# Patient Record
Sex: MALE | Race: WHITE | NOT HISPANIC OR LATINO | Employment: OTHER | URBAN - METROPOLITAN AREA
[De-identification: names, ages, dates, MRNs, and addresses within clinical notes are randomized per-mention and may not be internally consistent; named-entity substitution may affect disease eponyms.]

---

## 2023-04-27 LAB
ANION GAP IN SER/PLAS: 11 MMOL/L (ref 10–20)
CALCIUM (MG/DL) IN SER/PLAS: 9.4 MG/DL (ref 8.6–10.3)
CARBON DIOXIDE, TOTAL (MMOL/L) IN SER/PLAS: 28 MMOL/L (ref 21–32)
CHLORIDE (MMOL/L) IN SER/PLAS: 104 MMOL/L (ref 98–107)
CHOLESTEROL (MG/DL) IN SER/PLAS: 123 MG/DL (ref 0–199)
CHOLESTEROL IN HDL (MG/DL) IN SER/PLAS: 41.1 MG/DL
CHOLESTEROL/HDL RATIO: 3
CREATININE (MG/DL) IN SER/PLAS: 0.91 MG/DL (ref 0.5–1.3)
ERYTHROCYTE DISTRIBUTION WIDTH (RATIO) BY AUTOMATED COUNT: 12.3 % (ref 11.5–14.5)
ERYTHROCYTE MEAN CORPUSCULAR HEMOGLOBIN CONCENTRATION (G/DL) BY AUTOMATED: 32.7 G/DL (ref 32–36)
ERYTHROCYTE MEAN CORPUSCULAR VOLUME (FL) BY AUTOMATED COUNT: 95 FL (ref 80–100)
ERYTHROCYTES (10*6/UL) IN BLOOD BY AUTOMATED COUNT: 5.23 X10E12/L (ref 4.5–5.9)
GFR MALE: >90 ML/MIN/1.73M2
GLUCOSE (MG/DL) IN SER/PLAS: 105 MG/DL (ref 74–99)
HEMATOCRIT (%) IN BLOOD BY AUTOMATED COUNT: 49.9 % (ref 41–52)
HEMOGLOBIN (G/DL) IN BLOOD: 16.3 G/DL (ref 13.5–17.5)
LDL: 69 MG/DL (ref 0–99)
LEUKOCYTES (10*3/UL) IN BLOOD BY AUTOMATED COUNT: 8.4 X10E9/L (ref 4.4–11.3)
PLATELETS (10*3/UL) IN BLOOD AUTOMATED COUNT: 261 X10E9/L (ref 150–450)
POTASSIUM (MMOL/L) IN SER/PLAS: 4.8 MMOL/L (ref 3.5–5.3)
SODIUM (MMOL/L) IN SER/PLAS: 138 MMOL/L (ref 136–145)
TRIGLYCERIDE (MG/DL) IN SER/PLAS: 64 MG/DL (ref 0–149)
UREA NITROGEN (MG/DL) IN SER/PLAS: 16 MG/DL (ref 6–23)
VLDL: 13 MG/DL (ref 0–40)

## 2023-11-17 ENCOUNTER — APPOINTMENT (OUTPATIENT)
Dept: RADIOLOGY | Facility: HOSPITAL | Age: 63
End: 2023-11-17
Payer: MEDICARE

## 2023-11-17 ENCOUNTER — HOSPITAL ENCOUNTER (EMERGENCY)
Facility: HOSPITAL | Age: 63
Discharge: HOME | End: 2023-11-17
Attending: EMERGENCY MEDICINE
Payer: MEDICARE

## 2023-11-17 VITALS
BODY MASS INDEX: 29.48 KG/M2 | DIASTOLIC BLOOD PRESSURE: 71 MMHG | WEIGHT: 187.83 LBS | TEMPERATURE: 97.2 F | HEIGHT: 67 IN | SYSTOLIC BLOOD PRESSURE: 108 MMHG | HEART RATE: 67 BPM | RESPIRATION RATE: 18 BRPM | OXYGEN SATURATION: 96 %

## 2023-11-17 DIAGNOSIS — R42 VERTIGO: Primary | ICD-10-CM

## 2023-11-17 LAB
ANION GAP SERPL CALC-SCNC: 10 MMOL/L (ref 10–20)
BASOPHILS # BLD AUTO: 0.07 X10*3/UL (ref 0–0.1)
BASOPHILS NFR BLD AUTO: 0.7 %
BUN SERPL-MCNC: 11 MG/DL (ref 6–23)
CALCIUM SERPL-MCNC: 9 MG/DL (ref 8.6–10.3)
CHLORIDE SERPL-SCNC: 99 MMOL/L (ref 98–107)
CO2 SERPL-SCNC: 31 MMOL/L (ref 21–32)
CREAT SERPL-MCNC: 1.03 MG/DL (ref 0.5–1.3)
EOSINOPHIL # BLD AUTO: 0.94 X10*3/UL (ref 0–0.7)
EOSINOPHIL NFR BLD AUTO: 8.9 %
ERYTHROCYTE [DISTWIDTH] IN BLOOD BY AUTOMATED COUNT: 12.2 % (ref 11.5–14.5)
GFR SERPL CREATININE-BSD FRML MDRD: 81 ML/MIN/1.73M*2
GLUCOSE SERPL-MCNC: 144 MG/DL (ref 74–99)
HCT VFR BLD AUTO: 49.5 % (ref 41–52)
HGB BLD-MCNC: 15.7 G/DL (ref 13.5–17.5)
IMM GRANULOCYTES # BLD AUTO: 0.03 X10*3/UL (ref 0–0.7)
IMM GRANULOCYTES NFR BLD AUTO: 0.3 % (ref 0–0.9)
LYMPHOCYTES # BLD AUTO: 3.01 X10*3/UL (ref 1.2–4.8)
LYMPHOCYTES NFR BLD AUTO: 28.5 %
MCH RBC QN AUTO: 30.7 PG (ref 26–34)
MCHC RBC AUTO-ENTMCNC: 31.7 G/DL (ref 32–36)
MCV RBC AUTO: 97 FL (ref 80–100)
MONOCYTES # BLD AUTO: 0.59 X10*3/UL (ref 0.1–1)
MONOCYTES NFR BLD AUTO: 5.6 %
NEUTROPHILS # BLD AUTO: 5.92 X10*3/UL (ref 1.2–7.7)
NEUTROPHILS NFR BLD AUTO: 56 %
NRBC BLD-RTO: 0 /100 WBCS (ref 0–0)
PLATELET # BLD AUTO: 296 X10*3/UL (ref 150–450)
POTASSIUM SERPL-SCNC: 4.5 MMOL/L (ref 3.5–5.3)
RBC # BLD AUTO: 5.11 X10*6/UL (ref 4.5–5.9)
SODIUM SERPL-SCNC: 135 MMOL/L (ref 136–145)
WBC # BLD AUTO: 10.6 X10*3/UL (ref 4.4–11.3)

## 2023-11-17 PROCEDURE — 85025 COMPLETE CBC W/AUTO DIFF WBC: CPT | Performed by: EMERGENCY MEDICINE

## 2023-11-17 PROCEDURE — 2500000004 HC RX 250 GENERAL PHARMACY W/ HCPCS (ALT 636 FOR OP/ED): Performed by: EMERGENCY MEDICINE

## 2023-11-17 PROCEDURE — 70450 CT HEAD/BRAIN W/O DYE: CPT

## 2023-11-17 PROCEDURE — 99285 EMERGENCY DEPT VISIT HI MDM: CPT | Mod: 25 | Performed by: EMERGENCY MEDICINE

## 2023-11-17 PROCEDURE — 96361 HYDRATE IV INFUSION ADD-ON: CPT

## 2023-11-17 PROCEDURE — 70450 CT HEAD/BRAIN W/O DYE: CPT | Performed by: SURGERY

## 2023-11-17 PROCEDURE — 99284 EMERGENCY DEPT VISIT MOD MDM: CPT | Mod: 25

## 2023-11-17 PROCEDURE — 2500000001 HC RX 250 WO HCPCS SELF ADMINISTERED DRUGS (ALT 637 FOR MEDICARE OP): Performed by: EMERGENCY MEDICINE

## 2023-11-17 PROCEDURE — 36415 COLL VENOUS BLD VENIPUNCTURE: CPT | Performed by: EMERGENCY MEDICINE

## 2023-11-17 PROCEDURE — 96374 THER/PROPH/DIAG INJ IV PUSH: CPT

## 2023-11-17 PROCEDURE — 82435 ASSAY OF BLOOD CHLORIDE: CPT | Performed by: EMERGENCY MEDICINE

## 2023-11-17 RX ORDER — ONDANSETRON HYDROCHLORIDE 2 MG/ML
4 INJECTION, SOLUTION INTRAVENOUS ONCE
Status: COMPLETED | OUTPATIENT
Start: 2023-11-17 | End: 2023-11-17

## 2023-11-17 RX ORDER — MECLIZINE HYDROCHLORIDE 25 MG/1
25 TABLET ORAL ONCE
Status: COMPLETED | OUTPATIENT
Start: 2023-11-17 | End: 2023-11-17

## 2023-11-17 RX ORDER — ONDANSETRON 8 MG/1
8 TABLET, ORALLY DISINTEGRATING ORAL EVERY 8 HOURS PRN
Qty: 20 TABLET | Refills: 0 | Status: SHIPPED | OUTPATIENT
Start: 2023-11-17

## 2023-11-17 RX ORDER — MECLIZINE HYDROCHLORIDE 25 MG/1
25 TABLET ORAL 3 TIMES DAILY PRN
Qty: 20 TABLET | Refills: 0 | Status: SHIPPED | OUTPATIENT
Start: 2023-11-17

## 2023-11-17 RX ADMIN — MECLIZINE HYDROCHLORIDE 25 MG: 25 TABLET ORAL at 03:49

## 2023-11-17 RX ADMIN — ONDANSETRON 4 MG: 2 INJECTION INTRAMUSCULAR; INTRAVENOUS at 03:47

## 2023-11-17 RX ADMIN — SODIUM CHLORIDE 500 ML: 9 INJECTION, SOLUTION INTRAVENOUS at 03:39

## 2023-11-17 ASSESSMENT — LIFESTYLE VARIABLES
EVER FELT BAD OR GUILTY ABOUT YOUR DRINKING: NO
HAVE YOU EVER FELT YOU SHOULD CUT DOWN ON YOUR DRINKING: NO
HAVE PEOPLE ANNOYED YOU BY CRITICIZING YOUR DRINKING: NO
EVER HAD A DRINK FIRST THING IN THE MORNING TO STEADY YOUR NERVES TO GET RID OF A HANGOVER: NO
REASON UNABLE TO ASSESS: NO

## 2023-11-17 ASSESSMENT — PAIN - FUNCTIONAL ASSESSMENT: PAIN_FUNCTIONAL_ASSESSMENT: 0-10

## 2023-11-17 ASSESSMENT — PAIN SCALES - GENERAL: PAINLEVEL_OUTOF10: 0 - NO PAIN

## 2023-11-17 NOTE — ED PROVIDER NOTES
HPI   Chief Complaint   Patient presents with   • Dizziness   • n/v       Patient presents with dizziness.  He states that started 1 hour when he woke up.  He describes as a room spinning sensation.  He has felt nauseated as well.  He states that when he stands up or sits up it gets worse.  Denies any head trauma.  He has no chest pain.  Does have shortness of breath he has short of breath all the time.  He took no medications for this at home.  No history of vertigo.  Has been eating and drinking normally.  EMS was called.  He was 87% on room air so they placed him on oxygen.  He states he has been trying to get oxygen at home because of his COPD.  His normal pulse ox is around 88%.                            No data recorded                Patient History   Past Medical History:   Diagnosis Date   • Chronic systolic (congestive) heart failure (CMS/HCC) 09/22/2020    Chronic systolic heart failure   • Old myocardial infarction     Old non-ST elevation myocardial infarction (NSTEMI)   • Old myocardial infarction 09/22/2020    History of ST elevation myocardial infarction (STEMI)   • Old myocardial infarction     History of ST elevation myocardial infarction (STEMI)   • Personal history of other diseases of the circulatory system     History of heart disease   • Personal history of other endocrine, nutritional and metabolic disease     History of hyperlipidemia     Past Surgical History:   Procedure Laterality Date   • MANDIBLE SURGERY  06/01/2016    Jaw Surgery   • OTHER SURGICAL HISTORY  06/29/2020    Coronary artery stent placement   • TONSILLECTOMY  06/01/2016    Tonsillectomy With Adenoidectomy   • VASECTOMY  06/01/2016    Surgery Vas Deferens Vasectomy     No family history on file.  Social History     Tobacco Use   • Smoking status: Not on file   • Smokeless tobacco: Not on file   Substance Use Topics   • Alcohol use: Not on file   • Drug use: Not on file       Physical Exam   ED Triage Vitals   Temp Pulse  Resp BP   -- -- -- --      SpO2 Temp src Heart Rate Source Patient Position   -- -- -- --      BP Location FiO2 (%)     -- --       Physical Exam  Vitals and nursing note reviewed.   Constitutional:       Appearance: Normal appearance.   HENT:      Head: Normocephalic and atraumatic.      Mouth/Throat:      Mouth: Mucous membranes are moist.   Eyes:      Extraocular Movements: Extraocular movements intact.      Pupils: Pupils are equal, round, and reactive to light.      Comments: Bilateral nystagmus with 3 beats on each side.  This did not exacerbate his symptoms.   Cardiovascular:      Rate and Rhythm: Normal rate and regular rhythm.      Heart sounds: No murmur heard.  Pulmonary:      Effort: Pulmonary effort is normal. No respiratory distress.      Breath sounds: Normal breath sounds.   Abdominal:      General: There is no distension.      Palpations: Abdomen is soft.      Tenderness: There is no abdominal tenderness.   Musculoskeletal:         General: No tenderness or deformity. Normal range of motion.      Right lower leg: No edema.      Left lower leg: No edema.   Skin:     General: Skin is warm and dry.      Findings: No lesion or rash.   Neurological:      General: No focal deficit present.      Mental Status: He is alert and oriented to person, place, and time.      Sensory: No sensory deficit.      Motor: No weakness.   Psychiatric:         Behavior: Behavior normal.       Labs Reviewed   CBC WITH AUTO DIFFERENTIAL   BASIC METABOLIC PANEL     CT head wo IV contrast    (Results Pending)     ED Course & MDM   Diagnoses as of 11/17/23 0527   Vertigo       Medical Decision Making  Differentials include vertigo, dehydration, intracranial abnormality. Imaging studies, if performed, were independently reviewed and interpreted by myself and confirmed by radiologist. EKG(s), if performed, were interpreted by myself.  Patient was given 500 cc of IV fluids.  EKG was sinus rhythm with a rate of 69.  No acute  ischemia noted.  QTc 428, VA interval 167.  Laboratory studies were obtained and only showed a sodium of 135 and glucose of 144.  Otherwise unremarkable.  CAT scan of the head is unremarkable as well.  He felt improvement with meclizine, IV fluids and Zofran.  Feel this is likely vertigo.  I have very low suspicion for a posterior circulation issue of the brain.  This does seem peripheral in nature.  I feel that he can be discharged home with meclizine and Zofran.  Patient will be discharged to follow-up with his PCP.        Procedure  Procedures     Ignacio Garcia MD  11/17/23 0579

## 2024-03-16 RX ORDER — METFORMIN HYDROCHLORIDE 500 MG/1
500 TABLET, EXTENDED RELEASE ORAL 2 TIMES DAILY
COMMUNITY
Start: 2020-06-25

## 2024-03-16 RX ORDER — ALBUTEROL SULFATE 90 UG/1
2 AEROSOL, METERED RESPIRATORY (INHALATION) EVERY 6 HOURS PRN
COMMUNITY
Start: 2020-06-24

## 2024-03-16 RX ORDER — ASPIRIN 81 MG/1
81 TABLET ORAL DAILY
COMMUNITY
Start: 2020-06-25

## 2024-03-16 RX ORDER — TIOTROPIUM BROMIDE 18 UG/1
1 CAPSULE ORAL; RESPIRATORY (INHALATION)
COMMUNITY
Start: 2020-08-11

## 2024-03-16 RX ORDER — CARVEDILOL 6.25 MG/1
6.25 TABLET ORAL
COMMUNITY
Start: 2020-06-25 | End: 2024-03-20 | Stop reason: SDUPTHER

## 2024-03-16 RX ORDER — LISINOPRIL 5 MG/1
5 TABLET ORAL DAILY
COMMUNITY
Start: 2015-06-01 | End: 2024-03-20 | Stop reason: SDUPTHER

## 2024-03-16 RX ORDER — NITROGLYCERIN 0.4 MG/1
0.4 TABLET SUBLINGUAL EVERY 5 MIN PRN
COMMUNITY
Start: 2020-06-25 | End: 2024-03-20 | Stop reason: WASHOUT

## 2024-03-16 RX ORDER — ATORVASTATIN CALCIUM 80 MG/1
80 TABLET, FILM COATED ORAL NIGHTLY
COMMUNITY
Start: 2020-06-25 | End: 2024-03-20 | Stop reason: SDUPTHER

## 2024-03-16 RX ORDER — FLUTICASONE PROPIONATE AND SALMETEROL 100; 50 UG/1; UG/1
1 POWDER RESPIRATORY (INHALATION) EVERY 12 HOURS
COMMUNITY
Start: 2020-08-11

## 2024-03-18 PROBLEM — I25.10 ASHD (ARTERIOSCLEROTIC HEART DISEASE): Status: ACTIVE | Noted: 2024-03-18

## 2024-03-18 PROBLEM — E78.5 HYPERLIPIDEMIA: Status: ACTIVE | Noted: 2024-03-18

## 2024-03-18 PROBLEM — F17.200 TOBACCO DEPENDENCY: Status: ACTIVE | Noted: 2024-03-18

## 2024-03-18 PROBLEM — Z95.5 PRESENCE OF STENT IN LAD CORONARY ARTERY: Status: ACTIVE | Noted: 2024-03-18

## 2024-03-20 ENCOUNTER — OFFICE VISIT (OUTPATIENT)
Dept: CARDIOLOGY | Facility: CLINIC | Age: 64
End: 2024-03-20
Payer: MEDICARE

## 2024-03-20 VITALS
WEIGHT: 190 LBS | SYSTOLIC BLOOD PRESSURE: 102 MMHG | HEIGHT: 67 IN | BODY MASS INDEX: 29.82 KG/M2 | DIASTOLIC BLOOD PRESSURE: 60 MMHG | HEART RATE: 70 BPM

## 2024-03-20 DIAGNOSIS — E78.5 HYPERLIPIDEMIA, UNSPECIFIED HYPERLIPIDEMIA TYPE: ICD-10-CM

## 2024-03-20 DIAGNOSIS — I25.10 ASHD (ARTERIOSCLEROTIC HEART DISEASE): ICD-10-CM

## 2024-03-20 DIAGNOSIS — Z95.5 PRESENCE OF STENT IN LAD CORONARY ARTERY: Primary | ICD-10-CM

## 2024-03-20 DIAGNOSIS — F17.200 TOBACCO DEPENDENCY: ICD-10-CM

## 2024-03-20 PROCEDURE — 93000 ELECTROCARDIOGRAM COMPLETE: CPT | Performed by: INTERNAL MEDICINE

## 2024-03-20 PROCEDURE — 1160F RVW MEDS BY RX/DR IN RCRD: CPT | Performed by: INTERNAL MEDICINE

## 2024-03-20 PROCEDURE — 99214 OFFICE O/P EST MOD 30 MIN: CPT | Performed by: INTERNAL MEDICINE

## 2024-03-20 PROCEDURE — 1159F MED LIST DOCD IN RCRD: CPT | Performed by: INTERNAL MEDICINE

## 2024-03-20 RX ORDER — ATORVASTATIN CALCIUM 80 MG/1
80 TABLET, FILM COATED ORAL NIGHTLY
Qty: 90 TABLET | Refills: 3 | Status: SHIPPED | OUTPATIENT
Start: 2024-03-20

## 2024-03-20 RX ORDER — CARVEDILOL 6.25 MG/1
6.25 TABLET ORAL
Qty: 180 TABLET | Refills: 3 | Status: SHIPPED | OUTPATIENT
Start: 2024-03-20

## 2024-03-20 RX ORDER — LISINOPRIL 5 MG/1
5 TABLET ORAL DAILY
Qty: 90 TABLET | Refills: 3 | Status: SHIPPED | OUTPATIENT
Start: 2024-03-20

## 2024-03-20 ASSESSMENT — ENCOUNTER SYMPTOMS
OCCASIONAL FEELINGS OF UNSTEADINESS: 0
LOSS OF SENSATION IN FEET: 0
DEPRESSION: 0

## 2024-03-20 NOTE — PROGRESS NOTES
"Chief Complaint:   Follow-up (annual)     History Of Present Illness:    Darron Resendez is a 63 y.o. male here for annual follow-up.  He has a previous history of PCI to LAD in 2011 and hyperlipidemia, LV dysfunction. He got lost to follow-up and stopped taking his medications and presented with STEMI in July 2020 and had urgent PCI to LAD on June 22, 2020. Post MI ejection fraction was 35% but more recently improved to 65% in July 2020.     He is here for follow-up. He is doing well and does not have palpitations, ankle swelling, lightheadedness or loss of consciousness.  He has chronic shortness of breath with exertion.  He is not interested in quitting smoking.            EKG showed sinus rhythm with old anteroseptal MI. His last lipid profile from April 2023 was favorable.     Last Recorded Vitals:  Vitals:    03/20/24 1111   BP: 102/60   BP Location: Left arm   Pulse: 70   Weight: 86.2 kg (190 lb)   Height: 1.702 m (5' 7\")       Past Medical History:  He has a past medical history of Chronic systolic (congestive) heart failure (CMS/HCC) (09/22/2020), Old myocardial infarction, Old myocardial infarction (09/22/2020), Old myocardial infarction, Personal history of other diseases of the circulatory system, and Personal history of other endocrine, nutritional and metabolic disease.    Past Surgical History:  He has a past surgical history that includes Tonsillectomy (06/01/2016); Vasectomy (06/01/2016); Mandible surgery (06/01/2016); and Other surgical history (06/29/2020).      Social History:  He reports that he has been smoking cigarettes. He has been smoking an average of 1.5 packs per day. He has never used smokeless tobacco. He reports that he does not currently use alcohol. He reports that he does not use drugs.    Family History:  No family history on file.     Allergies:  Penicillin g    Outpatient Medications:  Current Outpatient Medications   Medication Instructions    albuterol 90 mcg/actuation inhaler 2 " puffs, inhalation, Every 6 hours PRN    aspirin 81 mg, oral, Daily    atorvastatin (LIPITOR) 80 mg, oral, Nightly    carvedilol (COREG) 6.25 mg, oral, 2 times daily with meals    fluticasone propion-salmeteroL (Advair Diskus) 100-50 mcg/dose diskus inhaler 1 puff, inhalation, Every 12 hours    lisinopril 5 mg, oral, Daily    meclizine (ANTIVERT) 25 mg, oral, 3 times daily PRN    metFORMIN XR (GLUCOPHAGE-XR) 500 mg, oral, 2 times daily    nitroglycerin (NITROSTAT) 0.4 mg, sublingual, Every 5 min PRN    ondansetron ODT (ZOFRAN-ODT) 8 mg, oral, Every 8 hours PRN    tiotropium (Spiriva) 18 mcg inhalation capsule 1 capsule, inhalation, Daily RT       Physical Exam:  Physical Exam  Vitals reviewed.   Constitutional:       Appearance: Normal appearance.   Neck:      Vascular: No carotid bruit or JVD.   Cardiovascular:      Rate and Rhythm: Normal rate and regular rhythm.      Heart sounds: Normal heart sounds, S1 normal and S2 normal. No murmur heard.  Pulmonary:      Effort: Pulmonary effort is normal.      Breath sounds: Examination of the left-lower field reveals wheezing. Wheezing present.   Abdominal:      General: Abdomen is flat. Bowel sounds are normal.      Palpations: Abdomen is soft.   Musculoskeletal:      Right lower leg: No edema.      Left lower leg: No edema.   Skin:     General: Skin is warm.   Neurological:      Mental Status: He is alert. Mental status is at baseline.   Psychiatric:         Mood and Affect: Mood normal.         Behavior: Behavior normal.          Last Labs:  CBC -  Lab Results   Component Value Date    WBC 10.6 11/17/2023    HGB 15.7 11/17/2023    HCT 49.5 11/17/2023    MCV 97 11/17/2023     11/17/2023       CMP -  Lab Results   Component Value Date    CALCIUM 9.0 11/17/2023    PROT 6.3 (L) 07/08/2020    ALBUMIN 3.4 07/08/2020    AST 11 07/08/2020    ALT 15 07/08/2020    ALKPHOS 42 07/08/2020    BILITOT 0.4 07/08/2020       LIPID PANEL -   Lab Results   Component Value Date     "CHOL 123 04/27/2023    TRIG 64 04/27/2023    HDL 41.1 04/27/2023    CHHDL 3.0 04/27/2023    LDLF 69 04/27/2023    VLDL 13 04/27/2023       RENAL FUNCTION PANEL -   Lab Results   Component Value Date    GLUCOSE 144 (H) 11/17/2023     (L) 11/17/2023    K 4.5 11/17/2023    CL 99 11/17/2023    CO2 31 11/17/2023    ANIONGAP 10 11/17/2023    BUN 11 11/17/2023    CREATININE 1.03 11/17/2023    GFRMALE >90 04/27/2023    CALCIUM 9.0 11/17/2023    ALBUMIN 3.4 07/08/2020        Lab Results   Component Value Date    BNP 20 08/07/2020    HGBA1C 6.5 06/23/2020       Last Cardiology Tests:  ECG:  ECG 12 Lead       Echo:  No results found for this or any previous visit from the past 1095 days.      Ejection Fractions:  No results found for: \"EF\"    Cath:  No results found for this or any previous visit from the past 1095 days.      Stress Test:  No results found for this or any previous visit from the past 1095 days.      Cardiac Imaging:  No results found for this or any previous visit from the past 1095 days.          Assessment/Plan   In summary Mr. Resendez is a 63-year-old gentleman with coronary artery disease, prior history of STEMI.     1-CAD-clinically doing well and is on appropriate medical therapy.   2-hyperlipidemia-lipid profile favorable.    3-cardiomyopathy/hypertension-LV function has improved post MI continue current medications blood pressure well-controlled.    4-tobacco use-not interested in quitting.    Corrine Zelaya MD  "

## 2024-04-02 ENCOUNTER — TELEPHONE (OUTPATIENT)
Dept: CARDIOLOGY | Facility: CLINIC | Age: 64
End: 2024-04-02
Payer: MEDICARE

## 2024-04-02 NOTE — TELEPHONE ENCOUNTER
----- Message from Britney Pham sent at 3/28/2024  4:01 PM EDT -----  Regarding: med refill  Atorvastatin, lisinopril, and carvedilol. Uses [facility] in [place]

## 2024-07-01 ENCOUNTER — APPOINTMENT (OUTPATIENT)
Dept: CARDIOLOGY | Facility: HOSPITAL | Age: 64
End: 2024-07-01
Payer: MEDICARE

## 2024-07-01 ENCOUNTER — APPOINTMENT (OUTPATIENT)
Dept: RADIOLOGY | Facility: HOSPITAL | Age: 64
End: 2024-07-01
Payer: MEDICARE

## 2024-07-01 ENCOUNTER — HOSPITAL ENCOUNTER (INPATIENT)
Facility: HOSPITAL | Age: 64
End: 2024-07-01
Attending: EMERGENCY MEDICINE | Admitting: INTERNAL MEDICINE
Payer: MEDICARE

## 2024-07-01 DIAGNOSIS — J96.21 ACUTE ON CHRONIC RESPIRATORY FAILURE WITH HYPOXIA (MULTI): Primary | ICD-10-CM

## 2024-07-01 DIAGNOSIS — E86.0 DEHYDRATION: ICD-10-CM

## 2024-07-01 DIAGNOSIS — A41.9 SEPTIC SHOCK (MULTI): ICD-10-CM

## 2024-07-01 DIAGNOSIS — J44.1 COPD EXACERBATION (MULTI): ICD-10-CM

## 2024-07-01 DIAGNOSIS — R65.21 SEPTIC SHOCK (MULTI): ICD-10-CM

## 2024-07-01 DIAGNOSIS — R13.12 OROPHARYNGEAL DYSPHAGIA: ICD-10-CM

## 2024-07-01 PROBLEM — N17.9 AKI (ACUTE KIDNEY INJURY) (CMS-HCC): Status: ACTIVE | Noted: 2024-07-01

## 2024-07-01 PROBLEM — I21.9 MYOCARDIAL INFARCTION (MULTI): Status: RESOLVED | Noted: 2024-07-01 | Resolved: 2024-07-01

## 2024-07-01 PROBLEM — I10 HTN (HYPERTENSION): Status: ACTIVE | Noted: 2024-07-01

## 2024-07-01 PROBLEM — F17.210 CIGARETTE NICOTINE DEPENDENCE: Status: ACTIVE | Noted: 2024-03-18

## 2024-07-01 PROBLEM — E87.1 HYPONATREMIA: Status: ACTIVE | Noted: 2024-07-01

## 2024-07-01 PROBLEM — R06.82 TACHYPNEA: Status: ACTIVE | Noted: 2024-07-01

## 2024-07-01 PROBLEM — R73.9 HYPERGLYCEMIA: Status: ACTIVE | Noted: 2024-07-01

## 2024-07-01 PROBLEM — Z95.5 PRESENCE OF STENT IN ANTERIOR DESCENDING BRANCH OF LEFT CORONARY ARTERY: Status: RESOLVED | Noted: 2024-07-01 | Resolved: 2024-07-01

## 2024-07-01 LAB
ANION GAP SERPL CALC-SCNC: 14 MMOL/L (ref 10–20)
ATRIAL RATE: 76 BPM
BASE EXCESS BLDV CALC-SCNC: 3.1 MMOL/L (ref -2–3)
BASOPHILS # BLD AUTO: 0.08 X10*3/UL (ref 0–0.1)
BASOPHILS NFR BLD AUTO: 0.6 %
BNP SERPL-MCNC: 122 PG/ML (ref 0–99)
BODY TEMPERATURE: 37 DEGREES CELSIUS
BUN SERPL-MCNC: 64 MG/DL (ref 6–23)
CALCIUM SERPL-MCNC: 8.9 MG/DL (ref 8.6–10.3)
CARDIAC TROPONIN I PNL SERPL HS: 14 NG/L (ref 0–20)
CARDIAC TROPONIN I PNL SERPL HS: 15 NG/L (ref 0–20)
CHLORIDE SERPL-SCNC: 92 MMOL/L (ref 98–107)
CO2 SERPL-SCNC: 29 MMOL/L (ref 21–32)
CREAT SERPL-MCNC: 2.12 MG/DL (ref 0.5–1.3)
EGFRCR SERPLBLD CKD-EPI 2021: 34 ML/MIN/1.73M*2
EOSINOPHIL # BLD AUTO: 0.09 X10*3/UL (ref 0–0.7)
EOSINOPHIL NFR BLD AUTO: 0.7 %
ERYTHROCYTE [DISTWIDTH] IN BLOOD BY AUTOMATED COUNT: 12.6 % (ref 11.5–14.5)
GLUCOSE SERPL-MCNC: 220 MG/DL (ref 74–99)
HCO3 BLDV-SCNC: 33 MMOL/L (ref 22–26)
HCT VFR BLD AUTO: 43.9 % (ref 41–52)
HGB BLD-MCNC: 14.3 G/DL (ref 13.5–17.5)
IMM GRANULOCYTES # BLD AUTO: 0.09 X10*3/UL (ref 0–0.7)
IMM GRANULOCYTES NFR BLD AUTO: 0.7 % (ref 0–0.9)
INHALED O2 CONCENTRATION: 100 %
LACTATE SERPL-SCNC: 1.4 MMOL/L (ref 0.4–2)
LYMPHOCYTES # BLD AUTO: 0.77 X10*3/UL (ref 1.2–4.8)
LYMPHOCYTES NFR BLD AUTO: 6 %
MCH RBC QN AUTO: 31.5 PG (ref 26–34)
MCHC RBC AUTO-ENTMCNC: 32.6 G/DL (ref 32–36)
MCV RBC AUTO: 97 FL (ref 80–100)
MONOCYTES # BLD AUTO: 0.9 X10*3/UL (ref 0.1–1)
MONOCYTES NFR BLD AUTO: 7 %
NEUTROPHILS # BLD AUTO: 11.01 X10*3/UL (ref 1.2–7.7)
NEUTROPHILS NFR BLD AUTO: 85 %
NRBC BLD-RTO: 0 /100 WBCS (ref 0–0)
OXYHGB MFR BLDV: 23.1 % (ref 45–75)
P AXIS: 75 DEGREES
PCO2 BLDV: 77 MM HG (ref 41–51)
PH BLDV: 7.24 PH (ref 7.33–7.43)
PLATELET # BLD AUTO: 260 X10*3/UL (ref 150–450)
PO2 BLDV: 21 MM HG (ref 35–45)
POTASSIUM SERPL-SCNC: 4.6 MMOL/L (ref 3.5–5.3)
PR INTERVAL: 144 MS
Q ONSET: 252 MS
QRS COUNT: 12 BEATS
QRS DURATION: 89 MS
QT INTERVAL: 333 MS
QTC CALCULATION(BAZETT): 375 MS
QTC FREDERICIA: 360 MS
R AXIS: 77 DEGREES
RBC # BLD AUTO: 4.54 X10*6/UL (ref 4.5–5.9)
SAO2 % BLDV: 23 % (ref 45–75)
SARS-COV-2 RNA RESP QL NAA+PROBE: NOT DETECTED
SODIUM SERPL-SCNC: 130 MMOL/L (ref 136–145)
T AXIS: 44 DEGREES
T OFFSET: 419 MS
VENTRICULAR RATE: 76 BPM
WBC # BLD AUTO: 12.9 X10*3/UL (ref 4.4–11.3)

## 2024-07-01 PROCEDURE — 94660 CPAP INITIATION&MGMT: CPT

## 2024-07-01 PROCEDURE — 71045 X-RAY EXAM CHEST 1 VIEW: CPT | Mod: FOREIGN READ | Performed by: RADIOLOGY

## 2024-07-01 PROCEDURE — 85025 COMPLETE CBC W/AUTO DIFF WBC: CPT | Performed by: EMERGENCY MEDICINE

## 2024-07-01 PROCEDURE — 2500000005 HC RX 250 GENERAL PHARMACY W/O HCPCS

## 2024-07-01 PROCEDURE — 84484 ASSAY OF TROPONIN QUANT: CPT | Performed by: EMERGENCY MEDICINE

## 2024-07-01 PROCEDURE — 2500000004 HC RX 250 GENERAL PHARMACY W/ HCPCS (ALT 636 FOR OP/ED): Performed by: STUDENT IN AN ORGANIZED HEALTH CARE EDUCATION/TRAINING PROGRAM

## 2024-07-01 PROCEDURE — 87635 SARS-COV-2 COVID-19 AMP PRB: CPT | Performed by: EMERGENCY MEDICINE

## 2024-07-01 PROCEDURE — 2500000005 HC RX 250 GENERAL PHARMACY W/O HCPCS: Performed by: EMERGENCY MEDICINE

## 2024-07-01 PROCEDURE — 94640 AIRWAY INHALATION TREATMENT: CPT | Mod: 59

## 2024-07-01 PROCEDURE — 2500000004 HC RX 250 GENERAL PHARMACY W/ HCPCS (ALT 636 FOR OP/ED): Mod: JZ

## 2024-07-01 PROCEDURE — 96375 TX/PRO/DX INJ NEW DRUG ADDON: CPT

## 2024-07-01 PROCEDURE — 87040 BLOOD CULTURE FOR BACTERIA: CPT | Mod: PORLAB | Performed by: EMERGENCY MEDICINE

## 2024-07-01 PROCEDURE — 2500000001 HC RX 250 WO HCPCS SELF ADMINISTERED DRUGS (ALT 637 FOR MEDICARE OP): Performed by: STUDENT IN AN ORGANIZED HEALTH CARE EDUCATION/TRAINING PROGRAM

## 2024-07-01 PROCEDURE — 5A09357 ASSISTANCE WITH RESPIRATORY VENTILATION, LESS THAN 24 CONSECUTIVE HOURS, CONTINUOUS POSITIVE AIRWAY PRESSURE: ICD-10-PCS | Performed by: INTERNAL MEDICINE

## 2024-07-01 PROCEDURE — 82805 BLOOD GASES W/O2 SATURATION: CPT | Performed by: EMERGENCY MEDICINE

## 2024-07-01 PROCEDURE — 9420000001 HC RT PATIENT EDUCATION 5 MIN

## 2024-07-01 PROCEDURE — 80048 BASIC METABOLIC PNL TOTAL CA: CPT | Performed by: EMERGENCY MEDICINE

## 2024-07-01 PROCEDURE — 36415 COLL VENOUS BLD VENIPUNCTURE: CPT | Performed by: EMERGENCY MEDICINE

## 2024-07-01 PROCEDURE — 96361 HYDRATE IV INFUSION ADD-ON: CPT

## 2024-07-01 PROCEDURE — 99291 CRITICAL CARE FIRST HOUR: CPT | Mod: 25 | Performed by: EMERGENCY MEDICINE

## 2024-07-01 PROCEDURE — 94640 AIRWAY INHALATION TREATMENT: CPT

## 2024-07-01 PROCEDURE — 2500000002 HC RX 250 W HCPCS SELF ADMINISTERED DRUGS (ALT 637 FOR MEDICARE OP, ALT 636 FOR OP/ED): Performed by: EMERGENCY MEDICINE

## 2024-07-01 PROCEDURE — 99406 BEHAV CHNG SMOKING 3-10 MIN: CPT | Performed by: STUDENT IN AN ORGANIZED HEALTH CARE EDUCATION/TRAINING PROGRAM

## 2024-07-01 PROCEDURE — 99291 CRITICAL CARE FIRST HOUR: CPT | Performed by: STUDENT IN AN ORGANIZED HEALTH CARE EDUCATION/TRAINING PROGRAM

## 2024-07-01 PROCEDURE — 2500000002 HC RX 250 W HCPCS SELF ADMINISTERED DRUGS (ALT 637 FOR MEDICARE OP, ALT 636 FOR OP/ED): Performed by: STUDENT IN AN ORGANIZED HEALTH CARE EDUCATION/TRAINING PROGRAM

## 2024-07-01 PROCEDURE — 83880 ASSAY OF NATRIURETIC PEPTIDE: CPT | Performed by: EMERGENCY MEDICINE

## 2024-07-01 PROCEDURE — 93005 ELECTROCARDIOGRAM TRACING: CPT

## 2024-07-01 PROCEDURE — 94664 DEMO&/EVAL PT USE INHALER: CPT

## 2024-07-01 PROCEDURE — 71045 X-RAY EXAM CHEST 1 VIEW: CPT

## 2024-07-01 PROCEDURE — 2500000005 HC RX 250 GENERAL PHARMACY W/O HCPCS: Performed by: STUDENT IN AN ORGANIZED HEALTH CARE EDUCATION/TRAINING PROGRAM

## 2024-07-01 PROCEDURE — 83605 ASSAY OF LACTIC ACID: CPT | Performed by: EMERGENCY MEDICINE

## 2024-07-01 PROCEDURE — 96365 THER/PROPH/DIAG IV INF INIT: CPT

## 2024-07-01 PROCEDURE — 2020000001 HC ICU ROOM DAILY

## 2024-07-01 PROCEDURE — 99291 CRITICAL CARE FIRST HOUR: CPT | Performed by: INTERNAL MEDICINE

## 2024-07-01 PROCEDURE — 2500000004 HC RX 250 GENERAL PHARMACY W/ HCPCS (ALT 636 FOR OP/ED): Performed by: EMERGENCY MEDICINE

## 2024-07-01 RX ORDER — ACETAMINOPHEN 650 MG/1
650 SUPPOSITORY RECTAL EVERY 4 HOURS PRN
Status: DISCONTINUED | OUTPATIENT
Start: 2024-07-01 | End: 2024-07-11 | Stop reason: HOSPADM

## 2024-07-01 RX ORDER — LANOLIN ALCOHOL/MO/W.PET/CERES
1000 CREAM (GRAM) TOPICAL DAILY
COMMUNITY

## 2024-07-01 RX ORDER — NOREPINEPHRINE BITARTRATE/D5W 8 MG/250ML
0-.2 PLASTIC BAG, INJECTION (ML) INTRAVENOUS CONTINUOUS
Status: DISCONTINUED | OUTPATIENT
Start: 2024-07-01 | End: 2024-07-02

## 2024-07-01 RX ORDER — PANTOPRAZOLE SODIUM 40 MG/10ML
40 INJECTION, POWDER, LYOPHILIZED, FOR SOLUTION INTRAVENOUS
Status: DISCONTINUED | OUTPATIENT
Start: 2024-07-02 | End: 2024-07-11 | Stop reason: HOSPADM

## 2024-07-01 RX ORDER — ONDANSETRON HYDROCHLORIDE 2 MG/ML
4 INJECTION, SOLUTION INTRAVENOUS EVERY 8 HOURS PRN
Status: DISCONTINUED | OUTPATIENT
Start: 2024-07-01 | End: 2024-07-11 | Stop reason: HOSPADM

## 2024-07-01 RX ORDER — BISACODYL 10 MG/1
10 SUPPOSITORY RECTAL DAILY PRN
Status: DISCONTINUED | OUTPATIENT
Start: 2024-07-01 | End: 2024-07-11 | Stop reason: HOSPADM

## 2024-07-01 RX ORDER — IPRATROPIUM BROMIDE AND ALBUTEROL SULFATE 2.5; .5 MG/3ML; MG/3ML
3 SOLUTION RESPIRATORY (INHALATION)
Status: DISCONTINUED | OUTPATIENT
Start: 2024-07-01 | End: 2024-07-01

## 2024-07-01 RX ORDER — BISACODYL 5 MG
10 TABLET, DELAYED RELEASE (ENTERIC COATED) ORAL DAILY PRN
Status: DISCONTINUED | OUTPATIENT
Start: 2024-07-01 | End: 2024-07-11 | Stop reason: HOSPADM

## 2024-07-01 RX ORDER — IPRATROPIUM BROMIDE AND ALBUTEROL SULFATE 2.5; .5 MG/3ML; MG/3ML
3 SOLUTION RESPIRATORY (INHALATION)
Status: DISCONTINUED | OUTPATIENT
Start: 2024-07-01 | End: 2024-07-11 | Stop reason: HOSPADM

## 2024-07-01 RX ORDER — ALBUTEROL SULFATE 0.83 MG/ML
2.5 SOLUTION RESPIRATORY (INHALATION) ONCE
Status: COMPLETED | OUTPATIENT
Start: 2024-07-01 | End: 2024-07-01

## 2024-07-01 RX ORDER — ASPIRIN 81 MG/1
81 TABLET ORAL DAILY
Status: DISCONTINUED | OUTPATIENT
Start: 2024-07-01 | End: 2024-07-11 | Stop reason: HOSPADM

## 2024-07-01 RX ORDER — CEFEPIME 1 G/50ML
2 INJECTION, SOLUTION INTRAVENOUS EVERY 12 HOURS
Status: DISCONTINUED | OUTPATIENT
Start: 2024-07-01 | End: 2024-07-09

## 2024-07-01 RX ORDER — ATORVASTATIN CALCIUM 40 MG/1
80 TABLET, FILM COATED ORAL NIGHTLY
Status: DISCONTINUED | OUTPATIENT
Start: 2024-07-01 | End: 2024-07-11 | Stop reason: HOSPADM

## 2024-07-01 RX ORDER — GLUCOSAM/CHONDRO/HERB 149/HYAL 750-100 MG
1 TABLET ORAL DAILY
COMMUNITY

## 2024-07-01 RX ORDER — ACETAMINOPHEN 325 MG/1
650 TABLET ORAL EVERY 4 HOURS PRN
Status: DISCONTINUED | OUTPATIENT
Start: 2024-07-01 | End: 2024-07-11 | Stop reason: HOSPADM

## 2024-07-01 RX ORDER — ONDANSETRON 4 MG/1
4 TABLET, FILM COATED ORAL EVERY 8 HOURS PRN
Status: DISCONTINUED | OUTPATIENT
Start: 2024-07-01 | End: 2024-07-11 | Stop reason: HOSPADM

## 2024-07-01 RX ORDER — PANTOPRAZOLE SODIUM 40 MG/1
40 TABLET, DELAYED RELEASE ORAL
Status: DISCONTINUED | OUTPATIENT
Start: 2024-07-02 | End: 2024-07-11 | Stop reason: HOSPADM

## 2024-07-01 RX ORDER — ACETAMINOPHEN 160 MG/5ML
650 SOLUTION ORAL EVERY 4 HOURS PRN
Status: DISCONTINUED | OUTPATIENT
Start: 2024-07-01 | End: 2024-07-11 | Stop reason: HOSPADM

## 2024-07-01 RX ORDER — TALC
3 POWDER (GRAM) TOPICAL NIGHTLY PRN
Status: DISCONTINUED | OUTPATIENT
Start: 2024-07-01 | End: 2024-07-11 | Stop reason: HOSPADM

## 2024-07-01 RX ORDER — IPRATROPIUM BROMIDE AND ALBUTEROL SULFATE 2.5; .5 MG/3ML; MG/3ML
3 SOLUTION RESPIRATORY (INHALATION) EVERY 2 HOUR PRN
Status: DISCONTINUED | OUTPATIENT
Start: 2024-07-01 | End: 2024-07-11 | Stop reason: HOSPADM

## 2024-07-01 RX ORDER — ENOXAPARIN SODIUM 100 MG/ML
40 INJECTION SUBCUTANEOUS EVERY 24 HOURS
Status: DISCONTINUED | OUTPATIENT
Start: 2024-07-01 | End: 2024-07-11 | Stop reason: HOSPADM

## 2024-07-01 RX ORDER — GUAIFENESIN 600 MG/1
600 TABLET, EXTENDED RELEASE ORAL EVERY 12 HOURS PRN
Status: DISCONTINUED | OUTPATIENT
Start: 2024-07-01 | End: 2024-07-11 | Stop reason: HOSPADM

## 2024-07-01 RX ORDER — POLYETHYLENE GLYCOL 3350 17 G/17G
17 POWDER, FOR SOLUTION ORAL DAILY
Status: DISCONTINUED | OUTPATIENT
Start: 2024-07-01 | End: 2024-07-11 | Stop reason: HOSPADM

## 2024-07-01 RX ORDER — CEFTRIAXONE 1 G/50ML
1 INJECTION, SOLUTION INTRAVENOUS ONCE
Status: COMPLETED | OUTPATIENT
Start: 2024-07-01 | End: 2024-07-01

## 2024-07-01 RX ORDER — CEFEPIME 1 G/50ML
1 INJECTION, SOLUTION INTRAVENOUS EVERY 8 HOURS
Status: DISCONTINUED | OUTPATIENT
Start: 2024-07-01 | End: 2024-07-01 | Stop reason: DRUGHIGH

## 2024-07-01 RX ORDER — SODIUM CHLORIDE, SODIUM LACTATE, POTASSIUM CHLORIDE, CALCIUM CHLORIDE 600; 310; 30; 20 MG/100ML; MG/100ML; MG/100ML; MG/100ML
100 INJECTION, SOLUTION INTRAVENOUS CONTINUOUS
Status: ACTIVE | OUTPATIENT
Start: 2024-07-01 | End: 2024-07-03

## 2024-07-01 SDOH — HEALTH STABILITY: PHYSICAL HEALTH: ON AVERAGE, HOW MANY DAYS PER WEEK DO YOU ENGAGE IN MODERATE TO STRENUOUS EXERCISE (LIKE A BRISK WALK)?: 0 DAYS

## 2024-07-01 SDOH — SOCIAL STABILITY: SOCIAL INSECURITY: DO YOU FEEL ANYONE HAS EXPLOITED OR TAKEN ADVANTAGE OF YOU FINANCIALLY OR OF YOUR PERSONAL PROPERTY?: NO

## 2024-07-01 SDOH — ECONOMIC STABILITY: HOUSING INSECURITY
IN THE LAST 12 MONTHS, WAS THERE A TIME WHEN YOU DID NOT HAVE A STEADY PLACE TO SLEEP OR SLEPT IN A SHELTER (INCLUDING NOW)?: NO

## 2024-07-01 SDOH — HEALTH STABILITY: MENTAL HEALTH: HOW OFTEN DO YOU HAVE A DRINK CONTAINING ALCOHOL?: NEVER

## 2024-07-01 SDOH — ECONOMIC STABILITY: TRANSPORTATION INSECURITY
IN THE PAST 12 MONTHS, HAS THE LACK OF TRANSPORTATION KEPT YOU FROM MEDICAL APPOINTMENTS OR FROM GETTING MEDICATIONS?: NO

## 2024-07-01 SDOH — SOCIAL STABILITY: SOCIAL INSECURITY: ABUSE: ADULT

## 2024-07-01 SDOH — HEALTH STABILITY: MENTAL HEALTH: HOW OFTEN DO YOU HAVE 6 OR MORE DRINKS ON ONE OCCASION?: NEVER

## 2024-07-01 SDOH — HEALTH STABILITY: PHYSICAL HEALTH: ON AVERAGE, HOW MANY MINUTES DO YOU ENGAGE IN EXERCISE AT THIS LEVEL?: 0 MIN

## 2024-07-01 SDOH — ECONOMIC STABILITY: HOUSING INSECURITY: IN THE LAST 12 MONTHS, HOW MANY PLACES HAVE YOU LIVED?: 1

## 2024-07-01 SDOH — SOCIAL STABILITY: SOCIAL INSECURITY: DO YOU FEEL UNSAFE GOING BACK TO THE PLACE WHERE YOU ARE LIVING?: NO

## 2024-07-01 SDOH — ECONOMIC STABILITY: INCOME INSECURITY: IN THE LAST 12 MONTHS, WAS THERE A TIME WHEN YOU WERE NOT ABLE TO PAY THE MORTGAGE OR RENT ON TIME?: NO

## 2024-07-01 SDOH — SOCIAL STABILITY: SOCIAL INSECURITY: ARE THERE ANY APPARENT SIGNS OF INJURIES/BEHAVIORS THAT COULD BE RELATED TO ABUSE/NEGLECT?: NO

## 2024-07-01 SDOH — ECONOMIC STABILITY: INCOME INSECURITY: HOW HARD IS IT FOR YOU TO PAY FOR THE VERY BASICS LIKE FOOD, HOUSING, MEDICAL CARE, AND HEATING?: NOT HARD AT ALL

## 2024-07-01 SDOH — SOCIAL STABILITY: SOCIAL INSECURITY: HAS ANYONE EVER THREATENED TO HURT YOUR FAMILY OR YOUR PETS?: NO

## 2024-07-01 SDOH — SOCIAL STABILITY: SOCIAL INSECURITY: HAVE YOU HAD ANY THOUGHTS OF HARMING ANYONE ELSE?: NO

## 2024-07-01 SDOH — HEALTH STABILITY: MENTAL HEALTH: HOW MANY STANDARD DRINKS CONTAINING ALCOHOL DO YOU HAVE ON A TYPICAL DAY?: PATIENT DOES NOT DRINK

## 2024-07-01 SDOH — SOCIAL STABILITY: SOCIAL INSECURITY: HAVE YOU HAD THOUGHTS OF HARMING ANYONE ELSE?: NO

## 2024-07-01 SDOH — SOCIAL STABILITY: SOCIAL INSECURITY: ARE YOU OR HAVE YOU BEEN THREATENED OR ABUSED PHYSICALLY, EMOTIONALLY, OR SEXUALLY BY ANYONE?: NO

## 2024-07-01 SDOH — SOCIAL STABILITY: SOCIAL INSECURITY: DOES ANYONE TRY TO KEEP YOU FROM HAVING/CONTACTING OTHER FRIENDS OR DOING THINGS OUTSIDE YOUR HOME?: NO

## 2024-07-01 SDOH — SOCIAL STABILITY: SOCIAL INSECURITY: WERE YOU ABLE TO COMPLETE ALL THE BEHAVIORAL HEALTH SCREENINGS?: YES

## 2024-07-01 SDOH — ECONOMIC STABILITY: TRANSPORTATION INSECURITY
IN THE PAST 12 MONTHS, HAS LACK OF TRANSPORTATION KEPT YOU FROM MEETINGS, WORK, OR FROM GETTING THINGS NEEDED FOR DAILY LIVING?: NO

## 2024-07-01 ASSESSMENT — ACTIVITIES OF DAILY LIVING (ADL)
HEARING - LEFT EAR: DIFFICULTY WITH NOISE
GROOMING: INDEPENDENT
JUDGMENT_ADEQUATE_SAFELY_COMPLETE_DAILY_ACTIVITIES: YES
DRESSING YOURSELF: INDEPENDENT
TOILETING: INDEPENDENT
PATIENT'S MEMORY ADEQUATE TO SAFELY COMPLETE DAILY ACTIVITIES?: YES
FEEDING YOURSELF: INDEPENDENT
WALKS IN HOME: INDEPENDENT
HEARING - RIGHT EAR: FUNCTIONAL
ADEQUATE_TO_COMPLETE_ADL: YES
BATHING: INDEPENDENT

## 2024-07-01 ASSESSMENT — COGNITIVE AND FUNCTIONAL STATUS - GENERAL
DAILY ACTIVITIY SCORE: 24
DAILY ACTIVITIY SCORE: 24
MOBILITY SCORE: 24
MOBILITY SCORE: 23
DAILY ACTIVITIY SCORE: 24
PATIENT BASELINE BEDBOUND: NO
MOBILITY SCORE: 23
CLIMB 3 TO 5 STEPS WITH RAILING: A LITTLE
CLIMB 3 TO 5 STEPS WITH RAILING: A LITTLE

## 2024-07-01 ASSESSMENT — ENCOUNTER SYMPTOMS
CONFUSION: 0
FLANK PAIN: 0
FEVER: 1
DIARRHEA: 0
ARTHRALGIAS: 0
DYSURIA: 0
MYALGIAS: 0
ACTIVITY CHANGE: 0
WOUND: 0
SINUS PAIN: 0
DIFFICULTY URINATING: 0
SORE THROAT: 0
CHILLS: 0
VOMITING: 0
HEMATURIA: 0
RHINORRHEA: 0
HEADACHES: 0
ABDOMINAL PAIN: 0
AGITATION: 0
DIAPHORESIS: 1
NAUSEA: 0
FREQUENCY: 0
ABDOMINAL DISTENTION: 0
CHEST TIGHTNESS: 0
JOINT SWELLING: 0
STRIDOR: 0
NUMBNESS: 0
WEAKNESS: 0
DECREASED CONCENTRATION: 0
LIGHT-HEADEDNESS: 0
FATIGUE: 1
PALPITATIONS: 0
BACK PAIN: 0
COLOR CHANGE: 0
NERVOUS/ANXIOUS: 0
APPETITE CHANGE: 0
APNEA: 0
DIZZINESS: 0
CONSTIPATION: 0
COUGH: 1
SHORTNESS OF BREATH: 1
WHEEZING: 1

## 2024-07-01 ASSESSMENT — PAIN - FUNCTIONAL ASSESSMENT
PAIN_FUNCTIONAL_ASSESSMENT: 0-10

## 2024-07-01 ASSESSMENT — LIFESTYLE VARIABLES
HAVE YOU EVER FELT YOU SHOULD CUT DOWN ON YOUR DRINKING: NO
AUDIT-C TOTAL SCORE: 0
EVER HAD A DRINK FIRST THING IN THE MORNING TO STEADY YOUR NERVES TO GET RID OF A HANGOVER: NO
HOW MANY STANDARD DRINKS CONTAINING ALCOHOL DO YOU HAVE ON A TYPICAL DAY: PATIENT DOES NOT DRINK
HOW OFTEN DO YOU HAVE 6 OR MORE DRINKS ON ONE OCCASION: NEVER
HOW OFTEN DO YOU HAVE A DRINK CONTAINING ALCOHOL: NEVER
TOTAL SCORE: 0
AUDIT-C TOTAL SCORE: 0
HAVE PEOPLE ANNOYED YOU BY CRITICIZING YOUR DRINKING: NO
SKIP TO QUESTIONS 9-10: 1
EVER FELT BAD OR GUILTY ABOUT YOUR DRINKING: NO
SKIP TO QUESTIONS 9-10: 1
AUDIT-C TOTAL SCORE: 0

## 2024-07-01 ASSESSMENT — PAIN SCALES - GENERAL
PAINLEVEL_OUTOF10: 0 - NO PAIN
PAINLEVEL_OUTOF10: 0 - NO PAIN
PAINLEVEL_OUTOF10: 6
PAINLEVEL_OUTOF10: 0 - NO PAIN
PAINLEVEL_OUTOF10: 0 - NO PAIN

## 2024-07-01 ASSESSMENT — PAIN DESCRIPTION - DESCRIPTORS: DESCRIPTORS: ACHING

## 2024-07-01 ASSESSMENT — PATIENT HEALTH QUESTIONNAIRE - PHQ9
1. LITTLE INTEREST OR PLEASURE IN DOING THINGS: NOT AT ALL
SUM OF ALL RESPONSES TO PHQ9 QUESTIONS 1 & 2: 0
2. FEELING DOWN, DEPRESSED OR HOPELESS: NOT AT ALL

## 2024-07-01 ASSESSMENT — COLUMBIA-SUICIDE SEVERITY RATING SCALE - C-SSRS
2. HAVE YOU ACTUALLY HAD ANY THOUGHTS OF KILLING YOURSELF?: NO
1. IN THE PAST MONTH, HAVE YOU WISHED YOU WERE DEAD OR WISHED YOU COULD GO TO SLEEP AND NOT WAKE UP?: NO
6. HAVE YOU EVER DONE ANYTHING, STARTED TO DO ANYTHING, OR PREPARED TO DO ANYTHING TO END YOUR LIFE?: NO

## 2024-07-01 ASSESSMENT — PAIN DESCRIPTION - LOCATION: LOCATION: HEAD

## 2024-07-01 NOTE — CONSULTS
"Critical Care Medicine Consult      Reason For Consult  Hypotension and shortness of breath    History Of Present Illness  Darron Resendez is a 63 y.o. male that is seen in the emergency department with chief complaints of change in his chronic productive cough with feverish feeling (did not take his temperature) chills and increasing dyspnea.  Emergency department staff physician began treating the patient and after 4-1/2 L of crystalloid administration his blood pressure remained low.  Patient is asking to be taken off of his BiPAP, which she was and he was comfortable and answered during my questions fluently in complete sentences.  He has no chest pain.  He had no hemoptysis.  In general he is limited in his ability to ambulate any distance due to chronic back pain.  He believes he has \"borderline\" COPD.  He does not have home oxygen.    Past Medical History:   Diagnosis Date    Chronic systolic (congestive) heart failure (Multi) 09/22/2020    Chronic systolic heart failure    Old myocardial infarction     Old non-ST elevation myocardial infarction (NSTEMI)    Old myocardial infarction 09/22/2020    History of ST elevation myocardial infarction (STEMI)    Old myocardial infarction     History of ST elevation myocardial infarction (STEMI)    Personal history of other diseases of the circulatory system     History of heart disease    Personal history of other endocrine, nutritional and metabolic disease     History of hyperlipidemia     Past Surgical History:   Procedure Laterality Date    MANDIBLE SURGERY  06/01/2016    Jaw Surgery    OTHER SURGICAL HISTORY  06/29/2020    Coronary artery stent placement    TONSILLECTOMY  06/01/2016    Tonsillectomy With Adenoidectomy    VASECTOMY  06/01/2016    Surgery Vas Deferens Vasectomy     (Not in a hospital admission)    Allergies:  Penicillin g  Social History     Tobacco Use    Smoking status: Every Day     Current packs/day: 1.50     Types: Cigarettes    Smokeless " tobacco: Never   Substance Use Topics    Alcohol use: Not Currently    Drug use: Never     Home medications are reviewed and include aspirin, statin, carvedilol, lisinopril, meclizine, and Martha.  Respiratory medications include Advair 100/50, Spiriva, and albuterol.  PRN Medications:   Family history is reviewed and is not contributory to the present illness    Review of Systems:  Review of Systems     Objective   Vitals:  Most Recent:  Vitals:    07/01/24 1352   BP: (!) 81/29   Pulse: 69   Resp:    Temp:    SpO2: 94%       24hr Min/Max:  Temp  Min: 36.2 °C (97.2 °F)  Max: 36.2 °C (97.2 °F)  Pulse  Min: 66  Max: 81  BP  Min: 51/37  Max: 91/53  Resp  Min: 20  Max: 22  SpO2  Min: 90 %  Max: 98 %          Intake/Output Summary (Last 24 hours) at 7/1/2024 1402  Last data filed at 7/1/2024 1309  Gross per 24 hour   Intake 4750 ml   Output --   Net 4750 ml           Physical exam:    Physical Exam  Vitals reviewed.   Constitutional:       General: He is not in acute distress.     Appearance: He is not toxic-appearing.   HENT:      Head: Normocephalic and atraumatic.      Nose: No congestion.      Mouth/Throat:      Pharynx: Oropharyngeal exudate (Whitish exudate on the tongue) present.   Eyes:      General: No scleral icterus.  Cardiovascular:      Rate and Rhythm: Normal rate and regular rhythm.      Heart sounds: No murmur heard.  Pulmonary:      Comments: He has patchy crackles throughout much of the right hemithorax; left side clear, diminished  Musculoskeletal:      Cervical back: Neck supple. No rigidity.   Lymphadenopathy:      Cervical: No cervical adenopathy.   Neurological:      Mental Status: He is alert.          Lab/Radiology/Diagnostic Review:  Results for orders placed or performed during the hospital encounter of 07/01/24 (from the past 24 hour(s))   B-Type Natriuretic Peptide   Result Value Ref Range     (H) 0 - 99 pg/mL   Basic Metabolic Panel   Result Value Ref Range    Glucose 220 (H) 74 - 99  mg/dL    Sodium 130 (L) 136 - 145 mmol/L    Potassium 4.6 3.5 - 5.3 mmol/L    Chloride 92 (L) 98 - 107 mmol/L    Bicarbonate 29 21 - 32 mmol/L    Anion Gap 14 10 - 20 mmol/L    Urea Nitrogen 64 (H) 6 - 23 mg/dL    Creatinine 2.12 (H) 0.50 - 1.30 mg/dL    eGFR 34 (L) >60 mL/min/1.73m*2    Calcium 8.9 8.6 - 10.3 mg/dL   CBC and Auto Differential   Result Value Ref Range    WBC 12.9 (H) 4.4 - 11.3 x10*3/uL    nRBC 0.0 0.0 - 0.0 /100 WBCs    RBC 4.54 4.50 - 5.90 x10*6/uL    Hemoglobin 14.3 13.5 - 17.5 g/dL    Hematocrit 43.9 41.0 - 52.0 %    MCV 97 80 - 100 fL    MCH 31.5 26.0 - 34.0 pg    MCHC 32.6 32.0 - 36.0 g/dL    RDW 12.6 11.5 - 14.5 %    Platelets 260 150 - 450 x10*3/uL    Neutrophils % 85.0 40.0 - 80.0 %    Immature Granulocytes %, Automated 0.7 0.0 - 0.9 %    Lymphocytes % 6.0 13.0 - 44.0 %    Monocytes % 7.0 2.0 - 10.0 %    Eosinophils % 0.7 0.0 - 6.0 %    Basophils % 0.6 0.0 - 2.0 %    Neutrophils Absolute 11.01 (H) 1.20 - 7.70 x10*3/uL    Immature Granulocytes Absolute, Automated 0.09 0.00 - 0.70 x10*3/uL    Lymphocytes Absolute 0.77 (L) 1.20 - 4.80 x10*3/uL    Monocytes Absolute 0.90 0.10 - 1.00 x10*3/uL    Eosinophils Absolute 0.09 0.00 - 0.70 x10*3/uL    Basophils Absolute 0.08 0.00 - 0.10 x10*3/uL   BLOOD GAS VENOUS   Result Value Ref Range    POCT pH, Venous 7.24 (LL) 7.33 - 7.43 pH    POCT pCO2, Venous 77 (HH) 41 - 51 mm Hg    POCT pO2, Venous 21 (L) 35 - 45 mm Hg    POCT SO2, Venous 23 (L) 45 - 75 %    POCT Oxy Hemoglobin, Venous 23.1 (L) 45.0 - 75.0 %    POCT Base Excess, Venous 3.1 (H) -2.0 - 3.0 mmol/L    POCT HCO3 Calculated, Venous 33.0 (H) 22.0 - 26.0 mmol/L    Patient Temperature 37.0 degrees Celsius    FiO2 100 %   Troponin I, High Sensitivity, Initial   Result Value Ref Range    Troponin I, High Sensitivity 15 0 - 20 ng/L   ECG 12 Lead   Result Value Ref Range    Ventricular Rate 76 BPM    Atrial Rate 76 BPM    MT Interval 144 ms    QRS Duration 89 ms    QT Interval 333 ms    QTC  Calculation(Bazett) 375 ms    P Axis 75 degrees    R Axis 77 degrees    T Axis 44 degrees    QRS Count 12 beats    Q Onset 252 ms    T Offset 419 ms    QTC Fredericia 360 ms   Sars-CoV-2 PCR   Result Value Ref Range    Coronavirus 2019, PCR Not Detected Not Detected   Troponin, High Sensitivity, 1 Hour   Result Value Ref Range    Troponin I, High Sensitivity 14 0 - 20 ng/L   Lactate   Result Value Ref Range    Lactate 1.4 0.4 - 2.0 mmol/L     XR chest 1 view    Result Date: 7/1/2024  STUDY: Chest Radiograph;  7/1/2024 10:13 AM. INDICATION: Shortness of breath. COMPARISON: Chest radiograph and CTA chest 8/7/2020. ACCESSION NUMBER(S): [id number] ORDERING CLINICIAN: ROSALVA CHIRINOS TECHNIQUE:  Frontal chest was obtained at 10:12 hours. FINDINGS: CARDIOMEDIASTINAL SILHOUETTE: Cardiomediastinal silhouette is normal in size and configuration but there is widening when compared with the prior.  LUNGS: There are increased perihilar and pulmonary markings more so on the right than the left.  No pleural effusion or pneumothorax  ABDOMEN: No remarkable upper abdominal findings.  BONES: No acute osseous changes.    1.Increased perihilar and pulmonary markings more so on the right than the left. Findings could be on the basis of pulmonary vascular congestion or pneumonia. 2.Widening of the mediastinum when compared with the prior. Signed by Erna Lu DO    ECG 12 Lead    Result Date: 7/1/2024  Sinus rhythm    I revealed multiple thoracic imaging studies from 2020 including a CT angio done on August 7, 2020.  Patient also had a plain film done earlier today which was a single frontal view.  This film was also compared to a single frontal view done on August 7, 2020.  Today's film is quite lordotic.  There are patchy areas of airspace disease in the right lung that were not present on the x-ray from 2020.  CT angio from 2020 shows the lungs to be riddled with emphysema, probably to a moderately severe  degree.      Assessment/Plan   Septic shock, due to pneumonia involving more than 1 lobe of the right lung.  Patient with very poor p.o. intake over the past 4 to 5 days and is likely still volume depleted.  Outpatient cardiology notes reviewed and most recently his ejection fraction has been preserved despite his history of low EF and 2 stents.  Therefore we can be more liberal with his fluid resuscitation.  Old records from 2020 note that he may have had a Pseudomonas infection so it may be wise to change his ceftriaxone to ceftazidime or cefepime.  Continue supplemental oxygen.  Probably can come off of BiPAP now.  Will require ICU admission.  May require vasopressors.  Blood pressure in the cardiologist's office not long ago was 102 systolic.  He did take his ACE inhibitor this morning.  He has associated acute kidney injury and has not yet produced urine.  Baseline creatinine November 2023 was normal, 1.03.  BUN was 11 at that time.  His BUN to creatinine ratio is about 30 now.  He is immunocompromised on the basis of his diabetes.  He is at high risk for pneumonia on the basis of his tobacco use and COPD/emphysema.  Blood cultures have been sent.  Would send Legionella and streptococcal antigens.    Thank you very much for asking me to see this gentleman in consultation.    I spent 55 minutes of cumulative critical care time with the patient.  Greater than 50% of that time was spent in the direct collaboration and or coordination of care of the patient.     Dragon dictation software was used to dictate this note and thus there may be minor errors in translation/transcription including garbled speech or misspellings. Please contact for clarification if needed. Inpatient consult to Intensivist  Consult performed by: Andrea Zayas MD  Consult ordered by: MD Andrea Wolfe MD

## 2024-07-01 NOTE — PROGRESS NOTES
Darron Resendez is a 63 y.o. male admitted for No Principal Problem: There is no principal problem currently on the Problem List. Please update the Problem List and refresh.. Pharmacy reviewed the patient's [id number] medications and allergies for accuracy.    The list below reflects the PTA list prior to pharmacy medication history. A summary a changes to the PTA medication list has been listed below. Please review each medication in order reconciliation for additional clarification and justification.    Source of information:  T2P SO    Medications added:  Fish oil 1000mg every day   B12 1000mcg every day     Medications modified:    Medications to be removed:  Advair 100/50mcg  Meclizine 25mg  Metformin ER 500mg  Ondansetron 8mg  Spiriva 18mcg    Medications of concern:      Prior to Admission Medications   Prescriptions Last Dose Informant Patient Reported? Taking?   albuterol 90 mcg/actuation inhaler   Yes No   Sig: Inhale 2 puffs every 6 hours if needed for shortness of breath.   aspirin 81 mg EC tablet   Yes No   Sig: Take 1 tablet (81 mg) by mouth once daily.   atorvastatin (Lipitor) 80 mg tablet   No No   Sig: Take 1 tablet (80 mg) by mouth once daily at bedtime.   carvedilol (Coreg) 6.25 mg tablet   No No   Sig: Take 1 tablet (6.25 mg) by mouth 2 times a day with meals.   fluticasone propion-salmeteroL (Advair Diskus) 100-50 mcg/dose diskus inhaler   Yes No   Sig: Inhale 1 puff every 12 hours.   lisinopril 5 mg tablet   No No   Sig: Take 1 tablet (5 mg) by mouth once daily.   meclizine (Antivert) 25 mg tablet   No No   Sig: Take 1 tablet (25 mg) by mouth 3 times a day as needed for dizziness.   Patient not taking: Reported on 3/20/2024   metFORMIN  mg 24 hr tablet   Yes No   Sig: Take 1 tablet (500 mg) by mouth 2 times a day.   ondansetron ODT (Zofran-ODT) 8 mg disintegrating tablet   No No   Sig: Take 1 tablet (8 mg) by mouth every 8 hours if needed for nausea or vomiting.   Patient not  taking: Reported on 3/20/2024   tiotropium (Spiriva) 18 mcg inhalation capsule   Yes No   Sig: Place 1 capsule (18 mcg) into inhaler and inhale once daily.      Facility-Administered Medications: None       Whitney Montalvo

## 2024-07-01 NOTE — PROGRESS NOTES
Medication Adjustment    The following medication(s) was/were adjusted for Darron Resendez per protocol/policy due to altered renal function.    Medication(s) adjusted:   Pharmacy - Antimicrobial dosing adjustment    Per System Dosing Policy    Dose adjustment from Cephalosporins: Cefepime: 1 g every 8 hours to Cephalosporins: Cefepime: 2 g every 12 hours for Estimated Creatinine Clearance: 36.3 mL/min (A) (by C-G formula based on SCr of 2.12 mg/dL (H)).            Cheikh Chin, RPh

## 2024-07-01 NOTE — H&P
"[facility] - GENERAL MEDICINE HISTORY AND PHYSICAL    History Obtained From: Pt and family    History Of Present Illness:  Darron Resendez is a 63 y.o. male with PMHx s/f \"borderline\" COPD, CAD with stent hx STEMI, HTN, chronic tobacco abuse presenting with shortness of breath.  Patient states for the past 5 days he has been increasingly short of breath.  He admits to a productive cough with white/yellow sputum production, he has been experiencing subjective fevers and lightheadedness.  He has not had any cigarettes in the past 3 days.  He is felt very winded and fatigued.  He denies sick contacts or environmental exposures.  He denies chest pain, nausea, vomiting, syncope, leg swelling, abdominal pain, or other symptoms.  He states he has been hospitalized with severe pneumonia in the past.  Chart review reveals he was admitted to this hospital back in 2020 for COPD exacerbation.    ED Course (Summary):   Vitals on presentation: 97.2 F, 81 bpm, 22 rr, 91/53 --> 76/54, 90% on Bipap  Labs: BMP glu 220, Na 130, Cl 92, BUN 64, Cr 2.12  Lactate 1.4    Trop 15 --> 14  CBC WBC 12.9, Hg 14.3, Plt 260  COVID negative  VBG pH 7.24, pCO2 77, pO2 21, HCO3 33.0  Imaging: CXR - Increased perihilar and pulmonary markings more so on the right   than the left. Findings could be on the basis of pulmonary vascular   congestion or pneumonia.   EKG - Sinus at 76 bpm.  Interventions: Duoneb X1, Rocephin and azithro given, Solumedrol 125 mg, NS 6.5 L bolus given. Pt is still hypotensive and will be admitted to the ICU. Critical care consulted.     ED Course (From Provider):  Diagnoses as of 07/01/24 1550   Acute on chronic respiratory failure with hypoxia (Multi)   Dehydration   Septic shock (Multi)     Relevant Results  Results for orders placed or performed during the hospital encounter of 07/01/24 (from the past 24 hour(s))   B-Type Natriuretic Peptide   Result Value Ref Range     (H) 0 - 99 pg/mL   Basic " Metabolic Panel   Result Value Ref Range    Glucose 220 (H) 74 - 99 mg/dL    Sodium 130 (L) 136 - 145 mmol/L    Potassium 4.6 3.5 - 5.3 mmol/L    Chloride 92 (L) 98 - 107 mmol/L    Bicarbonate 29 21 - 32 mmol/L    Anion Gap 14 10 - 20 mmol/L    Urea Nitrogen 64 (H) 6 - 23 mg/dL    Creatinine 2.12 (H) 0.50 - 1.30 mg/dL    eGFR 34 (L) >60 mL/min/1.73m*2    Calcium 8.9 8.6 - 10.3 mg/dL   CBC and Auto Differential   Result Value Ref Range    WBC 12.9 (H) 4.4 - 11.3 x10*3/uL    nRBC 0.0 0.0 - 0.0 /100 WBCs    RBC 4.54 4.50 - 5.90 x10*6/uL    Hemoglobin 14.3 13.5 - 17.5 g/dL    Hematocrit 43.9 41.0 - 52.0 %    MCV 97 80 - 100 fL    MCH 31.5 26.0 - 34.0 pg    MCHC 32.6 32.0 - 36.0 g/dL    RDW 12.6 11.5 - 14.5 %    Platelets 260 150 - 450 x10*3/uL    Neutrophils % 85.0 40.0 - 80.0 %    Immature Granulocytes %, Automated 0.7 0.0 - 0.9 %    Lymphocytes % 6.0 13.0 - 44.0 %    Monocytes % 7.0 2.0 - 10.0 %    Eosinophils % 0.7 0.0 - 6.0 %    Basophils % 0.6 0.0 - 2.0 %    Neutrophils Absolute 11.01 (H) 1.20 - 7.70 x10*3/uL    Immature Granulocytes Absolute, Automated 0.09 0.00 - 0.70 x10*3/uL    Lymphocytes Absolute 0.77 (L) 1.20 - 4.80 x10*3/uL    Monocytes Absolute 0.90 0.10 - 1.00 x10*3/uL    Eosinophils Absolute 0.09 0.00 - 0.70 x10*3/uL    Basophils Absolute 0.08 0.00 - 0.10 x10*3/uL   BLOOD GAS VENOUS   Result Value Ref Range    POCT pH, Venous 7.24 (LL) 7.33 - 7.43 pH    POCT pCO2, Venous 77 (HH) 41 - 51 mm Hg    POCT pO2, Venous 21 (L) 35 - 45 mm Hg    POCT SO2, Venous 23 (L) 45 - 75 %    POCT Oxy Hemoglobin, Venous 23.1 (L) 45.0 - 75.0 %    POCT Base Excess, Venous 3.1 (H) -2.0 - 3.0 mmol/L    POCT HCO3 Calculated, Venous 33.0 (H) 22.0 - 26.0 mmol/L    Patient Temperature 37.0 degrees Celsius    FiO2 100 %   Troponin I, High Sensitivity, Initial   Result Value Ref Range    Troponin I, High Sensitivity 15 0 - 20 ng/L   ECG 12 Lead   Result Value Ref Range    Ventricular Rate 76 BPM    Atrial Rate 76 BPM    NH Interval  144 ms    QRS Duration 89 ms    QT Interval 333 ms    QTC Calculation(Bazett) 375 ms    P Axis 75 degrees    R Axis 77 degrees    T Axis 44 degrees    QRS Count 12 beats    Q Onset 252 ms    T Offset 419 ms    QTC Fredericia 360 ms   Sars-CoV-2 PCR   Result Value Ref Range    Coronavirus 2019, PCR Not Detected Not Detected   Troponin, High Sensitivity, 1 Hour   Result Value Ref Range    Troponin I, High Sensitivity 14 0 - 20 ng/L   Lactate   Result Value Ref Range    Lactate 1.4 0.4 - 2.0 mmol/L      XR chest 1 view    Result Date: 7/1/2024  STUDY: Chest Radiograph;  7/1/2024 10:13 AM. INDICATION: Shortness of breath. COMPARISON: Chest radiograph and CTA chest 8/7/2020. ACCESSION NUMBER(S): [id number] ORDERING CLINICIAN: ROSALVA CHIRINOS TECHNIQUE:  Frontal chest was obtained at 10:12 hours. FINDINGS: CARDIOMEDIASTINAL SILHOUETTE: Cardiomediastinal silhouette is normal in size and configuration but there is widening when compared with the prior.  LUNGS: There are increased perihilar and pulmonary markings more so on the right than the left.  No pleural effusion or pneumothorax  ABDOMEN: No remarkable upper abdominal findings.  BONES: No acute osseous changes.    1.Increased perihilar and pulmonary markings more so on the right than the left. Findings could be on the basis of pulmonary vascular congestion or pneumonia. 2.Widening of the mediastinum when compared with the prior. Signed by Erna Lu DO    ECG 12 Lead    Result Date: 7/1/2024  Sinus rhythm    Scheduled medications:  aspirin, 81 mg, oral, Daily  atorvastatin, 80 mg, oral, Nightly  cefepime, 2 g, intravenous, q12h  enoxaparin, 40 mg, subcutaneous, q24h  oxygen, , inhalation, Continuous - Inhalation  [START ON 7/2/2024] pantoprazole, 40 mg, oral, Daily before breakfast   Or  [START ON 7/2/2024] pantoprazole, 40 mg, intravenous, Daily before breakfast  polyethylene glycol, 17 g, oral, Daily      Continuous medications:     PRN medications:  PRN  medications: bisacodyl, bisacodyl, guaiFENesin, melatonin, ondansetron **OR** ondansetron     Past Medical History  He has a past medical history of Chronic systolic (congestive) heart failure (Multi) (09/22/2020), Myocardial infarction (Multi) (07/01/2024), Old myocardial infarction, Old myocardial infarction (09/22/2020), Old myocardial infarction, Personal history of other diseases of the circulatory system, Personal history of other endocrine, nutritional and metabolic disease, and Presence of stent in anterior descending branch of left coronary artery (07/01/2024).    Surgical History  He has a past surgical history that includes Tonsillectomy (06/01/2016); Vasectomy (06/01/2016); Mandible surgery (06/01/2016); and Other surgical history (06/29/2020).     Social History  He reports that he has been smoking cigarettes. He has never used smokeless tobacco. He reports that he does not currently use alcohol. He reports that he does not use drugs.    Family History  No family history on file.    Allergies  Penicillin g    Code Status  Full Code     Review of Systems   Constitutional:  Positive for diaphoresis, fatigue and fever. Negative for activity change, appetite change and chills.   HENT:  Negative for congestion, ear pain, rhinorrhea, sinus pain and sore throat.    Respiratory:  Positive for cough, shortness of breath and wheezing. Negative for apnea, chest tightness and stridor.    Cardiovascular:  Negative for chest pain, palpitations and leg swelling.   Gastrointestinal:  Negative for abdominal distention, abdominal pain, constipation, diarrhea, nausea and vomiting.   Genitourinary:  Negative for difficulty urinating, dysuria, flank pain, frequency, hematuria and urgency.   Musculoskeletal:  Negative for arthralgias, back pain, gait problem, joint swelling and myalgias.   Skin:  Negative for color change, pallor, rash and wound.   Neurological:  Negative for dizziness, syncope, weakness, light-headedness,  numbness and headaches.   Psychiatric/Behavioral:  Negative for agitation, behavioral problems, confusion and decreased concentration. The patient is not nervous/anxious.    All other systems reviewed and are negative.      Last Recorded Vitals  BP 73/56   Pulse 72   Temp 36.2 °C (97.2 °F)   Resp 20   Wt 85.6 kg (188 lb 11.4 oz)   SpO2 98%      Physical Exam:  Vital signs and nursing notes reviewed.   Constitutional: Pleasant and cooperative. Laying in bed in moderate acute distress. Conversant.   Skin: Warm and dry; no obvious lesions, rashes, pallor, or jaundice. Good turgor.   Eyes: EOMI. Anicteric sclera.   ENT: Mucous membranes moist; no obvious injury or deformity appreciated.   Head and Neck: Normocephalic, atraumatic. ROM preserved. Trachea midline. No appreciable JVD.   Respiratory: Nonlabored on NRB. Mild end-exp wheezing bilaterally. Chest rise is equal.  Cardiovascular: RRR. No gross murmur, gallop, or rub. Extremities are warm and well-perfused with good capillary refill (< 3 seconds). No chest wall tenderness.   GI: Abdomen soft, nontender, nondistended. No obvious organomegaly appreciated. Bowel sounds are present and normoactive.  : No CVA tenderness.   MSK: No gross abnormalities appreciated. No limitations to AROM/PROM appreciated.   Extremities: No cyanosis, edema, or clubbing evident. Neurovascularly intact.   Neuro: A&Ox3. CN 2-12 grossly intact. Able to respond to questions appropriately and clearly. No acute focal neurologic deficits appreciated.  Psych: Appropriate mood and behavior.    Assessment/Plan   Principal Problem:    Acute on chronic respiratory failure with hypoxia (Multi)  Active Problems:    Cigarette nicotine dependence    Presence of stent in LAD coronary artery    Hyperlipidemia    HTN (hypertension)    Septic shock (Multi)    Tachypnea    Hyperglycemia    LARRY (acute kidney injury) (CMS-HCC)    Hyponatremia    Plan:  Admit to ICU  Critical care consulted.    R sided  pneumonia/COPD exacerbation, septic shock:  Rocephin and azithro given in ER. Will change to cefepime given he has a possible hx of pseudomonas infection in 2020.  6.5 L NS given in ER. Still somewhat hypotensive. I did discuss CVC placement with him, but did not obtain formal consent.  Solumedrol BID and duonebs  Was on Bipap originally in ER, now on NRB and much more calm.    LARRY:  Cr 2.12 on arrival. No hx CKD.  Generous fluid resuscitation given. Presumed prerenal etiology as pt has not been eating or drinking much.  Will start LR at 125 ml/hr given he is still somewhat hypotensive and hasn't made urine yet.  Hold home anti-hypertensives for now given LARRY and hypotension    CAD:  Home Lipitor and ASA.  Will need his Coreg resumed once stabilized.    Tobacco abuse  5 minutes of tobacco cessation counseling given during this encounter.    Diet: Regular  DVT Prophylaxis: Lovenox SQ  Code Status: Full code per discussion with pt.    60 minutes critical care spent on this encounter.     DO Mateusz Trotter dictation software was used to dictate this note and thus there may be minor errors in translation/transcription including garbled speech or misspellings. Please contact for clarification if needed.

## 2024-07-01 NOTE — ED PROVIDER NOTES
"HPI   Chief Complaint   Patient presents with   • Shortness of Breath       Patient presents with shortness of breath.  He states it has been happening over the past 3 days.  He states he is having a hard time catching his breath.  He has had a cough productive of sputum.  He states he did have a fever 4 days ago but has not had one since.  He does have history of \"borderline COPD.\"  He is not on any home oxygen.  He denies any leg swelling.  He denies any chest pain associated with this.  He does follow with cardiology for history of coronary artery disease.  He states that he did quit smoking.  EMS was called and noted his respiratory distress and gave him a DuoNeb treatment and route.                          [place] Coma Scale Score: 15                  Patient History   Past Medical History:   Diagnosis Date   • Chronic systolic (congestive) heart failure (Multi) 09/22/2020    Chronic systolic heart failure   • Old myocardial infarction     Old non-ST elevation myocardial infarction (NSTEMI)   • Old myocardial infarction 09/22/2020    History of ST elevation myocardial infarction (STEMI)   • Old myocardial infarction     History of ST elevation myocardial infarction (STEMI)   • Personal history of other diseases of the circulatory system     History of heart disease   • Personal history of other endocrine, nutritional and metabolic disease     History of hyperlipidemia     Past Surgical History:   Procedure Laterality Date   • MANDIBLE SURGERY  06/01/2016    Jaw Surgery   • OTHER SURGICAL HISTORY  06/29/2020    Coronary artery stent placement   • TONSILLECTOMY  06/01/2016    Tonsillectomy With Adenoidectomy   • VASECTOMY  06/01/2016    Surgery Vas Deferens Vasectomy     No family history on file.  Social History     Tobacco Use   • Smoking status: Every Day     Current packs/day: 1.50     Types: Cigarettes   • Smokeless tobacco: Never   Substance Use Topics   • Alcohol use: Not Currently   • Drug use: Never "       Physical Exam   ED Triage Vitals [07/01/24 0901]   Temperature Heart Rate Respirations BP   (!) 33.7 °C (92.7 °F) 81 (!) 22 91/53      Pulse Ox Temp src Heart Rate Source Patient Position   (!) 90 % -- -- --      BP Location FiO2 (%)     -- 50 %       Physical Exam  Vitals and nursing note reviewed.   Constitutional:       Appearance: Normal appearance.      Comments: Talking in 3-5 word sentences   HENT:      Head: Normocephalic and atraumatic.      Mouth/Throat:      Mouth: Mucous membranes are moist.   Eyes:      Extraocular Movements: Extraocular movements intact.      Pupils: Pupils are equal, round, and reactive to light.   Cardiovascular:      Rate and Rhythm: Normal rate and regular rhythm.      Heart sounds: No murmur heard.  Pulmonary:      Effort: Respiratory distress (Mild) present.      Breath sounds: Wheezing (Diffuse) present.   Chest:      Chest wall: No tenderness or crepitus.   Abdominal:      General: There is no distension.      Palpations: Abdomen is soft.      Tenderness: There is no abdominal tenderness.   Musculoskeletal:         General: No tenderness or deformity. Normal range of motion.      Cervical back: Neck supple.      Right lower leg: No edema.      Left lower leg: No edema.   Skin:     General: Skin is warm and dry.      Findings: No lesion or rash.   Neurological:      General: No focal deficit present.      Mental Status: He is alert and oriented to person, place, and time.      Sensory: No sensory deficit.      Motor: No weakness.   Psychiatric:         Behavior: Behavior normal.       Labs Reviewed   TROPONIN SERIES- (INITIAL, 1 HR)    Narrative:     The following orders were created for panel order Troponin I Series, High Sensitivity (0, 1 HR).  Procedure                               Abnormality         Status                     ---------                               -----------         ------                     Troponin I, High Sensiti...[[id number]]                       In process                 Troponin, High Sensitivi...[[id number]]                                                   Please view results for these tests on the individual orders.   B-TYPE NATRIURETIC PEPTIDE   BASIC METABOLIC PANEL   CBC WITH AUTO DIFFERENTIAL   BLOOD GAS VENOUS   SERIAL TROPONIN-INITIAL   SERIAL TROPONIN, 1 HOUR   SARS-COV-2 PCR     XR chest 1 view    (Results Pending)     ED Course & MDM   Diagnoses as of 07/01/24 1420   Acute on chronic respiratory failure with hypoxia (Multi)   Dehydration   Septic shock (Multi)       Medical Decision Making  Differentials include COPD exacerbation, pneumonia, ACS, COVID.  Imaging studies, if performed, were independently reviewed and interpreted by myself and confirmed by radiologist. EKG(s), if performed, were interpreted by myself.The patient had an EKG that was sinus rhythm at a rate of 76.  No acute ST changes.  QTc 375, .  Patient was placed on BiPAP upon his arrival due to shortness of breath.  Initial pH was 7.24 with a pCO2 of 77. His CBC showed a WBC count of 12.9.  Creatinine is 2.12 which is above his baseline.  Sodium is 130.  Troponin is 15 with a repeat of 40.  Lactate normal.  COVID-negative.  Chest x-ray does show evidence of right-sided pneumonia.  Chest x-ray returned at 1135.  Antibiotics were ordered at 1140.  He continued to be hypotensive despite 30 cc/kg of IV fluids.  I continued this.  There is likely an element of dehydration along with septic shock.  I did give him Solu-Medrol upon his arrival as well.  I discussed with Dr. Olivarez with the intensive care unit.  He evaluated the patient and felt that there could be an element of dehydration along with the pneumonia.  We will continue IV fluids.  He may need pressors in the near future.  We will admit the patient to the intensive care unit overnight for close monitoring.        Procedure  Critical Care    Performed by: Ignacio Garcia MD  Authorized by: Ignacio Garcia MD     Critical care provider statement:     Critical care time (minutes):  45    Critical care time was exclusive of:  Separately billable procedures and treating other patients    Critical care was necessary to treat or prevent imminent or life-threatening deterioration of the following conditions:  Respiratory failure    Critical care was time spent personally by me on the following activities:  Ordering and performing treatments and interventions, ordering and review of laboratory studies, ordering and review of radiographic studies, discussions with consultants, evaluation of patient's response to treatment, re-evaluation of patient's condition and examination of patient    Care discussed with: admitting provider         Ignacio Garcia MD  07/01/24 1481

## 2024-07-02 ENCOUNTER — APPOINTMENT (OUTPATIENT)
Dept: RADIOLOGY | Facility: HOSPITAL | Age: 64
End: 2024-07-02
Payer: MEDICARE

## 2024-07-02 LAB
ANION GAP SERPL CALC-SCNC: 9 MMOL/L (ref 10–20)
APPEARANCE UR: CLEAR
BILIRUB UR STRIP.AUTO-MCNC: NEGATIVE MG/DL
BUN SERPL-MCNC: 68 MG/DL (ref 6–23)
CALCIUM SERPL-MCNC: 7.2 MG/DL (ref 8.6–10.3)
CHLORIDE SERPL-SCNC: 102 MMOL/L (ref 98–107)
CO2 SERPL-SCNC: 22 MMOL/L (ref 21–32)
COLOR UR: ABNORMAL
CREAT SERPL-MCNC: 2.13 MG/DL (ref 0.5–1.3)
CREAT UR-MCNC: 108.4 MG/DL (ref 20–370)
CREAT UR-MCNC: 109.7 MG/DL (ref 20–370)
EGFRCR SERPLBLD CKD-EPI 2021: 34 ML/MIN/1.73M*2
ERYTHROCYTE [DISTWIDTH] IN BLOOD BY AUTOMATED COUNT: 12.7 % (ref 11.5–14.5)
GLUCOSE BLD MANUAL STRIP-MCNC: 214 MG/DL (ref 74–99)
GLUCOSE BLD MANUAL STRIP-MCNC: 242 MG/DL (ref 74–99)
GLUCOSE BLD MANUAL STRIP-MCNC: 272 MG/DL (ref 74–99)
GLUCOSE SERPL-MCNC: 237 MG/DL (ref 74–99)
GLUCOSE UR STRIP.AUTO-MCNC: NORMAL MG/DL
HCT VFR BLD AUTO: 35.8 % (ref 41–52)
HGB BLD-MCNC: 11.3 G/DL (ref 13.5–17.5)
HYALINE CASTS #/AREA URNS AUTO: ABNORMAL /LPF
KETONES UR STRIP.AUTO-MCNC: NEGATIVE MG/DL
LEGIONELLA AG UR QL: NEGATIVE
LEUKOCYTE ESTERASE UR QL STRIP.AUTO: NEGATIVE
MAGNESIUM SERPL-MCNC: 1.92 MG/DL (ref 1.6–2.4)
MCH RBC QN AUTO: 31.2 PG (ref 26–34)
MCHC RBC AUTO-ENTMCNC: 31.6 G/DL (ref 32–36)
MCV RBC AUTO: 99 FL (ref 80–100)
MICROALBUMIN UR-MCNC: 9.1 MG/L
MICROALBUMIN/CREAT UR: 8.4 UG/MG CREAT
MUCOUS THREADS #/AREA URNS AUTO: ABNORMAL /LPF
NITRITE UR QL STRIP.AUTO: NEGATIVE
NRBC BLD-RTO: 0 /100 WBCS (ref 0–0)
OSMOLALITY UR: 407 MOSM/KG (ref 200–1200)
PH UR STRIP.AUTO: 5.5 [PH]
PLATELET # BLD AUTO: 225 X10*3/UL (ref 150–450)
POTASSIUM SERPL-SCNC: 4.4 MMOL/L (ref 3.5–5.3)
PROT UR STRIP.AUTO-MCNC: ABNORMAL MG/DL
RBC # BLD AUTO: 3.62 X10*6/UL (ref 4.5–5.9)
RBC # UR STRIP.AUTO: ABNORMAL /UL
RBC #/AREA URNS AUTO: ABNORMAL /HPF
S PNEUM AG UR QL: NEGATIVE
SODIUM SERPL-SCNC: 129 MMOL/L (ref 136–145)
SODIUM UR-SCNC: <10 MMOL/L
SODIUM/CREAT UR-RTO: NORMAL
SP GR UR STRIP.AUTO: 1.01
UROBILINOGEN UR STRIP.AUTO-MCNC: NORMAL MG/DL
WBC # BLD AUTO: 12.8 X10*3/UL (ref 4.4–11.3)
WBC #/AREA URNS AUTO: ABNORMAL /HPF

## 2024-07-02 PROCEDURE — 2500000004 HC RX 250 GENERAL PHARMACY W/ HCPCS (ALT 636 FOR OP/ED)

## 2024-07-02 PROCEDURE — 2500000005 HC RX 250 GENERAL PHARMACY W/O HCPCS: Performed by: STUDENT IN AN ORGANIZED HEALTH CARE EDUCATION/TRAINING PROGRAM

## 2024-07-02 PROCEDURE — 2500000004 HC RX 250 GENERAL PHARMACY W/ HCPCS (ALT 636 FOR OP/ED): Performed by: STUDENT IN AN ORGANIZED HEALTH CARE EDUCATION/TRAINING PROGRAM

## 2024-07-02 PROCEDURE — 82570 ASSAY OF URINE CREATININE: CPT | Performed by: REGISTERED NURSE

## 2024-07-02 PROCEDURE — 87899 AGENT NOS ASSAY W/OPTIC: CPT | Mod: PORLAB

## 2024-07-02 PROCEDURE — 85027 COMPLETE CBC AUTOMATED: CPT | Performed by: STUDENT IN AN ORGANIZED HEALTH CARE EDUCATION/TRAINING PROGRAM

## 2024-07-02 PROCEDURE — 83935 ASSAY OF URINE OSMOLALITY: CPT | Mod: PORLAB | Performed by: REGISTERED NURSE

## 2024-07-02 PROCEDURE — 99233 SBSQ HOSP IP/OBS HIGH 50: CPT | Performed by: INTERNAL MEDICINE

## 2024-07-02 PROCEDURE — 2500000004 HC RX 250 GENERAL PHARMACY W/ HCPCS (ALT 636 FOR OP/ED): Mod: JZ

## 2024-07-02 PROCEDURE — 80048 BASIC METABOLIC PNL TOTAL CA: CPT | Performed by: STUDENT IN AN ORGANIZED HEALTH CARE EDUCATION/TRAINING PROGRAM

## 2024-07-02 PROCEDURE — 2500000002 HC RX 250 W HCPCS SELF ADMINISTERED DRUGS (ALT 637 FOR MEDICARE OP, ALT 636 FOR OP/ED): Performed by: STUDENT IN AN ORGANIZED HEALTH CARE EDUCATION/TRAINING PROGRAM

## 2024-07-02 PROCEDURE — 82043 UR ALBUMIN QUANTITATIVE: CPT | Performed by: REGISTERED NURSE

## 2024-07-02 PROCEDURE — 2500000004 HC RX 250 GENERAL PHARMACY W/ HCPCS (ALT 636 FOR OP/ED): Performed by: INTERNAL MEDICINE

## 2024-07-02 PROCEDURE — 2500000002 HC RX 250 W HCPCS SELF ADMINISTERED DRUGS (ALT 637 FOR MEDICARE OP, ALT 636 FOR OP/ED): Performed by: NURSE PRACTITIONER

## 2024-07-02 PROCEDURE — 2500000001 HC RX 250 WO HCPCS SELF ADMINISTERED DRUGS (ALT 637 FOR MEDICARE OP): Performed by: STUDENT IN AN ORGANIZED HEALTH CARE EDUCATION/TRAINING PROGRAM

## 2024-07-02 PROCEDURE — 82947 ASSAY GLUCOSE BLOOD QUANT: CPT

## 2024-07-02 PROCEDURE — 82540 ASSAY OF CREATINE: CPT | Performed by: REGISTERED NURSE

## 2024-07-02 PROCEDURE — 83735 ASSAY OF MAGNESIUM: CPT

## 2024-07-02 PROCEDURE — 87449 NOS EACH ORGANISM AG IA: CPT | Mod: PORLAB

## 2024-07-02 PROCEDURE — 2500000005 HC RX 250 GENERAL PHARMACY W/O HCPCS: Performed by: NURSE PRACTITIONER

## 2024-07-02 PROCEDURE — 2500000005 HC RX 250 GENERAL PHARMACY W/O HCPCS

## 2024-07-02 PROCEDURE — 76770 US EXAM ABDO BACK WALL COMP: CPT

## 2024-07-02 PROCEDURE — 2060000001 HC INTERMEDIATE ICU ROOM DAILY

## 2024-07-02 PROCEDURE — 36415 COLL VENOUS BLD VENIPUNCTURE: CPT | Performed by: STUDENT IN AN ORGANIZED HEALTH CARE EDUCATION/TRAINING PROGRAM

## 2024-07-02 PROCEDURE — 94640 AIRWAY INHALATION TREATMENT: CPT

## 2024-07-02 PROCEDURE — 94660 CPAP INITIATION&MGMT: CPT

## 2024-07-02 PROCEDURE — 2500000002 HC RX 250 W HCPCS SELF ADMINISTERED DRUGS (ALT 637 FOR MEDICARE OP, ALT 636 FOR OP/ED): Performed by: INTERNAL MEDICINE

## 2024-07-02 PROCEDURE — C9113 INJ PANTOPRAZOLE SODIUM, VIA: HCPCS | Performed by: STUDENT IN AN ORGANIZED HEALTH CARE EDUCATION/TRAINING PROGRAM

## 2024-07-02 PROCEDURE — 81001 URINALYSIS AUTO W/SCOPE: CPT

## 2024-07-02 PROCEDURE — 76770 US EXAM ABDO BACK WALL COMP: CPT | Performed by: RADIOLOGY

## 2024-07-02 PROCEDURE — 2500000004 HC RX 250 GENERAL PHARMACY W/ HCPCS (ALT 636 FOR OP/ED): Performed by: NURSE PRACTITIONER

## 2024-07-02 PROCEDURE — 99291 CRITICAL CARE FIRST HOUR: CPT | Performed by: INTERNAL MEDICINE

## 2024-07-02 RX ORDER — INSULIN LISPRO 100 [IU]/ML
3 INJECTION, SOLUTION INTRAVENOUS; SUBCUTANEOUS
Status: DISCONTINUED | OUTPATIENT
Start: 2024-07-02 | End: 2024-07-11 | Stop reason: HOSPADM

## 2024-07-02 RX ORDER — DEXTROSE 50 % IN WATER (D50W) INTRAVENOUS SYRINGE
12.5
Status: DISCONTINUED | OUTPATIENT
Start: 2024-07-02 | End: 2024-07-11 | Stop reason: HOSPADM

## 2024-07-02 RX ORDER — DEXTROSE 50 % IN WATER (D50W) INTRAVENOUS SYRINGE
25
Status: DISCONTINUED | OUTPATIENT
Start: 2024-07-02 | End: 2024-07-11 | Stop reason: HOSPADM

## 2024-07-02 ASSESSMENT — ENCOUNTER SYMPTOMS
ENDOCRINE NEGATIVE: 1
GASTROINTESTINAL NEGATIVE: 1
WHEEZING: 1
EYES NEGATIVE: 1
CARDIOVASCULAR NEGATIVE: 1
NEUROLOGICAL NEGATIVE: 1
MUSCULOSKELETAL NEGATIVE: 1
FATIGUE: 1
ALLERGIC/IMMUNOLOGIC NEGATIVE: 1
HEMATOLOGIC/LYMPHATIC NEGATIVE: 1
PSYCHIATRIC NEGATIVE: 1
SHORTNESS OF BREATH: 1

## 2024-07-02 ASSESSMENT — PAIN SCALES - GENERAL
PAINLEVEL_OUTOF10: 0 - NO PAIN

## 2024-07-02 ASSESSMENT — PAIN - FUNCTIONAL ASSESSMENT
PAIN_FUNCTIONAL_ASSESSMENT: 0-10

## 2024-07-02 ASSESSMENT — COGNITIVE AND FUNCTIONAL STATUS - GENERAL
DAILY ACTIVITIY SCORE: 24
MOBILITY SCORE: 23
CLIMB 3 TO 5 STEPS WITH RAILING: A LITTLE

## 2024-07-02 NOTE — PROGRESS NOTES
"T[facility] attempted to meet with patient for discharge planning assessment. Patient is having an imaging study done at bedside. T[facility] following, will meet with patient later today as time permits.     1530:    07/02/24 1548   Discharge Planning   Living Arrangements Spouse/significant other   Support Systems Spouse/significant other   Assistance Needed none   Type of Residence Private residence   Patient expects to be discharged to: Home   Does the patient need discharge transport arranged? No     Discharge planning assessment completed with patient at bedside. His significant other [place] was present for the assessment as well. Patient stated he is fairly independent at home. He has a cane but does not use it. Patient relayed that sometimes [place] needs to assist him up out of furniture that sits low to the floor, but otherwise does not need assistance. Patient does not follow with primary care, states \"If I have to see my cardiologist I come in. Otherwise, I don't like doctors too much\". He is currently on supplemental oxygen, which is new for him. Patient is planning to return home at discharge. PT/OT evals are pending. TCC following.   "

## 2024-07-02 NOTE — PROGRESS NOTES
Critical Care Daily Progress        Subjective   Patient is a 63 y.o. male admitted on 7/1/2024  8:59 AM with hypotension.     Interval History: Feels a bit better, used some NIPPV overnight now on cannula.  Cough is less productive.  Able to produce some urine..     Scheduled Medications:   aspirin, 81 mg, oral, Daily  atorvastatin, 80 mg, oral, Nightly  cefepime, 2 g, intravenous, q12h  enoxaparin, 40 mg, subcutaneous, q24h  ipratropium-albuteroL, 3 mL, nebulization, TID  methylPREDNISolone sodium succinate (PF), 40 mg, intravenous, q12h  oxygen, , inhalation, Continuous - Inhalation  pantoprazole, 40 mg, oral, Daily before breakfast   Or  pantoprazole, 40 mg, intravenous, Daily before breakfast  polyethylene glycol, 17 g, oral, Daily         Continuous Medications:   lactated Ringer's, 125 mL/hr, Last Rate: 125 mL/hr (07/02/24 0111)  norepinephrine, 0-0.2 mcg/kg/min, Last Rate: Stopped (07/02/24 0215)         PRN Medications:   PRN medications: acetaminophen **OR** acetaminophen **OR** acetaminophen, bisacodyl, bisacodyl, guaiFENesin, ipratropium-albuteroL, melatonin, ondansetron **OR** ondansetron, oxygen    Objective   Vitals:  Most Recent:  Vitals:    07/02/24 0726   BP:    Pulse:    Resp:    Temp: 36.3 °C (97.4 °F)   SpO2:        24hr Min/Max:  Temp  Min: 36.2 °C (97.2 °F)  Max: 37 °C (98.6 °F)  Pulse  Min: 60  Max: 124  BP  Min: 51/37  Max: 125/100  Resp  Min: 14  Max: 25  SpO2  Min: 88 %  Max: 100 %        I/O:    Intake/Output Summary (Last 24 hours) at 7/2/2024 0801  Last data filed at 7/2/2024 0600  Gross per 24 hour   Intake 86802.29 ml   Output 475 ml   Net 18516.29 ml       Physical Exam:   Physical Exam  Vitals reviewed.   Constitutional:       General: He is not in acute distress.     Appearance: He is obese. He is not toxic-appearing.   HENT:      Head: Normocephalic and atraumatic.      Nose: No congestion.      Mouth/Throat:      Pharynx: No oropharyngeal exudate.   Eyes:      General: No scleral  icterus.  Cardiovascular:      Rate and Rhythm: Normal rate and regular rhythm.      Heart sounds: No murmur heard.  Pulmonary:      Effort: No respiratory distress.      Breath sounds: Wheezing (Diffuse, bilateral high-pitched expiratory) present.   Chest:      Chest wall: No tenderness.   Abdominal:      Palpations: Abdomen is soft. There is no mass.      Tenderness: There is no guarding or rebound.   Musculoskeletal:         General: Normal range of motion.      Cervical back: Normal range of motion and neck supple. No rigidity or tenderness.   Lymphadenopathy:      Cervical: No cervical adenopathy.   Skin:     Findings: No rash.   Neurological:      General: No focal deficit present.      Mental Status: He is alert. Mental status is at baseline.          Lab/Radiology/Diagnostic Review:  Results for orders placed or performed during the hospital encounter of 07/01/24 (from the past 24 hour(s))   B-Type Natriuretic Peptide   Result Value Ref Range     (H) 0 - 99 pg/mL   Basic Metabolic Panel   Result Value Ref Range    Glucose 220 (H) 74 - 99 mg/dL    Sodium 130 (L) 136 - 145 mmol/L    Potassium 4.6 3.5 - 5.3 mmol/L    Chloride 92 (L) 98 - 107 mmol/L    Bicarbonate 29 21 - 32 mmol/L    Anion Gap 14 10 - 20 mmol/L    Urea Nitrogen 64 (H) 6 - 23 mg/dL    Creatinine 2.12 (H) 0.50 - 1.30 mg/dL    eGFR 34 (L) >60 mL/min/1.73m*2    Calcium 8.9 8.6 - 10.3 mg/dL   CBC and Auto Differential   Result Value Ref Range    WBC 12.9 (H) 4.4 - 11.3 x10*3/uL    nRBC 0.0 0.0 - 0.0 /100 WBCs    RBC 4.54 4.50 - 5.90 x10*6/uL    Hemoglobin 14.3 13.5 - 17.5 g/dL    Hematocrit 43.9 41.0 - 52.0 %    MCV 97 80 - 100 fL    MCH 31.5 26.0 - 34.0 pg    MCHC 32.6 32.0 - 36.0 g/dL    RDW 12.6 11.5 - 14.5 %    Platelets 260 150 - 450 x10*3/uL    Neutrophils % 85.0 40.0 - 80.0 %    Immature Granulocytes %, Automated 0.7 0.0 - 0.9 %    Lymphocytes % 6.0 13.0 - 44.0 %    Monocytes % 7.0 2.0 - 10.0 %    Eosinophils % 0.7 0.0 - 6.0 %     Basophils % 0.6 0.0 - 2.0 %    Neutrophils Absolute 11.01 (H) 1.20 - 7.70 x10*3/uL    Immature Granulocytes Absolute, Automated 0.09 0.00 - 0.70 x10*3/uL    Lymphocytes Absolute 0.77 (L) 1.20 - 4.80 x10*3/uL    Monocytes Absolute 0.90 0.10 - 1.00 x10*3/uL    Eosinophils Absolute 0.09 0.00 - 0.70 x10*3/uL    Basophils Absolute 0.08 0.00 - 0.10 x10*3/uL   BLOOD GAS VENOUS   Result Value Ref Range    POCT pH, Venous 7.24 (LL) 7.33 - 7.43 pH    POCT pCO2, Venous 77 (HH) 41 - 51 mm Hg    POCT pO2, Venous 21 (L) 35 - 45 mm Hg    POCT SO2, Venous 23 (L) 45 - 75 %    POCT Oxy Hemoglobin, Venous 23.1 (L) 45.0 - 75.0 %    POCT Base Excess, Venous 3.1 (H) -2.0 - 3.0 mmol/L    POCT HCO3 Calculated, Venous 33.0 (H) 22.0 - 26.0 mmol/L    Patient Temperature 37.0 degrees Celsius    FiO2 100 %   Troponin I, High Sensitivity, Initial   Result Value Ref Range    Troponin I, High Sensitivity 15 0 - 20 ng/L   ECG 12 Lead   Result Value Ref Range    Ventricular Rate 76 BPM    Atrial Rate 76 BPM    MT Interval 144 ms    QRS Duration 89 ms    QT Interval 333 ms    QTC Calculation(Bazett) 375 ms    P Axis 75 degrees    R Axis 77 degrees    T Axis 44 degrees    QRS Count 12 beats    Q Onset 252 ms    T Offset 419 ms    QTC Fredericia 360 ms   Sars-CoV-2 PCR   Result Value Ref Range    Coronavirus 2019, PCR Not Detected Not Detected   Troponin, High Sensitivity, 1 Hour   Result Value Ref Range    Troponin I, High Sensitivity 14 0 - 20 ng/L   Lactate   Result Value Ref Range    Lactate 1.4 0.4 - 2.0 mmol/L   Blood Culture    Specimen: Peripheral Venipuncture; Blood culture   Result Value Ref Range    Blood Culture Loaded on Instrument - Culture in progress    Blood Culture    Specimen: Peripheral Venipuncture; Blood culture   Result Value Ref Range    Blood Culture Loaded on Instrument - Culture in progress    CBC   Result Value Ref Range    WBC 12.8 (H) 4.4 - 11.3 x10*3/uL    nRBC 0.0 0.0 - 0.0 /100 WBCs    RBC 3.62 (L) 4.50 - 5.90 x10*6/uL     Hemoglobin 11.3 (L) 13.5 - 17.5 g/dL    Hematocrit 35.8 (L) 41.0 - 52.0 %    MCV 99 80 - 100 fL    MCH 31.2 26.0 - 34.0 pg    MCHC 31.6 (L) 32.0 - 36.0 g/dL    RDW 12.7 11.5 - 14.5 %    Platelets 225 150 - 450 x10*3/uL   Basic metabolic panel   Result Value Ref Range    Glucose 237 (H) 74 - 99 mg/dL    Sodium 129 (L) 136 - 145 mmol/L    Potassium 4.4 3.5 - 5.3 mmol/L    Chloride 102 98 - 107 mmol/L    Bicarbonate 22 21 - 32 mmol/L    Anion Gap 9 (L) 10 - 20 mmol/L    Urea Nitrogen 68 (H) 6 - 23 mg/dL    Creatinine 2.13 (H) 0.50 - 1.30 mg/dL    eGFR 34 (L) >60 mL/min/1.73m*2    Calcium 7.2 (L) 8.6 - 10.3 mg/dL   Magnesium   Result Value Ref Range    Magnesium 1.92 1.60 - 2.40 mg/dL   Urinalysis with Reflex Microscopic   Result Value Ref Range    Color, Urine Light-Yellow Light-Yellow, Yellow, Dark-Yellow    Appearance, Urine Clear Clear    Specific Gravity, Urine 1.014 1.005 - 1.035    pH, Urine 5.5 5.0, 5.5, 6.0, 6.5, 7.0, 7.5, 8.0    Protein, Urine 30 (1+) (A) NEGATIVE, 10 (TRACE), 20 (TRACE) mg/dL    Glucose, Urine Normal Normal mg/dL    Blood, Urine 0.03 (TRACE) (A) NEGATIVE    Ketones, Urine NEGATIVE NEGATIVE mg/dL    Bilirubin, Urine NEGATIVE NEGATIVE    Urobilinogen, Urine Normal Normal mg/dL    Nitrite, Urine NEGATIVE NEGATIVE    Leukocyte Esterase, Urine NEGATIVE NEGATIVE   Microscopic Only, Urine   Result Value Ref Range    WBC, Urine 1-5 1-5, NONE /HPF    RBC, Urine NONE NONE, 1-2, 3-5 /HPF    Mucus, Urine FEW Reference range not established. /LPF    Hyaline Casts, Urine 1+ (A) NONE /LPF     XR chest 1 view    Result Date: 7/1/2024  STUDY: Chest Radiograph;  7/1/2024 10:13 AM. INDICATION: Shortness of breath. COMPARISON: Chest radiograph and CTA chest 8/7/2020. ACCESSION NUMBER(S): [id number] ORDERING CLINICIAN: ROSALVA CHIRINOS TECHNIQUE:  Frontal chest was obtained at 10:12 hours. FINDINGS: CARDIOMEDIASTINAL SILHOUETTE: Cardiomediastinal silhouette is normal in size and configuration but there is  widening when compared with the prior.  LUNGS: There are increased perihilar and pulmonary markings more so on the right than the left.  No pleural effusion or pneumothorax  ABDOMEN: No remarkable upper abdominal findings.  BONES: No acute osseous changes.    1.Increased perihilar and pulmonary markings more so on the right than the left. Findings could be on the basis of pulmonary vascular congestion or pneumonia. 2.Widening of the mediastinum when compared with the prior. Signed by Erna Lu DO    ECG 12 Lead    Result Date: 7/1/2024  Sinus rhythm      Assessment/Plan   Principal Problem:    Acute on chronic respiratory failure with hypoxia (Multi)  Active Problems:    Cigarette nicotine dependence    Presence of stent in LAD coronary artery    Hyperlipidemia  History of HTN (hypertension)    Septic shock (Multi)    Tachypnea    Hyperglycemia    LARRY (acute kidney injury) (CMS-HCC)    Hyponatremia    My impressions and assessment are unchanged compared to yesterday.  The patient is clinically stable to slightly improved.  Was placed on norepinephrine for about 3 hours now off.  Creatinine stable.  Will check renal ultrasound and ask nephrology to see.  Continue to hold ACE inhibitors and carvedilol.  Blood glucose control.  Drop in hemoglobin was dilutional.    I spent 38 minutes of cumulative critical care time with the patient.  Greater than 50% of that time was spent in the direct collaboration and or coordination of care of the patient.     Dragon dictation software was used to dictate this note and thus there may be minor errors in translation/transcription including garbled speech or misspellings. Please contact for clarification if needed.     Andrea Zayas MD

## 2024-07-02 NOTE — CONSULTS
Reason For Consult    elevated creatinine       History Of Present Illness  Darron Resendez is a 63 y.o. male presenting with history of COPD not on oxygen, CAD with stents, history of STEMI, hypertension, chronic tobacco abuse.  Presents with shortness of breath for the last 5 days along with a productive cough.  He expressed that he had intermittent fevers and lightheadedness, sputum is yellowish.  Denies sick contact or environmental exposure.  In the ER patient's vitals: blood pressure 91/53, as low as 51/33 pulse ox  88% placed on BiPAP now spo2 90%  Fluid bolus administered, patient placed on levo and admitted to the ICU for pressor support and BiPAP    Nephrology consulted for acute kidney injury, currently 2.13, patient has a baseline of 0.8-1  Patient has hyponatremia as well, sodium 129, previously 130  No CT with contrast  Patient's blood pressure has been low since admission as low as 51/37, levo was started for a short period of time, discontinued this morning at o937  Patient is on lisinopril at home and was last administer yesterday, medication currently on hold.       Past Medical History  He has a past medical history of Chronic systolic (congestive) heart failure (Multi) (09/22/2020), Myocardial infarction (Multi) (07/01/2024), Old myocardial infarction, Old myocardial infarction (09/22/2020), Old myocardial infarction, Personal history of other diseases of the circulatory system, Personal history of other endocrine, nutritional and metabolic disease, and Presence of stent in anterior descending branch of left coronary artery (07/01/2024).    Surgical History  He has a past surgical history that includes Tonsillectomy (06/01/2016); Vasectomy (06/01/2016); Mandible surgery (06/01/2016); and Other surgical history (06/29/2020).     Social History  He reports that he has been smoking cigarettes. He has never used smokeless tobacco. He reports that he does not currently use alcohol. He reports that he  "does not use drugs.    Family History  No family hx of renal failure per pt knowledge       Allergies  Penicillin g    Review of Systems  .Review of Systems   Constitutional:  Positive for fatigue.   HENT: Negative.     Eyes: Negative.    Respiratory:  Positive for shortness of breath and wheezing.    Cardiovascular: Negative.    Gastrointestinal: Negative.    Endocrine: Negative.    Genitourinary: Negative.    Musculoskeletal: Negative.    Allergic/Immunologic: Negative.    Neurological: Negative.    Hematological: Negative.    Psychiatric/Behavioral: Negative.           Physical Exam  .Physical Exam  Constitutional:       Appearance: He is obese.   HENT:      Mouth/Throat:      Mouth: Mucous membranes are dry.   Eyes:      Pupils: Pupils are equal, round, and reactive to light.   Cardiovascular:      Rate and Rhythm: Tachycardia present.   Pulmonary:      Breath sounds: Wheezing present.   Abdominal:      General: Bowel sounds are normal. There is distension.   Skin:     Capillary Refill: Capillary refill takes less than 2 seconds.   Neurological:      Mental Status: He is alert and oriented to person, place, and time.   Psychiatric:         Mood and Affect: Mood normal.                I&O 24HR    Intake/Output Summary (Last 24 hours) at 7/2/2024 1312  Last data filed at 7/2/2024 1100  Gross per 24 hour   Intake 6572.29 ml   Output 775 ml   Net 5797.29 ml       Vitals 24HR  Heart Rate:  []   Temp:  [36.3 °C (97.3 °F)-37 °C (98.6 °F)]   Resp:  [14-25]   BP: ()/()   Height:  [170.2 cm (5' 7.01\")]   Weight:  [85.6 kg (188 lb 11.4 oz)-94.2 kg (207 lb 10.8 oz)]   SpO2:  [88 %-100 %]       Relevant Results  No hydronephrosis bilaterally.      Right renal lower pole 6.5 cm partially exophytic cyst.      No focal urinary bladder abnormality identified.    Increased perihilar and pulmonary markings more so on the right  than the left. Findings could be on the basis of pulmonary vascular  congestion or " pneumonia.  2.Widening of the mediastinum when compared with the prior.     Assessment/Plan   Acute kidney injury likely developing ATN: hypoperfusion injury in the setting of hypotension, + ACE use (N17.9)  Hyponatremia e87.1  Pneumonia/COPD exacerbation (J18.9)  Acute on chronic respiratory failure with hypoxia(J96.21)  Leukocytosis  Nicotine dependence  Septic shock A41.9, R65.21      Recommendations  Urine lytes: consistent with prerenal but likely will develop ATN  Continue with free water restriction 1500/ 24 hour  Oliguric now currently non oliguric, needs strict I and O documentation  Sodium will improve as acute kidney injury resolves  Maintain systolic blood pressure greater than 100 to help with renal perfusion  Continue to hold lisinopril  Continue with IV fluids  Your recs for pneumonia  Dose to current GFR  Active smoker prior to presentation to hospital, needs tobacco cessation.  Patient counseled   Wean oxygen as tolerated  Rest of management per ICU team            .Thank you for the consult and the opportunity to participate inn the care of this patient. Please do not hesitate to contact us with any questions or concern  .This note is not final until Authenticated by responsible provider.   OTONIEL Hooker, CNP  [facility]  [phone]   OTONIEL Styles-CNP    Attending Supervising Physician's Attestation Statement  I independently performed a history and physical examination on the patient and discussed the management with the RICHARD on the date of service.  The plan was co formulated and reflects my assessment. Any variance is noted below.     Electronically signed by Mateus Donald MD    High likelihood of patient developing ATN.  Creatinine has started to plateau between July 1 and July 2.  Some tubular function is still intact patient is sodium avid.  Making more urine no Johns catheter at this time.  Seems to be reasonably well volume expanded cut down the rate of IV  fluids and discontinue in 10 hours.

## 2024-07-02 NOTE — CONSULTS
"Nutrition Initial Assessment:   Nutrition Assessment    Reason for Assessment: Provider consult order    Medical history per chart: Darron Resendez is a 63 y.o. male with PMHx s/f \"borderline\" COPD, CAD with stent hx STEMI, HTN, chronic tobacco abuse presenting with shortness of breath. Currently admitted for acute on chronic respiratory failure with hypoxia.     7/2: Pt seen in ICU this AM. Notes usual intake of 1-2 meals/day, says this is normal for him. Does not endorse any unintentional weight loss (usual noted to be around 195# per pt, current = 207#). Encouraged pt to continue eating and to be sure to get in regular meals, offered a supplement to regulate PO intake during admission. Pt was agreeable to ensure plus strawberry once daily. No other nutrition-related concerns at this time. Will continue to follow and monitor per protocol.     Nutrition History:          Current Diet: Adult diet Regular; 1500 mL fluid  Supplement(s): No  Average meal Intake during admission: %   Average supplement intake during admission: none ordered at this time     Nutrition Related Findings:   Oral Symptoms: none Teeth: Dentures upper, Dentures lower   GI symptoms: no GI issues at this time.   BM:    Food allergies: NKFA. is allergic to penicillin g.  Meds/Labs reviewed.  aspirin, 81 mg, oral, Daily  atorvastatin, 80 mg, oral, Nightly  cefepime, 2 g, intravenous, q12h  enoxaparin, 40 mg, subcutaneous, q24h  insulin lispro, 3 Units, subcutaneous, TID  ipratropium-albuteroL, 3 mL, nebulization, TID  methylPREDNISolone sodium succinate (PF), 40 mg, intravenous, q8h  oxygen, , inhalation, Continuous - Inhalation  pantoprazole, 40 mg, oral, Daily before breakfast   Or  pantoprazole, 40 mg, intravenous, Daily before breakfast  polyethylene glycol, 17 g, oral, Daily       lactated Ringer's, 125 mL/hr, Last Rate: 125 mL/hr (07/02/24 0923)         Nutrition Significant Labs:  Results from last 7 days   Lab Units 07/02/24  8412 " "07/01/24  0904   GLUCOSE mg/dL 237* 220*   SODIUM mmol/L 129* 130*   POTASSIUM mmol/L 4.4 4.6   CHLORIDE mmol/L 102 92*   CO2 mmol/L 22 29   BUN mg/dL 68* 64*   CREATININE mg/dL 2.13* 2.12*   EGFR mL/min/1.73m*2 34* 34*   CALCIUM mg/dL 7.2* 8.9   MAGNESIUM mg/dL 1.92  --      Lab Results   Component Value Date    HGBA1C 6.5 06/23/2020     Results from last 7 days   Lab Units 07/02/24  1031   POCT GLUCOSE mg/dL 214*       Anthropometrics:  Height: 170.2 cm (5' 7.01\")   Weight: 94.2 kg (207 lb 10.8 oz)   BMI (Calculated): 32.52  IBW/kg (Dietitian Calculated): 67.3 kg  Percent of IBW: 140 %          Weight History:   Wt Readings from Last 10 Encounters:   07/02/24 94.2 kg (207 lb 10.8 oz)   03/20/24 86.2 kg (190 lb)   11/17/23 85.2 kg (187 lb 13.3 oz)   03/15/23 84.8 kg (187 lb)   07/15/22 85.3 kg (188 lb)   03/08/22 87 kg (191 lb 12.8 oz)   03/23/21 87.2 kg (192 lb 4 oz)   09/22/20 80.4 kg (177 lb 3.2 oz)   07/08/20 85.3 kg (188 lb 0.8 oz)   06/29/20 88 kg (194 lb)        Weight Change %:  Weight History / % Weight Change: Noted ~9% gain x4 months, potentially related to fluid repletion this admission (pt was hypotensive and received 6.5L NS in ED). Pt's weight is otherwise stable based on available weight history.  Significant Weight Loss: No     Significant Weight Gain: Fluid related    Nutrition Focused Physical Exam Findings:  defer: No visible losses present.       Estimated Needs:   Total Energy Estimated Needs (kCal):  (0884-1446)  Method for Estimating Needs: 25-30kcal/kg IBW  Total Protein Estimated Needs (g):  (68-81)  Method for Estimating Needs: 1-1.2g/kg IBW   Fluid Needs:  Method for Estimating Needs: per physician        Nutrition Diagnosis   Nutrition Diagnosis:  Malnutrition Diagnosis  Patient has Malnutrition Diagnosis: No    Nutrition Diagnosis  Patient has Nutrition Diagnosis: Yes  Diagnosis Status (1): New  Nutrition Diagnosis 1: Predicted inadequate energy intake  Related to (1): pt reported low " appetite  As Evidenced by (1): reported usual intake of 1-2 meals per day       Nutrition Interventions/Recommendations   Nutrition Interventions and Recommendations:        Nutrition Prescription:  Individualized Nutrition Prescription Provided for : Continue regular diet, fluid restriction. Add ensure plus HP strawberry once daily to regulate PO intake        Nutrition Interventions:   Food and/or Nutrient Delivery Interventions  Interventions: Meals and snacks, Medical food supplement  Meals and Snacks: Fluid-modified diet, General healthful diet  Goal: >75% intake meals  Medical Food Supplement: Commercial beverage  Goal: Ensure plus HP strawberry once daily    Coordination of Nutrition Care by a Nutrition Professional  Collaboration and Referral of Nutrition Care: Collaboration by nutrition professional with other providers  Goal: IDT rounds    Nutrition Education:   Education Documentation  No documentation found.      Nutrition Counseling  Counseling Theoretical Approach: Other (Comment)  Goal: Discussed importance of regular PO intake, supplement options with pt       Nutrition Monitoring and Evaluation   Monitoring/Evaluation:   Food/Nutrient Related History Monitoring  Monitoring and Evaluation Plan: Energy intake  Energy Intake: Estimated energy intake  Criteria: >75% intake estimated needs    Body Composition/Growth/Weight History  Monitoring and Evaluation Plan: Weight  Weight: Measured weight  Criteria: maintain stable weight    Biochemical Data, Medical Tests and Procedures  Monitoring and Evaluation Plan: Electrolyte/renal panel, Glucose/endocrine profile  Electrolyte and Renal Panel: BUN, Creatinine, Magnesium, Potassium, Sodium, Calcium, serum  Criteria: WNL  Glucose/Endocrine Profile: Glucose, casual  Criteria: WNL    Nutrition Focused Physical Findings  Monitoring and Evaluation Plan: Skin  Skin: Other (Comment)  Criteria: Maintain skin integrity            Time Spent/Follow-up Reminder:   Follow  Up  Time Spent (min): 40 minutes  Last Date of Nutrition Visit: 07/02/24  Nutrition Follow-Up Needed?: Dietitian to reassess per policy  Follow up Comment: 7/8-7/10

## 2024-07-02 NOTE — PROGRESS NOTES
Social work consult placed for positive medical risk screen. SW reviewed pt's chart and communicated with [facility]. No SW needs foreseen at this time. SW signing off; available upon request.    Cheikh Eid, MSW, LSW ([id number])   Care Transitions

## 2024-07-02 NOTE — PROGRESS NOTES
"Darron Resendez is a 63 y.o. male on day 1 of admission presenting with Acute on chronic respiratory failure with hypoxia (Multi).      Subjective   Darron Resendez is a 63 y.o. male with PMHx s/f \"borderline\" COPD, CAD with stent hx STEMI, HTN, chronic tobacco abuse presenting with shortness of breath.  Patient states for the past 5 days he has been increasingly short of breath.  He admits to a productive cough with white/yellow sputum production, he has been experiencing subjective fevers and lightheadedness.  He has not had any cigarettes in the past 3 days.  He is felt very winded and fatigued.  He denies sick contacts or environmental exposures.  He denies chest pain, nausea, vomiting, syncope, leg swelling, abdominal pain, or other symptoms.  He states he has been hospitalized with severe pneumonia in the past.  Chart review reveals he was admitted to this hospital back in 2020 for COPD exacerbation.     ED Course (Summary):   Vitals on presentation: 97.2 F, 81 bpm, 22 rr, 91/53 --> 76/54, 90% on Bipap  Labs: BMP glu 220, Na 130, Cl 92, BUN 64, Cr 2.12  Lactate 1.4    Trop 15 --> 14  CBC WBC 12.9, Hg 14.3, Plt 260  COVID negative  VBG pH 7.24, pCO2 77, pO2 21, HCO3 33.0  Imaging: CXR - Increased perihilar and pulmonary markings more so on the right   than the left. Findings could be on the basis of pulmonary vascular   congestion or pneumonia.   EKG - Sinus at 76 bpm.  Interventions: Duoneb X1, Rocephin and azithro given, Solumedrol 125 mg, NS 6.5 L bolus given. Pt is still hypotensive and will be admitted to the ICU. Critical care consulted.     7/2: History and chart reviewed.  Patient seen and examined.  No acute events overnight.  Patient feels a little bit better.  He denies any shortness of breath, chest pain, fevers or chills.  Critical care recommendations appreciated.  Nephrology recommendations appreciated.  Patient is off pressors.  Follow cultures.  Wean down oxygen as tolerated.  Patient will " require at least another 24 to 48 hours of inpatient service.       Objective     Last Recorded Vitals  BP (!) 100/33   Pulse 85   Temp 36.8 °C (98.2 °F)   Resp 20   Wt 94.2 kg (207 lb 10.8 oz)   SpO2 92%   Intake/Output last 3 Shifts:    Intake/Output Summary (Last 24 hours) at 7/2/2024 1628  Last data filed at 7/2/2024 1500  Gross per 24 hour   Intake 6572.29 ml   Output 1175 ml   Net 5397.29 ml       Admission Weight  Weight: 85.6 kg (188 lb 11.4 oz) (07/01/24 0901)    Daily Weight  07/02/24 : 94.2 kg (207 lb 10.8 oz)    Image Results  US renal complete  Narrative: Interpreted By:  Ken Kruse,   STUDY:  US RENAL COMPLETE;  7/2/2024 12:08 pm      INDICATION:  Signs/Symptoms:elevated creatinine.      COMPARISON:  None.      ACCESSION NUMBER(S):  [id number]      ORDERING CLINICIAN:  DINORAH BREEN      TECHNIQUE:  Real-time sonographic evaluation of the kidneys and urinary bladder  was performed.      FINDINGS:  RIGHT KIDNEY:  Right kidney measures  11.6 cm.  No shadowing calculus or right  hydronephrosis is visualized.  There is a right renal lower pole  round hypoechoic partially exophytic cyst measuring 5.9 x 6.1 x 6.5  cm.      LEFT KIDNEY:  Left kidney measures  11.2 cm.  No shadowing calculus or left  hydronephrosis is visualized.  No discrete renal lesion is visualized.      BLADDER:  Urinary bladder is partially distended.      No intraluminal mass, wall thickening or shadowing calculus is  visualized.      Impression: No hydronephrosis bilaterally.      Right renal lower pole 6.5 cm partially exophytic cyst.      No focal urinary bladder abnormality identified.      MACRO:  None.      Signed by: Ken Kruse 7/2/2024 1:05 PM  Dictation workstation:   [id number]      Physical Exam  CONSTITUTIONAL - alert, in no acute distress, not ill-appearing  CHEST -decreased air entry bilaterally, diffuse wheeze, no crackles  CARDIAC - regular rate and regular rhythm, no murmur  ABDOMEN - no  organomegaly, soft, nontender  EXTREMITIES - no edema, no deformities  NEUROLOGICAL - alert, oriented x3 and no acute focal signs  PSYCHIATRIC - alert, pleasant and cordial, age-appropriate    Relevant Results               Assessment/Plan            #Presumed streptococcal pneumoniae pneumonia with septic shock  #Acute hypoxic respiratory failure secondary to pneumonia and COPD exacerbation  #COPD exacerbation secondary to pneumonia  Critical care recommendations appreciated.  Continue cefepime  Continue IV methylprednisone  Patient is off pressors  Follow cultures  Encourage out of bed  Wean down oxygen as tolerated    #Acute kidney injury   Baseline creatinine is between 0.9-1.0.  On admission creatinine was 2.13  Nephrology recommendations appreciated  Renal ultrasound ordered and showed no hydronephrosis  Monitor renal function closely    #History of coronary artery disease  #Tobacco abuse         Keisha Streeter MD

## 2024-07-03 LAB
GLUCOSE BLD MANUAL STRIP-MCNC: 240 MG/DL (ref 74–99)
GLUCOSE BLD MANUAL STRIP-MCNC: 242 MG/DL (ref 74–99)
GLUCOSE BLD MANUAL STRIP-MCNC: 248 MG/DL (ref 74–99)
GLUCOSE BLD MANUAL STRIP-MCNC: 315 MG/DL (ref 74–99)

## 2024-07-03 PROCEDURE — 97116 GAIT TRAINING THERAPY: CPT | Mod: GP | Performed by: PHYSICAL THERAPIST

## 2024-07-03 PROCEDURE — 2500000001 HC RX 250 WO HCPCS SELF ADMINISTERED DRUGS (ALT 637 FOR MEDICARE OP): Performed by: NURSE PRACTITIONER

## 2024-07-03 PROCEDURE — 2500000005 HC RX 250 GENERAL PHARMACY W/O HCPCS: Performed by: NURSE PRACTITIONER

## 2024-07-03 PROCEDURE — 94640 AIRWAY INHALATION TREATMENT: CPT

## 2024-07-03 PROCEDURE — 2500000004 HC RX 250 GENERAL PHARMACY W/ HCPCS (ALT 636 FOR OP/ED): Performed by: NURSE PRACTITIONER

## 2024-07-03 PROCEDURE — 97161 PT EVAL LOW COMPLEX 20 MIN: CPT | Mod: GP | Performed by: PHYSICAL THERAPIST

## 2024-07-03 PROCEDURE — 97165 OT EVAL LOW COMPLEX 30 MIN: CPT | Mod: GO

## 2024-07-03 PROCEDURE — 94660 CPAP INITIATION&MGMT: CPT

## 2024-07-03 PROCEDURE — 82947 ASSAY GLUCOSE BLOOD QUANT: CPT

## 2024-07-03 PROCEDURE — 2060000001 HC INTERMEDIATE ICU ROOM DAILY

## 2024-07-03 PROCEDURE — 2500000002 HC RX 250 W HCPCS SELF ADMINISTERED DRUGS (ALT 637 FOR MEDICARE OP, ALT 636 FOR OP/ED): Performed by: NURSE PRACTITIONER

## 2024-07-03 PROCEDURE — 99233 SBSQ HOSP IP/OBS HIGH 50: CPT | Performed by: INTERNAL MEDICINE

## 2024-07-03 ASSESSMENT — COGNITIVE AND FUNCTIONAL STATUS - GENERAL
EATING MEALS: A LITTLE
DRESSING REGULAR LOWER BODY CLOTHING: TOTAL
STANDING UP FROM CHAIR USING ARMS: A LOT
DRESSING REGULAR UPPER BODY CLOTHING: A LITTLE
DAILY ACTIVITIY SCORE: 15
DRESSING REGULAR UPPER BODY CLOTHING: A LITTLE
MOVING TO AND FROM BED TO CHAIR: A LOT
PERSONAL GROOMING: A LITTLE
EATING MEALS: A LITTLE
DRESSING REGULAR LOWER BODY CLOTHING: TOTAL
HELP NEEDED FOR BATHING: A LOT
CLIMB 3 TO 5 STEPS WITH RAILING: A LITTLE
PERSONAL GROOMING: A LITTLE
DAILY ACTIVITIY SCORE: 15
MOBILITY SCORE: 23
MOBILITY SCORE: 16
WALKING IN HOSPITAL ROOM: A LOT
HELP NEEDED FOR BATHING: A LOT
TOILETING: A LITTLE
CLIMB 3 TO 5 STEPS WITH RAILING: A LOT
TOILETING: A LITTLE

## 2024-07-03 ASSESSMENT — PAIN - FUNCTIONAL ASSESSMENT
PAIN_FUNCTIONAL_ASSESSMENT: 0-10

## 2024-07-03 ASSESSMENT — PAIN SCALES - GENERAL
PAINLEVEL_OUTOF10: 0 - NO PAIN

## 2024-07-03 ASSESSMENT — ACTIVITIES OF DAILY LIVING (ADL)
ADL_ASSISTANCE: INDEPENDENT
BATHING_ASSISTANCE: MODERATE

## 2024-07-03 NOTE — PROGRESS NOTES
"Darron Resendez is a 64 y.o. male on day 2 of admission presenting with Acute on chronic respiratory failure with hypoxia (Multi).      Subjective   Darron Resendez is a 63 y.o. male with PMHx s/f \"borderline\" COPD, CAD with stent hx STEMI, HTN, chronic tobacco abuse presenting with shortness of breath.  Patient states for the past 5 days he has been increasingly short of breath.  He admits to a productive cough with white/yellow sputum production, he has been experiencing subjective fevers and lightheadedness.  He has not had any cigarettes in the past 3 days.  He is felt very winded and fatigued.  He denies sick contacts or environmental exposures.  He denies chest pain, nausea, vomiting, syncope, leg swelling, abdominal pain, or other symptoms.  He states he has been hospitalized with severe pneumonia in the past.  Chart review reveals he was admitted to this hospital back in 2020 for COPD exacerbation.     ED Course (Summary):   Vitals on presentation: 97.2 F, 81 bpm, 22 rr, 91/53 --> 76/54, 90% on Bipap  Labs: BMP glu 220, Na 130, Cl 92, BUN 64, Cr 2.12  Lactate 1.4    Trop 15 --> 14  CBC WBC 12.9, Hg 14.3, Plt 260  COVID negative  VBG pH 7.24, pCO2 77, pO2 21, HCO3 33.0  Imaging: CXR - Increased perihilar and pulmonary markings more so on the right   than the left. Findings could be on the basis of pulmonary vascular   congestion or pneumonia.   EKG - Sinus at 76 bpm.  Interventions: Duoneb X1, Rocephin and azithro given, Solumedrol 125 mg, NS 6.5 L bolus given. Pt is still hypotensive and will be admitted to the ICU. Critical care consulted.     7/2: History and chart reviewed.  Patient seen and examined.  No acute events overnight.  Patient feels a little bit better.  He denies any shortness of breath, chest pain, fevers or chills.  Critical care recommendations appreciated.  Nephrology recommendations appreciated.  Patient is off pressors.  Follow cultures.  Wean down oxygen as tolerated.  Patient will " require at least another 24 to 48 hours of inpatient service.  7/3: No acute events overnight.  Patient is transferred to the stepdown unit.  He is still short of breath.  And has a dry cough with minimal expectoration.  He denies any chest pain, fevers or chills.  He still requiring at least 5 L of oxygen.  Continue aggressive pulmonary hygiene.  Continue nebulizers.  Patient will require at least another 24 to 48 hours of inpatient service.       Objective     Last Recorded Vitals  /68 (BP Location: Right arm, Patient Position: Lying)   Pulse 84   Temp 37.2 °C (99 °F) (Temporal)   Resp 17   Wt 97.6 kg (215 lb 2.7 oz)   SpO2 93%   Intake/Output last 3 Shifts:    Intake/Output Summary (Last 24 hours) at 7/3/2024 1639  Last data filed at 7/3/2024 1530  Gross per 24 hour   Intake 2700 ml   Output 1320 ml   Net 1380 ml       Admission Weight  Weight: 85.6 kg (188 lb 11.4 oz) (07/01/24 0901)    Daily Weight  07/03/24 : 97.6 kg (215 lb 2.7 oz)    Image Results  US renal complete  Narrative: Interpreted By:  Ken Kruse,   STUDY:  US RENAL COMPLETE;  7/2/2024 12:08 pm      INDICATION:  Signs/Symptoms:elevated creatinine.      COMPARISON:  None.      ACCESSION NUMBER(S):  [id number]      ORDERING CLINICIAN:  DINORAH BREEN      TECHNIQUE:  Real-time sonographic evaluation of the kidneys and urinary bladder  was performed.      FINDINGS:  RIGHT KIDNEY:  Right kidney measures  11.6 cm.  No shadowing calculus or right  hydronephrosis is visualized.  There is a right renal lower pole  round hypoechoic partially exophytic cyst measuring 5.9 x 6.1 x 6.5  cm.      LEFT KIDNEY:  Left kidney measures  11.2 cm.  No shadowing calculus or left  hydronephrosis is visualized.  No discrete renal lesion is visualized.      BLADDER:  Urinary bladder is partially distended.      No intraluminal mass, wall thickening or shadowing calculus is  visualized.      Impression: No hydronephrosis bilaterally.      Right renal  lower pole 6.5 cm partially exophytic cyst.      No focal urinary bladder abnormality identified.      MACRO:  None.      Signed by: Ken Kruse 7/2/2024 1:05 PM  Dictation workstation:   [id number]      Physical Exam  CONSTITUTIONAL - alert, in no acute distress, not ill-appearing  CHEST -shallow breaths, decreased air entry bilaterally, diffuse wheeze  CARDIAC - regular rate and regular rhythm, no murmur  ABDOMEN - no organomegaly, soft, nontender  EXTREMITIES - no edema, no deformities  NEUROLOGICAL - alert, oriented x3 and no acute focal signs  PSYCHIATRIC - alert, pleasant and cordial, age-appropriate    Relevant Results               Assessment/Plan            #Presumed streptococcal pneumoniae pneumonia with septic shock  #Acute hypoxic respiratory failure secondary to pneumonia and COPD exacerbation  #COPD exacerbation secondary to pneumonia  Critical care recommendations appreciated.  Continue cefepime  Continue IV methylprednisone  Patient is off pressors  Follow cultures  Encourage out of bed  Wean down oxygen as tolerated  7/3: Continue nebulizers and IV steroids.  Continue antibiotics.  Continue aggressive pulmonary hygiene.    #Acute kidney injury   Baseline creatinine is between 0.9-1.0.  On admission creatinine was 2.13  Nephrology recommendations appreciated  Renal ultrasound ordered and showed no hydronephrosis  Monitor renal function closely  7/3: Will check renal function in the morning    #History of coronary artery disease  #Tobacco abuse         Keisha Streeter MD

## 2024-07-03 NOTE — CONSULTS
Reason For Consult    elevated creatinine       Interval History    Patient is doing   beter   Nephrology consulted for acute kidney injury, currently 2.13, patient has a baseline of 0.8-1  Patient has hyponatremia as well, sodium 129, previously 130  No CT with contrast  No labs on July 3    Patient is on lisinopril at home and was last administer yesterday, medication currently on hold.  Bp better      ROS  Denies chest pain,    Nausea, vomiting , diarrhea, fever or chills.     No change in Past Medical History      I&O 24HR    Intake/Output Summary (Last 24 hours) at 7/3/2024 1540  Last data filed at 7/3/2024 1530  Gross per 24 hour   Intake 2700 ml   Output 1320 ml   Net 1380 ml       Vitals 24HR  Heart Rate:  [82-88]   Temp:  [36.5 °C (97.7 °F)-36.9 °C (98.5 °F)]   Resp:  [18-25]   BP: (100-140)/(33-77)   Weight:  [97.6 kg (215 lb 2.7 oz)]   SpO2:  [87 %-96 %]       Relevant Results  No hydronephrosis bilaterally.      Right renal lower pole 6.5 cm partially exophytic cyst.      No focal urinary bladder abnormality identified.    Increased perihilar and pulmonary markings more so on the right  than the left. Findings could be on the basis of pulmonary vascular  congestion or pneumonia.  2.Widening of the mediastinum when compared with the prior.     Assessment/Plan   Acute kidney injury likely developing ATN: hypoperfusion injury in the setting of hypotension, + ACE use (N17.9)  Hyponatremia e87.1  Pneumonia/COPD exacerbation (J18.9)  Acute on chronic respiratory failure with hypoxia(J96.21)  Leukocytosis  Nicotine dependence  Septic shock A41.9, R65.21      Recommendations  Urine lytes: consistent with prerenal but likely will develop ATN    High likelihood of patient developing ATN.  Nonoliguric    Some tubular function is still intact patient is sodium avid.  Making more urine no Johns catheter at this time.  Seems to be reasonably well volume expanded cut down the rate of IV fluids and discontinue in 10  hours.    No blood work on July 3 will obtain lab work for July 4 continue with free water restriction 1500/ 24 hour      Sodium will improve as acute kidney injury resolves  Maintain systolic blood pressure greater than 100 to help with renal perfusion  Continue to hold lisinopril     Your recs for pneumonia  Dose to current GFR    Wean oxygen as tolerated

## 2024-07-03 NOTE — CARE PLAN
The patient's goals for the shift include      The clinical goals for the shift include SPO2 >88%      Problem: Nutrition  Goal: Oral intake greater 75%  Outcome: Progressing  Goal: Consume prescribed supplement  Outcome: Progressing  Goal: BG  mg/dL  Outcome: Progressing  Goal: Lab values WNL  Outcome: Progressing  Goal: Electrolytes WNL  Outcome: Progressing  Goal: Maintain stable weight  Outcome: Progressing     Problem: Respiratory  Goal: Clear secretions with interventions this shift  Outcome: Progressing  Goal: Minimize anxiety/maximize coping throughout shift  Outcome: Progressing  Goal: Minimal/no exertional discomfort or dyspnea this shift  Outcome: Progressing  Goal: No signs of respiratory distress (eg. Use of accessory muscles. Peds grunting)  Outcome: Progressing  Goal: Patent airway maintained this shift  Outcome: Progressing  Goal: Tolerate pulmonary toileting this shift  Outcome: Progressing  Goal: Verbalize decreased shortness of breath this shift  Outcome: Progressing  Goal: Wean oxygen to maintain O2 saturation per order/standard this shift  Outcome: Progressing  Goal: Increase self care and/or family involvement in next 24 hours  Outcome: Progressing     Problem: Pain - Adult  Goal: Verbalizes/displays adequate comfort level or baseline comfort level  Outcome: Progressing     Problem: Safety - Adult  Goal: Free from fall injury  Outcome: Progressing     Problem: Discharge Planning  Goal: Discharge to home or other facility with appropriate resources  Outcome: Progressing     Problem: Chronic Conditions and Co-morbidities  Goal: Patient's chronic conditions and co-morbidity symptoms are monitored and maintained or improved  Outcome: Progressing     Problem: Infection prevention/bleeding  Goal: Infection s/sx managed  Outcome: Progressing  Goal: No further progression of infection  Outcome: Progressing  Goal: No signs of bleeding  Outcome: Progressing  Goal: Normal coagulation  studies  Outcome: Progressing     Problem: Perfusion/Cardiac  Goal: Adequate perfusion to organs/extremities  Outcome: Progressing  Goal: Hemodynamically stable  Outcome: Progressing  Goal: No cardiac arrhythmias  Outcome: Progressing     Problem: Respiratory/Oxygenation  Goal: No signs of respiratory compromise  Outcome: Progressing  Goal: Tolerates activity without increased O2 demands  Outcome: Progressing     Problem: Neuro/Coping  Goal: Minimal anxiety; utilize coping mechanisms  Outcome: Progressing  Goal: No signs of neurological compromise  Outcome: Progressing  Goal: Increase self care/family involvement  Outcome: Progressing     Problem: Fluid/Electrolyte/Nutrition  Goal: Fluid balance within 1 liter of normovolemia  Outcome: Progressing  Goal: No signs of renal failure  Outcome: Progressing  Goal: Normal electrolyte levels  Outcome: Progressing  Goal: Normal glucose levels  Outcome: Progressing  Goal: Tolerates nutritional intake  Outcome: Progressing

## 2024-07-03 NOTE — PROGRESS NOTES
SW consult placed stating pt interested in POA and Living Will paperwork. SW met with pt, accompanied by ZAIN SANTAMARIA, introduced self and role, provided requested paperwork and educated pt re: utilization and completion, including needing a notary or two witnesses for signatures. SW assisted pt with completing documentation, pt named significant other India Adame as HPOA. SW placed copy of paperwork in pt's chart, returned originals to pt, pt was appreciative and did not identify any other SW needs at this time. No other needs identified at this time, SW signing off,  pt and care team aware of SW availability while inpt.     Cheikh Eid, MSW, LSW ([id number])   Care Transitions

## 2024-07-03 NOTE — PROGRESS NOTES
Physical Therapy    Physical Therapy Evaluation    Patient Name: Darron Resendez  MRN: [id number]  Today's Date: 7/3/2024   Time Calculation  Start Time: 1018  Stop Time: 1046  Time Calculation (min): 28 min  2008/2008-A    Assessment/Plan   PT Assessment  PT Assessment Results: Decreased strength, Decreased endurance, Decreased mobility, Impaired judgement, Decreased safety awareness (respiratory restrictions)  Rehab Prognosis: Fair  Barriers to Discharge: new need for O2, poor safety awareness  Evaluation/Treatment Tolerance: Treatment limited secondary to medical complications (Comment)  Barriers to Participation: Ability to acquire knowledge, Attitude of self, Coping skills  Assessment Comment: pt demos POOR cardiopulmonary endurance with new need for O2, recommend OUTPATIENT CARDIOPULMONARY REHAB as pt describes chronic TODD and increased dep on S.O. Anticipate mobility may be assisted at home by S.O. pt actually LESS safe using walker d/t poor attention to safety and reduced ability to learn.  End of Session Patient Position: Bed, 3 rail up, Alarm on  IP OR SWING BED PT PLAN  Inpatient or Swing Bed: Inpatient  PT Plan  Treatment/Interventions: Transfer training, Gait training, Stair training, Neuromuscular re-education, Endurance training, Therapeutic exercise, Therapeutic activity  PT Plan: Ongoing PT  PT Frequency: 4 times per week  PT Discharge Recommendations: Low intensity level of continued care (PT HOME SAFETY ASSESSMENT)  PT Recommended Transfer Status: Assist x1  PT - OK to Discharge: Yes    Subjective     Current Problem:  1. Acute on chronic respiratory failure with hypoxia (Multi)        2. Dehydration        3. Septic shock (Multi)          General Visit Information:  General  Reason for Referral: Patient states for the past 5 days he has been increasingly short of breath.  He admits to a productive cough with white/yellow sputum production, he has been experiencing subjective fevers and  "lightheadedness.  Referred By: PASCUAL YATES. PT FOR IMPAIRED MOBILITY  Past Medical History Relevant to Rehab: Chronic CHF, MI, Old myocardial infarction, Old myocardial infarction (09/22/2020), Old myocardial infarction- stent in anterior descending branch of left coronary artery (07/01/2024).  Family/Caregiver Present: No  Caregiver Feedback: pt expecting arrival of girlfriend soon  Prior to Session Communication: Bedside nurse  Patient Position Received: Up in chair, Alarm on  General Comment: pt alert and motivated to participate in PT but self limits activity, frequently blames TODD on \"smoking\", very distracted by lines (repeatedly wrapping them around his hands despite repeated reassurance that PT would manage lines)    Home Living:  Home Living  Type of Home: House  Lives With: Significant other (\"girlfriend\")  Home Adaptive Equipment: Cane  Home Layout: One level  Home Access: Stairs to enter with rails  Entrance Stairs-Number of Steps: 3-4    Prior Level of Function:  Prior Function Per Pt/Caregiver Report  Ambulatory Assistance: Needs assistance  Transfers: Moderate  Gait: Minimal (has cane but states \"likes to hold onto girlfriend\")  Prior Function Comments: describes dependency on S.O. for assisted transfers from low surfaces    Precautions:  Precautions  Medical Precautions: Fall precautions, Oxygen therapy device and L/min  Precautions Comment: no Home O2 baseline, unable to wean n/c 5L to 4L  7/3 d/t hypoxia. WATCH PULSE OX.    Vital Signs:  Vital Signs  SpO2: (!) 88 % (with any activity, needed VC /demo for PLBreathing to rebound to 90%--otherwise pt tends to BREATH SHALLOW and BREATH HOLD)  Objective     Pain:  Pain Assessment  Pain Assessment: 0-10  0-10 (Numeric) Pain Score: 0 - No pain    Cognition:  Cognition  Attention: Exceptions to WFL (easily distracted by lines and walker, walker had to be removed to improve attention to TASK and self A)  Problem Solving: Exceptions to WFL (defers to " caregiver assistance before making effor for SELF A)    General Assessments:  General Observation  General Observation: sit<>stand practice from chair x2, gait training around bed with seated rest on other side of bed. then pt asked to walk to bathrom toilet for BM: gait with walker into bathroom,needed much safety training in bathroom gait training back to bed WITHOUT walker. pt opted to dangle EOB-alarm ON and repeated warning that pt must have STAFF  assist to walk, clarified that pt may NOT walk with walker unassisted.   Activity Tolerance  Endurance: Tolerates less than 10 min exercise with changes in vital signs  Activity Tolerance Comments: HYPOXIA on 5L O2     Dynamic Sitting Balance  Dynamic Sitting-Comments: good  Dynamic Standing Balance  Dynamic Standing-Comments: fair    Functional Assessments:     Bed Mobility  Bed Mobility: No  Transfer 1  Transfer From 1: Chair with arms to  Technique 1: Sit to stand  Transfer Device 1: Walker  Transfer Level of Assistance 1: Maximum verbal cues, Maximum tactile cues, Moderate assistance  Trials/Comments 1: *trial use walker practice x2 bouts: pt verbalized understanding of HAND PLACEMENT repeated instructions VC/TC but UNABLE TO RETURN DEMO at chair OR toilet  Transfers 2  Transfer to 2: Toilet  Technique 2: Sit to stand, Stand to sit  Transfer Device 2: Walker (grab bar)  Transfer Level of Assistance 2: Maximum verbal cues, Maximum tactile cues, Moderate assistance  Trials/Comments 2: pt could NOT return demo use grab bar, PT needed to physically place pt's hand onto grab bar  Ambulation/Gait Training 1  Device 1: Rolling walker  Assistance 1: Maximum verbal cues, Maximum tactile cues, Minimum assistance  Comments/Distance (ft) 1: 18, 10  Ambulation/Gait Training 2  Device 2: No device  Assistance 2: Contact guard  Comments/Distance (ft) 2: 10  with marked TODD          Extremity/Trunk Assessments:        RLE   RLE : Within Functional Limits  LLE   LLE : Within  Functional Limits    Outcome Measures:     [facility] Basic Mobility  Turning from your back to your side while in a flat bed without using bedrails: None  Moving from lying on your back to sitting on the side of a flat bed without using bedrails: None  Moving to and from bed to chair (including a wheelchair): A lot  Standing up from a chair using your arms (e.g. wheelchair or bedside chair): A lot  To walk in hospital room: A lot  Climbing 3-5 steps with railing: A lot  Basic Mobility - Total Score: 16        Goals:  Encounter Problems       Encounter Problems (Active)       ENDURANCE, ENERGY CONSERVATION       STG-pt will demo PACING/BREATHING TECH to reduce TODD with only 1 VC       Start:  07/03/24    Expected End:  07/17/24               Mobility       STG - Patient will ambulate household distance with improved safety awareness, use FWW for ENERGY CONSERVATION FOR longer distances only       Start:  07/03/24    Expected End:  07/17/24               PT Transfers       STG - Patient will transfer sit to and from stand with DEMO CORRECT HAND PLACEMENT, USE OF GRAB BARS/AD WHEN APPROP.       Start:  07/03/24    Expected End:  07/17/24               Pain - Adult            Education Documentation  ADL Training, taught by Latanya Gonzalez, PT at 7/3/2024  1:29 PM.  Learner: Patient  Readiness: Nonacceptance  Method: Explanation, Demonstration  Response: Needs Reinforcement    Education Comments  No comments found.

## 2024-07-03 NOTE — PROGRESS NOTES
Occupational Therapy  Evaluation    Patient Name: Darron Resendez  MRN: [id number]  Today's Date: 7/3/2024  Time Calculation  Start Time: 0921  Stop Time: 0945  Time Calculation (min): 24 min    Current Problem:   1. Acute on chronic respiratory failure with hypoxia (Multi)    2. Dehydration    3. Septic shock (Multi)        OT order: OT eval and treat   Referred by: Roxann  Reason for referral: ADLs, safety assessment  Past medical history related to rehab:  has a past medical history of Chronic systolic (congestive) heart failure (Multi) (09/22/2020), Myocardial infarction (Multi) (07/01/2024), Old myocardial infarction, Old myocardial infarction (09/22/2020), Old myocardial infarction, Personal history of other diseases of the circulatory system, Personal history of other endocrine, nutritional and metabolic disease, and Presence of stent in anterior descending branch of left coronary artery (07/01/2024).     Precautions:   Hearing/Visual Limitations: none  Medical Precautions: Fall precautions, Oxygen therapy device and L/min  Precautions Comment: 4L NC, increased to 5L NC for activity by RN    ASSESSMENT  OT Assessment: OT eval completed. The patient is functioning below baseline for ADLs and mobility. can benefit from continued OT. Pt with Decreased ADL status, Decreased upper extremity strength, Decreased safe judgment during ADL, Decreased cognition, Decreased endurance, Decreased functional mobility, Decreased gross motor control, Decreased IADLs  Prognosis:    Barriers to discharge:  none  Tolerance:      PLAN  Frequency: 3 times per week  Treatment Interventions: ADL retraining, Functional transfer training, UE strengthening/ROM, Endurance training, Cognitive reorientation, Patient/family training, Equipment evaluation/education  Discharge Recommendations: Low intensity level of continued care  OT OK to discharge: Yes    GENERAL VISIT INFORMATION   Start of session communication: Bedside nurse  End of  "session communication: Bedside nurse  Family/caregiver present: No  Caregiver feedback:    Co-Treatment:  (no)  Reason for co-treatment:     Position Pt Received:  Bed, 3 rail up, Alarm off, not on at start of session  End of session position: Up in chair, Alarm on    SUBJECTIVE  Home Living:  Type of Home: House  Lives With: Significant other  Home Adaptive Equipment: Cane  Home Layout: One level  Home Access: Stairs to enter with rails  Entrance Stairs-Number of Steps: 3-4  Bathroom Shower/Tub:  (pt reports he does not access shower/tub d/t low endurance and physical difficulty. cannot recall last time he used shower. pt mostly sponge bathes)     Prior Level of Function:  Receives Help From: Family (significant other)  ADL Assistance: Independent (fairly independent. occassional PRN assist from girlfriend for dressing and sponge bathing)  Homemaking Assistance:  (significant other assists or performs most IADLs)  Ambulatory Assistance: Independent (no AD; pt does need \"a little\" assistance for transfers from low surfaces)  Prior Function Comments: +drives    Pain:  Assessment: 0-10  Score: 0 - No pain      OBJECTIVE  Vital Signs:  SpO2: (!) 87 % (pt's spO2 dropped to 87% with sitting eob to use urinal. increased recovery time. RN present and increased pt to 5L NC. maintained 5L NC throughout remaining session. pt's spO2 ranged from 87-94%.)  Patient Position: Sitting    Cognition:  Overall Cognitive Status: Within Functional Limits (oriented x4)             Current ADL function:   EATING:  Independent Beverage management   GROOMING: Stand by (anticipate)     BATHING: Moderate (anticipate)     UB DRESSING: Minimal (anticipate)     LB DRESSING: Total Don/doff R sock, Don/doff L sock   TOILETING: Stand by Setup (use of urinal sitting at bedside)  ADL comments:       Activity Tolerance:  Endurance: Decreased tolerance for upright activites  Activity Tolerance Comments: TODD, SOB. watch for hypoxia    Bed " Mobility/Transfers:   Bed Mobility  Bed Mobility: Yes  Bed Mobility 1  Bed Mobility 1: Supine to sitting  Level of Assistance 1: Minimum assistance (x1)  Bed Mobility Comments 1: HOB raised  Transfers  Transfer: Yes  Transfer 1  Transfer From 1: Sit to  Transfer to 1: Stand  Technique 1: Stand to sit  Transfer Level of Assistance 1: Minimum assistance, Hand held assistance    Ambulation/Gait Training:  Functional Mobility  Functional Mobility Performed:  (pt performed functional mobility around bedside to chair with CGAX1. min VC for sequencing and safety)    Sitting Balance:  Static Sitting Balance  Static Sitting-Level of Assistance: Close supervision  Dynamic Sitting Balance  Dynamic Sitting-Comments: supv    Standing Balance:  Static Standing Balance  Static Standing-Level of Assistance: Contact guard  Dynamic Standing Balance  Dynamic Standing-Comments: CGA    Vision: Vision - Basic Assessment  Current Vision: No visual deficits   and      Sensation:  Light Touch: No apparent deficits (pt denied deficits)    Strength:  Strength Comments: BUE grossly 4-/5    Perception:  Inattention/Neglect: Appears intact    Coordination:  Movements are Fluid and Coordinated: Yes     Hand Function:  Hand Function  Gross Grasp: Functional    Extremities: RUE   RUE : Within Functional Limits and LUE   LUE: Within Functional Limits    Outcome Measures: [facility] Daily Activity  Putting on and taking off regular lower body clothing: Total  Bathing (including washing, rinsing, drying): A lot  Putting on and taking off regular upper body clothing: A little  Toileting, which includes using toilet, bedpan or urinal: A little  Taking care of personal grooming such as brushing teeth: A little  Eating Meals: A little  Daily Activity - Total Score: 15                    EDUCATION:     Education Documentation  ADL Training, taught by Hillary Mejia OT at 7/3/2024 10:32 AM.  Learner: Patient  Readiness: Acceptance  Method:  Explanation  Response: Needs Reinforcement    Education Comments  No comments found.        Goals:   Encounter Problems       Encounter Problems (Active)       ADLs       The patient will tolerate 25 minutes of ADL/Functional activity with min fatigue and while maintaining spO2 >90% (Progressing)       Start:  07/03/24    Expected End:  07/17/24               BALANCE       Patient will tolerate standing for 8 minutes to modified independent level of assistance with least restrictive device in order to improve functional activity tolerance for ADL tasks. (Progressing)       Start:  07/03/24    Expected End:  07/17/24               COGNITION/SAFETY       The patient will demonstrate or verbalize 3 energy conservation techniques without cueing to perform ADLs/functional activity with reduced SOB and fatigue.  (Progressing)       Start:  07/03/24    Expected End:  07/17/24               MOBILITY       Patient will perform Functional mobility max Household distances/Community Distances with modified independent level of assistance and least restrictive device in order to improve safety and functional mobility. (Progressing)       Start:  07/03/24    Expected End:  07/17/24

## 2024-07-04 LAB
ANION GAP SERPL CALC-SCNC: 9 MMOL/L (ref 10–20)
BASOPHILS # BLD AUTO: 0.01 X10*3/UL (ref 0–0.1)
BASOPHILS NFR BLD AUTO: 0 %
BUN SERPL-MCNC: 39 MG/DL (ref 6–23)
CALCIUM SERPL-MCNC: 7.5 MG/DL (ref 8.6–10.3)
CHLORIDE SERPL-SCNC: 106 MMOL/L (ref 98–107)
CO2 SERPL-SCNC: 25 MMOL/L (ref 21–32)
CREAT SERPL-MCNC: 0.98 MG/DL (ref 0.5–1.3)
EGFRCR SERPLBLD CKD-EPI 2021: 85 ML/MIN/1.73M*2
EOSINOPHIL # BLD AUTO: 0.01 X10*3/UL (ref 0–0.7)
EOSINOPHIL NFR BLD AUTO: 0 %
ERYTHROCYTE [DISTWIDTH] IN BLOOD BY AUTOMATED COUNT: 12.8 % (ref 11.5–14.5)
GLUCOSE BLD MANUAL STRIP-MCNC: 181 MG/DL (ref 74–99)
GLUCOSE BLD MANUAL STRIP-MCNC: 185 MG/DL (ref 74–99)
GLUCOSE BLD MANUAL STRIP-MCNC: 246 MG/DL (ref 74–99)
GLUCOSE BLD MANUAL STRIP-MCNC: 271 MG/DL (ref 74–99)
GLUCOSE SERPL-MCNC: 203 MG/DL (ref 74–99)
HCT VFR BLD AUTO: 36.1 % (ref 41–52)
HGB BLD-MCNC: 11.8 G/DL (ref 13.5–17.5)
IMM GRANULOCYTES # BLD AUTO: 3.94 X10*3/UL (ref 0–0.7)
IMM GRANULOCYTES NFR BLD AUTO: 16.4 % (ref 0–0.9)
LYMPHOCYTES # BLD AUTO: 1.22 X10*3/UL (ref 1.2–4.8)
LYMPHOCYTES NFR BLD AUTO: 5.1 %
MAGNESIUM SERPL-MCNC: 1.98 MG/DL (ref 1.6–2.4)
MCH RBC QN AUTO: 31.7 PG (ref 26–34)
MCHC RBC AUTO-ENTMCNC: 32.7 G/DL (ref 32–36)
MCV RBC AUTO: 97 FL (ref 80–100)
MONOCYTES # BLD AUTO: 1.46 X10*3/UL (ref 0.1–1)
MONOCYTES NFR BLD AUTO: 6.1 %
NEUTROPHILS # BLD AUTO: 17.35 X10*3/UL (ref 1.2–7.7)
NEUTROPHILS NFR BLD AUTO: 72.4 %
NRBC BLD-RTO: 0 /100 WBCS (ref 0–0)
PLATELET # BLD AUTO: 254 X10*3/UL (ref 150–450)
POTASSIUM SERPL-SCNC: 5 MMOL/L (ref 3.5–5.3)
RBC # BLD AUTO: 3.72 X10*6/UL (ref 4.5–5.9)
RBC MORPH BLD: NORMAL
SODIUM SERPL-SCNC: 135 MMOL/L (ref 136–145)
WBC # BLD AUTO: 24 X10*3/UL (ref 4.4–11.3)

## 2024-07-04 PROCEDURE — 2500000005 HC RX 250 GENERAL PHARMACY W/O HCPCS: Performed by: NURSE PRACTITIONER

## 2024-07-04 PROCEDURE — 80048 BASIC METABOLIC PNL TOTAL CA: CPT | Performed by: INTERNAL MEDICINE

## 2024-07-04 PROCEDURE — 99233 SBSQ HOSP IP/OBS HIGH 50: CPT | Performed by: INTERNAL MEDICINE

## 2024-07-04 PROCEDURE — 83735 ASSAY OF MAGNESIUM: CPT | Performed by: INTERNAL MEDICINE

## 2024-07-04 PROCEDURE — 2500000004 HC RX 250 GENERAL PHARMACY W/ HCPCS (ALT 636 FOR OP/ED): Performed by: NURSE PRACTITIONER

## 2024-07-04 PROCEDURE — 2500000001 HC RX 250 WO HCPCS SELF ADMINISTERED DRUGS (ALT 637 FOR MEDICARE OP): Performed by: NURSE PRACTITIONER

## 2024-07-04 PROCEDURE — 94660 CPAP INITIATION&MGMT: CPT

## 2024-07-04 PROCEDURE — 2060000001 HC INTERMEDIATE ICU ROOM DAILY

## 2024-07-04 PROCEDURE — 36415 COLL VENOUS BLD VENIPUNCTURE: CPT | Performed by: INTERNAL MEDICINE

## 2024-07-04 PROCEDURE — 85025 COMPLETE CBC W/AUTO DIFF WBC: CPT | Performed by: INTERNAL MEDICINE

## 2024-07-04 PROCEDURE — 82947 ASSAY GLUCOSE BLOOD QUANT: CPT

## 2024-07-04 PROCEDURE — 2500000002 HC RX 250 W HCPCS SELF ADMINISTERED DRUGS (ALT 637 FOR MEDICARE OP, ALT 636 FOR OP/ED): Performed by: NURSE PRACTITIONER

## 2024-07-04 PROCEDURE — 94640 AIRWAY INHALATION TREATMENT: CPT

## 2024-07-04 ASSESSMENT — COGNITIVE AND FUNCTIONAL STATUS - GENERAL
DRESSING REGULAR UPPER BODY CLOTHING: A LITTLE
DAILY ACTIVITIY SCORE: 15
PERSONAL GROOMING: A LITTLE
WALKING IN HOSPITAL ROOM: A LOT
MOBILITY SCORE: 16
MOBILITY SCORE: 16
STANDING UP FROM CHAIR USING ARMS: A LOT
WALKING IN HOSPITAL ROOM: A LOT
CLIMB 3 TO 5 STEPS WITH RAILING: A LOT
TOILETING: A LITTLE
HELP NEEDED FOR BATHING: A LOT
MOVING TO AND FROM BED TO CHAIR: A LOT
EATING MEALS: A LITTLE
PERSONAL GROOMING: A LITTLE
HELP NEEDED FOR BATHING: A LOT
TOILETING: A LITTLE
DRESSING REGULAR UPPER BODY CLOTHING: A LITTLE
CLIMB 3 TO 5 STEPS WITH RAILING: A LOT
EATING MEALS: A LITTLE
STANDING UP FROM CHAIR USING ARMS: A LOT
DRESSING REGULAR LOWER BODY CLOTHING: TOTAL
DAILY ACTIVITIY SCORE: 15
MOVING TO AND FROM BED TO CHAIR: A LOT
DRESSING REGULAR LOWER BODY CLOTHING: TOTAL

## 2024-07-04 ASSESSMENT — ENCOUNTER SYMPTOMS
CARDIOVASCULAR NEGATIVE: 1
CONSTITUTIONAL NEGATIVE: 1
PSYCHIATRIC NEGATIVE: 1
ENDOCRINE NEGATIVE: 1
SHORTNESS OF BREATH: 1
ALLERGIC/IMMUNOLOGIC NEGATIVE: 1
GASTROINTESTINAL NEGATIVE: 1
MUSCULOSKELETAL NEGATIVE: 1
NEUROLOGICAL NEGATIVE: 1
EYES NEGATIVE: 1
HEMATOLOGIC/LYMPHATIC NEGATIVE: 1
WHEEZING: 1

## 2024-07-04 ASSESSMENT — PAIN SCALES - GENERAL
PAINLEVEL_OUTOF10: 0 - NO PAIN
PAINLEVEL_OUTOF10: 0 - NO PAIN

## 2024-07-04 ASSESSMENT — PAIN - FUNCTIONAL ASSESSMENT
PAIN_FUNCTIONAL_ASSESSMENT: 0-10
PAIN_FUNCTIONAL_ASSESSMENT: 0-10

## 2024-07-04 NOTE — PROGRESS NOTES
Darron Resendez is a 64 y.o. male on day 3 of admission presenting with Acute on chronic respiratory failure with hypoxia (Multi).      Subjective   Sitting up in chair eating breakfast  Patient states he refuses BiPAP last night  Shortness of breath is the same as yesterday, patient on 10 L of oxygen high flow  Patient has no difficulty urinating  No chest pain   Patient having bowel movements       Objective          Vitals 24HR  Heart Rate:  [77-88]   Temp:  [36.2 °C (97.2 °F)-37.2 °C (99 °F)]   Resp:  [17-21]   BP: (121-167)/(67-81)   Weight:  [95 kg (209 lb 7 oz)]   SpO2:  [87 %-96 %]   .  Vitals:    07/03/24 2332 07/04/24 0007 07/04/24 0326 07/04/24 0406   BP: 157/74   167/81   BP Location: Left arm   Left arm   Patient Position:    Lying   Pulse: 78   77   Resp: 17 21   Temp: 36.2 °C (97.2 °F)   36.3 °C (97.4 °F)   TempSrc: Temporal   Temporal   SpO2: 92% 94% 95% 91%   Weight:    95 kg (209 lb 7 oz)   Height:              Intake/Output last 3 Shifts:    Intake/Output Summary (Last 24 hours) at 7/4/2024 0721  Last data filed at 7/4/2024 0529  Gross per 24 hour   Intake 3000 ml   Output 2920 ml   Net 80 ml     .  Results from last 7 days   Lab Units 07/04/24 0434 07/02/24 0315 07/01/24  0904   WBC AUTO x10*3/uL 24.0* 12.8* 12.9*   HEMOGLOBIN g/dL 11.8* 11.3* 14.3   HEMATOCRIT % 36.1* 35.8* 43.9   PLATELETS AUTO x10*3/uL 254 225 260    .  Results from last 7 days   Lab Units 07/04/24 0434 07/02/24 0315 07/01/24  0904   SODIUM mmol/L 135* 129* 130*   POTASSIUM mmol/L 5.0 4.4 4.6   CHLORIDE mmol/L 106 102 92*   CO2 mmol/L 25 22 29   BUN mg/dL 39* 68* 64*   CREATININE mg/dL 0.98 2.13* 2.12*   EGFR mL/min/1.73m*2 85 34* 34*   GLUCOSE mg/dL 203* 237* 220*   CALCIUM mg/dL 7.5* 7.2* 8.9      Physical Exam  HENT:      Head: Normocephalic.      Right Ear: Tympanic membrane normal.      Left Ear: Tympanic membrane normal.      Nose: Nose normal.      Mouth/Throat:      Mouth: Mucous membranes are dry.   Eyes:       Pupils: Pupils are equal, round, and reactive to light.   Cardiovascular:      Rate and Rhythm: Normal rate.      Pulses: Normal pulses.   Pulmonary:      Breath sounds: Wheezing present.   Abdominal:      General: Bowel sounds are normal. There is distension.   Genitourinary:     Penis: Normal.    Musculoskeletal:         General: Normal range of motion.      Cervical back: Normal range of motion.   Skin:     General: Skin is warm and dry.      Capillary Refill: Capillary refill takes 2 to 3 seconds.   Neurological:      Mental Status: He is alert and oriented to person, place, and time.   Psychiatric:         Mood and Affect: Mood normal.     .Review of Systems   Constitutional: Negative.    HENT: Negative.     Eyes: Negative.    Respiratory:  Positive for shortness of breath and wheezing.    Cardiovascular: Negative.    Gastrointestinal: Negative.    Endocrine: Negative.    Genitourinary: Negative.    Musculoskeletal: Negative.    Allergic/Immunologic: Negative.    Neurological: Negative.    Hematological: Negative.    Psychiatric/Behavioral: Negative.         ..  Relevant Results               Assessment/Plan              Acute kidney injury likely developing ATN: hypoperfusion injury in the setting of hypotension, + ACE use (N17.9)  Hyponatremia e87.1  Pneumonia/COPD exacerbation (J18.9)  Acute on chronic respiratory failure with hypoxia(J96.21)  Leukocytosis  Nicotine dependence  Septic shock A41.9, R65.2            Recommendations  Urine lytes: consistent with prerenal but likely will develop ATN  Serum creatinine improving, however fluctuations are expected  High likelihood of patient developing ATN.  Nonoliguric     Some tubular function is still intact patient is sodium avid.  Making more urine no Johns catheter at this time.        continue with free water restriction 1500/ 24 hour        Sodium will improve as acute kidney injury resolves  Maintain systolic blood pressure greater than 100 to help with  renal perfusion  Continue to hold lisinopril      Your recs for pneumonia  Dose to current GFR     Wean oxygen as tolerated    Counseled patient in regards to wearing BiPAP at night, patient refused BiPAP last night    .Thank you for the consult and the opportunity to participate inn the care of this patient. Please do not hesitate to contact us with any questions or concern  .This note is not final until Authenticated by responsible provider.   OTONIEL Hooker, CNP  [facility]  [phone]   OTONIEL Styles-CNP

## 2024-07-04 NOTE — PROGRESS NOTES
"Darron Resendez is a 64 y.o. male on day 3 of admission presenting with Acute on chronic respiratory failure with hypoxia (Multi).      Subjective   Darron Resendez is a 63 y.o. male with PMHx s/f \"borderline\" COPD, CAD with stent hx STEMI, HTN, chronic tobacco abuse presenting with shortness of breath.  Patient states for the past 5 days he has been increasingly short of breath.  He admits to a productive cough with white/yellow sputum production, he has been experiencing subjective fevers and lightheadedness.  He has not had any cigarettes in the past 3 days.  He is felt very winded and fatigued.  He denies sick contacts or environmental exposures.  He denies chest pain, nausea, vomiting, syncope, leg swelling, abdominal pain, or other symptoms.  He states he has been hospitalized with severe pneumonia in the past.  Chart review reveals he was admitted to this hospital back in 2020 for COPD exacerbation.     ED Course (Summary):   Vitals on presentation: 97.2 F, 81 bpm, 22 rr, 91/53 --> 76/54, 90% on Bipap  Labs: BMP glu 220, Na 130, Cl 92, BUN 64, Cr 2.12  Lactate 1.4    Trop 15 --> 14  CBC WBC 12.9, Hg 14.3, Plt 260  COVID negative  VBG pH 7.24, pCO2 77, pO2 21, HCO3 33.0  Imaging: CXR - Increased perihilar and pulmonary markings more so on the right   than the left. Findings could be on the basis of pulmonary vascular   congestion or pneumonia.   EKG - Sinus at 76 bpm.  Interventions: Duoneb X1, Rocephin and azithro given, Solumedrol 125 mg, NS 6.5 L bolus given. Pt is still hypotensive and will be admitted to the ICU. Critical care consulted.     7/2: History and chart reviewed.  Patient seen and examined.  No acute events overnight.  Patient feels a little bit better.  He denies any shortness of breath, chest pain, fevers or chills.  Critical care recommendations appreciated.  Nephrology recommendations appreciated.  Patient is off pressors.  Follow cultures.  Wean down oxygen as tolerated.  Patient will " require at least another 24 to 48 hours of inpatient service.  7/3: No acute events overnight.  Patient is transferred to the stepdown unit.  He is still short of breath.  And has a dry cough with minimal expectoration.  He denies any chest pain, fevers or chills.  He still requiring at least 5 L of oxygen.  Continue aggressive pulmonary hygiene.  Continue nebulizers.  Patient will require at least another 24 to 48 hours of inpatient service.   7/4: No acute events overnight.  Patient currently requiring 10 L of oxygen.  Continue to encourage out of bed.  Continue aggressive pulm hygiene.  Continue cefepime, methylprednisone and nebulizers.  Patient will require at least another 48 hours of inpatient service.    Objective     Last Recorded Vitals  /73 (BP Location: Left arm, Patient Position: Sitting)   Pulse 92   Temp 36.8 °C (98.2 °F) (Temporal)   Resp 21   Wt 95 kg (209 lb 7 oz)   SpO2 92%   Intake/Output last 3 Shifts:    Intake/Output Summary (Last 24 hours) at 7/4/2024 1018  Last data filed at 7/4/2024 0709  Gross per 24 hour   Intake 800 ml   Output 2720 ml   Net -1920 ml       Admission Weight  Weight: 85.6 kg (188 lb 11.4 oz) (07/01/24 0901)    Daily Weight  07/04/24 : 95 kg (209 lb 7 oz)    Image Results  US renal complete  Narrative: Interpreted By:  Ken Kruse,   STUDY:  US RENAL COMPLETE;  7/2/2024 12:08 pm      INDICATION:  Signs/Symptoms:elevated creatinine.      COMPARISON:  None.      ACCESSION NUMBER(S):  [id number]      ORDERING CLINICIAN:  DINORAH BREEN      TECHNIQUE:  Real-time sonographic evaluation of the kidneys and urinary bladder  was performed.      FINDINGS:  RIGHT KIDNEY:  Right kidney measures  11.6 cm.  No shadowing calculus or right  hydronephrosis is visualized.  There is a right renal lower pole  round hypoechoic partially exophytic cyst measuring 5.9 x 6.1 x 6.5  cm.      LEFT KIDNEY:  Left kidney measures  11.2 cm.  No shadowing calculus or  left  hydronephrosis is visualized.  No discrete renal lesion is visualized.      BLADDER:  Urinary bladder is partially distended.      No intraluminal mass, wall thickening or shadowing calculus is  visualized.      Impression: No hydronephrosis bilaterally.      Right renal lower pole 6.5 cm partially exophytic cyst.      No focal urinary bladder abnormality identified.      MACRO:  None.      Signed by: Ken Kruse 7/2/2024 1:05 PM  Dictation workstation:   [id number]      Physical Exam  CONSTITUTIONAL - alert, in no acute distress, not ill-appearing  CHEST -shallow breaths, decreased air entry bilaterally, diffuse wheeze  CARDIAC - regular rate and regular rhythm, no murmur  ABDOMEN - no organomegaly, soft, nontender  EXTREMITIES - no edema, no deformities  NEUROLOGICAL - alert, oriented x3 and no acute focal signs  PSYCHIATRIC - alert, pleasant and cordial, age-appropriate    Relevant Results               Assessment/Plan            #Presumed streptococcal pneumoniae pneumonia with septic shock  #Acute hypoxic respiratory failure secondary to pneumonia and COPD exacerbation  #COPD exacerbation secondary to pneumonia  Critical care recommendations appreciated.  Continue cefepime  Continue IV methylprednisone  Patient is off pressors  Follow cultures  Encourage out of bed  Wean down oxygen as tolerated  7/3: Continue nebulizers and IV steroids.  Continue antibiotics.  Continue aggressive pulmonary hygiene.  7/4: Patient currently on 10 L of oxygen.  Wean down as tolerated.  Continue cefepime, methylprednisone, DuoNebs.  Continue aggressive pulmonary hygiene.  Encourage out of bed.    #Acute kidney injury   Baseline creatinine is between 0.9-1.0.  On admission creatinine was 2.13  Nephrology recommendations appreciated  Renal ultrasound ordered and showed no hydronephrosis  Monitor renal function closely  7/3: Will check renal function in the morning  7/4: Creatinine down to baseline at 0.98.  Nephrology  recommendation appreciated.    #History of coronary artery disease  #Tobacco abuse         Keisha Streeter MD

## 2024-07-05 LAB
ANION GAP SERPL CALC-SCNC: 10 MMOL/L (ref 10–20)
BACTERIA BLD CULT: NORMAL
BACTERIA BLD CULT: NORMAL
BASOPHILS # BLD MANUAL: 0.24 X10*3/UL (ref 0–0.1)
BASOPHILS NFR BLD MANUAL: 1 %
BUN SERPL-MCNC: 24 MG/DL (ref 6–23)
CALCIUM SERPL-MCNC: 6.5 MG/DL (ref 8.6–10.3)
CHLORIDE SERPL-SCNC: 108 MMOL/L (ref 98–107)
CO2 SERPL-SCNC: 24 MMOL/L (ref 21–32)
CREAT SERPL-MCNC: 0.76 MG/DL (ref 0.5–1.3)
EGFRCR SERPLBLD CKD-EPI 2021: >90 ML/MIN/1.73M*2
EOSINOPHIL # BLD MANUAL: 0 X10*3/UL (ref 0–0.7)
EOSINOPHIL NFR BLD MANUAL: 0 %
ERYTHROCYTE [DISTWIDTH] IN BLOOD BY AUTOMATED COUNT: 12.9 % (ref 11.5–14.5)
GLUCOSE BLD MANUAL STRIP-MCNC: 156 MG/DL (ref 74–99)
GLUCOSE BLD MANUAL STRIP-MCNC: 186 MG/DL (ref 74–99)
GLUCOSE BLD MANUAL STRIP-MCNC: 187 MG/DL (ref 74–99)
GLUCOSE BLD MANUAL STRIP-MCNC: 235 MG/DL (ref 74–99)
GLUCOSE SERPL-MCNC: 183 MG/DL (ref 74–99)
HCT VFR BLD AUTO: 35.2 % (ref 41–52)
HGB BLD-MCNC: 11.7 G/DL (ref 13.5–17.5)
HYPOCHROMIA BLD QL SMEAR: ABNORMAL
IMM GRANULOCYTES # BLD AUTO: 3.68 X10*3/UL (ref 0–0.7)
IMM GRANULOCYTES NFR BLD AUTO: 15.2 % (ref 0–0.9)
LYMPHOCYTES # BLD MANUAL: 1.46 X10*3/UL (ref 1.2–4.8)
LYMPHOCYTES NFR BLD MANUAL: 6 %
MCH RBC QN AUTO: 31.6 PG (ref 26–34)
MCHC RBC AUTO-ENTMCNC: 33.2 G/DL (ref 32–36)
MCV RBC AUTO: 95 FL (ref 80–100)
METAMYELOCYTES # BLD MANUAL: 0.49 X10*3/UL
METAMYELOCYTES NFR BLD MANUAL: 2 %
MONOCYTES # BLD MANUAL: 1.22 X10*3/UL (ref 0.1–1)
MONOCYTES NFR BLD MANUAL: 5 %
NEUTROPHILS # BLD MANUAL: 20.89 X10*3/UL (ref 1.2–7.7)
NEUTS BAND # BLD MANUAL: 1.94 X10*3/UL (ref 0–0.7)
NEUTS BAND NFR BLD MANUAL: 8 %
NEUTS SEG # BLD MANUAL: 18.95 X10*3/UL (ref 1.2–7)
NEUTS SEG NFR BLD MANUAL: 78 %
NRBC BLD-RTO: 0 /100 WBCS (ref 0–0)
OVALOCYTES BLD QL SMEAR: ABNORMAL
PLATELET # BLD AUTO: 256 X10*3/UL (ref 150–450)
POTASSIUM SERPL-SCNC: 4.2 MMOL/L (ref 3.5–5.3)
RBC # BLD AUTO: 3.7 X10*6/UL (ref 4.5–5.9)
RBC MORPH BLD: ABNORMAL
SODIUM SERPL-SCNC: 138 MMOL/L (ref 136–145)
TOTAL CELLS COUNTED BLD: 100
WBC # BLD AUTO: 24.3 X10*3/UL (ref 4.4–11.3)

## 2024-07-05 PROCEDURE — 2500000005 HC RX 250 GENERAL PHARMACY W/O HCPCS: Performed by: NURSE PRACTITIONER

## 2024-07-05 PROCEDURE — 36415 COLL VENOUS BLD VENIPUNCTURE: CPT | Performed by: INTERNAL MEDICINE

## 2024-07-05 PROCEDURE — 85027 COMPLETE CBC AUTOMATED: CPT | Performed by: INTERNAL MEDICINE

## 2024-07-05 PROCEDURE — 80048 BASIC METABOLIC PNL TOTAL CA: CPT | Performed by: INTERNAL MEDICINE

## 2024-07-05 PROCEDURE — 85007 BL SMEAR W/DIFF WBC COUNT: CPT | Performed by: INTERNAL MEDICINE

## 2024-07-05 PROCEDURE — 2500000002 HC RX 250 W HCPCS SELF ADMINISTERED DRUGS (ALT 637 FOR MEDICARE OP, ALT 636 FOR OP/ED): Performed by: NURSE PRACTITIONER

## 2024-07-05 PROCEDURE — 2500000001 HC RX 250 WO HCPCS SELF ADMINISTERED DRUGS (ALT 637 FOR MEDICARE OP): Performed by: NURSE PRACTITIONER

## 2024-07-05 PROCEDURE — 99233 SBSQ HOSP IP/OBS HIGH 50: CPT | Performed by: INTERNAL MEDICINE

## 2024-07-05 PROCEDURE — 2500000004 HC RX 250 GENERAL PHARMACY W/ HCPCS (ALT 636 FOR OP/ED): Performed by: NURSE PRACTITIONER

## 2024-07-05 PROCEDURE — 97530 THERAPEUTIC ACTIVITIES: CPT | Mod: GO,CO

## 2024-07-05 PROCEDURE — 94660 CPAP INITIATION&MGMT: CPT

## 2024-07-05 PROCEDURE — 94668 MNPJ CHEST WALL SBSQ: CPT

## 2024-07-05 PROCEDURE — 2500000004 HC RX 250 GENERAL PHARMACY W/ HCPCS (ALT 636 FOR OP/ED): Mod: JZ | Performed by: NURSE PRACTITIONER

## 2024-07-05 PROCEDURE — 94640 AIRWAY INHALATION TREATMENT: CPT

## 2024-07-05 PROCEDURE — 82947 ASSAY GLUCOSE BLOOD QUANT: CPT

## 2024-07-05 PROCEDURE — 2060000001 HC INTERMEDIATE ICU ROOM DAILY

## 2024-07-05 ASSESSMENT — COGNITIVE AND FUNCTIONAL STATUS - GENERAL
DRESSING REGULAR LOWER BODY CLOTHING: TOTAL
EATING MEALS: A LITTLE
MOVING TO AND FROM BED TO CHAIR: A LOT
DAILY ACTIVITIY SCORE: 15
TOILETING: A LITTLE
HELP NEEDED FOR BATHING: A LOT
CLIMB 3 TO 5 STEPS WITH RAILING: A LOT
DRESSING REGULAR UPPER BODY CLOTHING: A LITTLE
HELP NEEDED FOR BATHING: A LOT
MOBILITY SCORE: 16
DRESSING REGULAR UPPER BODY CLOTHING: A LITTLE
DAILY ACTIVITIY SCORE: 15
STANDING UP FROM CHAIR USING ARMS: A LOT
WALKING IN HOSPITAL ROOM: A LOT
TOILETING: A LITTLE
PERSONAL GROOMING: A LITTLE
DRESSING REGULAR LOWER BODY CLOTHING: TOTAL
EATING MEALS: A LITTLE
PERSONAL GROOMING: A LITTLE

## 2024-07-05 ASSESSMENT — PAIN - FUNCTIONAL ASSESSMENT
PAIN_FUNCTIONAL_ASSESSMENT: 0-10
PAIN_FUNCTIONAL_ASSESSMENT: 0-10

## 2024-07-05 ASSESSMENT — PAIN SCALES - GENERAL
PAINLEVEL_OUTOF10: 0 - NO PAIN
PAINLEVEL_OUTOF10: 0 - NO PAIN

## 2024-07-05 NOTE — PROGRESS NOTES
"Darron Resendez is a 64 y.o. male on day 4 of admission presenting with Acute on chronic respiratory failure with hypoxia (Multi).      Subjective   Darron Resendez is a 63 y.o. male with PMHx s/f \"borderline\" COPD, CAD with stent hx STEMI, HTN, chronic tobacco abuse presenting with shortness of breath.  Patient states for the past 5 days he has been increasingly short of breath.  He admits to a productive cough with white/yellow sputum production, he has been experiencing subjective fevers and lightheadedness.  He has not had any cigarettes in the past 3 days.  He is felt very winded and fatigued.  He denies sick contacts or environmental exposures.  He denies chest pain, nausea, vomiting, syncope, leg swelling, abdominal pain, or other symptoms.  He states he has been hospitalized with severe pneumonia in the past.  Chart review reveals he was admitted to this hospital back in 2020 for COPD exacerbation.     ED Course (Summary):   Vitals on presentation: 97.2 F, 81 bpm, 22 rr, 91/53 --> 76/54, 90% on Bipap  Labs: BMP glu 220, Na 130, Cl 92, BUN 64, Cr 2.12  Lactate 1.4    Trop 15 --> 14  CBC WBC 12.9, Hg 14.3, Plt 260  COVID negative  VBG pH 7.24, pCO2 77, pO2 21, HCO3 33.0  Imaging: CXR - Increased perihilar and pulmonary markings more so on the right   than the left. Findings could be on the basis of pulmonary vascular   congestion or pneumonia.   EKG - Sinus at 76 bpm.  Interventions: Duoneb X1, Rocephin and azithro given, Solumedrol 125 mg, NS 6.5 L bolus given. Pt is still hypotensive and will be admitted to the ICU. Critical care consulted.      7/2: History and chart reviewed.  Patient seen and examined.  No acute events overnight.  Patient feels a little bit better.  He denies any shortness of breath, chest pain, fevers or chills.  Critical care recommendations appreciated.  Nephrology recommendations appreciated.  Patient is off pressors.  Follow cultures.  Wean down oxygen as tolerated.  Patient will " require at least another 24 to 48 hours of inpatient service.  7/3: No acute events overnight.  Patient is transferred to the stepdown unit.  He is still short of breath.  And has a dry cough with minimal expectoration.  He denies any chest pain, fevers or chills.  He still requiring at least 5 L of oxygen.  Continue aggressive pulmonary hygiene.  Continue nebulizers.  Patient will require at least another 24 to 48 hours of inpatient service.     7/4: No acute events overnight.  Patient currently requiring 10 L of oxygen.  Continue to encourage out of bed.  Continue aggressive pulm hygiene.  Continue cefepime, methylprednisone and nebulizers.  Patient will require at least another 48 hours of inpatient service.    7/5: No acute events overnight. Patient is currently requiring 6 L of oxygen. Continue to encourage out of bed. Continue aggressive pulm hygiene. Continue cefepime, methylprednisone and nebulizers. Patient will require 24 to 48 hours of inpatient services.        Objective     Last Recorded Vitals  /76 (BP Location: Left arm, Patient Position: Lying)   Pulse 79   Temp 37.2 °C (98.9 °F) (Temporal)   Resp 16   Wt 94.7 kg (208 lb 12.4 oz)   SpO2 91%   Intake/Output last 3 Shifts:    Intake/Output Summary (Last 24 hours) at 7/5/2024 1421  Last data filed at 7/5/2024 1012  Gross per 24 hour   Intake 200 ml   Output 2050 ml   Net -1850 ml       Admission Weight  Weight: 85.6 kg (188 lb 11.4 oz) (07/01/24 0901)    Daily Weight  07/05/24 : 94.7 kg (208 lb 12.4 oz)    Image Results  US renal complete  Narrative: Interpreted By:  Ken Kruse,   STUDY:  US RENAL COMPLETE;  7/2/2024 12:08 pm      INDICATION:  Signs/Symptoms:elevated creatinine.      COMPARISON:  None.      ACCESSION NUMBER(S):  [id number]      ORDERING CLINICIAN:  DINORAH BREEN      TECHNIQUE:  Real-time sonographic evaluation of the kidneys and urinary bladder  was performed.      FINDINGS:  RIGHT KIDNEY:  Right kidney measures   11.6 cm.  No shadowing calculus or right  hydronephrosis is visualized.  There is a right renal lower pole  round hypoechoic partially exophytic cyst measuring 5.9 x 6.1 x 6.5  cm.      LEFT KIDNEY:  Left kidney measures  11.2 cm.  No shadowing calculus or left  hydronephrosis is visualized.  No discrete renal lesion is visualized.      BLADDER:  Urinary bladder is partially distended.      No intraluminal mass, wall thickening or shadowing calculus is  visualized.      Impression: No hydronephrosis bilaterally.      Right renal lower pole 6.5 cm partially exophytic cyst.      No focal urinary bladder abnormality identified.      MACRO:  None.      Signed by: Ken Kruse 7/2/2024 1:05 PM  Dictation workstation:   [id number]      Physical Exam:    General: Alert and oriented x 3, no distress  Cardiovascular: Normal S1-S2, sinus rhythm, no murmurs  Respiratory: Good air entry bilaterally, no obvious wheeze or crackles  Abdomen: Abdomen is soft, not distended with no tenderness  Extremities: No edema or deformities  Neurology: Alert and oriented x 3, no acute focal deficits         Assessment/Plan      #Presumed streptococcal pneumoniae pneumonia with septic shock  #Acute hypoxic respiratory failure secondary to pneumonia and COPD exacerbation  #COPD exacerbation secondary to pneumonia  Critical care recommendations appreciated.  Continue cefepime  Continue IV methylprednisone  Patient is off pressors  Follow cultures  Encourage out of bed  Wean down oxygen as tolerated  7/3: Continue nebulizers and IV steroids.  Continue antibiotics.  Continue aggressive pulmonary hygiene.  7/4: Patient currently on 10 L of oxygen.  Wean down as tolerated.  Continue cefepime, methylprednisone, DuoNebs.  Continue aggressive pulmonary hygiene.  Encourage out of bed.  7/5: Patient currently on 6 L of oxygen. Wean down as tolerated. Continue cefepime, methylprednisone, nebulizers. Continue aggressive pulmonary hygiene. Encourage  out of bed.      #Acute kidney injury   Baseline creatinine is between 0.9-1.0.  On admission creatinine was 2.13  Nephrology recommendations appreciated  Renal ultrasound ordered and showed no hydronephrosis  Monitor renal function closely  7/3: Will check renal function in the morning  7/4: Creatinine down to baseline at 0.98.  Nephrology recommendation appreciated.  7/5: Creatinine down to 0.76.           Sodium= 138.           Nephrology recommendations appreciated.        #History of coronary artery disease  #Tobacco abuse    Diet: Regular  DVT prophylaxis: Lovenox subcutaneous   Code Status: Full code per discussion with pt.       ROLO HUNT PA-S    Pt seen and examined  with my PA student . I have modified the note to reflect my documentation of HPI and assessment and plan.    Keisha Streeter MD MRCP

## 2024-07-05 NOTE — PROGRESS NOTES
Physical Therapy                 Therapy Communication Note    Patient Name: Darron Resendez  MRN: [id number]  Today's Date: 7/5/2024     Discipline: Physical Therapy    Missed Visit Reason:  (pt eating lunch, requested therapist check back later)    Missed Time: Attempt

## 2024-07-05 NOTE — PROGRESS NOTES
Occupational Therapy    OT Treatment    Patient Name: Darron Resendez  MRN: [id number]  Today's Date: 7/5/2024  Time Calculation  Start Time: 1214  Stop Time: 1237  Time Calculation (min): 23 min        Assessment:  End of Session Communication: Bedside nurse  End of Session Patient Position:  (pt sitting EOB with family member present in room)  OT Assessment Results: Decreased ADL status, Decreased upper extremity strength, Decreased safe judgment during ADL, Decreased cognition, Decreased endurance, Decreased functional mobility, Decreased gross motor control, Decreased IADLs  Plan:  Treatment Interventions: ADL retraining, Functional transfer training, Endurance training  OT Frequency: 3 times per week  OT Discharge Recommendations: Low intensity level of continued care  OT - OK to Discharge: Yes  Treatment Interventions: ADL retraining, Functional transfer training, Endurance training    Subjective   Previous Visit Info:  OT Last Visit  OT Received On: 07/05/24  General:  General  Prior to Session Communication: Bedside nurse  Patient Position Received:  (pt sitting on EOB with family member present)  General Comment: RN cleared pt for tx session, pt hesitant and required some encouragement to participate in tx session. Decreased insight of deficits  Precautions:  Medical Precautions: Fall precautions, Oxygen therapy device and L/min  Vital Signs:     Pain:  Pain Assessment  Pain Assessment: 0-10  0-10 (Numeric) Pain Score: 0 - No pain    Objective    Cognition:  Cognition  Overall Cognitive Status: Within Functional Limits  Orientation Level: Oriented X4    Functional Standing Tolerance:  Time: <1 min standing bouts  Activity: pt tolerated standing bouts with FWW and cga, no LOB noted  Bed Mobility/Transfers: Transfers  Transfer: Yes  Transfer 1  Transfer From 1: Bed to  Transfer to 1: Stand  Technique 1: Sit to stand, Stand to sit  Transfer Device 1: Walker  Transfer Level of Assistance 1: Contact  guard  Trials/Comments 1: x3 STS with cues for proper hand placement, fair carryover      Functional Mobility:  Functional Mobility  Functional Mobility Performed: Yes  Functional Mobility 1  Comments 1: pt achieved short bouts of functional mobility with FWW and cga, no LOB. Pt required seated RB's for recovery d/t pt O2 trending at 85-85%, cues required for EC/WS and breathing techniques  Sitting Balance:  Static Sitting Balance  Static Sitting-Level of Assistance: Close supervision  Dynamic Sitting Balance  Dynamic Sitting-Comments: supv  Standing Balance:  Static Standing Balance  Static Standing-Level of Assistance: Contact guard  Dynamic Standing Balance  Dynamic Standing-Comments: CGA      Outcome Measures:[facility] Daily Activity  Putting on and taking off regular lower body clothing: Total  Bathing (including washing, rinsing, drying): A lot  Putting on and taking off regular upper body clothing: A little  Toileting, which includes using toilet, bedpan or urinal: A little  Taking care of personal grooming such as brushing teeth: A little  Eating Meals: A little  Daily Activity - Total Score: 15        Education Documentation  ADL Training, taught by MARCO Macias at 7/5/2024  1:04 PM.  Learner: Patient  Readiness: Acceptance  Method: Explanation  Response: Verbalizes Understanding, Needs Reinforcement    Education Comments  No comments found.        OP EDUCATION:       Goals:  Encounter Problems       Encounter Problems (Active)       ADLs       The patient will tolerate 25 minutes of ADL/Functional activity with min fatigue and while maintaining spO2 >90% (Progressing)       Start:  07/03/24    Expected End:  07/17/24               BALANCE       Patient will tolerate standing for 8 minutes to modified independent level of assistance with least restrictive device in order to improve functional activity tolerance for ADL tasks. (Progressing)       Start:  07/03/24    Expected End:  07/17/24                COGNITION/SAFETY       The patient will demonstrate or verbalize 3 energy conservation techniques without cueing to perform ADLs/functional activity with reduced SOB and fatigue.  (Progressing)       Start:  07/03/24    Expected End:  07/17/24               MOBILITY       Patient will perform Functional mobility max Household distances/Community Distances with modified independent level of assistance and least restrictive device in order to improve safety and functional mobility. (Progressing)       Start:  07/03/24    Expected End:  07/17/24

## 2024-07-05 NOTE — PROGRESS NOTES
Darron Resendez is a 64 y.o. male on day 4 of admission presenting with Acute on chronic respiratory failure with hypoxia (Multi).      Subjective   Sitting up in chair eating breakfast  Hoping for discharge soon.  Patient has no difficulty urinating  No chest pain           Objective          Vitals 24HR  Heart Rate:  [75-92]   Temp:  [36.6 °C (97.8 °F)-37.1 °C (98.7 °F)]   Resp:  [17-21]   BP: (126-156)/(72-84)   Weight:  [94.7 kg (208 lb 12.4 oz)]   SpO2:  [90 %-96 %]   .  Vitals:    07/05/24 0349 07/05/24 0739 07/05/24 0743 07/05/24 0807   BP: 156/76   126/84   BP Location: Left arm   Right arm   Patient Position: Lying   Sitting   Pulse: 78   92   Resp: 18   17   Temp: 37.1 °C (98.7 °F)   36.6 °C (97.8 °F)   TempSrc: Temporal   Temporal   SpO2: 93% 92% 92% 90%   Weight: 94.7 kg (208 lb 12.4 oz)      Height:              Intake/Output last 3 Shifts:    Intake/Output Summary (Last 24 hours) at 7/5/2024 0907  Last data filed at 7/5/2024 0807  Gross per 24 hour   Intake --   Output 2450 ml   Net -2450 ml     .  Results from last 7 days   Lab Units 07/05/24  0503 07/04/24  0434 07/02/24  0315   WBC AUTO x10*3/uL 24.3* 24.0* 12.8*   HEMOGLOBIN g/dL 11.7* 11.8* 11.3*   HEMATOCRIT % 35.2* 36.1* 35.8*   PLATELETS AUTO x10*3/uL 256 254 225    .  Results from last 7 days   Lab Units 07/05/24  0503 07/04/24  0434 07/02/24  0315   SODIUM mmol/L 138 135* 129*   POTASSIUM mmol/L 4.2 5.0 4.4   CHLORIDE mmol/L 108* 106 102   CO2 mmol/L 24 25 22   BUN mg/dL 24* 39* 68*   CREATININE mg/dL 0.76 0.98 2.13*   EGFR mL/min/1.73m*2 >90 85 34*   GLUCOSE mg/dL 183* 203* 237*   CALCIUM mg/dL 6.5* 7.5* 7.2*      Physical Exam  HENT:      Head: Normocephalic.      Right Ear: Tympanic membrane normal.      Left Ear: Tympanic membrane normal.   Eyes:      Extraocular Movements: Extraocular movements intact.      Pupils: Pupils are equal, round, and reactive to light.   Cardiovascular:      Rate and Rhythm: Normal rate and regular rhythm.       Pulses: Normal pulses.   Pulmonary:      Effort: Pulmonary effort is normal.      Breath sounds: Wheezing present.   Abdominal:      General: Bowel sounds are normal. There is distension.   Musculoskeletal:      Cervical back: Normal range of motion.   Skin:     General: Skin is warm and dry.      Capillary Refill: Capillary refill takes 2 to 3 seconds.   Neurological:      General: No focal deficit present.      Mental Status: He is alert and oriented to person, place, and time.   Psychiatric:         Mood and Affect: Mood normal.         Behavior: Behavior normal.     .    ..  Relevant Results    Results from last 7 days   Lab Units 07/05/24  0503 07/04/24  0434 07/02/24  0315   SODIUM mmol/L 138 135* 129*   POTASSIUM mmol/L 4.2 5.0 4.4   CHLORIDE mmol/L 108* 106 102   CO2 mmol/L 24 25 22   BUN mg/dL 24* 39* 68*   CREATININE mg/dL 0.76 0.98 2.13*   EGFR mL/min/1.73m*2 >90 85 34*   GLUCOSE mg/dL 183* 203* 237*   CALCIUM mg/dL 6.5* 7.5* 7.2*     Results from last 7 days   Lab Units 07/05/24  0503 07/04/24  0434 07/02/24  0315   WBC AUTO x10*3/uL 24.3* 24.0* 12.8*   HEMOGLOBIN g/dL 11.7* 11.8* 11.3*   HEMATOCRIT % 35.2* 36.1* 35.8*   PLATELETS AUTO x10*3/uL 256 254 225         Assessment/Plan              Acute kidney injury likely developing ATN: hypoperfusion injury in the setting of hypotension, + ACE use (N17.9)  Hyponatremia e87.1  Pneumonia/COPD exacerbation (J18.9)  Acute on chronic respiratory failure with hypoxia(J96.21)  Leukocytosis  Nicotine dependence          Recommendations  Acute kidney injury consistent with partial ATN  Kidney function back to baseline    Fluid restriction may be relaxed to 2000 mL over 24 hours  Would have no objection to resumption of lisinoprilLeukocytosis remains persistent kidney function has improved  Nephrology will sign off

## 2024-07-05 NOTE — CARE PLAN
Problem: Nutrition  Goal: Oral intake greater 75%  Outcome: Progressing  Goal: Consume prescribed supplement  Outcome: Progressing  Goal: BG  mg/dL  Outcome: Progressing  Goal: Lab values WNL  Outcome: Progressing  Goal: Electrolytes WNL  Outcome: Progressing  Goal: Maintain stable weight  Outcome: Progressing     Problem: Respiratory  Goal: Clear secretions with interventions this shift  Outcome: Progressing  Goal: Minimize anxiety/maximize coping throughout shift  Outcome: Progressing  Goal: Minimal/no exertional discomfort or dyspnea this shift  Outcome: Progressing  Goal: No signs of respiratory distress (eg. Use of accessory muscles. Peds grunting)  Outcome: Progressing  Goal: Patent airway maintained this shift  Outcome: Progressing  Goal: Tolerate pulmonary toileting this shift  Outcome: Progressing  Goal: Verbalize decreased shortness of breath this shift  Outcome: Progressing  Goal: Wean oxygen to maintain O2 saturation per order/standard this shift  Outcome: Progressing  Goal: Increase self care and/or family involvement in next 24 hours  Outcome: Progressing     Problem: Pain - Adult  Goal: Verbalizes/displays adequate comfort level or baseline comfort level  Outcome: Progressing     Problem: Safety - Adult  Goal: Free from fall injury  Outcome: Progressing     Problem: Discharge Planning  Goal: Discharge to home or other facility with appropriate resources  Outcome: Progressing     Problem: Chronic Conditions and Co-morbidities  Goal: Patient's chronic conditions and co-morbidity symptoms are monitored and maintained or improved  Outcome: Progressing     Problem: Infection prevention/bleeding  Goal: Infection s/sx managed  Outcome: Progressing  Goal: No further progression of infection  Outcome: Progressing  Goal: No signs of bleeding  Outcome: Progressing  Goal: Normal coagulation studies  Outcome: Progressing     Problem: Perfusion/Cardiac  Goal: Adequate perfusion to  organs/extremities  Outcome: Progressing  Goal: Hemodynamically stable  Outcome: Progressing  Goal: No cardiac arrhythmias  Outcome: Progressing     Problem: Respiratory/Oxygenation  Goal: No signs of respiratory compromise  Outcome: Progressing  Goal: Tolerates activity without increased O2 demands  Outcome: Progressing     Problem: Neuro/Coping  Goal: Minimal anxiety; utilize coping mechanisms  Outcome: Progressing  Goal: No signs of neurological compromise  Outcome: Progressing  Goal: Increase self care/family involvement  Outcome: Progressing     Problem: Fluid/Electrolyte/Nutrition  Goal: Fluid balance within 1 liter of normovolemia  Outcome: Progressing  Goal: No signs of renal failure  Outcome: Progressing  Goal: Normal electrolyte levels  Outcome: Progressing  Goal: Normal glucose levels  Outcome: Progressing  Goal: Tolerates nutritional intake  Outcome: Progressing

## 2024-07-05 NOTE — PROGRESS NOTES
I met with pt to discuss PT rec for low intensity. Pt is agreeable.  Freedom of choice list was offered by creating list in [facility] in patient's preferred geographical area and in network with insurance.  Patient's preference is: [facility].  MD has been made aware.

## 2024-07-06 LAB
ANION GAP SERPL CALC-SCNC: 8 MMOL/L (ref 10–20)
BASOPHILS # BLD MANUAL: 0 X10*3/UL (ref 0–0.1)
BASOPHILS NFR BLD MANUAL: 0 %
BUN SERPL-MCNC: 27 MG/DL (ref 6–23)
CALCIUM SERPL-MCNC: 7.3 MG/DL (ref 8.6–10.3)
CHLORIDE SERPL-SCNC: 102 MMOL/L (ref 98–107)
CO2 SERPL-SCNC: 31 MMOL/L (ref 21–32)
CREAT SERPL-MCNC: 0.87 MG/DL (ref 0.5–1.3)
EGFRCR SERPLBLD CKD-EPI 2021: >90 ML/MIN/1.73M*2
EOSINOPHIL # BLD MANUAL: 0 X10*3/UL (ref 0–0.7)
EOSINOPHIL NFR BLD MANUAL: 0 %
ERYTHROCYTE [DISTWIDTH] IN BLOOD BY AUTOMATED COUNT: 12.9 % (ref 11.5–14.5)
GLUCOSE BLD MANUAL STRIP-MCNC: 184 MG/DL (ref 74–99)
GLUCOSE BLD MANUAL STRIP-MCNC: 192 MG/DL (ref 74–99)
GLUCOSE BLD MANUAL STRIP-MCNC: 235 MG/DL (ref 74–99)
GLUCOSE BLD MANUAL STRIP-MCNC: 281 MG/DL (ref 74–99)
GLUCOSE SERPL-MCNC: 192 MG/DL (ref 74–99)
HCT VFR BLD AUTO: 34.9 % (ref 41–52)
HGB BLD-MCNC: 11.7 G/DL (ref 13.5–17.5)
IMM GRANULOCYTES # BLD AUTO: 3.29 X10*3/UL (ref 0–0.7)
IMM GRANULOCYTES NFR BLD AUTO: 12 % (ref 0–0.9)
LYMPHOCYTES # BLD MANUAL: 1.93 X10*3/UL (ref 1.2–4.8)
LYMPHOCYTES NFR BLD MANUAL: 7 %
MCH RBC QN AUTO: 31.3 PG (ref 26–34)
MCHC RBC AUTO-ENTMCNC: 33.5 G/DL (ref 32–36)
MCV RBC AUTO: 93 FL (ref 80–100)
METAMYELOCYTES # BLD MANUAL: 0.28 X10*3/UL
METAMYELOCYTES NFR BLD MANUAL: 1 %
MONOCYTES # BLD MANUAL: 0.83 X10*3/UL (ref 0.1–1)
MONOCYTES NFR BLD MANUAL: 3 %
MYELOCYTES # BLD MANUAL: 0.28 X10*3/UL
MYELOCYTES NFR BLD MANUAL: 1 %
NEUTROPHILS # BLD MANUAL: 24.21 X10*3/UL (ref 1.2–7.7)
NEUTS BAND # BLD MANUAL: 0.83 X10*3/UL (ref 0–0.7)
NEUTS BAND NFR BLD MANUAL: 3 %
NEUTS SEG # BLD MANUAL: 23.38 X10*3/UL (ref 1.2–7)
NEUTS SEG NFR BLD MANUAL: 85 %
NRBC BLD-RTO: 0 /100 WBCS (ref 0–0)
PLATELET # BLD AUTO: 277 X10*3/UL (ref 150–450)
POTASSIUM SERPL-SCNC: 4.8 MMOL/L (ref 3.5–5.3)
RBC # BLD AUTO: 3.74 X10*6/UL (ref 4.5–5.9)
RBC MORPH BLD: ABNORMAL
SODIUM SERPL-SCNC: 136 MMOL/L (ref 136–145)
TOTAL CELLS COUNTED BLD: 100
WBC # BLD AUTO: 27.5 X10*3/UL (ref 4.4–11.3)

## 2024-07-06 PROCEDURE — 36415 COLL VENOUS BLD VENIPUNCTURE: CPT | Performed by: INTERNAL MEDICINE

## 2024-07-06 PROCEDURE — 82947 ASSAY GLUCOSE BLOOD QUANT: CPT

## 2024-07-06 PROCEDURE — 97535 SELF CARE MNGMENT TRAINING: CPT | Mod: GO,CO

## 2024-07-06 PROCEDURE — 2500000004 HC RX 250 GENERAL PHARMACY W/ HCPCS (ALT 636 FOR OP/ED): Performed by: NURSE PRACTITIONER

## 2024-07-06 PROCEDURE — 97116 GAIT TRAINING THERAPY: CPT | Mod: CQ,GP

## 2024-07-06 PROCEDURE — 99233 SBSQ HOSP IP/OBS HIGH 50: CPT | Performed by: INTERNAL MEDICINE

## 2024-07-06 PROCEDURE — 85007 BL SMEAR W/DIFF WBC COUNT: CPT | Performed by: INTERNAL MEDICINE

## 2024-07-06 PROCEDURE — 2500000005 HC RX 250 GENERAL PHARMACY W/O HCPCS: Performed by: NURSE PRACTITIONER

## 2024-07-06 PROCEDURE — 94668 MNPJ CHEST WALL SBSQ: CPT

## 2024-07-06 PROCEDURE — 2500000002 HC RX 250 W HCPCS SELF ADMINISTERED DRUGS (ALT 637 FOR MEDICARE OP, ALT 636 FOR OP/ED): Performed by: NURSE PRACTITIONER

## 2024-07-06 PROCEDURE — 94660 CPAP INITIATION&MGMT: CPT

## 2024-07-06 PROCEDURE — 85027 COMPLETE CBC AUTOMATED: CPT | Performed by: INTERNAL MEDICINE

## 2024-07-06 PROCEDURE — 2500000001 HC RX 250 WO HCPCS SELF ADMINISTERED DRUGS (ALT 637 FOR MEDICARE OP): Performed by: NURSE PRACTITIONER

## 2024-07-06 PROCEDURE — 97530 THERAPEUTIC ACTIVITIES: CPT | Mod: GO,CO

## 2024-07-06 PROCEDURE — 80048 BASIC METABOLIC PNL TOTAL CA: CPT | Performed by: INTERNAL MEDICINE

## 2024-07-06 PROCEDURE — 2060000001 HC INTERMEDIATE ICU ROOM DAILY

## 2024-07-06 PROCEDURE — 94640 AIRWAY INHALATION TREATMENT: CPT

## 2024-07-06 RX ORDER — PREDNISONE 20 MG/1
40 TABLET ORAL DAILY
Status: DISCONTINUED | OUTPATIENT
Start: 2024-07-06 | End: 2024-07-11 | Stop reason: HOSPADM

## 2024-07-06 ASSESSMENT — COGNITIVE AND FUNCTIONAL STATUS - GENERAL
DRESSING REGULAR UPPER BODY CLOTHING: A LITTLE
DAILY ACTIVITIY SCORE: 15
TURNING FROM BACK TO SIDE WHILE IN FLAT BAD: A LITTLE
PERSONAL GROOMING: A LITTLE
TOILETING: A LITTLE
HELP NEEDED FOR BATHING: A LITTLE
PERSONAL GROOMING: A LITTLE
MOBILITY SCORE: 22
MOVING TO AND FROM BED TO CHAIR: A LITTLE
WALKING IN HOSPITAL ROOM: A LITTLE
TOILETING: A LITTLE
MOVING TO AND FROM BED TO CHAIR: A LOT
DRESSING REGULAR LOWER BODY CLOTHING: TOTAL
HELP NEEDED FOR BATHING: A LOT
CLIMB 3 TO 5 STEPS WITH RAILING: A LITTLE
CLIMB 3 TO 5 STEPS WITH RAILING: A LOT
DAILY ACTIVITIY SCORE: 18
MOVING FROM LYING ON BACK TO SITTING ON SIDE OF FLAT BED WITH BEDRAILS: A LITTLE
DRESSING REGULAR UPPER BODY CLOTHING: A LITTLE
STANDING UP FROM CHAIR USING ARMS: A LITTLE
WALKING IN HOSPITAL ROOM: A LOT
DRESSING REGULAR LOWER BODY CLOTHING: A LOT
EATING MEALS: A LITTLE
CLIMB 3 TO 5 STEPS WITH RAILING: A LOT
DAILY ACTIVITIY SCORE: 24
STANDING UP FROM CHAIR USING ARMS: A LOT
MOBILITY SCORE: 18
MOBILITY SCORE: 16

## 2024-07-06 ASSESSMENT — PAIN SCALES - GENERAL
PAINLEVEL_OUTOF10: 0 - NO PAIN

## 2024-07-06 ASSESSMENT — ACTIVITIES OF DAILY LIVING (ADL)
BATHING_LEVEL_OF_ASSISTANCE: SETUP
HOME_MANAGEMENT_TIME_ENTRY: 15

## 2024-07-06 ASSESSMENT — PAIN - FUNCTIONAL ASSESSMENT
PAIN_FUNCTIONAL_ASSESSMENT: 0-10
PAIN_FUNCTIONAL_ASSESSMENT: 0-10

## 2024-07-06 NOTE — PROGRESS NOTES
"Darron Resendez is a 64 y.o. male on day 5 of admission presenting with Acute on chronic respiratory failure with hypoxia (Multi).      Subjective   Darron Resendez is a 63 y.o. male with PMHx s/f \"borderline\" COPD, CAD with stent hx STEMI, HTN, chronic tobacco abuse presenting with shortness of breath.  Patient states for the past 5 days he has been increasingly short of breath.  He admits to a productive cough with white/yellow sputum production, he has been experiencing subjective fevers and lightheadedness.  He has not had any cigarettes in the past 3 days.  He is felt very winded and fatigued.  He denies sick contacts or environmental exposures.  He denies chest pain, nausea, vomiting, syncope, leg swelling, abdominal pain, or other symptoms.  He states he has been hospitalized with severe pneumonia in the past.  Chart review reveals he was admitted to this hospital back in 2020 for COPD exacerbation.     ED Course (Summary):   Vitals on presentation: 97.2 F, 81 bpm, 22 rr, 91/53 --> 76/54, 90% on Bipap  Labs: BMP glu 220, Na 130, Cl 92, BUN 64, Cr 2.12  Lactate 1.4    Trop 15 --> 14  CBC WBC 12.9, Hg 14.3, Plt 260  COVID negative  VBG pH 7.24, pCO2 77, pO2 21, HCO3 33.0  Imaging: CXR - Increased perihilar and pulmonary markings more so on the right   than the left. Findings could be on the basis of pulmonary vascular   congestion or pneumonia.   EKG - Sinus at 76 bpm.  Interventions: Duoneb X1, Rocephin and azithro given, Solumedrol 125 mg, NS 6.5 L bolus given. Pt is still hypotensive and will be admitted to the ICU. Critical care consulted.      7/2: History and chart reviewed.  Patient seen and examined.  No acute events overnight.  Patient feels a little bit better.  He denies any shortness of breath, chest pain, fevers or chills.  Critical care recommendations appreciated.  Nephrology recommendations appreciated.  Patient is off pressors.  Follow cultures.  Wean down oxygen as tolerated.  Patient will " require at least another 24 to 48 hours of inpatient service.  7/3: No acute events overnight.  Patient is transferred to the stepdown unit.  He is still short of breath.  And has a dry cough with minimal expectoration.  He denies any chest pain, fevers or chills.  He still requiring at least 5 L of oxygen.  Continue aggressive pulmonary hygiene.  Continue nebulizers.  Patient will require at least another 24 to 48 hours of inpatient service.     7/4: No acute events overnight.  Patient currently requiring 10 L of oxygen.  Continue to encourage out of bed.  Continue aggressive pulm hygiene.  Continue cefepime, methylprednisone and nebulizers.  Patient will require at least another 48 hours of inpatient service.    7/5: No acute events overnight. Patient is currently requiring 6 L of oxygen. Continue to encourage out of bed. Continue aggressive pulm hygiene. Continue cefepime, methylprednisone and nebulizers. Patient will require 24 to 48 hours of inpatient services.      7/6: No acute events.  Patient still short of breath on minimal exertion..  He still has a dry cough.  Patient denies any fevers, chills, nausea, diarrhea.  Still on 6 L of oxygen.  Continue to wean down oxygen.  Continue aggressive pulmonary hygiene.  White cell count elevated at 27 K likely due to to the steroids.  Will discontinue his IV methylprednisone and start on prednisone 40 mg patient will require at least another 24 hours of inpatient service.    Objective     Last Recorded Vitals  /79 (BP Location: Left arm, Patient Position: Lying)   Pulse 69   Temp 36.9 °C (98.4 °F) (Temporal)   Resp 16   Wt 95.3 kg (210 lb 1.6 oz)   SpO2 (!) 89% Comment: pt had just got up and sat on side of bed  Intake/Output last 3 Shifts:    Intake/Output Summary (Last 24 hours) at 7/6/2024 0904  Last data filed at 7/6/2024 0755  Gross per 24 hour   Intake 200 ml   Output 1795 ml   Net -1595 ml       Admission Weight  Weight: 85.6 kg (188 lb 11.4 oz)  (07/01/24 0901)    Daily Weight  07/06/24 : 95.3 kg (210 lb 1.6 oz)    Image Results  US renal complete  Narrative: Interpreted By:  Ken Kruse,   STUDY:  US RENAL COMPLETE;  7/2/2024 12:08 pm      INDICATION:  Signs/Symptoms:elevated creatinine.      COMPARISON:  None.      ACCESSION NUMBER(S):  [id number]      ORDERING CLINICIAN:  DINORAH BREEN      TECHNIQUE:  Real-time sonographic evaluation of the kidneys and urinary bladder  was performed.      FINDINGS:  RIGHT KIDNEY:  Right kidney measures  11.6 cm.  No shadowing calculus or right  hydronephrosis is visualized.  There is a right renal lower pole  round hypoechoic partially exophytic cyst measuring 5.9 x 6.1 x 6.5  cm.      LEFT KIDNEY:  Left kidney measures  11.2 cm.  No shadowing calculus or left  hydronephrosis is visualized.  No discrete renal lesion is visualized.      BLADDER:  Urinary bladder is partially distended.      No intraluminal mass, wall thickening or shadowing calculus is  visualized.      Impression: No hydronephrosis bilaterally.      Right renal lower pole 6.5 cm partially exophytic cyst.      No focal urinary bladder abnormality identified.      MACRO:  None.      Signed by: Ken Kruse 7/2/2024 1:05 PM  Dictation workstation:   [id number]      Physical Exam:    General: Alert and oriented x 3, no distress  Cardiovascular: Normal S1-S2, sinus rhythm, no murmurs  Respiratory: Good air entry bilaterally, no obvious wheeze or crackles  Abdomen: Abdomen is soft, not distended with no tenderness  Extremities: No edema or deformities  Neurology: Alert and oriented x 3, no acute focal deficits         Assessment/Plan      #Presumed streptococcal pneumoniae pneumonia with septic shock  #Acute hypoxic respiratory failure secondary to pneumonia and COPD exacerbation  #COPD exacerbation secondary to pneumonia  #Leukocytosis likely due to steroids.  Critical care recommendations appreciated.  Continue cefepime  Continue IV  methylprednisone  Patient is off pressors  Follow cultures  Encourage out of bed  Wean down oxygen as tolerated  7/3: Continue nebulizers and IV steroids.  Continue antibiotics.  Continue aggressive pulmonary hygiene.  7/4: Patient currently on 10 L of oxygen.  Wean down as tolerated.  Continue cefepime, methylprednisone, DuoNebs.  Continue aggressive pulmonary hygiene.  Encourage out of bed.  7/5: Patient currently on 6 L of oxygen. Wean down as tolerated. Continue cefepime, methylprednisone, nebulizers. Continue aggressive pulmonary hygiene. Encourage out of bed.  7/6: Will discontinue methylprednisolone.  Start on prednisone 40 mg daily.  Continue aggressive pulmonary hygiene.  Continue to encourage out of bed.  Wean down oxygen as tolerated     #Acute kidney injury   Baseline creatinine is between 0.9-1.0.  On admission creatinine was 2.13  Nephrology recommendations appreciated  Renal ultrasound ordered and showed no hydronephrosis  Monitor renal function closely  7/3: Will check renal function in the morning  7/4: Creatinine down to baseline at 0.98.  Nephrology recommendation appreciated.  7/5: Creatinine down to 0.76.           Sodium= 138.           Nephrology recommendations appreciated.        #History of coronary artery disease  #Tobacco abuse    Diet: Regular  DVT prophylaxis: Lovenox subcutaneous   Code Status: Full code per discussion with pt.   Keisha Streeter MD MRCP

## 2024-07-06 NOTE — PROGRESS NOTES
"Occupational Therapy    OT Treatment    Patient Name: Darron Resendez  MRN: [id number]  Today's Date: 7/6/2024  Time Calculation  Start Time: 1028  Stop Time: 1058  Time Calculation (min): 30 min        Assessment:  End of Session Patient Position: Up in chair, Alarm on (call light in reach. chair  positioned at patient request facing door.)     Plan:  Treatment Interventions: ADL retraining, Functional transfer training, Endurance training      Subjective   Previous Visit Info:  OT Last Visit  OT Received On: 07/06/24  General:  General  Co-Treatment: PT  Co-Treatment Reason: to maximize safety with functional mobility and activity tolernace while managing lines.  Patient Position Received: Bed, 3 rail up, Alarm on  General Comment: pt. improving he ranged from 86-91% spo2. Transfers  CGA while another negotiated IV and  O2 tubing. No loss of balance pt. reports a \" little bit\" short of breath walking from bed to doorway x2. Pt. able to perform ADLS with set up.  Precautions:     Vital Signs: spo2 sats 86-91%     Pain:  Pain Assessment  Pain Assessment: 0-10    Objective    Cognition:  Cognition  Orientation Level: Oriented X4  Coordination:     Activities of Daily Living: Grooming  Grooming Level of Assistance: Setup  Grooming Where Assessed: Chair  Grooming Comments: wash face    UE Bathing  UE Bathing Level of Assistance: Setup  UE Bathing Where Assessed:  (chair)    LE Bathing  LE Bathing Level of Assistance: Setup  LE Bathing Where Assessed:  (chair)    UE Dressing  UE Dressing Level of Assistance: Setup  UE Dressing Where Assessed: Chair         Toileting  Toileting Level of Assistance: Setup  Where Assessed:  (used urinal from chair)  Functional Standing Tolerance:  Time: 3 mins x2  Activity: pt. performed standing during functional mobility  no loss of balance  Bed Mobility/Transfers: Bed Mobility  Bed Mobility: Yes  Bed Mobility 1  Bed Mobility 1: Supine to sitting  Level of Assistance 1: Close " supervision  Bed Mobility Comments 1: managed lines.    Transfers  Transfer: Yes  Transfer 1  Transfer From 1: Bed to  Transfer to 1: Chair with arms  Technique 1: Sit to stand, Stand to sit  Transfer Device 1: Walker  Transfer Level of Assistance 1: Contact guard  Trials/Comments 1: needs line management      Functional Mobility:  Functional Mobility  Functional Mobility Performed: Yes  Functional Mobility 1  Device 1: Rolling walker  Assistance 1: Minimum assistance  Comments 1: cueing for pacing and posture                 Outcome Measures:[facility] Daily Activity  Putting on and taking off regular lower body clothing: A lot  Bathing (including washing, rinsing, drying): A little  Putting on and taking off regular upper body clothing: A little  Toileting, which includes using toilet, bedpan or urinal: A little  Taking care of personal grooming such as brushing teeth: A little  Eating Meals: None  Daily Activity - Total Score: 18        Education Documentation  ADL Training, taught by MARCO Wong at 7/6/2024 11:05 AM.  Learner: Patient  Readiness: Acceptance  Method: Explanation, Demonstration  Response: Verbalizes Understanding, Needs Reinforcement    Education Comments  No comments found.        OP EDUCATION:       Goals:  Encounter Problems       Encounter Problems (Active)       ADLs       The patient will tolerate 25 minutes of ADL/Functional activity with min fatigue and while maintaining spO2 >90% (Progressing)       Start:  07/03/24    Expected End:  07/17/24               BALANCE       Patient will tolerate standing for 8 minutes to modified independent level of assistance with least restrictive device in order to improve functional activity tolerance for ADL tasks. (Progressing)       Start:  07/03/24    Expected End:  07/17/24               COGNITION/SAFETY       The patient will demonstrate or verbalize 3 energy conservation techniques without cueing to perform ADLs/functional activity with  reduced SOB and fatigue.  (Progressing)       Start:  07/03/24    Expected End:  07/17/24               MOBILITY       Patient will perform Functional mobility max Household distances/Community Distances with modified independent level of assistance and least restrictive device in order to improve safety and functional mobility. (Progressing)       Start:  07/03/24    Expected End:  07/17/24

## 2024-07-06 NOTE — PROGRESS NOTES
Physical Therapy  Physical Therapy Treatment    Patient Name: Darron Resendez  MRN: [id number]  Today's Date: 7/6/2024  Time Calculation  Start Time: 1029  Stop Time: 1058  Time Calculation (min): 29 min    Assessment/Plan   PT Plan  Treatment/Interventions: Transfer training, Gait training, Stair training, Neuromuscular re-education, Endurance training, Therapeutic exercise, Therapeutic activity  PT Plan: Ongoing PT  PT Frequency: 4 times per week  PT Discharge Recommendations: Low intensity level of continued care (PT HOME SAFETY ASSESSMENT)  PT Recommended Transfer Status: Assist x1  PT - OK to Discharge: Yes    General Visit Information:   PT  Visit  PT Received On: 07/06/24  Response to Previous Treatment: Patient with no complaints from previous session.    Reason for Referral: Impaired mobility  Room: 2008    Subjective   Precautions:  O2    Objective   Pain:  0 - No pain    Cognition:  Oriented X4    Activity Tolerance:  Activity Tolerance  Endurance: Tolerates 10 - 20 min exercise with multiple rests    Treatments:  Bed Mobility  Supine to sitting: Close supervision  Scooting: Close supervision  Rolling: Close supervision    Transfers  Sit to stand: Close supervision  Stand to sit: Close supervision    Ambulation/Gait Training  70 feet x1 rep, 10 feet x1 rep with Rolling walker, Close supervision  Mild SOB. Spo2 86% with exertion, recovers to 90% with 1 minute seated rest  No buckling or gross LOB      Pt remained sitting in bedside chair following tx. Alarm on and call light in reach.       Outcome Measures:  [facility] Basic Mobility  Turning from your back to your side while in a flat bed without using bedrails: A little  Moving from lying on your back to sitting on the side of a flat bed without using bedrails: A little  Moving to and from bed to chair (including a wheelchair): A little  Standing up from a chair using your arms (e.g. wheelchair or bedside chair): A little  To walk in hospital room: A  little  Climbing 3-5 steps with railing: A little  Basic Mobility - Total Score: 18    Education Documentation  Body Mechanics, taught by Tacos Lambert PTA at 7/6/2024 11:55 AM.  Learner: Patient  Readiness: Acceptance  Method: Explanation, Demonstration  Response: Verbalizes Understanding, Demonstrated Understanding    Mobility Training, taught by Tacos Lambert PTA at 7/6/2024 11:55 AM.  Learner: Patient  Readiness: Acceptance  Method: Explanation, Demonstration  Response: Verbalizes Understanding, Demonstrated Understanding    Education Comments  No comments found.        OP EDUCATION:       Encounter Problems       Encounter Problems (Active)       ENDURANCE, ENERGY CONSERVATION       STG-pt will demo PACING/BREATHING TECH to reduce TODD with only 1 VC (Progressing)       Start:  07/03/24    Expected End:  07/17/24               Mobility       STG - Patient will ambulate household distance with improved safety awareness, use FWW for ENERGY CONSERVATION FOR longer distances only (Progressing)       Start:  07/03/24    Expected End:  07/17/24               PT Transfers       STG - Patient will transfer sit to and from stand with DEMO CORRECT HAND PLACEMENT, USE OF GRAB BARS/AD WHEN APPROP. (Progressing)       Start:  07/03/24    Expected End:  07/17/24               Pain - Adult

## 2024-07-07 ENCOUNTER — APPOINTMENT (OUTPATIENT)
Dept: RADIOLOGY | Facility: HOSPITAL | Age: 64
End: 2024-07-07
Payer: MEDICARE

## 2024-07-07 VITALS
WEIGHT: 208.11 LBS | OXYGEN SATURATION: 90 % | TEMPERATURE: 98.3 F | SYSTOLIC BLOOD PRESSURE: 136 MMHG | RESPIRATION RATE: 18 BRPM | HEIGHT: 67 IN | HEART RATE: 82 BPM | DIASTOLIC BLOOD PRESSURE: 71 MMHG | BODY MASS INDEX: 32.66 KG/M2

## 2024-07-07 LAB
ANION GAP SERPL CALC-SCNC: 7 MMOL/L (ref 10–20)
BASOPHILS # BLD MANUAL: 0 X10*3/UL (ref 0–0.1)
BASOPHILS NFR BLD MANUAL: 0 %
BUN SERPL-MCNC: 25 MG/DL (ref 6–23)
CALCIUM SERPL-MCNC: 7.8 MG/DL (ref 8.6–10.3)
CHLORIDE SERPL-SCNC: 103 MMOL/L (ref 98–107)
CO2 SERPL-SCNC: 31 MMOL/L (ref 21–32)
CREAT SERPL-MCNC: 0.93 MG/DL (ref 0.5–1.3)
EGFRCR SERPLBLD CKD-EPI 2021: >90 ML/MIN/1.73M*2
EOSINOPHIL # BLD MANUAL: 0.6 X10*3/UL (ref 0–0.7)
EOSINOPHIL NFR BLD MANUAL: 2 %
ERYTHROCYTE [DISTWIDTH] IN BLOOD BY AUTOMATED COUNT: 13.1 % (ref 11.5–14.5)
GLUCOSE BLD MANUAL STRIP-MCNC: 142 MG/DL (ref 74–99)
GLUCOSE BLD MANUAL STRIP-MCNC: 209 MG/DL (ref 74–99)
GLUCOSE BLD MANUAL STRIP-MCNC: 265 MG/DL (ref 74–99)
GLUCOSE BLD MANUAL STRIP-MCNC: 99 MG/DL (ref 74–99)
GLUCOSE SERPL-MCNC: 120 MG/DL (ref 74–99)
HCT VFR BLD AUTO: 38.1 % (ref 41–52)
HGB BLD-MCNC: 12.8 G/DL (ref 13.5–17.5)
IMM GRANULOCYTES # BLD AUTO: 2.99 X10*3/UL (ref 0–0.7)
IMM GRANULOCYTES NFR BLD AUTO: 9.9 % (ref 0–0.9)
LYMPHOCYTES # BLD MANUAL: 3.02 X10*3/UL (ref 1.2–4.8)
LYMPHOCYTES NFR BLD MANUAL: 10 %
MCH RBC QN AUTO: 31.3 PG (ref 26–34)
MCHC RBC AUTO-ENTMCNC: 33.6 G/DL (ref 32–36)
MCV RBC AUTO: 93 FL (ref 80–100)
METAMYELOCYTES # BLD MANUAL: 0.91 X10*3/UL
METAMYELOCYTES NFR BLD MANUAL: 3 %
MONOCYTES # BLD MANUAL: 1.81 X10*3/UL (ref 0.1–1)
MONOCYTES NFR BLD MANUAL: 6 %
MYELOCYTES # BLD MANUAL: 0.3 X10*3/UL
MYELOCYTES NFR BLD MANUAL: 1 %
NEUTROPHILS # BLD MANUAL: 23.56 X10*3/UL (ref 1.2–7.7)
NEUTS BAND # BLD MANUAL: 1.51 X10*3/UL (ref 0–0.7)
NEUTS BAND NFR BLD MANUAL: 5 %
NEUTS SEG # BLD MANUAL: 22.05 X10*3/UL (ref 1.2–7)
NEUTS SEG NFR BLD MANUAL: 73 %
NRBC BLD-RTO: 0.1 /100 WBCS (ref 0–0)
PLATELET # BLD AUTO: 270 X10*3/UL (ref 150–450)
POTASSIUM SERPL-SCNC: 4 MMOL/L (ref 3.5–5.3)
RBC # BLD AUTO: 4.09 X10*6/UL (ref 4.5–5.9)
RBC MORPH BLD: ABNORMAL
SODIUM SERPL-SCNC: 137 MMOL/L (ref 136–145)
TOTAL CELLS COUNTED BLD: 100
WBC # BLD AUTO: 30.2 X10*3/UL (ref 4.4–11.3)

## 2024-07-07 PROCEDURE — 2500000005 HC RX 250 GENERAL PHARMACY W/O HCPCS: Performed by: NURSE PRACTITIONER

## 2024-07-07 PROCEDURE — 2500000004 HC RX 250 GENERAL PHARMACY W/ HCPCS (ALT 636 FOR OP/ED): Performed by: INTERNAL MEDICINE

## 2024-07-07 PROCEDURE — 85027 COMPLETE CBC AUTOMATED: CPT | Performed by: INTERNAL MEDICINE

## 2024-07-07 PROCEDURE — 94640 AIRWAY INHALATION TREATMENT: CPT

## 2024-07-07 PROCEDURE — 85007 BL SMEAR W/DIFF WBC COUNT: CPT | Performed by: INTERNAL MEDICINE

## 2024-07-07 PROCEDURE — 80048 BASIC METABOLIC PNL TOTAL CA: CPT | Performed by: INTERNAL MEDICINE

## 2024-07-07 PROCEDURE — 71046 X-RAY EXAM CHEST 2 VIEWS: CPT

## 2024-07-07 PROCEDURE — 2500000002 HC RX 250 W HCPCS SELF ADMINISTERED DRUGS (ALT 637 FOR MEDICARE OP, ALT 636 FOR OP/ED): Performed by: NURSE PRACTITIONER

## 2024-07-07 PROCEDURE — 2060000001 HC INTERMEDIATE ICU ROOM DAILY

## 2024-07-07 PROCEDURE — 94668 MNPJ CHEST WALL SBSQ: CPT

## 2024-07-07 PROCEDURE — 99233 SBSQ HOSP IP/OBS HIGH 50: CPT | Performed by: INTERNAL MEDICINE

## 2024-07-07 PROCEDURE — 2500000004 HC RX 250 GENERAL PHARMACY W/ HCPCS (ALT 636 FOR OP/ED): Mod: JZ | Performed by: NURSE PRACTITIONER

## 2024-07-07 PROCEDURE — 2500000001 HC RX 250 WO HCPCS SELF ADMINISTERED DRUGS (ALT 637 FOR MEDICARE OP): Performed by: NURSE PRACTITIONER

## 2024-07-07 PROCEDURE — 36415 COLL VENOUS BLD VENIPUNCTURE: CPT | Performed by: INTERNAL MEDICINE

## 2024-07-07 PROCEDURE — 71046 X-RAY EXAM CHEST 2 VIEWS: CPT | Performed by: STUDENT IN AN ORGANIZED HEALTH CARE EDUCATION/TRAINING PROGRAM

## 2024-07-07 PROCEDURE — 82947 ASSAY GLUCOSE BLOOD QUANT: CPT

## 2024-07-07 RX ORDER — FLUTICASONE FUROATE AND VILANTEROL 200; 25 UG/1; UG/1
1 POWDER RESPIRATORY (INHALATION)
Status: DISCONTINUED | OUTPATIENT
Start: 2024-07-07 | End: 2024-07-11 | Stop reason: HOSPADM

## 2024-07-07 ASSESSMENT — COGNITIVE AND FUNCTIONAL STATUS - GENERAL
WALKING IN HOSPITAL ROOM: A LITTLE
MOBILITY SCORE: 20
STANDING UP FROM CHAIR USING ARMS: A LITTLE
MOVING TO AND FROM BED TO CHAIR: A LITTLE
DAILY ACTIVITIY SCORE: 21
DRESSING REGULAR LOWER BODY CLOTHING: A LITTLE
TOILETING: A LITTLE
CLIMB 3 TO 5 STEPS WITH RAILING: A LITTLE
HELP NEEDED FOR BATHING: A LITTLE

## 2024-07-07 ASSESSMENT — PAIN - FUNCTIONAL ASSESSMENT
PAIN_FUNCTIONAL_ASSESSMENT: 0-10

## 2024-07-07 ASSESSMENT — PAIN SCALES - GENERAL
PAINLEVEL_OUTOF10: 0 - NO PAIN

## 2024-07-07 NOTE — PROGRESS NOTES
"Darron Resendez is a 64 y.o. male on day 6 of admission presenting with Acute on chronic respiratory failure with hypoxia (Multi).      Subjective   Darron Resendez is a 63 y.o. male with PMHx s/f \"borderline\" COPD, CAD with stent hx STEMI, HTN, chronic tobacco abuse presenting with shortness of breath.  Patient states for the past 5 days he has been increasingly short of breath.  He admits to a productive cough with white/yellow sputum production, he has been experiencing subjective fevers and lightheadedness.  He has not had any cigarettes in the past 3 days.  He is felt very winded and fatigued.  He denies sick contacts or environmental exposures.  He denies chest pain, nausea, vomiting, syncope, leg swelling, abdominal pain, or other symptoms.  He states he has been hospitalized with severe pneumonia in the past.  Chart review reveals he was admitted to this hospital back in 2020 for COPD exacerbation.     ED Course (Summary):   Vitals on presentation: 97.2 F, 81 bpm, 22 rr, 91/53 --> 76/54, 90% on Bipap  Labs: BMP glu 220, Na 130, Cl 92, BUN 64, Cr 2.12  Lactate 1.4    Trop 15 --> 14  CBC WBC 12.9, Hg 14.3, Plt 260  COVID negative  VBG pH 7.24, pCO2 77, pO2 21, HCO3 33.0  Imaging: CXR - Increased perihilar and pulmonary markings more so on the right   than the left. Findings could be on the basis of pulmonary vascular   congestion or pneumonia.   EKG - Sinus at 76 bpm.  Interventions: Duoneb X1, Rocephin and azithro given, Solumedrol 125 mg, NS 6.5 L bolus given. Pt is still hypotensive and will be admitted to the ICU. Critical care consulted.      7/2: History and chart reviewed.  Patient seen and examined.  No acute events overnight.  Patient feels a little bit better.  He denies any shortness of breath, chest pain, fevers or chills.  Critical care recommendations appreciated.  Nephrology recommendations appreciated.  Patient is off pressors.  Follow cultures.  Wean down oxygen as tolerated.  Patient will " require at least another 24 to 48 hours of inpatient service.  7/3: No acute events overnight.  Patient is transferred to the stepdown unit.  He is still short of breath.  And has a dry cough with minimal expectoration.  He denies any chest pain, fevers or chills.  He still requiring at least 5 L of oxygen.  Continue aggressive pulmonary hygiene.  Continue nebulizers.  Patient will require at least another 24 to 48 hours of inpatient service.     7/4: No acute events overnight.  Patient currently requiring 10 L of oxygen.  Continue to encourage out of bed.  Continue aggressive pulm hygiene.  Continue cefepime, methylprednisone and nebulizers.  Patient will require at least another 48 hours of inpatient service.    7/5: No acute events overnight. Patient is currently requiring 6 L of oxygen. Continue to encourage out of bed. Continue aggressive pulm hygiene. Continue cefepime, methylprednisone and nebulizers. Patient will require 24 to 48 hours of inpatient services.      7/6: No acute events.  Patient still short of breath on minimal exertion..  He still has a dry cough.  Patient denies any fevers, chills, nausea, diarrhea.  Still on 6 L of oxygen.  Continue to wean down oxygen.  Continue aggressive pulmonary hygiene.  White cell count elevated at 27 K likely due to to the steroids.  Will discontinue his IV methylprednisone and start on prednisone 40 mg patient will require at least another 24 hours of inpatient service.    7/7: No acute events overnight.  Patient is still short of breath although he is improving.  He denies any cough.  He feels congested.  Patient had multiple complaints and this is likely due to frustration with prolonged hospital stay.  I again reiterated to him that COPD and his pneumonia will take time to be treated.  He is on 6 L of oxygen.  Will add Symbicort.  Will order a chest x-ray for today.  Patient will likely require another evaluation by the pulmonary team.  White cell count is up to  30 K.  Will see if the x-ray has new findings although this is likely still steroid-induced.  Cultures are negative.  He is currently on prednisone 40 mg.      Objective     Last Recorded Vitals  /83 (BP Location: Left arm, Patient Position: Lying)   Pulse 80   Temp 36.7 °C (98 °F) (Temporal)   Resp 18   Wt 94.4 kg (208 lb 1.8 oz)   SpO2 90%   Intake/Output last 3 Shifts:    Intake/Output Summary (Last 24 hours) at 7/7/2024 1143  Last data filed at 7/7/2024 0954  Gross per 24 hour   Intake 200 ml   Output 1315 ml   Net -1115 ml       Admission Weight  Weight: 85.6 kg (188 lb 11.4 oz) (07/01/24 0901)    Daily Weight  07/07/24 : 94.4 kg (208 lb 1.8 oz)    Image Results  US renal complete  Narrative: Interpreted By:  Ken Kruse,   STUDY:  US RENAL COMPLETE;  7/2/2024 12:08 pm      INDICATION:  Signs/Symptoms:elevated creatinine.      COMPARISON:  None.      ACCESSION NUMBER(S):  [id number]      ORDERING CLINICIAN:  DINORAH BREEN      TECHNIQUE:  Real-time sonographic evaluation of the kidneys and urinary bladder  was performed.      FINDINGS:  RIGHT KIDNEY:  Right kidney measures  11.6 cm.  No shadowing calculus or right  hydronephrosis is visualized.  There is a right renal lower pole  round hypoechoic partially exophytic cyst measuring 5.9 x 6.1 x 6.5  cm.      LEFT KIDNEY:  Left kidney measures  11.2 cm.  No shadowing calculus or left  hydronephrosis is visualized.  No discrete renal lesion is visualized.      BLADDER:  Urinary bladder is partially distended.      No intraluminal mass, wall thickening or shadowing calculus is  visualized.      Impression: No hydronephrosis bilaterally.      Right renal lower pole 6.5 cm partially exophytic cyst.      No focal urinary bladder abnormality identified.      MACRO:  None.      Signed by: Ken Kruse 7/2/2024 1:05 PM  Dictation workstation:   [id number]      Physical Exam:    General: Alert and oriented x 3, agitated  Cardiovascular: Normal  S1-S2, sinus rhythm, no murmurs  Respiratory: Decreased air entry bilaterally, scattered wheeze, no crackle  Abdomen: Abdomen is soft, not distended with no tenderness  Extremities: No edema or deformities  Neurology: Alert and oriented x 3, no acute focal deficits         Assessment/Plan      #Presumed streptococcal pneumoniae pneumonia with septic shock  #Acute hypoxic respiratory failure secondary to pneumonia and COPD exacerbation  #COPD exacerbation secondary to pneumonia  #Leukocytosis likely due to steroids.  Critical care recommendations appreciated.  Continue cefepime  Continue IV methylprednisone  Patient is off pressors  Follow cultures  Encourage out of bed  Wean down oxygen as tolerated  7/3: Continue nebulizers and IV steroids.  Continue antibiotics.  Continue aggressive pulmonary hygiene.  7/4: Patient currently on 10 L of oxygen.  Wean down as tolerated.  Continue cefepime, methylprednisone, DuoNebs.  Continue aggressive pulmonary hygiene.  Encourage out of bed.  7/5: Patient currently on 6 L of oxygen. Wean down as tolerated. Continue cefepime, methylprednisone, nebulizers. Continue aggressive pulmonary hygiene. Encourage out of bed.  7/6: Will discontinue methylprednisolone.  Start on prednisone 40 mg daily.  Continue aggressive pulmonary hygiene.  Continue to encourage out of bed.  Wean down oxygen as tolerated.  7/7: Continue antibiotics.  Continue prednisone 40 mg.  Will check a chest x-ray.  Will check a BNP in the morning.     #Acute kidney injury   Baseline creatinine is between 0.9-1.0.  On admission creatinine was 2.13  Nephrology recommendations appreciated  Renal ultrasound ordered and showed no hydronephrosis  Monitor renal function closely  7/3: Will check renal function in the morning  7/4: Creatinine down to baseline at 0.98.  Nephrology recommendation appreciated.  7/5: Creatinine down to 0.76.           Sodium= 138.           Nephrology recommendations appreciated.    7/6:  Resolved      #History of coronary artery disease  #Tobacco abuse      DVT prophylaxis: Lovenox subcutaneous   Code Status: Full code    Keisha Streeter MD MRCP

## 2024-07-08 LAB
ALBUMIN SERPL BCP-MCNC: 2.4 G/DL (ref 3.4–5)
ALP SERPL-CCNC: 35 U/L (ref 33–136)
ALT SERPL W P-5'-P-CCNC: 46 U/L (ref 10–52)
ANION GAP SERPL CALC-SCNC: 10 MMOL/L (ref 10–20)
AST SERPL W P-5'-P-CCNC: 31 U/L (ref 9–39)
BASOPHILS # BLD MANUAL: 0 X10*3/UL (ref 0–0.1)
BASOPHILS NFR BLD MANUAL: 0 %
BILIRUB SERPL-MCNC: 0.5 MG/DL (ref 0–1.2)
BNP SERPL-MCNC: 129 PG/ML (ref 0–99)
BUN SERPL-MCNC: 22 MG/DL (ref 6–23)
CALCIUM SERPL-MCNC: 7.5 MG/DL (ref 8.6–10.3)
CHLORIDE SERPL-SCNC: 101 MMOL/L (ref 98–107)
CO2 SERPL-SCNC: 30 MMOL/L (ref 21–32)
CREAT SERPL-MCNC: 0.9 MG/DL (ref 0.5–1.3)
EGFRCR SERPLBLD CKD-EPI 2021: >90 ML/MIN/1.73M*2
EOSINOPHIL # BLD MANUAL: 0.63 X10*3/UL (ref 0–0.7)
EOSINOPHIL NFR BLD MANUAL: 3 %
ERYTHROCYTE [DISTWIDTH] IN BLOOD BY AUTOMATED COUNT: 13.2 % (ref 11.5–14.5)
GLUCOSE BLD MANUAL STRIP-MCNC: 108 MG/DL (ref 74–99)
GLUCOSE BLD MANUAL STRIP-MCNC: 134 MG/DL (ref 74–99)
GLUCOSE BLD MANUAL STRIP-MCNC: 167 MG/DL (ref 74–99)
GLUCOSE BLD MANUAL STRIP-MCNC: 255 MG/DL (ref 74–99)
GLUCOSE SERPL-MCNC: 112 MG/DL (ref 74–99)
HCT VFR BLD AUTO: 34.9 % (ref 41–52)
HGB BLD-MCNC: 11.6 G/DL (ref 13.5–17.5)
IMM GRANULOCYTES # BLD AUTO: 1.47 X10*3/UL (ref 0–0.7)
IMM GRANULOCYTES NFR BLD AUTO: 7 % (ref 0–0.9)
LYMPHOCYTES # BLD MANUAL: 1.47 X10*3/UL (ref 1.2–4.8)
LYMPHOCYTES NFR BLD MANUAL: 7 %
MCH RBC QN AUTO: 31 PG (ref 26–34)
MCHC RBC AUTO-ENTMCNC: 33.2 G/DL (ref 32–36)
MCV RBC AUTO: 93 FL (ref 80–100)
MONOCYTES # BLD MANUAL: 1.26 X10*3/UL (ref 0.1–1)
MONOCYTES NFR BLD MANUAL: 6 %
MYELOCYTES # BLD MANUAL: 0.21 X10*3/UL
MYELOCYTES NFR BLD MANUAL: 1 %
NEUTROPHILS # BLD MANUAL: 17.43 X10*3/UL (ref 1.2–7.7)
NEUTS BAND # BLD MANUAL: 1.05 X10*3/UL (ref 0–0.7)
NEUTS BAND NFR BLD MANUAL: 5 %
NEUTS SEG # BLD MANUAL: 16.38 X10*3/UL (ref 1.2–7)
NEUTS SEG NFR BLD MANUAL: 78 %
NRBC BLD-RTO: 0 /100 WBCS (ref 0–0)
PLATELET # BLD AUTO: 214 X10*3/UL (ref 150–450)
POTASSIUM SERPL-SCNC: 4 MMOL/L (ref 3.5–5.3)
PROT SERPL-MCNC: 4.3 G/DL (ref 6.4–8.2)
RBC # BLD AUTO: 3.74 X10*6/UL (ref 4.5–5.9)
RBC MORPH BLD: ABNORMAL
SODIUM SERPL-SCNC: 137 MMOL/L (ref 136–145)
TOTAL CELLS COUNTED BLD: 100
WBC # BLD AUTO: 21 X10*3/UL (ref 4.4–11.3)

## 2024-07-08 PROCEDURE — 2500000005 HC RX 250 GENERAL PHARMACY W/O HCPCS: Performed by: NURSE PRACTITIONER

## 2024-07-08 PROCEDURE — 85007 BL SMEAR W/DIFF WBC COUNT: CPT | Performed by: INTERNAL MEDICINE

## 2024-07-08 PROCEDURE — 36415 COLL VENOUS BLD VENIPUNCTURE: CPT | Performed by: INTERNAL MEDICINE

## 2024-07-08 PROCEDURE — 2500000004 HC RX 250 GENERAL PHARMACY W/ HCPCS (ALT 636 FOR OP/ED): Performed by: INTERNAL MEDICINE

## 2024-07-08 PROCEDURE — 2060000001 HC INTERMEDIATE ICU ROOM DAILY

## 2024-07-08 PROCEDURE — 84075 ASSAY ALKALINE PHOSPHATASE: CPT | Performed by: INTERNAL MEDICINE

## 2024-07-08 PROCEDURE — 97116 GAIT TRAINING THERAPY: CPT | Mod: CQ,GP

## 2024-07-08 PROCEDURE — 2500000001 HC RX 250 WO HCPCS SELF ADMINISTERED DRUGS (ALT 637 FOR MEDICARE OP): Performed by: NURSE PRACTITIONER

## 2024-07-08 PROCEDURE — 85027 COMPLETE CBC AUTOMATED: CPT | Performed by: INTERNAL MEDICINE

## 2024-07-08 PROCEDURE — 2500000004 HC RX 250 GENERAL PHARMACY W/ HCPCS (ALT 636 FOR OP/ED): Mod: JZ | Performed by: NURSE PRACTITIONER

## 2024-07-08 PROCEDURE — 2500000004 HC RX 250 GENERAL PHARMACY W/ HCPCS (ALT 636 FOR OP/ED): Performed by: NURSE PRACTITIONER

## 2024-07-08 PROCEDURE — 94668 MNPJ CHEST WALL SBSQ: CPT

## 2024-07-08 PROCEDURE — 2500000002 HC RX 250 W HCPCS SELF ADMINISTERED DRUGS (ALT 637 FOR MEDICARE OP, ALT 636 FOR OP/ED): Performed by: NURSE PRACTITIONER

## 2024-07-08 PROCEDURE — 94640 AIRWAY INHALATION TREATMENT: CPT

## 2024-07-08 PROCEDURE — 97530 THERAPEUTIC ACTIVITIES: CPT | Mod: GO,CO

## 2024-07-08 PROCEDURE — 2500000001 HC RX 250 WO HCPCS SELF ADMINISTERED DRUGS (ALT 637 FOR MEDICARE OP): Performed by: INTERNAL MEDICINE

## 2024-07-08 PROCEDURE — 82947 ASSAY GLUCOSE BLOOD QUANT: CPT

## 2024-07-08 PROCEDURE — 97535 SELF CARE MNGMENT TRAINING: CPT | Mod: GO,CO

## 2024-07-08 PROCEDURE — 99233 SBSQ HOSP IP/OBS HIGH 50: CPT | Performed by: INTERNAL MEDICINE

## 2024-07-08 PROCEDURE — 83880 ASSAY OF NATRIURETIC PEPTIDE: CPT | Performed by: INTERNAL MEDICINE

## 2024-07-08 ASSESSMENT — COGNITIVE AND FUNCTIONAL STATUS - GENERAL
MOVING TO AND FROM BED TO CHAIR: A LITTLE
CLIMB 3 TO 5 STEPS WITH RAILING: A LITTLE
DRESSING REGULAR UPPER BODY CLOTHING: A LITTLE
PERSONAL GROOMING: A LITTLE
MOBILITY SCORE: 20
DRESSING REGULAR LOWER BODY CLOTHING: A LITTLE
STANDING UP FROM CHAIR USING ARMS: A LITTLE
DAILY ACTIVITIY SCORE: 19
WALKING IN HOSPITAL ROOM: A LITTLE
TOILETING: A LITTLE
HELP NEEDED FOR BATHING: A LITTLE

## 2024-07-08 ASSESSMENT — ACTIVITIES OF DAILY LIVING (ADL): HOME_MANAGEMENT_TIME_ENTRY: 8

## 2024-07-08 ASSESSMENT — PAIN - FUNCTIONAL ASSESSMENT: PAIN_FUNCTIONAL_ASSESSMENT: 0-10

## 2024-07-08 ASSESSMENT — PAIN SCALES - GENERAL
PAINLEVEL_OUTOF10: 0 - NO PAIN

## 2024-07-08 NOTE — PROGRESS NOTES
"Darron Resendez is a 64 y.o. male on day 7 of admission presenting with Acute on chronic respiratory failure with hypoxia (Multi).      Subjective   Darron Resendez is a 63 y.o. male with PMHx s/f \"borderline\" COPD, CAD with stent hx STEMI, HTN, chronic tobacco abuse presenting with shortness of breath.  Patient states for the past 5 days he has been increasingly short of breath.  He admits to a productive cough with white/yellow sputum production, he has been experiencing subjective fevers and lightheadedness.  He has not had any cigarettes in the past 3 days.  He is felt very winded and fatigued.  He denies sick contacts or environmental exposures.  He denies chest pain, nausea, vomiting, syncope, leg swelling, abdominal pain, or other symptoms.  He states he has been hospitalized with severe pneumonia in the past.  Chart review reveals he was admitted to this hospital back in 2020 for COPD exacerbation.     ED Course (Summary):   Vitals on presentation: 97.2 F, 81 bpm, 22 rr, 91/53 --> 76/54, 90% on Bipap  Labs: BMP glu 220, Na 130, Cl 92, BUN 64, Cr 2.12  Lactate 1.4    Trop 15 --> 14  CBC WBC 12.9, Hg 14.3, Plt 260  COVID negative  VBG pH 7.24, pCO2 77, pO2 21, HCO3 33.0  Imaging: CXR - Increased perihilar and pulmonary markings more so on the right   than the left. Findings could be on the basis of pulmonary vascular   congestion or pneumonia.   EKG - Sinus at 76 bpm.  Interventions: Duoneb X1, Rocephin and azithro given, Solumedrol 125 mg, NS 6.5 L bolus given. Pt is still hypotensive and will be admitted to the ICU. Critical care consulted.      7/2: History and chart reviewed.  Patient seen and examined.  No acute events overnight.  Patient feels a little bit better.  He denies any shortness of breath, chest pain, fevers or chills.  Critical care recommendations appreciated.  Nephrology recommendations appreciated.  Patient is off pressors.  Follow cultures.  Wean down oxygen as tolerated.  Patient will " require at least another 24 to 48 hours of inpatient service.  7/3: No acute events overnight.  Patient is transferred to the stepdown unit.  He is still short of breath.  And has a dry cough with minimal expectoration.  He denies any chest pain, fevers or chills.  He still requiring at least 5 L of oxygen.  Continue aggressive pulmonary hygiene.  Continue nebulizers.  Patient will require at least another 24 to 48 hours of inpatient service.     7/4: No acute events overnight.  Patient currently requiring 10 L of oxygen.  Continue to encourage out of bed.  Continue aggressive pulm hygiene.  Continue cefepime, methylprednisone and nebulizers.  Patient will require at least another 48 hours of inpatient service.    7/5: No acute events overnight. Patient is currently requiring 6 L of oxygen. Continue to encourage out of bed. Continue aggressive pulm hygiene. Continue cefepime, methylprednisone and nebulizers. Patient will require 24 to 48 hours of inpatient services.      7/6: No acute events.  Patient still short of breath on minimal exertion..  He still has a dry cough.  Patient denies any fevers, chills, nausea, diarrhea.  Still on 6 L of oxygen.  Continue to wean down oxygen.  Continue aggressive pulmonary hygiene.  White cell count elevated at 27 K likely due to to the steroids.  Will discontinue his IV methylprednisone and start on prednisone 40 mg patient will require at least another 24 hours of inpatient service.    7/7: No acute events overnight.  Patient is still short of breath although he is improving.  He denies any cough.  He feels congested.  Patient had multiple complaints and this is likely due to frustration with prolonged hospital stay.  I again reiterated to him that COPD and his pneumonia will take time to be treated.  He is on 6 L of oxygen.  Will add Symbicort.  Will order a chest x-ray for today.  Patient will likely require another evaluation by the pulmonary team.  White cell count is up to  30 K.  Will see if the x-ray has new findings although this is likely still steroid-induced.  Cultures are negative.  He is currently on prednisone 40 mg.    7/8: Patient seen.  Remains on 3 L.  Weaning.  Chest x-ray shows similar right-sided infiltrates with less left-sided infiltrates.  On exam patient has no crackles, mild wheezing.  BMP will be checked for open this morning's labs.  Continue to wean O2.  Patient complains of humidifier, will remove from his O2 line.  Reassess in AM.      Objective     Last Recorded Vitals  /79 (BP Location: Left arm, Patient Position: Lying)   Pulse 80   Temp 37 °C (98.6 °F) (Temporal)   Resp 22   Wt 93.3 kg (205 lb 11 oz)   SpO2 90%   Intake/Output last 3 Shifts:    Intake/Output Summary (Last 24 hours) at 7/8/2024 1303  Last data filed at 7/8/2024 1148  Gross per 24 hour   Intake 914 ml   Output 3165 ml   Net -2251 ml       Admission Weight  Weight: 85.6 kg (188 lb 11.4 oz) (07/01/24 0901)    Daily Weight  07/08/24 : 93.3 kg (205 lb 11 oz)    Image Results  XR chest 2 views  Narrative: Interpreted By:  Anil Regan,   STUDY:  XR CHEST 2 VIEWS;  7/7/2024 6:37 pm      INDICATION:  Signs/Symptoms:Still shortness of breath, recent pneumonia with  severe COPD.  Rule out heart failure or worsening pneumonia.      COMPARISON:  Chest radiograph 07/01/2024.      ACCESSION NUMBER(S):  [id number]      ORDERING CLINICIAN:  REUBEN FLOOD      FINDINGS:                  CARDIOMEDIASTINAL SILHOUETTE:  Cardiomediastinal silhouette is normal in size and configuration.  Left coronary artery stent is visualized.      LUNGS:  Blunting of the right costophrenic angle suggestive of small right  pleural effusion. No evidence of consolidation or es pulmonary  edema.      ABDOMEN:  No remarkable upper abdominal findings.      BONES:  No acute osseous changes.      Impression: 1. Small right pleural effusion. No consolidation or es pulmonary  edema.              MACRO:  None       Signed by: Anil Regan 7/7/2024 6:58 PM  Dictation workstation:   [id number]      Physical Exam:    General: Alert and oriented x 3, agitated  Cardiovascular: Normal S1-S2, sinus rhythm, no murmurs  Respiratory: Decreased air entry bilaterally, scattered wheeze, no crackle  Abdomen: Abdomen is soft, not distended with no tenderness  Extremities: No edema or deformities  Neurology: Alert and oriented x 3, no acute focal deficits         Assessment/Plan      #Presumed streptococcal pneumoniae pneumonia with septic shock  #Acute hypoxic respiratory failure secondary to pneumonia and COPD exacerbation  #COPD exacerbation secondary to pneumonia  #Leukocytosis likely due to steroids.  Critical care recommendations appreciated.  Continue cefepime  Continue IV methylprednisone  Patient is off pressors  Follow cultures  Encourage out of bed  Wean down oxygen as tolerated  7/3: Continue nebulizers and IV steroids.  Continue antibiotics.  Continue aggressive pulmonary hygiene.  7/4: Patient currently on 10 L of oxygen.  Wean down as tolerated.  Continue cefepime, methylprednisone, DuoNebs.  Continue aggressive pulmonary hygiene.  Encourage out of bed.  7/5: Patient currently on 6 L of oxygen. Wean down as tolerated. Continue cefepime, methylprednisone, nebulizers. Continue aggressive pulmonary hygiene. Encourage out of bed.  7/6: Will discontinue methylprednisolone.  Start on prednisone 40 mg daily.  Continue aggressive pulmonary hygiene.  Continue to encourage out of bed.  Wean down oxygen as tolerated.  7/7: Continue antibiotics.  Continue prednisone 40 mg.  Will check a chest x-ray.  Will check a BNP in the morning.  7/8: Chest x-ray is unchanged.  On exam does not have wheezing or crackles.  Recheck BMP.  Suspect delayed resolution of pneumonia findings, expected.  Will attempt to wean to room air, successful likely discharge tomorrow.     #Acute kidney injury   Baseline creatinine is between 0.9-1.0.  On admission  creatinine was 2.13  Nephrology recommendations appreciated  Renal ultrasound ordered and showed no hydronephrosis  Monitor renal function closely  7/3: Will check renal function in the morning  7/4: Creatinine down to baseline at 0.98.  Nephrology recommendation appreciated.  7/5: Creatinine down to 0.76.           Sodium= 138.           Nephrology recommendations appreciated.    7/6: Resolved      #History of coronary artery disease  #Tobacco abuse      DVT prophylaxis: Lovenox subcutaneous   Code Status: Full code    Keisha Streeter MD MRCP

## 2024-07-08 NOTE — PROGRESS NOTES
Physical Therapy  Physical Therapy Treatment    Patient Name: Darron Resendez  MRN: [id number]  Today's Date: 7/8/2024  Time Calculation  Start Time: 0828  Stop Time: 0851  Time Calculation (min): 23 min    Assessment/Plan   PT Plan  Treatment/Interventions: Transfer training, Gait training, Stair training, Neuromuscular re-education, Endurance training, Therapeutic exercise, Therapeutic activity  PT Plan: Ongoing PT  PT Frequency: 4 times per week  PT Discharge Recommendations: Low intensity level of continued care (PT HOME SAFETY ASSESSMENT)  PT Recommended Transfer Status: Assist x1  PT - OK to Discharge: Yes    General Visit Information:   PT  Visit  PT Received On: 07/08/24  Response to Previous Treatment: Patient with no complaints from previous session.    Reason for Referral: Impaired mobility  Room: 2008    Subjective   Precautions:  O2    Objective   Pain:  No pain    Cognition:  Oriented X4    Activity Tolerance:  Activity Tolerance  Endurance: Tolerates 10 - 20 min exercise with multiple rests    Treatments:  Bed Mobility  Supine to sitting: Independent  Scooting: Independent    Transfers  Sit to stand: Close supervision  Stand to sit: Close supervision  Transfer Device: Walker    Ambulation/Gait Training  40 feet x3 reps with FWW, Supervision  SpO2 86% with exertion, 91% with seated rest and purse lip breathing      Pt remained sitting in bedside chair following tx. Call light in reach.     Outcome Measures:  [facility] Basic Mobility  Turning from your back to your side while in a flat bed without using bedrails: None  Moving from lying on your back to sitting on the side of a flat bed without using bedrails: None  Moving to and from bed to chair (including a wheelchair): A little  Standing up from a chair using your arms (e.g. wheelchair or bedside chair): A little  To walk in hospital room: A little  Climbing 3-5 steps with railing: A little  Basic Mobility - Total Score: 20    Education  Documentation  Mobility Training, taught by Tacos Lambert PTA at 7/8/2024 12:49 PM.  Learner: Patient  Readiness: Acceptance  Method: Explanation, Demonstration  Response: Verbalizes Understanding, Demonstrated Understanding    Education Comments  No comments found.        OP EDUCATION:       Encounter Problems       Encounter Problems (Active)       ENDURANCE, ENERGY CONSERVATION       STG-pt will demo PACING/BREATHING TECH to reduce TODD with only 1 VC (Progressing)       Start:  07/03/24    Expected End:  07/17/24               Mobility       STG - Patient will ambulate household distance with improved safety awareness, use FWW for ENERGY CONSERVATION FOR longer distances only (Progressing)       Start:  07/03/24    Expected End:  07/17/24               PT Transfers       STG - Patient will transfer sit to and from stand with DEMO CORRECT HAND PLACEMENT, USE OF GRAB BARS/AD WHEN APPROP. (Progressing)       Start:  07/03/24    Expected End:  07/17/24               Pain - Adult

## 2024-07-08 NOTE — PROGRESS NOTES
Occupational Therapy    OT Treatment    Patient Name: Darron Resendez  MRN: [id number]  Today's Date: 7/8/2024  Time Calculation  Start Time: 1155  Stop Time: 1218  Time Calculation (min): 23 min        Assessment:  End of Session Patient Position: Alarm off, not on at start of session (EOB with tray table pt. verbalizes he will call for nursing when wanting to get up)     Plan:  Treatment Interventions: ADL retraining, Functional transfer training, Endurance training      Subjective   Previous Visit Info:  OT Last Visit  OT Received On: 07/08/24  General:  General  General Comment: pt. agreeable to OT as he was sitting EOB. He improved mobility walking without walker close supervision and assist  managing o2 tubing   He did 2 trips from bed to door to window back to bed. NO loss of balance mild shortness of breath but able to regain with dyphramatic breathing tech.  Pt. sat EOB shaved bathed UB with set up. Pt. left up EOB with tray table in front of him instructed pt. to use call light when wanting to get up and walk with nursing.       Pain:  Pain Assessment  0-10 (Numeric) Pain Score: 0 - No pain    Objective    Cognition:  Cognition  Orientation Level: Oriented X4  Coordination:     Activities of Daily Living:      Grooming  Grooming Level of Assistance: Setup  Grooming Where Assessed: Edge of bed  Grooming Comments: wash face, shave    UE Bathing  UE Bathing Level of Assistance: Setup  UE Bathing Where Assessed: Edge of bed       Bed Mobility/Transfers:      Transfers  Transfer: Yes  Transfer 1  Transfer From 1: Bed to  Transfer to 1: Stand  Technique 1: Sit to stand  Transfer Device 1:  (no AD at pt request)  Transfer Level of Assistance 1: Close supervision  Trials/Comments 1: no loss of balance  Transfers 2  Transfer From 2: Bed to, Stand to  Technique 2: Stand to sit  Transfer Device 2: Walker  Transfer Level of Assistance 2: Close supervision  Trials/Comments 2: functional mobility around room x2          Therapy/Activity: Therapeutic Exercise  Therapeutic Exercise Performed: Yes  Therapeutic Exercise Activity 1: pt. educated on exercises he can complete in room on own. Shoulder flexion/ext., cross arms chest fly, scapular retraction protraction 1x10 reps. pt. has good understanding . dyphramatic brething also instructed pt. demonstrated good knowlege and follow through when short of breath.              Outcome Measures:[facility] Daily Activity  Putting on and taking off regular lower body clothing: A little  Bathing (including washing, rinsing, drying): A little  Putting on and taking off regular upper body clothing: A little  Toileting, which includes using toilet, bedpan or urinal: A little  Taking care of personal grooming such as brushing teeth: A little  Eating Meals: None  Daily Activity - Total Score: 19        Education Documentation  Body Mechanics, taught by MARCO Wong at 7/8/2024  2:54 PM.  Learner: Patient  Readiness: Acceptance  Method: Explanation, Demonstration  Response: Verbalizes Understanding, Needs Reinforcement  Comment: dyphramatic breathing    ADL Training, taught by MARCO Wong at 7/8/2024  2:54 PM.  Learner: Patient  Readiness: Acceptance  Method: Explanation, Demonstration  Response: Verbalizes Understanding, Needs Reinforcement  Comment: dyphramatic breathing    Education Comments  No comments found.        OP EDUCATION:       Goals:  Encounter Problems       Encounter Problems (Active)       ADLs       The patient will tolerate 25 minutes of ADL/Functional activity with min fatigue and while maintaining spO2 >90% (Progressing)       Start:  07/03/24    Expected End:  07/17/24               BALANCE       Patient will tolerate standing for 8 minutes to modified independent level of assistance with least restrictive device in order to improve functional activity tolerance for ADL tasks. (Progressing)       Start:  07/03/24    Expected End:  07/17/24                COGNITION/SAFETY       The patient will demonstrate or verbalize 3 energy conservation techniques without cueing to perform ADLs/functional activity with reduced SOB and fatigue.  (Progressing)       Start:  07/03/24    Expected End:  07/17/24               MOBILITY       Patient will perform Functional mobility max Household distances/Community Distances with modified independent level of assistance and least restrictive device in order to improve safety and functional mobility. (Progressing)       Start:  07/03/24    Expected End:  07/17/24
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
## 2024-07-08 NOTE — PROGRESS NOTES
Patients oxygen weaning as tolerated. Patient plans to return home with [facility] when medically ready. [facility] will continue to follow for needs if they arise.

## 2024-07-08 NOTE — CARE PLAN
The clinical goals for the shift include Patient to remain free from falls      Problem: Safety - Adult  Goal: Free from fall injury  Outcome: Progressing     Problem: Discharge Planning  Goal: Discharge to home or other facility with appropriate resources  Outcome: Progressing     Problem: Chronic Conditions and Co-morbidities  Goal: Patient's chronic conditions and co-morbidity symptoms are monitored and maintained or improved  Outcome: Progressing

## 2024-07-08 NOTE — PROGRESS NOTES
This RN informed patient that we would wean oxygen down. Patient removed oxygen, states he does not need it. Patient educated that he will be monitored to make sure oxygen level stays at acceptable levels.

## 2024-07-09 ENCOUNTER — APPOINTMENT (OUTPATIENT)
Dept: RADIOLOGY | Facility: HOSPITAL | Age: 64
End: 2024-07-09
Payer: MEDICARE

## 2024-07-09 LAB
ANION GAP SERPL CALC-SCNC: 10 MMOL/L (ref 10–20)
BUN SERPL-MCNC: 18 MG/DL (ref 6–23)
CALCIUM SERPL-MCNC: 7.6 MG/DL (ref 8.6–10.3)
CHLORIDE SERPL-SCNC: 102 MMOL/L (ref 98–107)
CO2 SERPL-SCNC: 30 MMOL/L (ref 21–32)
CREAT SERPL-MCNC: 0.93 MG/DL (ref 0.5–1.3)
EGFRCR SERPLBLD CKD-EPI 2021: >90 ML/MIN/1.73M*2
ERYTHROCYTE [DISTWIDTH] IN BLOOD BY AUTOMATED COUNT: 13.2 % (ref 11.5–14.5)
GLUCOSE BLD MANUAL STRIP-MCNC: 104 MG/DL (ref 74–99)
GLUCOSE BLD MANUAL STRIP-MCNC: 128 MG/DL (ref 74–99)
GLUCOSE BLD MANUAL STRIP-MCNC: 156 MG/DL (ref 74–99)
GLUCOSE BLD MANUAL STRIP-MCNC: 198 MG/DL (ref 74–99)
GLUCOSE SERPL-MCNC: 102 MG/DL (ref 74–99)
HCT VFR BLD AUTO: 36.2 % (ref 41–52)
HGB BLD-MCNC: 12.2 G/DL (ref 13.5–17.5)
MCH RBC QN AUTO: 31.5 PG (ref 26–34)
MCHC RBC AUTO-ENTMCNC: 33.7 G/DL (ref 32–36)
MCV RBC AUTO: 94 FL (ref 80–100)
NRBC BLD-RTO: 0 /100 WBCS (ref 0–0)
PLATELET # BLD AUTO: 222 X10*3/UL (ref 150–450)
POTASSIUM SERPL-SCNC: 3.8 MMOL/L (ref 3.5–5.3)
RBC # BLD AUTO: 3.87 X10*6/UL (ref 4.5–5.9)
SODIUM SERPL-SCNC: 138 MMOL/L (ref 136–145)
WBC # BLD AUTO: 17.2 X10*3/UL (ref 4.4–11.3)

## 2024-07-09 PROCEDURE — 97116 GAIT TRAINING THERAPY: CPT | Mod: CQ,GP

## 2024-07-09 PROCEDURE — 94668 MNPJ CHEST WALL SBSQ: CPT

## 2024-07-09 PROCEDURE — 2500000002 HC RX 250 W HCPCS SELF ADMINISTERED DRUGS (ALT 637 FOR MEDICARE OP, ALT 636 FOR OP/ED): Performed by: INTERNAL MEDICINE

## 2024-07-09 PROCEDURE — 2550000001 HC RX 255 CONTRASTS: Performed by: INTERNAL MEDICINE

## 2024-07-09 PROCEDURE — 2500000001 HC RX 250 WO HCPCS SELF ADMINISTERED DRUGS (ALT 637 FOR MEDICARE OP): Performed by: INTERNAL MEDICINE

## 2024-07-09 PROCEDURE — 2500000002 HC RX 250 W HCPCS SELF ADMINISTERED DRUGS (ALT 637 FOR MEDICARE OP, ALT 636 FOR OP/ED): Performed by: NURSE PRACTITIONER

## 2024-07-09 PROCEDURE — 80048 BASIC METABOLIC PNL TOTAL CA: CPT | Performed by: INTERNAL MEDICINE

## 2024-07-09 PROCEDURE — 71260 CT THORAX DX C+: CPT | Performed by: RADIOLOGY

## 2024-07-09 PROCEDURE — 71260 CT THORAX DX C+: CPT

## 2024-07-09 PROCEDURE — 85027 COMPLETE CBC AUTOMATED: CPT | Performed by: INTERNAL MEDICINE

## 2024-07-09 PROCEDURE — 1210000001 HC SEMI-PRIVATE ROOM DAILY

## 2024-07-09 PROCEDURE — 2500000005 HC RX 250 GENERAL PHARMACY W/O HCPCS: Performed by: INTERNAL MEDICINE

## 2024-07-09 PROCEDURE — 2500000004 HC RX 250 GENERAL PHARMACY W/ HCPCS (ALT 636 FOR OP/ED): Performed by: INTERNAL MEDICINE

## 2024-07-09 PROCEDURE — 2500000001 HC RX 250 WO HCPCS SELF ADMINISTERED DRUGS (ALT 637 FOR MEDICARE OP): Performed by: NURSE PRACTITIONER

## 2024-07-09 PROCEDURE — 2500000005 HC RX 250 GENERAL PHARMACY W/O HCPCS: Performed by: NURSE PRACTITIONER

## 2024-07-09 PROCEDURE — 94640 AIRWAY INHALATION TREATMENT: CPT

## 2024-07-09 PROCEDURE — 82947 ASSAY GLUCOSE BLOOD QUANT: CPT

## 2024-07-09 PROCEDURE — 92610 EVALUATE SWALLOWING FUNCTION: CPT | Mod: GN | Performed by: SPEECH-LANGUAGE PATHOLOGIST

## 2024-07-09 PROCEDURE — 36415 COLL VENOUS BLD VENIPUNCTURE: CPT | Performed by: INTERNAL MEDICINE

## 2024-07-09 PROCEDURE — 2500000004 HC RX 250 GENERAL PHARMACY W/ HCPCS (ALT 636 FOR OP/ED): Mod: JZ | Performed by: NURSE PRACTITIONER

## 2024-07-09 PROCEDURE — 99233 SBSQ HOSP IP/OBS HIGH 50: CPT | Performed by: INTERNAL MEDICINE

## 2024-07-09 ASSESSMENT — COGNITIVE AND FUNCTIONAL STATUS - GENERAL
WALKING IN HOSPITAL ROOM: A LITTLE
DAILY ACTIVITIY SCORE: 24
STANDING UP FROM CHAIR USING ARMS: A LITTLE
CLIMB 3 TO 5 STEPS WITH RAILING: A LITTLE
CLIMB 3 TO 5 STEPS WITH RAILING: A LITTLE
DAILY ACTIVITIY SCORE: 24
MOBILITY SCORE: 23
MOBILITY SCORE: 20
MOVING TO AND FROM BED TO CHAIR: A LITTLE
CLIMB 3 TO 5 STEPS WITH RAILING: A LITTLE
MOBILITY SCORE: 23

## 2024-07-09 ASSESSMENT — PAIN SCALES - GENERAL
PAINLEVEL_OUTOF10: 0 - NO PAIN

## 2024-07-09 ASSESSMENT — PAIN - FUNCTIONAL ASSESSMENT
PAIN_FUNCTIONAL_ASSESSMENT: 0-10
PAIN_FUNCTIONAL_ASSESSMENT: 0-10

## 2024-07-09 NOTE — PROGRESS NOTES
Occupational Therapy                 Therapy Communication Note    Patient Name: Darron Resendez  MRN: [id number]  Today's Date: 7/9/2024     Discipline: Occupational Therapy    Missed Visit Reason: Missed Visit Reason: Patient refused (pt. in bed reporting he is resting until he goes for x-ray. Reports he walked with PT earlier.)    Missed Time: Attempt x2     Comment:

## 2024-07-09 NOTE — PROGRESS NOTES
"Speech-Language Pathology Clinical Swallow Evaluation    Patient Name: Darron Resendez  MRN: [id number]  : 1960  Today's Date: 24  Start time: Start Time: 1320  Stop time: Stop Time: 1341  Time calculation (min) : Time Calculation (min): 21 min    ASSESSMENT  Impressions:  Pt was alert and awake during the SLP visit.   Pt exhibited no oral phase dysphagia and no suspected pharyngeal dysphagia during the clinical swallow evaluation. No overt s/s of aspiration or penetration was noted.     Pt safe to continue baseline diet of Regular diet and Thin liquid.     No SLP services needed at this time.     Prognosis: Good      PLAN  Recommendations:  MBSS recommended: No; no pharyngeal dysphagia suspected.  Solid consistency: Regular (IDDSI level 7)  Liquid consistency: Thin (IDDSI 0)  Medication administration: Whole, in thin liquid  Compensatory swallow strategies: Upright positioning for all PO intake, Remain upright for >30 min after meals, Slow rate of intake, Small bites, Small sips, and Alternate bites and sips    Recommended frequency/duration:  Skilled SLP services recommended: No  Frequency: N/A  Duration: N/A  Discharge recommendation: No SLP services recommended at this time.   Strengths: Cognition, Motivation, and Family/caregiver support  Barriers to participation in tx: Comorbidities      Goals: N/A      REASON FOR ADMISSION:  CHIEF COMPLAINT: Pt presenting with shortness of breath.     PMHx relevant to rehab: s/f \"borderline\" COPD, CAD with stent hx STEMI, HTN, chronic tobacco abuse     Relevant imaging results:   XR CHEST IMPRESSION: LUNGS  Blunting of the right costophrenic angle suggestive of small right  pleural effusion. No evidence of consolidation or es pulmonary  edema.    Small right pleural effusion. No consolidation or es pulmonary  edema.      SUBJECTIVE  SLP Received On: 24  Patient Class: Inpatient  Living Environment: Home  Ordering Physician: Dr. Eliseo Chappell  Reason for " Referral: suspect oropharygneal dysphagia  Prior to Session Communication: Bedside nurse    RN cleared pt to participate in session and reported that no issues with swallowing noted and pt takes multiple pills at once with thin liquid. No overt coughing or choking was noted.     Pt/family reported no issues with swallowing noted. Pt also reported that pt has trouble sleeping as he would wake up in between and feels like he is running out of air. Pt reported to be smoking 2 packs of cigarettes/day prior to admission.     Nutritional status: Appears well-nourished/no concerns          [employer] Diet: Regular diet and Thin Liquids  Current Diet : Regular diet and Thin liquid    Pain Assessment  Pain Assessment: 0-10  0-10 (Numeric) Pain Score: 0 - No pain    Behavior/Cognition: Alert, Cooperative, Pleasant mood  Orientation: Ox4  Ability to follow functional commands: WFL  Respiratory Status: Oxygen via nasal cannula           Patient positioning: Upright on edge of bed      OBJECTIVE  Clinical swallow evaluation completed and consisted of interview, oral motor assessment, and PO trials (5 consecutive sips of thin liquid via straw, 5 bites of applesauce, 5 bites of pears with liquid and 1.5 jannet cracker).    ORAL MOTOR: Dentition: Pt is edentulous. Pt does have dentures but has not been using as they are uncomfortable.  Oral Hygiene: Oral mucosa were pink, moist, and free of obvious lesions. Lingual strength and ROM were adequate. Labial strength/ROM were adequate. Labial seal was adequate. Palatal elevation: adequate    ORAL PHASE: Mastication of regular solids was functional.  A/P transit was timely.  No oral residuals were seen.    PHARYNGEAL PHASE: Laryngeal elevation was visualized or palpated with all trials, however adequacy of hyolaryngeal elevation/excursion cannot be determined at bedside.     No immediate or delayed s/sx aspiration/penetration were observed with any consistencies.    Voice quality was clear  across all trials and consistencies.     Pt safe to continue Regular diet and Thin liquid.       Treatment/Education:  Results and recommendations were relayed to: Patient and Physician  Education provided: Yes   Learner: Patient   Barriers to learning: None   Method of teaching: Verbal   Topic: role of ST, results of assessment, risk for aspiration, recommended safe swallow strategies, and swallow anatomy/physiology   Outcome of teaching: Pt verbalized understanding  Treatment provided: No    Next Treatment Priority: No SLP services needed at this time

## 2024-07-09 NOTE — PROGRESS NOTES
"Darron Resendez is a 64 y.o. male on day 8 of admission presenting with Acute on chronic respiratory failure with hypoxia (Multi).      Subjective   Darron Resendez is a 63 y.o. male with PMHx s/f \"borderline\" COPD, CAD with stent hx STEMI, HTN, chronic tobacco abuse presenting with shortness of breath.  Patient states for the past 5 days he has been increasingly short of breath.  He admits to a productive cough with white/yellow sputum production, he has been experiencing subjective fevers and lightheadedness.  He has not had any cigarettes in the past 3 days.  He is felt very winded and fatigued.  He denies sick contacts or environmental exposures.  He denies chest pain, nausea, vomiting, syncope, leg swelling, abdominal pain, or other symptoms.  He states he has been hospitalized with severe pneumonia in the past.  Chart review reveals he was admitted to this hospital back in 2020 for COPD exacerbation.     ED Course (Summary):   Vitals on presentation: 97.2 F, 81 bpm, 22 rr, 91/53 --> 76/54, 90% on Bipap  Labs: BMP glu 220, Na 130, Cl 92, BUN 64, Cr 2.12  Lactate 1.4    Trop 15 --> 14  CBC WBC 12.9, Hg 14.3, Plt 260  COVID negative  VBG pH 7.24, pCO2 77, pO2 21, HCO3 33.0  Imaging: CXR - Increased perihilar and pulmonary markings more so on the right   than the left. Findings could be on the basis of pulmonary vascular   congestion or pneumonia.   EKG - Sinus at 76 bpm.  Interventions: Duoneb X1, Rocephin and azithro given, Solumedrol 125 mg, NS 6.5 L bolus given. Pt is still hypotensive and will be admitted to the ICU. Critical care consulted.      7/2: History and chart reviewed.  Patient seen and examined.  No acute events overnight.  Patient feels a little bit better.  He denies any shortness of breath, chest pain, fevers or chills.  Critical care recommendations appreciated.  Nephrology recommendations appreciated.  Patient is off pressors.  Follow cultures.  Wean down oxygen as tolerated.  Patient will " require at least another 24 to 48 hours of inpatient service.  7/3: No acute events overnight.  Patient is transferred to the stepdown unit.  He is still short of breath.  And has a dry cough with minimal expectoration.  He denies any chest pain, fevers or chills.  He still requiring at least 5 L of oxygen.  Continue aggressive pulmonary hygiene.  Continue nebulizers.  Patient will require at least another 24 to 48 hours of inpatient service.     7/4: No acute events overnight.  Patient currently requiring 10 L of oxygen.  Continue to encourage out of bed.  Continue aggressive pulm hygiene.  Continue cefepime, methylprednisone and nebulizers.  Patient will require at least another 48 hours of inpatient service.    7/5: No acute events overnight. Patient is currently requiring 6 L of oxygen. Continue to encourage out of bed. Continue aggressive pulm hygiene. Continue cefepime, methylprednisone and nebulizers. Patient will require 24 to 48 hours of inpatient services.      7/6: No acute events.  Patient still short of breath on minimal exertion..  He still has a dry cough.  Patient denies any fevers, chills, nausea, diarrhea.  Still on 6 L of oxygen.  Continue to wean down oxygen.  Continue aggressive pulmonary hygiene.  White cell count elevated at 27 K likely due to to the steroids.  Will discontinue his IV methylprednisone and start on prednisone 40 mg patient will require at least another 24 hours of inpatient service.    7/7: No acute events overnight.  Patient is still short of breath although he is improving.  He denies any cough.  He feels congested.  Patient had multiple complaints and this is likely due to frustration with prolonged hospital stay.  I again reiterated to him that COPD and his pneumonia will take time to be treated.  He is on 6 L of oxygen.  Will add Symbicort.  Will order a chest x-ray for today.  Patient will likely require another evaluation by the pulmonary team.  White cell count is up to  30 K.  Will see if the x-ray has new findings although this is likely still steroid-induced.  Cultures are negative.  He is currently on prednisone 40 mg.    7/8: Patient seen.  Remains on 3 L.  Weaning.  Chest x-ray shows similar right-sided infiltrates with less left-sided infiltrates.  On exam patient has no crackles, mild wheezing.  BMP will be checked for open this morning's labs.  Continue to wean O2.  Patient complains of humidifier, will remove from his O2 line.  Reassess in AM.    7/9: Patient remains hypoxic.  Feels better.  Has received 8 days of cefepime.  Had suspected patient had Pseudomonas earlier in admission but cultures were negative.  Will DC antibiotic therapy.  Check CT evaluation to rule out chronic changes.  Will have SLP evaluate patient.  Okay to transfer to general medical floor.  Continue to wean O2.  BNP low, do not suspect heart failure.      Objective     Last Recorded Vitals  /75 (BP Location: Right arm, Patient Position: Lying)   Pulse 78   Temp 36.9 °C (98.4 °F) (Temporal)   Resp 18   Wt 90.5 kg (199 lb 8.3 oz)   SpO2 90%   Intake/Output last 3 Shifts:    Intake/Output Summary (Last 24 hours) at 7/9/2024 0951  Last data filed at 7/9/2024 0856  Gross per 24 hour   Intake 800 ml   Output 1600 ml   Net -800 ml       Admission Weight  Weight: 85.6 kg (188 lb 11.4 oz) (07/01/24 0901)    Daily Weight  07/09/24 : 90.5 kg (199 lb 8.3 oz)    Image Results  XR chest 2 views  Narrative: Interpreted By:  Anil Regan,   STUDY:  XR CHEST 2 VIEWS;  7/7/2024 6:37 pm      INDICATION:  Signs/Symptoms:Still shortness of breath, recent pneumonia with  severe COPD.  Rule out heart failure or worsening pneumonia.      COMPARISON:  Chest radiograph 07/01/2024.      ACCESSION NUMBER(S):  [id number]      ORDERING CLINICIAN:  REUBEN FLOOD      FINDINGS:                  CARDIOMEDIASTINAL SILHOUETTE:  Cardiomediastinal silhouette is normal in size and configuration.  Left coronary artery  stent is visualized.      LUNGS:  Blunting of the right costophrenic angle suggestive of small right  pleural effusion. No evidence of consolidation or es pulmonary  edema.      ABDOMEN:  No remarkable upper abdominal findings.      BONES:  No acute osseous changes.      Impression: 1. Small right pleural effusion. No consolidation or es pulmonary  edema.              MACRO:  None      Signed by: Anil Regan 7/7/2024 6:58 PM  Dictation workstation:   [id number]      Physical Exam:    General: Alert and oriented x 3, agitated  Cardiovascular: Normal S1-S2, sinus rhythm, no murmurs  Respiratory: Decreased air entry bilaterally, scattered wheeze, no crackle  Abdomen: Abdomen is soft, not distended with no tenderness  Extremities: No edema or deformities  Neurology: Alert and oriented x 3, no acute focal deficits         Assessment/Plan      #Presumed streptococcal pneumoniae pneumonia with septic shock  #Acute hypoxic respiratory failure secondary to pneumonia and COPD exacerbation  #COPD exacerbation secondary to pneumonia  #Leukocytosis likely due to steroids.  Critical care recommendations appreciated.  Continue cefepime  Continue IV methylprednisone  Patient is off pressors  Follow cultures  Encourage out of bed  Wean down oxygen as tolerated  7/3: Continue nebulizers and IV steroids.  Continue antibiotics.  Continue aggressive pulmonary hygiene.  7/4: Patient currently on 10 L of oxygen.  Wean down as tolerated.  Continue cefepime, methylprednisone, DuoNebs.  Continue aggressive pulmonary hygiene.  Encourage out of bed.  7/5: Patient currently on 6 L of oxygen. Wean down as tolerated. Continue cefepime, methylprednisone, nebulizers. Continue aggressive pulmonary hygiene. Encourage out of bed.  7/6: Will discontinue methylprednisolone.  Start on prednisone 40 mg daily.  Continue aggressive pulmonary hygiene.  Continue to encourage out of bed.  Wean down oxygen as tolerated.  7/7: Continue antibiotics.   Continue prednisone 40 mg.  Will check a chest x-ray.  Will check a BNP in the morning.  7/8: Chest x-ray is unchanged.  On exam does not have wheezing or crackles.  Recheck BMP.  Suspect delayed resolution of pneumonia findings, expected.  Will attempt to wean to room air, successful likely discharge tomorrow.  7/9: Completed 8 days of cefepime.  Will DC antibiotic therapy.  Continues to be's hypoxic.  Wean O2 as able.  Check CT of chest to evaluate infiltrates, discussed briefly with ID.  Suspects patient may be chronically aspirating or has some other sort of chemical injury.  Will ask SLP to evaluate.     #Acute kidney injury   Baseline creatinine is between 0.9-1.0.  On admission creatinine was 2.13  Nephrology recommendations appreciated  Renal ultrasound ordered and showed no hydronephrosis  Monitor renal function closely  7/3: Will check renal function in the morning  7/4: Creatinine down to baseline at 0.98.  Nephrology recommendation appreciated.  7/5: Creatinine down to 0.76.           Sodium= 138.           Nephrology recommendations appreciated.    7/6: Resolved      #History of coronary artery disease  #Tobacco abuse      DVT prophylaxis: Lovenox subcutaneous   Code Status: Full code    Keisha Streeter MD MRCP

## 2024-07-09 NOTE — PROGRESS NOTES
Nurse to nurse to nurse called to [facility] for room 2331. Monitor removed. Transportation to new floor called.

## 2024-07-09 NOTE — PROGRESS NOTES
Physical Therapy  Physical Therapy Treatment    Patient Name: Darron Resendez  MRN: [id number]  Today's Date: 7/9/2024  Time Calculation  Start Time: 1005  Stop Time: 1028  Time Calculation (min): 23 min    Assessment/Plan   PT Plan  Treatment/Interventions: Transfer training, Gait training, Stair training, Neuromuscular re-education, Endurance training, Therapeutic exercise, Therapeutic activity  PT Plan: Ongoing PT  PT Frequency: 4 times per week  PT Discharge Recommendations: Low intensity level of continued care (PT HOME SAFETY ASSESSMENT)  PT Recommended Transfer Status: Assist x1  PT - OK to Discharge: Yes    General Visit Information:   PT  Visit  PT Received On: 07/09/24  Response to Previous Treatment: Patient with no complaints from previous session.    Reason for Referral: Impaired mobility  Room: 2008    Objective   Pain:  No pain    Cognition:  Oriented X4    Activity Tolerance:  Activity Tolerance  Endurance: Tolerates 10 - 20 min exercise with multiple rests    Treatments:  Bed Mobility  Supine to sitting: Independent  Scooting: Independent    Transfers  Sit to stand: Close supervision  Stand to sit: Close supervision  Commode: Close supervision    Ambulation/Gait Training  90 feet x1, 60 feet x1, 20 feet x1 with no device, Supervision        Pt remained sitting in bedside chair pre/post tx. Alarm on and call light in reach.       Outcome Measures:  [facility] Basic Mobility  Turning from your back to your side while in a flat bed without using bedrails: None  Moving from lying on your back to sitting on the side of a flat bed without using bedrails: None  Moving to and from bed to chair (including a wheelchair): A little  Standing up from a chair using your arms (e.g. wheelchair or bedside chair): A little  To walk in hospital room: A little  Climbing 3-5 steps with railing: A little  Basic Mobility - Total Score: 20    Education Documentation  Mobility Training, taught by Tacos Lambert PTA at 7/9/2024 11:39  AM.  Learner: Patient  Readiness: Acceptance  Method: Explanation, Demonstration  Response: Needs Reinforcement    Education Comments  No comments found.        OP EDUCATION:       Encounter Problems       Encounter Problems (Active)       ENDURANCE, ENERGY CONSERVATION       STG-pt will demo PACING/BREATHING TECH to reduce TODD with only 1 VC (Progressing)       Start:  07/03/24    Expected End:  07/17/24               Mobility       STG - Patient will ambulate household distance with improved safety awareness, use FWW for ENERGY CONSERVATION FOR longer distances only (Progressing)       Start:  07/03/24    Expected End:  07/17/24               PT Transfers       STG - Patient will transfer sit to and from stand with DEMO CORRECT HAND PLACEMENT, USE OF GRAB BARS/AD WHEN APPROP. (Progressing)       Start:  07/03/24    Expected End:  07/17/24               Pain - Adult

## 2024-07-09 NOTE — CARE PLAN
The clinical goals for the shift include SPO2 will remain greater than 90%    Problem: Nutrition  Goal: Oral intake greater 75%  Outcome: Progressing  Goal: Consume prescribed supplement  Outcome: Progressing  Goal: BG  mg/dL  Outcome: Progressing  Goal: Lab values WNL  Outcome: Progressing  Goal: Electrolytes WNL  Outcome: Progressing  Goal: Maintain stable weight  Outcome: Progressing     Problem: Respiratory  Goal: Clear secretions with interventions this shift  Outcome: Progressing  Goal: Minimize anxiety/maximize coping throughout shift  Outcome: Progressing  Goal: Minimal/no exertional discomfort or dyspnea this shift  Outcome: Progressing  Goal: No signs of respiratory distress (eg. Use of accessory muscles. Peds grunting)  Outcome: Progressing  Goal: Patent airway maintained this shift  Outcome: Progressing  Goal: Tolerate pulmonary toileting this shift  Outcome: Progressing  Goal: Verbalize decreased shortness of breath this shift  Outcome: Progressing  Goal: Wean oxygen to maintain O2 saturation per order/standard this shift  Outcome: Progressing  Goal: Increase self care and/or family involvement in next 24 hours  Outcome: Progressing     Problem: Pain - Adult  Goal: Verbalizes/displays adequate comfort level or baseline comfort level  Outcome: Progressing     Problem: Safety - Adult  Goal: Free from fall injury  Outcome: Progressing     Problem: Discharge Planning  Goal: Discharge to home or other facility with appropriate resources  Outcome: Progressing     Problem: Chronic Conditions and Co-morbidities  Goal: Patient's chronic conditions and co-morbidity symptoms are monitored and maintained or improved  Outcome: Progressing     Problem: Infection prevention/bleeding  Goal: Infection s/sx managed  Outcome: Progressing  Goal: No further progression of infection  Outcome: Progressing  Goal: No signs of bleeding  Outcome: Progressing  Goal: Normal coagulation studies  Outcome: Progressing      Problem: Perfusion/Cardiac  Goal: Adequate perfusion to organs/extremities  Outcome: Progressing  Goal: Hemodynamically stable  Outcome: Progressing  Goal: No cardiac arrhythmias  Outcome: Progressing     Problem: Respiratory/Oxygenation  Goal: No signs of respiratory compromise  Outcome: Progressing  Goal: Tolerates activity without increased O2 demands  Outcome: Progressing     Problem: Neuro/Coping  Goal: Minimal anxiety; utilize coping mechanisms  Outcome: Progressing  Goal: No signs of neurological compromise  Outcome: Progressing  Goal: Increase self care/family involvement  Outcome: Progressing     Problem: Fluid/Electrolyte/Nutrition  Goal: Fluid balance within 1 liter of normovolemia  Outcome: Progressing  Goal: No signs of renal failure  Outcome: Progressing  Goal: Normal electrolyte levels  Outcome: Progressing  Goal: Normal glucose levels  Outcome: Progressing  Goal: Tolerates nutritional intake  Outcome: Progressing

## 2024-07-10 ENCOUNTER — APPOINTMENT (OUTPATIENT)
Dept: RADIOLOGY | Facility: HOSPITAL | Age: 64
End: 2024-07-10
Payer: MEDICARE

## 2024-07-10 LAB
ALBUMIN SERPL BCP-MCNC: 2.7 G/DL (ref 3.4–5)
ALP SERPL-CCNC: 39 U/L (ref 33–136)
ALT SERPL W P-5'-P-CCNC: 88 U/L (ref 10–52)
ANION GAP SERPL CALC-SCNC: 10 MMOL/L (ref 10–20)
AST SERPL W P-5'-P-CCNC: 51 U/L (ref 9–39)
ATRIAL RATE: 76 BPM
BILIRUB DIRECT SERPL-MCNC: 0.1 MG/DL (ref 0–0.3)
BILIRUB SERPL-MCNC: 0.5 MG/DL (ref 0–1.2)
BUN SERPL-MCNC: 18 MG/DL (ref 6–23)
CALCIUM SERPL-MCNC: 7.8 MG/DL (ref 8.6–10.3)
CHLORIDE SERPL-SCNC: 103 MMOL/L (ref 98–107)
CO2 SERPL-SCNC: 29 MMOL/L (ref 21–32)
CREAT SERPL-MCNC: 0.91 MG/DL (ref 0.5–1.3)
EGFRCR SERPLBLD CKD-EPI 2021: >90 ML/MIN/1.73M*2
ERYTHROCYTE [DISTWIDTH] IN BLOOD BY AUTOMATED COUNT: 13.3 % (ref 11.5–14.5)
GLUCOSE BLD MANUAL STRIP-MCNC: 114 MG/DL (ref 74–99)
GLUCOSE BLD MANUAL STRIP-MCNC: 218 MG/DL (ref 74–99)
GLUCOSE BLD MANUAL STRIP-MCNC: 313 MG/DL (ref 74–99)
GLUCOSE BLD MANUAL STRIP-MCNC: 86 MG/DL (ref 74–99)
GLUCOSE SERPL-MCNC: 95 MG/DL (ref 74–99)
HCT VFR BLD AUTO: 35.7 % (ref 41–52)
HGB BLD-MCNC: 11.8 G/DL (ref 13.5–17.5)
MCH RBC QN AUTO: 31.3 PG (ref 26–34)
MCHC RBC AUTO-ENTMCNC: 33.1 G/DL (ref 32–36)
MCV RBC AUTO: 95 FL (ref 80–100)
NRBC BLD-RTO: 0 /100 WBCS (ref 0–0)
P AXIS: 75 DEGREES
PLATELET # BLD AUTO: 236 X10*3/UL (ref 150–450)
POTASSIUM SERPL-SCNC: 3.7 MMOL/L (ref 3.5–5.3)
PR INTERVAL: 144 MS
PROT SERPL-MCNC: 5.1 G/DL (ref 6.4–8.2)
Q ONSET: 252 MS
QRS COUNT: 12 BEATS
QRS DURATION: 89 MS
QT INTERVAL: 333 MS
QTC CALCULATION(BAZETT): 375 MS
QTC FREDERICIA: 360 MS
R AXIS: 77 DEGREES
RBC # BLD AUTO: 3.77 X10*6/UL (ref 4.5–5.9)
SODIUM SERPL-SCNC: 138 MMOL/L (ref 136–145)
T AXIS: 44 DEGREES
T OFFSET: 419 MS
VENTRICULAR RATE: 76 BPM
WBC # BLD AUTO: 13.3 X10*3/UL (ref 4.4–11.3)

## 2024-07-10 PROCEDURE — 97535 SELF CARE MNGMENT TRAINING: CPT | Mod: GO,CO

## 2024-07-10 PROCEDURE — 2500000004 HC RX 250 GENERAL PHARMACY W/ HCPCS (ALT 636 FOR OP/ED): Performed by: INTERNAL MEDICINE

## 2024-07-10 PROCEDURE — 2500000001 HC RX 250 WO HCPCS SELF ADMINISTERED DRUGS (ALT 637 FOR MEDICARE OP): Performed by: NURSE PRACTITIONER

## 2024-07-10 PROCEDURE — 99222 1ST HOSP IP/OBS MODERATE 55: CPT | Performed by: NURSE PRACTITIONER

## 2024-07-10 PROCEDURE — 36415 COLL VENOUS BLD VENIPUNCTURE: CPT | Performed by: INTERNAL MEDICINE

## 2024-07-10 PROCEDURE — 82248 BILIRUBIN DIRECT: CPT | Performed by: INTERNAL MEDICINE

## 2024-07-10 PROCEDURE — 76604 US EXAM CHEST: CPT | Performed by: RADIOLOGY

## 2024-07-10 PROCEDURE — 94640 AIRWAY INHALATION TREATMENT: CPT

## 2024-07-10 PROCEDURE — 82374 ASSAY BLOOD CARBON DIOXIDE: CPT | Performed by: INTERNAL MEDICINE

## 2024-07-10 PROCEDURE — 99233 SBSQ HOSP IP/OBS HIGH 50: CPT | Performed by: INTERNAL MEDICINE

## 2024-07-10 PROCEDURE — 2500000001 HC RX 250 WO HCPCS SELF ADMINISTERED DRUGS (ALT 637 FOR MEDICARE OP): Performed by: INTERNAL MEDICINE

## 2024-07-10 PROCEDURE — 2500000005 HC RX 250 GENERAL PHARMACY W/O HCPCS: Performed by: INTERNAL MEDICINE

## 2024-07-10 PROCEDURE — 1210000001 HC SEMI-PRIVATE ROOM DAILY

## 2024-07-10 PROCEDURE — 2500000002 HC RX 250 W HCPCS SELF ADMINISTERED DRUGS (ALT 637 FOR MEDICARE OP, ALT 636 FOR OP/ED): Performed by: INTERNAL MEDICINE

## 2024-07-10 PROCEDURE — 85027 COMPLETE CBC AUTOMATED: CPT | Performed by: INTERNAL MEDICINE

## 2024-07-10 PROCEDURE — 82947 ASSAY GLUCOSE BLOOD QUANT: CPT

## 2024-07-10 PROCEDURE — 76604 US EXAM CHEST: CPT

## 2024-07-10 ASSESSMENT — COGNITIVE AND FUNCTIONAL STATUS - GENERAL
MOBILITY SCORE: 24
DAILY ACTIVITIY SCORE: 22
MOBILITY SCORE: 24
DAILY ACTIVITIY SCORE: 24
DAILY ACTIVITIY SCORE: 24
HELP NEEDED FOR BATHING: A LITTLE
DRESSING REGULAR LOWER BODY CLOTHING: A LITTLE

## 2024-07-10 ASSESSMENT — PAIN SCALES - GENERAL
PAINLEVEL_OUTOF10: 0 - NO PAIN

## 2024-07-10 ASSESSMENT — ACTIVITIES OF DAILY LIVING (ADL): HOME_MANAGEMENT_TIME_ENTRY: 8

## 2024-07-10 ASSESSMENT — PAIN - FUNCTIONAL ASSESSMENT: PAIN_FUNCTIONAL_ASSESSMENT: 0-10

## 2024-07-10 NOTE — PROGRESS NOTES
"Nutrition Follow Up Assessment:   Nutrition Assessment         Medical history per chart:    Darron Resendez is a 63 y.o. male with PMHx s/f \"borderline\" COPD, CAD with stent hx STEMI, HTN, chronic tobacco abuse presenting with shortness of breath. Currently admitted for acute on chronic respiratory failure with hypoxia.     7/10:  Patient awake, alert at time of visit, patient with visitors at bedside.  Patient reports good appetite, tolerating meals well.  Reviewed current fluid restriction.  Providing patient with Ensure plus high protein supplement daily, patient is consuming and wishes to continue.      7/2: Pt seen in ICU this AM. Notes usual intake of 1-2 meals/day, says this is normal for him. Does not endorse any unintentional weight loss (usual noted to be around 195# per pt, current = 207#). Encouraged pt to continue eating and to be sure to get in regular meals, offered a supplement to regulate PO intake during admission. Pt was agreeable to ensure plus strawberry once daily. No other nutrition-related concerns at this time. Will continue to follow and monitor per protocol.       Current Diet: Adult diet Regular; 2000 mL fluid  Supplement(s): Yes: Ensure plus high protein daily   Average meal Intake during admission: %   Average supplement intake during admission: %     Nutrition Related Findings:   Oral Symptoms: none Teeth: Dentures upper, Dentures lower   GI symptoms: no GI issues at this time.   BM: Last BM Date: 07/08/24  Food allergies: NKFA. is allergic to penicillin g.  Meds/Labs reviewed.  aspirin, 81 mg, oral, Daily  atorvastatin, 80 mg, oral, Nightly  enoxaparin, 40 mg, subcutaneous, q24h  fluticasone furoate-vilanteroL, 1 puff, inhalation, Daily  insulin lispro, 3 Units, subcutaneous, TID  ipratropium-albuteroL, 3 mL, nebulization, TID  oxygen, , inhalation, Continuous - Inhalation  pantoprazole, 40 mg, oral, Daily before breakfast   Or  pantoprazole, 40 mg, intravenous, Daily " "before breakfast  polyethylene glycol, 17 g, oral, Daily  predniSONE, 40 mg, oral, Daily  tiotropium, 2 puff, inhalation, Daily             Nutrition Significant Labs:    Results from last 7 days   Lab Units 07/10/24  0502 07/09/24  0444 07/08/24  0433 07/05/24  0503 07/04/24  0434   GLUCOSE mg/dL 95 102* 112*   < > 203*   SODIUM mmol/L 138 138 137   < > 135*   POTASSIUM mmol/L 3.7 3.8 4.0   < > 5.0   CHLORIDE mmol/L 103 102 101   < > 106   CO2 mmol/L 29 30 30   < > 25   BUN mg/dL 18 18 22   < > 39*   CREATININE mg/dL 0.91 0.93 0.90   < > 0.98   EGFR mL/min/1.73m*2 >90 >90 >90   < > 85   CALCIUM mg/dL 7.8* 7.6* 7.5*   < > 7.5*   MAGNESIUM mg/dL  --   --   --   --  1.98    < > = values in this interval not displayed.     Lab Results   Component Value Date    HGBA1C 6.5 06/23/2020     Results from last 7 days   Lab Units 07/10/24  1053 07/10/24  0613 07/09/24  2028 07/09/24  1638 07/09/24  1103 07/09/24  0716 07/08/24 2020 07/08/24  1457   POCT GLUCOSE mg/dL 218* 86 128* 198* 156* 104* 134* 255*       Anthropometrics:  Height: 170.2 cm (5' 7.01\")   Weight: 90.5 kg (199 lb 8.3 oz)   BMI (Calculated): 31.24  IBW/kg (Dietitian Calculated): 67.3 kg  Percent of IBW: 140 %         Weight History:   Wt Readings from Last 10 Encounters:   07/09/24 90.5 kg (199 lb 8.3 oz)   03/20/24 86.2 kg (190 lb)   11/17/23 85.2 kg (187 lb 13.3 oz)   03/15/23 84.8 kg (187 lb)   07/15/22 85.3 kg (188 lb)   03/08/22 87 kg (191 lb 12.8 oz)   03/23/21 87.2 kg (192 lb 4 oz)   09/22/20 80.4 kg (177 lb 3.2 oz)   07/08/20 85.3 kg (188 lb 0.8 oz)   06/29/20 88 kg (194 lb)        Weight Change %:  Weight History / % Weight Change: Noted ~9% gain x4 months, potentially related to fluid repletion this admission (pt was hypotensive and received 6.5L NS in ED). Pt's weight is otherwise stable based on available weight history.  Significant Weight Loss: No     Significant Weight Gain: Fluid related        Estimated Needs:   Total Energy Estimated Needs " (kCal):  (6040-2027)  Method for Estimating Needs: 25-30kcal/kg IBW  Total Protein Estimated Needs (g):  (68-81)  Method for Estimating Needs: 1-1.2g/kg IBW     Method for Estimating Needs: per physician        Nutrition Diagnosis   Nutrition Diagnosis:  Malnutrition Diagnosis  Patient has Malnutrition Diagnosis: No    Nutrition Diagnosis  Patient has Nutrition Diagnosis: Yes  Diagnosis Status (1): Resolved  Nutrition Diagnosis 1: Predicted inadequate energy intake  Related to (1): pt reported low appetite  As Evidenced by (1): reported usual intake of 1-2 meals per day       Nutrition Interventions/Recommendations   Nutrition Interventions and Recommendations:        Nutrition Prescription:  Individualized Nutrition Prescription Provided for : Regular diet with fluid restriction.  Continue Ensure plus high protein daily.        Nutrition Interventions:   Food and/or Nutrient Delivery Interventions  Interventions: Meals and snacks, Medical food supplement  Meals and Snacks: Fluid-modified diet  Goal: >75% intake of meals  Medical Food Supplement: Commercial beverage  Goal: >75% of Ensure plus high protein daily         Nutrition Education:   Education Documentation  No documentation found.      Nutrition Counseling  Counseling Theoretical Approach: Other (Comment)  Goal: N/A       Nutrition Monitoring and Evaluation   Monitoring/Evaluation:   Food/Nutrient Related History Monitoring  Monitoring and Evaluation Plan: Energy intake  Energy Intake: Estimated energy intake  Criteria: meet >75% of estimated needs from diet and ONS    Body Composition/Growth/Weight History  Monitoring and Evaluation Plan: Weight  Weight: Weight change  Criteria: Maintain stable, dry weight    Biochemical Data, Medical Tests and Procedures  Monitoring and Evaluation Plan: Glucose/endocrine profile, Electrolyte/renal panel  Criteria: WNL  Criteria: WNL    Nutrition Focused Physical Findings  Monitoring and Evaluation Plan: Skin  Criteria:  Maintain skin integrity            Time Spent/Follow-up Reminder:   Follow Up  Time Spent (min): 30 minutes  Last Date of Nutrition Visit: 07/10/24  Nutrition Follow-Up Needed?: Dietitian to reassess per policy  Follow up Comment: 7/17-7/20

## 2024-07-10 NOTE — CARE PLAN
The patient's goals for the shift include      The clinical goals for the shift include Pt will have no s/s of SOB this shift    Over the shift, the patient did make progress toward the following goals. This nurse maintained pt safety this shift. Pt up this morning using IS.     Problem: Nutrition  Goal: Oral intake greater 75%  Outcome: Progressing  Goal: Consume prescribed supplement  Outcome: Progressing  Goal: BG  mg/dL  Outcome: Progressing  Goal: Lab values WNL  Outcome: Progressing  Goal: Electrolytes WNL  Outcome: Progressing  Goal: Maintain stable weight  Outcome: Progressing     Problem: Respiratory  Goal: Clear secretions with interventions this shift  Outcome: Progressing  Goal: Minimize anxiety/maximize coping throughout shift  Outcome: Progressing  Goal: Minimal/no exertional discomfort or dyspnea this shift  Outcome: Progressing  Goal: No signs of respiratory distress (eg. Use of accessory muscles. Peds grunting)  Outcome: Progressing  Goal: Patent airway maintained this shift  Outcome: Progressing  Goal: Tolerate pulmonary toileting this shift  Outcome: Progressing  Goal: Verbalize decreased shortness of breath this shift  Outcome: Progressing  Goal: Wean oxygen to maintain O2 saturation per order/standard this shift  Outcome: Progressing  Goal: Increase self care and/or family involvement in next 24 hours  Outcome: Progressing     Problem: Perfusion/Cardiac  Goal: Adequate perfusion to organs/extremities  Outcome: Progressing  Goal: Hemodynamically stable  Outcome: Progressing  Goal: No cardiac arrhythmias  Outcome: Progressing

## 2024-07-10 NOTE — PROGRESS NOTES
"Darron Resendez is a 64 y.o. male on day 9 of admission presenting with Acute on chronic respiratory failure with hypoxia (Multi).      Subjective   Darron Resendez is a 63 y.o. male with PMHx s/f \"borderline\" COPD, CAD with stent hx STEMI, HTN, chronic tobacco abuse presenting with shortness of breath.  Patient states for the past 5 days he has been increasingly short of breath.  He admits to a productive cough with white/yellow sputum production, he has been experiencing subjective fevers and lightheadedness.  He has not had any cigarettes in the past 3 days.  He is felt very winded and fatigued.  He denies sick contacts or environmental exposures.  He denies chest pain, nausea, vomiting, syncope, leg swelling, abdominal pain, or other symptoms.  He states he has been hospitalized with severe pneumonia in the past.  Chart review reveals he was admitted to this hospital back in 2020 for COPD exacerbation.     ED Course (Summary):   Vitals on presentation: 97.2 F, 81 bpm, 22 rr, 91/53 --> 76/54, 90% on Bipap  Labs: BMP glu 220, Na 130, Cl 92, BUN 64, Cr 2.12  Lactate 1.4    Trop 15 --> 14  CBC WBC 12.9, Hg 14.3, Plt 260  COVID negative  VBG pH 7.24, pCO2 77, pO2 21, HCO3 33.0  Imaging: CXR - Increased perihilar and pulmonary markings more so on the right   than the left. Findings could be on the basis of pulmonary vascular   congestion or pneumonia.   EKG - Sinus at 76 bpm.  Interventions: Duoneb X1, Rocephin and azithro given, Solumedrol 125 mg, NS 6.5 L bolus given. Pt is still hypotensive and will be admitted to the ICU. Critical care consulted.      7/2: History and chart reviewed.  Patient seen and examined.  No acute events overnight.  Patient feels a little bit better.  He denies any shortness of breath, chest pain, fevers or chills.  Critical care recommendations appreciated.  Nephrology recommendations appreciated.  Patient is off pressors.  Follow cultures.  Wean down oxygen as tolerated.  Patient will " require at least another 24 to 48 hours of inpatient service.  7/3: No acute events overnight.  Patient is transferred to the stepdown unit.  He is still short of breath.  And has a dry cough with minimal expectoration.  He denies any chest pain, fevers or chills.  He still requiring at least 5 L of oxygen.  Continue aggressive pulmonary hygiene.  Continue nebulizers.  Patient will require at least another 24 to 48 hours of inpatient service.     7/4: No acute events overnight.  Patient currently requiring 10 L of oxygen.  Continue to encourage out of bed.  Continue aggressive pulm hygiene.  Continue cefepime, methylprednisone and nebulizers.  Patient will require at least another 48 hours of inpatient service.     7/5: No acute events overnight. Patient is currently requiring 6 L of oxygen. Continue to encourage out of bed. Continue aggressive pulm hygiene. Continue cefepime, methylprednisone and nebulizers. Patient will require 24 to 48 hours of inpatient services.         7/6: No acute events.  Patient still short of breath on minimal exertion..  He still has a dry cough.  Patient denies any fevers, chills, nausea, diarrhea.  Still on 6 L of oxygen.  Continue to wean down oxygen.  Continue aggressive pulmonary hygiene.  White cell count elevated at 27 K likely due to to the steroids.  Will discontinue his IV methylprednisone and start on prednisone 40 mg patient will require at least another 24 hours of inpatient service.     7/7: No acute events overnight.  Patient is still short of breath although he is improving.  He denies any cough.  He feels congested.  Patient had multiple complaints and this is likely due to frustration with prolonged hospital stay.  I again reiterated to him that COPD and his pneumonia will take time to be treated.  He is on 6 L of oxygen.  Will add Symbicort.  Will order a chest x-ray for today.  Patient will likely require another evaluation by the pulmonary team.  White cell count is  up to 30 K.  Will see if the x-ray has new findings although this is likely still steroid-induced.  Cultures are negative.  He is currently on prednisone 40 mg.     7/8: Patient seen.  Remains on 3 L.  Weaning.  Chest x-ray shows similar right-sided infiltrates with less left-sided infiltrates.  On exam patient has no crackles, mild wheezing.  BMP will be checked for open this morning's labs.  Continue to wean O2.  Patient complains of humidifier, will remove from his O2 line.  Reassess in AM.     7/9: Patient remains hypoxic.  Feels better.  Has received 8 days of cefepime.  Had suspected patient had Pseudomonas earlier in admission but cultures were negative.  Will DC antibiotic therapy.  Check CT evaluation to rule out chronic changes.  Will have SLP evaluate patient.  Okay to transfer to general medical floor.  Continue to wean O2.  BNP low, do not suspect heart failure.    7/10: No acute events overnight. Patient denies nausea, vomiting, fever, chills, abdominal pain, chest pain, increased dyspnea. Patient currently on 2 to 3 L oxygen. Continue to wean O2. Patient is not on oxygen at home. CT chest read as bilateral pleural effusions presents larger on the right. The lobes of the right lung have scattered peripheral areas of atelectasis versus pneumonia consolidation. IR stated there was inadequate amount of fluid to complete thoracocentesis. Pulmonology consult recommendations appreciated.        Objective     Last Recorded Vitals  /75 (BP Location: Left arm, Patient Position: Sitting)   Pulse 78   Temp 36.7 °C (98.1 °F) (Temporal)   Resp 20   Wt 90.5 kg (199 lb 8.3 oz)   SpO2 94%   Intake/Output last 3 Shifts:    Intake/Output Summary (Last 24 hours) at 7/10/2024 1207  Last data filed at 7/9/2024 1700  Gross per 24 hour   Intake 120 ml   Output --   Net 120 ml       Admission Weight  Weight: 85.6 kg (188 lb 11.4 oz) (07/01/24 0901)    Daily Weight  07/09/24 : 90.5 kg (199 lb 8.3 oz)    Image  Results  CT chest w IV contrast  Narrative: Interpreted By:  Bienvenido Raymundo,   STUDY:  CT CHEST W IV CONTRAST;  7/9/2024 4:59 pm      INDICATION:  Signs/Symptoms:Eval infiltrates RLL.      COMPARISON:  None.      ACCESSION NUMBER(S):  [id number]      ORDERING CLINICIAN:  ANDRADE JURADO      TECHNIQUE:  Helical data acquisition of the chest was obtained following  intravenous administration of 75 MLOmnipaque 350.  Images were  reformatted in axial, coronal, and sagittal planes.      FINDINGS:  The right hemithorax has a small layering pleural effusion with trace  layering pleural effusion in the left hemithorax.      The posterior aspects of the right upper lobe and right lower lobe  pleural-based areas of atelectasis versus pneumonia the lateral  aspect of the right middle lobe has a few peripheral areas of  consolidation.      The right hilum and precarinal region of the mediastinum have benign  lymph nodes with granulomatous calcification. No mediastinal  adenopathy is noted otherwise.      The left anterior descending coronary artery has metal artifact from  stents.      The calibers of the thoracic aorta and the main pulmonary arteries  are normal.      The T12 vertebral body has old compression injury centrally and  anteriorly with 20% loss of height, unchanged.      The spleen has numerous benign calcified granulomas.      Impression: 1.  Bilateral pleural effusions are present larger on the right.  2. The lobes of the right lung have scattered peripheral areas of  atelectasis versus pneumonic consolidation.  3. Old compression injury of the T12 vertebral body.      Signed by: Bienvenido Raymundo 7/10/2024 8:31 AM  Dictation workstation:   [id number]          Physical Exam:    General: Alert and oriented x 3, no distress  Cardiovascular: Normal S1-S2, sinus rhythm, no murmurs  Respiratory: Decreased air entry bilaterally, scattered wheeze, no crackles   Abdomen: Abdomen is soft, not distended with no  tenderness  Extremities: No edema or deformities  Neurology: Alert and oriented x 3, no acute focal deficits      Assessment/Plan      #Presumed streptococcal pneumoniae pneumonia with septic shock  #Acute hypoxic respiratory failure secondary to pneumonia and COPD exacerbation  #COPD exacerbation secondary to pneumonia  #Leukocytosis likely due to steroids.  #Bilateral pleural effusions  Critical care recommendations appreciated.  Continue cefepime  Continue IV methylprednisone  Patient is off pressors  Follow cultures  Encourage out of bed  Wean down oxygen as tolerated  7/3: Continue nebulizers and IV steroids.  Continue antibiotics.  Continue aggressive pulmonary hygiene.  7/4: Patient currently on 10 L of oxygen.  Wean down as tolerated.  Continue cefepime, methylprednisone, DuoNebs.  Continue aggressive pulmonary hygiene.  Encourage out of bed.  7/5: Patient currently on 6 L of oxygen. Wean down as tolerated. Continue cefepime, methylprednisone, nebulizers. Continue aggressive pulmonary hygiene. Encourage out of bed.  7/6: Will discontinue methylprednisolone.  Start on prednisone 40 mg daily.  Continue aggressive pulmonary hygiene.  Continue to encourage out of bed.  Wean down oxygen as tolerated.  7/7: Continue antibiotics.  Continue prednisone 40 mg.  Will check a chest x-ray.  Will check a BNP in the morning.  7/8: Chest x-ray is unchanged.  On exam does not have wheezing or crackles.  Recheck BMP.  Suspect delayed resolution of pneumonia findings, expected.  Will attempt to wean to room air, successful likely discharge tomorrow.  7/9: Completed 8 days of cefepime.  Will DC antibiotic therapy.  Continues to be's hypoxic.  Wean O2 as able.  Check CT of chest to evaluate infiltrates, discussed briefly with ID.  Suspects patient may be chronically aspirating or has some other sort of chemical injury.  Will ask SLP to evaluate.  7/10: Patient currently on 2 to 3 L oxygen. Continue to wean oxygen as tolerated.  CT impression appreciated. Patient agreeable to thoracocentesis. IR consult- deemed inadequate amount of fluid to complete thoracentesis. Pulmonology consult recommendations appreciated. Encourage bronchopulmonary hygiene with Acapella and IS. Continue Breo during inpatient services. Start spirivia. Continue duonebs, but consider prn dosing due to triple therapy. Patient requires home o2 study prior to discharge. Continue prednisone taper at discharge. Follow-up pulmonology scheduled. Discharge on triple therapy, prn albuterol inhaler, nicotine patches.      #Acute kidney injury   Baseline creatinine is between 0.9-1.0.  On admission creatinine was 2.13  Nephrology recommendations appreciated  Renal ultrasound ordered and showed no hydronephrosis  Monitor renal function closely  7/3: Will check renal function in the morning  7/4: Creatinine down to baseline at 0.98.  Nephrology recommendation appreciated.  7/5: Creatinine down to 0.76.           Sodium= 138.           Nephrology recommendations appreciated.        7/6: Resolved  7/10: at baseline- 0.91.     #History of coronary artery disease  #Tobacco abuse  7/10: discharge with nicotine patches.        DVT prophylaxis: Lovenox subcutaneous   Code Status: Full code        ROLO HUNT PA-S    Pt seen and examined with my PA student. I have modified the note to reflect my documentation of HPI and assessment and plan.    Eliseo Chappell MD

## 2024-07-10 NOTE — PROGRESS NOTES
Occupational Therapy    OT Treatment    Patient Name: Darron Resendez  MRN: [id number]  Today's Date: 7/10/2024  Time Calculation  Start Time: 1145  Stop Time: 1153  Time Calculation (min): 8 min        Assessment:  End of Session Patient Position: Alarm off, not on at start of session (at EOB)     Plan:  Treatment Interventions: ADL retraining, Functional transfer training, Endurance training  OT Frequency: 3 times per week  OT Discharge Recommendations: Low intensity level of continued care  OT - OK to Discharge: Yes  Treatment Interventions: ADL retraining, Functional transfer training, Endurance training    Subjective   Previous Visit Info:  OT Last Visit  OT Received On: 07/10/24  General:  General  Prior to Session Communication: Bedside nurse  Patient Position Received: Bed, 3 rail up, Alarm off, not on at start of session  General Comment: Patient agreeable to limited session. Patient's girlfriend present upon arrival. Patient scheduled for thoracentisis.  Precautions:  Medical Precautions: Fall precautions, Oxygen therapy device and L/min     Pain:  Pain Assessment  0-10 (Numeric) Pain Score: 0 - No pain    Objective    Cognition:  Cognition  Overall Cognitive Status: Within Functional Limits    Bed Mobility/Transfers: Bed Mobility  Bed Mobility: Yes  Bed Mobility 1  Bed Mobility 1: Supine to sitting  Level of Assistance 1: Distant supervision    Transfers  Transfer: (P) Yes  Transfer 1  Technique 1: (P) Stand to sit, Sit to stand  Transfer Level of Assistance 1: (P) Distant supervision  Trials/Comments 1: (P) No device      Functional Mobility:  Functional Mobility  Functional Mobility Performed: Yes  Functional Mobility 1  Surface 1: Level tile  Assistance 1: Distant supervision  Comments 1: Patient completed functional mobility in room with no device and distance Supervision. Education on pacing, breathing techniques, and rest breaks. Offered education on dressing tasks, patient repeatedly stated that  girlfriend will assist. (Patient refused socks/shoes despite education.)    Outcome Measures:[facility] Daily Activity  Putting on and taking off regular lower body clothing: A little  Bathing (including washing, rinsing, drying): A little  Putting on and taking off regular upper body clothing: None  Toileting, which includes using toilet, bedpan or urinal: None  Taking care of personal grooming such as brushing teeth: None  Eating Meals: None  Daily Activity - Total Score: 22    Education Documentation  ADL Training, taught by MARCO Mann at 7/10/2024  1:57 PM.  Learner: Patient  Readiness: Acceptance  Method: Explanation  Response: Verbalizes Understanding    Education Comments  No comments found.           Goals:  Encounter Problems       Encounter Problems (Active)       ADLs       The patient will tolerate 25 minutes of ADL/Functional activity with min fatigue and while maintaining spO2 >90% (Progressing)       Start:  07/03/24    Expected End:  07/17/24               BALANCE       Patient will tolerate standing for 8 minutes to modified independent level of assistance with least restrictive device in order to improve functional activity tolerance for ADL tasks. (Progressing)       Start:  07/03/24    Expected End:  07/17/24               COGNITION/SAFETY       The patient will demonstrate or verbalize 3 energy conservation techniques without cueing to perform ADLs/functional activity with reduced SOB and fatigue.  (Progressing)       Start:  07/03/24    Expected End:  07/17/24               MOBILITY       Patient will perform Functional mobility max Household distances/Community Distances with modified independent level of assistance and least restrictive device in order to improve safety and functional mobility. (Progressing)       Start:  07/03/24    Expected End:  07/17/24                              10-May-2018 12:24

## 2024-07-10 NOTE — CONSULTS
Inpatient consult to Pulmonology  Consult performed by: India Katz, APRN-CNP  Consult ordered by: Eliseo Chappell MD        Reason For Consult  R pleural effusion vs other    History Of Present Illness  Darron Resendez is a 64 y.o. male (current smoker, ~ pack year history) with a PMHx of ?COPD (no PFT on file), CAD (STEMI) s/p stent, DM, HTN, HLD, HFrEF (EF 35% 6/2020, 65% 7/2020), and pneumonia requiring hospital admission. Admitted 7/1/24 after presented to ED with c/o increasing SOB x 3 days with associated productive cough, decreased oral intake and fever. Patient admitted and treated for septic shock (on pressors briefly), R pneumonia, acute respiratory failure (on BiPAP initially) and LARRY. Since admission patient's O2 needs have decreased and has been weaned to 3 L NC (not on home O2 prior). Pulmonary consulted for R pleural effusion vs other.    From H&P:  ED Course (Summary):   Vitals on presentation: 97.2 F, 81 bpm, 22 rr, 91/53 --> 76/54, 90% on Bipap  Labs: BMP glu 220, Na 130, Cl 92, BUN 64, Cr 2.12  Lactate 1.4    Trop 15 --> 14  CBC WBC 12.9, Hg 14.3, Plt 260  COVID negative  VBG pH 7.24, pCO2 77, pO2 21, HCO3 33.0  Imaging: CXR - Increased perihilar and pulmonary markings more so on the right   than the left. Findings could be on the basis of pulmonary vascular   congestion or pneumonia.   EKG - Sinus at 76 bpm.  Interventions: Duoneb X1, Rocephin and azithro given, Solumedrol 125 mg, NS 6.5 L bolus given. Pt is still hypotensive and will be admitted to the ICU. Critical care consulted.     Patient seen and examined, on 3 L NC and in no apparent distress. He reports he went for thoracentesis but was told there was not enough fluid to drain. Prior to coming to hospital patient reports he was havig issues with chronic shortness of breath with activity. He has occasional cough, was coughing up sputum, now dry. He denies fever, chills, chest pain, tightness, leg swelling. He reports he was  "told in the past he had \"borderline COPD\" but never had PFTs. He was given inhalers in the past but had not been using them. He was smoking prior to coming to hospital, reports last cigarette 7/1/24, he is hopeful to continue to avoid smoking after discharge.     Overall patient reports he is feeling much betters since admission -- SOB and cough improved, energy level improving, able to be more mobile and reports is walking around room.       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, plastics factory, aluminum factory, [employer], [employer])  -exposures: + chemicals/fumes, dusts, farms    Fam Hx:   - denies known history of lung disease  - maternal uncles: cancer (unsure what kind)     Past Medical History  Past Medical History:   Diagnosis Date    Chronic systolic (congestive) heart failure (Multi) 09/22/2020    Chronic systolic heart failure    Myocardial infarction (Multi) 07/01/2024    Old myocardial infarction     Old non-ST elevation myocardial infarction (NSTEMI)    Old myocardial infarction 09/22/2020    History of ST elevation myocardial infarction (STEMI)    Old myocardial infarction     History of ST elevation myocardial infarction (STEMI)    Personal history of other diseases of the circulatory system     History of heart disease    Personal history of other endocrine, nutritional and metabolic disease     History of hyperlipidemia    Presence of stent in anterior descending branch of left coronary artery 07/01/2024       Surgical History  Past Surgical History:   Procedure Laterality Date    MANDIBLE SURGERY  06/01/2016    Jaw Surgery    OTHER SURGICAL HISTORY  06/29/2020    Coronary artery stent placement    TONSILLECTOMY  06/01/2016    " "Tonsillectomy With Adenoidectomy    VASECTOMY  06/01/2016    Surgery Vas Deferens Vasectomy        Social History  Social History     Tobacco Use    Smoking status: Every Day     Current packs/day: 1.50     Types: Cigarettes    Smokeless tobacco: Never   Substance Use Topics    Alcohol use: Not Currently    Drug use: Never       Family History  No family history on file.      Allergies  Penicillin g    Review of Systems   10-point ROS complete and negative except as documented in HPI     Physical Exam  Vitals reviewed.   Constitutional:       General: He is not in acute distress.     Appearance: He is obese.   HENT:      Head: Normocephalic.      Mouth/Throat:      Mouth: Mucous membranes are moist.   Eyes:      General: No scleral icterus.  Cardiovascular:      Rate and Rhythm: Normal rate and regular rhythm.      Pulses: Normal pulses.      Heart sounds: Normal heart sounds.   Pulmonary:      Effort: Pulmonary effort is normal. No respiratory distress.      Comments: Slightly diminished with faint expiratory wheezing; no rhonchi or crackles noted   Skin:     General: Skin is warm.   Neurological:      Mental Status: He is alert and oriented to person, place, and time.   Psychiatric:         Mood and Affect: Mood normal.         Behavior: Behavior normal.         Vital Signs  Blood pressure 110/72, pulse 87, temperature 36.4 °C (97.6 °F), temperature source Temporal, resp. rate 22, height 1.702 m (5' 7.01\"), weight 90.5 kg (199 lb 8.3 oz), SpO2 91%.  Oxygen Therapy  SpO2: 91 %  Medical Gas Therapy: Supplemental oxygen  O2 Delivery Method: Nasal cannula (3L)  FiO2 (%): 90 %    Relevant Results  Medications:  Scheduled medications  aspirin, 81 mg, oral, Daily  atorvastatin, 80 mg, oral, Nightly  enoxaparin, 40 mg, subcutaneous, q24h  fluticasone furoate-vilanteroL, 1 puff, inhalation, Daily  insulin lispro, 3 Units, subcutaneous, TID  ipratropium-albuteroL, 3 mL, nebulization, TID  oxygen, , inhalation, Continuous - " Inhalation  pantoprazole, 40 mg, oral, Daily before breakfast   Or  pantoprazole, 40 mg, intravenous, Daily before breakfast  polyethylene glycol, 17 g, oral, Daily  predniSONE, 40 mg, oral, Daily       PRN medications  PRN medications: acetaminophen **OR** acetaminophen **OR** acetaminophen, bisacodyl, bisacodyl, dextrose, dextrose, glucagon, glucagon, guaiFENesin, ipratropium-albuteroL, melatonin, ondansetron **OR** ondansetron, oxygen    Labs:  Results for orders placed or performed during the hospital encounter of 07/01/24 (from the past 24 hour(s))   POCT GLUCOSE   Result Value Ref Range    POCT Glucose 198 (H) 74 - 99 mg/dL   POCT GLUCOSE   Result Value Ref Range    POCT Glucose 128 (H) 74 - 99 mg/dL   CBC   Result Value Ref Range    WBC 13.3 (H) 4.4 - 11.3 x10*3/uL    nRBC 0.0 0.0 - 0.0 /100 WBCs    RBC 3.77 (L) 4.50 - 5.90 x10*6/uL    Hemoglobin 11.8 (L) 13.5 - 17.5 g/dL    Hematocrit 35.7 (L) 41.0 - 52.0 %    MCV 95 80 - 100 fL    MCH 31.3 26.0 - 34.0 pg    MCHC 33.1 32.0 - 36.0 g/dL    RDW 13.3 11.5 - 14.5 %    Platelets 236 150 - 450 x10*3/uL   Basic Metabolic Panel   Result Value Ref Range    Glucose 95 74 - 99 mg/dL    Sodium 138 136 - 145 mmol/L    Potassium 3.7 3.5 - 5.3 mmol/L    Chloride 103 98 - 107 mmol/L    Bicarbonate 29 21 - 32 mmol/L    Anion Gap 10 10 - 20 mmol/L    Urea Nitrogen 18 6 - 23 mg/dL    Creatinine 0.91 0.50 - 1.30 mg/dL    eGFR >90 >60 mL/min/1.73m*2    Calcium 7.8 (L) 8.6 - 10.3 mg/dL   Hepatic function panel   Result Value Ref Range    Albumin 2.7 (L) 3.4 - 5.0 g/dL    Bilirubin, Total 0.5 0.0 - 1.2 mg/dL    Bilirubin, Direct 0.1 0.0 - 0.3 mg/dL    Alkaline Phosphatase 39 33 - 136 U/L    ALT 88 (H) 10 - 52 U/L    AST 51 (H) 9 - 39 U/L    Total Protein 5.1 (L) 6.4 - 8.2 g/dL   POCT GLUCOSE   Result Value Ref Range    POCT Glucose 86 74 - 99 mg/dL   POCT GLUCOSE   Result Value Ref Range    POCT Glucose 218 (H) 74 - 99 mg/dL     Imaging:  CT chest w IV contrast  Result Date:  7/10/2024  Interpreted By:  Bienvenido Raymundo, STUDY: CT CHEST W IV CONTRAST;  7/9/2024 4:59 pm   INDICATION: Signs/Symptoms:Eval infiltrates RLL.   COMPARISON: None.   ACCESSION NUMBER(S): [id number]   ORDERING CLINICIAN: ANDRADE JURADO   TECHNIQUE: Helical data acquisition of the chest was obtained following intravenous administration of 75 MLOmnipaque 350.  Images were reformatted in axial, coronal, and sagittal planes.   FINDINGS: The right hemithorax has a small layering pleural effusion with trace layering pleural effusion in the left hemithorax.   The posterior aspects of the right upper lobe and right lower lobe pleural-based areas of atelectasis versus pneumonia the lateral aspect of the right middle lobe has a few peripheral areas of consolidation.   The right hilum and precarinal region of the mediastinum have benign lymph nodes with granulomatous calcification. No mediastinal adenopathy is noted otherwise.   The left anterior descending coronary artery has metal artifact from stents.   The calibers of the thoracic aorta and the main pulmonary arteries are normal.   The T12 vertebral body has old compression injury centrally and anteriorly with 20% loss of height, unchanged.   The spleen has numerous benign calcified granulomas.       1.  Bilateral pleural effusions are present larger on the right. 2. The lobes of the right lung have scattered peripheral areas of atelectasis versus pneumonic consolidation. 3. Old compression injury of the T12 vertebral body.   Signed by: Bienvenido Raymundo 7/10/2024 8:31 AM Dictation workstation:   [id number]    XR chest 2 views  Result Date: 7/7/2024  Interpreted By:  Anil Regan, STUDY: XR CHEST 2 VIEWS;  7/7/2024 6:37 pm   INDICATION: Signs/Symptoms:Still shortness of breath, recent pneumonia with severe COPD.  Rule out heart failure or worsening pneumonia.   COMPARISON: Chest radiograph 07/01/2024.   ACCESSION NUMBER(S): [id number]   ORDERING CLINICIAN: REUBEN FLOOD    FINDINGS:         CARDIOMEDIASTINAL SILHOUETTE: Cardiomediastinal silhouette is normal in size and configuration. Left coronary artery stent is visualized.   LUNGS: Blunting of the right costophrenic angle suggestive of small right pleural effusion. No evidence of consolidation or es pulmonary edema.   ABDOMEN: No remarkable upper abdominal findings.   BONES: No acute osseous changes.       1. Small right pleural effusion. No consolidation or es pulmonary edema.       MACRO: None   Signed by: Anil Regan 7/7/2024 6:58 PM Dictation workstation:   [id number]    US renal complete  Result Date: 7/2/2024  Interpreted By:  Ken Kruse, STUDY: US RENAL COMPLETE;  7/2/2024 12:08 pm   INDICATION: Signs/Symptoms:elevated creatinine.   COMPARISON: None.   ACCESSION NUMBER(S): [id number]   ORDERING CLINICIAN: DINORAH BREEN   TECHNIQUE: Real-time sonographic evaluation of the kidneys and urinary bladder was performed.   FINDINGS: RIGHT KIDNEY: Right kidney measures  11.6 cm.  No shadowing calculus or right hydronephrosis is visualized.  There is a right renal lower pole round hypoechoic partially exophytic cyst measuring 5.9 x 6.1 x 6.5 cm.   LEFT KIDNEY: Left kidney measures  11.2 cm.  No shadowing calculus or left hydronephrosis is visualized.  No discrete renal lesion is visualized.   BLADDER: Urinary bladder is partially distended.   No intraluminal mass, wall thickening or shadowing calculus is visualized.       No hydronephrosis bilaterally.   Right renal lower pole 6.5 cm partially exophytic cyst.   No focal urinary bladder abnormality identified.   MACRO: None.   Signed by: Ken Kruse 7/2/2024 1:05 PM Dictation workstation:   [id number]    XR chest 1 view  Result Date: 7/1/2024  STUDY: Chest Radiograph;  7/1/2024 10:13 AM. INDICATION: Shortness of breath. COMPARISON: Chest radiograph and CTA chest 8/7/2020. ACCESSION NUMBER(S): [id number] ORDERING CLINICIAN: ROSALVA CHIRINOS TECHNIQUE:  Frontal  chest was obtained at 10:12 hours. FINDINGS: CARDIOMEDIASTINAL SILHOUETTE: Cardiomediastinal silhouette is normal in size and configuration but there is widening when compared with the prior.  LUNGS: There are increased perihilar and pulmonary markings more so on the right than the left.  No pleural effusion or pneumothorax  ABDOMEN: No remarkable upper abdominal findings.  BONES: No acute osseous changes.    1.Increased perihilar and pulmonary markings more so on the right than the left. Findings could be on the basis of pulmonary vascular congestion or pneumonia. 2.Widening of the mediastinum when compared with the prior. Signed by Erna Lu, DO         Assessment/Plan   Darron YASEMIN Resendez is a 64 y.o. male (current smoker, ~ pack year history) with a PMHx of ?COPD (no PFT on file), CAD (STEMI) s/p stent, DM, HTN, HLD, HFrEF (EF 35% 6/2020, 65% 7/2020), and pneumonia requiring hospital admission. Admitted 7/1/24 after presented to ED with c/o increasing SOB x 3 days with associated productive cough, decreased oral intake and fever. Patient admitted and treated for septic shock (on pressors briefly), R pneumonia, acute respiratory failure (on BiPAP initially) and LARRY. Since admission patient's O2 needs have decreased and has been weaned to 3 L NC (not on home O2 prior). Pulmonary consulted for R pleural effusion vs other.    Impression:  - R pneumonia - on CXR; SOB & productive cough on admission; leukocytosis (30 at highest, down trending now); negative legionella & strep pneumo urine antigens; BC x 2 negative; no sputum culture sent; s/p cefepime x 7 days, solumedrol 40 mg q8hr x 5 days now prednisone 40 mg po once daily  - acute hypoxic respiratory failure  - Bilateral pleural effusions - appears layered/loculated on imaging (R>L); parapneumonic vs other; IR unable to complete thora   - Cigarette nicotine dependence - current smoker, last cigarette 7/1 prior to admission  - ?COPD - emphysema on CT;   "\"borderline COPD\" listed in chart as PMHx, no PFT on file; no prior home O2    Recommendations:  - enourage use of Acapella & IS for bronchopulmonary hygiene (educated importance of devices)  - continue supplemental O2, wean to maintain pulse ox 90-92%  - continue Breo while inpatient  - start spiriva   - continue Duonebs, consider changing to PRN since will be on triple therapy     Discharge recommendations (ongoing, will update as needed)  - has outpatient pulmonary follow-up scheduled 8/8 at 10:40am (will plan for PFT and repeat imaging in 6-8 weeks)  - discharge on triple therapy (ICS/LAMA/LABA), whichever is covered by insurance (Trelegy would be ideal)  - discharge on PRN albuterol inhaler  - discharge with nicotine patches  -home O2 study prior to discharge   - continue prednisone taper at discharge      Thank you for the consult. Pulmonary will follow-up with patient tomorrow.       India Katz, APRN-CNP    "

## 2024-07-10 NOTE — PROGRESS NOTES
"Physical Therapy                 Therapy Communication Note    Patient Name: Darron Resendez  MRN: [id number]  Today's Date: 7/10/2024     Discipline: Physical Therapy    Missed Visit Reason: Patient refused    Missed Time: Attempt    Comment: pt up and amb in room upon arrival, adamantly declining therapy stating \"You just saw how I was moving, I'm fine\" pt awaiting transport for thoracentesis today  "

## 2024-07-10 NOTE — PROGRESS NOTES
Patient has a pleural effusion and is awaiting thoracentesis. Patient is planning to return home with [facility] and possible oxygen needed when medically ready. TCC to follow.

## 2024-07-11 ENCOUNTER — HOME HEALTH ADMISSION (OUTPATIENT)
Dept: HOME HEALTH SERVICES | Facility: HOME HEALTH | Age: 64
End: 2024-07-11
Payer: MEDICARE

## 2024-07-11 ENCOUNTER — PHARMACY VISIT (OUTPATIENT)
Dept: PHARMACY | Facility: CLINIC | Age: 64
End: 2024-07-11
Payer: COMMERCIAL

## 2024-07-11 ENCOUNTER — DOCUMENTATION (OUTPATIENT)
Dept: HOME HEALTH SERVICES | Facility: HOME HEALTH | Age: 64
End: 2024-07-11
Payer: MEDICARE

## 2024-07-11 VITALS
WEIGHT: 199.52 LBS | HEIGHT: 67 IN | OXYGEN SATURATION: 90 % | BODY MASS INDEX: 31.31 KG/M2 | RESPIRATION RATE: 16 BRPM | HEART RATE: 74 BPM | TEMPERATURE: 98.2 F | SYSTOLIC BLOOD PRESSURE: 126 MMHG | DIASTOLIC BLOOD PRESSURE: 76 MMHG

## 2024-07-11 PROBLEM — R73.9 HYPERGLYCEMIA: Status: RESOLVED | Noted: 2024-07-01 | Resolved: 2024-07-11

## 2024-07-11 PROBLEM — R06.82 TACHYPNEA: Status: RESOLVED | Noted: 2024-07-01 | Resolved: 2024-07-11

## 2024-07-11 PROBLEM — A41.9 SEPTIC SHOCK (MULTI): Status: RESOLVED | Noted: 2024-07-01 | Resolved: 2024-07-11

## 2024-07-11 PROBLEM — R65.21 SEPTIC SHOCK (MULTI): Status: RESOLVED | Noted: 2024-07-01 | Resolved: 2024-07-11

## 2024-07-11 PROBLEM — E87.1 HYPONATREMIA: Status: RESOLVED | Noted: 2024-07-01 | Resolved: 2024-07-11

## 2024-07-11 PROBLEM — N17.9 AKI (ACUTE KIDNEY INJURY) (CMS-HCC): Status: RESOLVED | Noted: 2024-07-01 | Resolved: 2024-07-11

## 2024-07-11 LAB
GLUCOSE BLD MANUAL STRIP-MCNC: 166 MG/DL (ref 74–99)
GLUCOSE BLD MANUAL STRIP-MCNC: 89 MG/DL (ref 74–99)

## 2024-07-11 PROCEDURE — 2500000002 HC RX 250 W HCPCS SELF ADMINISTERED DRUGS (ALT 637 FOR MEDICARE OP, ALT 636 FOR OP/ED): Performed by: INTERNAL MEDICINE

## 2024-07-11 PROCEDURE — 82947 ASSAY GLUCOSE BLOOD QUANT: CPT

## 2024-07-11 PROCEDURE — 2500000005 HC RX 250 GENERAL PHARMACY W/O HCPCS: Performed by: INTERNAL MEDICINE

## 2024-07-11 PROCEDURE — 94668 MNPJ CHEST WALL SBSQ: CPT

## 2024-07-11 PROCEDURE — 2500000004 HC RX 250 GENERAL PHARMACY W/ HCPCS (ALT 636 FOR OP/ED): Performed by: INTERNAL MEDICINE

## 2024-07-11 PROCEDURE — 2500000001 HC RX 250 WO HCPCS SELF ADMINISTERED DRUGS (ALT 637 FOR MEDICARE OP): Performed by: INTERNAL MEDICINE

## 2024-07-11 PROCEDURE — 94640 AIRWAY INHALATION TREATMENT: CPT

## 2024-07-11 PROCEDURE — 94761 N-INVAS EAR/PLS OXIMETRY MLT: CPT

## 2024-07-11 PROCEDURE — 97116 GAIT TRAINING THERAPY: CPT | Mod: CQ,GP

## 2024-07-11 PROCEDURE — RXMED WILLOW AMBULATORY MEDICATION CHARGE

## 2024-07-11 PROCEDURE — 99239 HOSP IP/OBS DSCHRG MGMT >30: CPT | Performed by: INTERNAL MEDICINE

## 2024-07-11 RX ORDER — PREDNISONE 10 MG/1
TABLET ORAL
Qty: 30 TABLET | Refills: 0 | Status: SHIPPED | OUTPATIENT
Start: 2024-07-11 | End: 2024-07-23

## 2024-07-11 RX ORDER — FLUTICASONE FUROATE, UMECLIDINIUM BROMIDE AND VILANTEROL TRIFENATATE 200; 62.5; 25 UG/1; UG/1; UG/1
1 POWDER RESPIRATORY (INHALATION)
Qty: 1 EACH | Refills: 0 | Status: CANCELLED | OUTPATIENT
Start: 2024-07-11 | End: 2024-08-10

## 2024-07-11 RX ORDER — BUDESONIDE, GLYCOPYRROLATE, AND FORMOTEROL FUMARATE 160; 9; 4.8 UG/1; UG/1; UG/1
2 AEROSOL, METERED RESPIRATORY (INHALATION) 2 TIMES DAILY
Qty: 10.7 G | Refills: 0 | Status: SHIPPED | OUTPATIENT
Start: 2024-07-11 | End: 2024-08-10

## 2024-07-11 RX ORDER — PREDNISONE 10 MG/1
TABLET ORAL
Qty: 30 TABLET | Refills: 0 | Status: SHIPPED | OUTPATIENT
Start: 2024-07-11 | End: 2024-07-11

## 2024-07-11 ASSESSMENT — COGNITIVE AND FUNCTIONAL STATUS - GENERAL
MOBILITY SCORE: 24
CLIMB 3 TO 5 STEPS WITH RAILING: A LITTLE
MOVING TO AND FROM BED TO CHAIR: A LITTLE
WALKING IN HOSPITAL ROOM: A LITTLE
STANDING UP FROM CHAIR USING ARMS: A LITTLE
DAILY ACTIVITIY SCORE: 24
MOBILITY SCORE: 20

## 2024-07-11 ASSESSMENT — PAIN SCALES - GENERAL
PAINLEVEL_OUTOF10: 0 - NO PAIN
PAINLEVEL_OUTOF10: 0 - NO PAIN

## 2024-07-11 NOTE — HH CARE COORDINATION
Home Care received a Referral for Nursing and Physical Therapy. We have processed the referral for a Start of Care on 7.12 or 7.13.24.     If you have any questions or concerns, please feel free to contact us at [phone]. Follow the prompts, enter your five digit zip code, and you will be directed to your care team on [facility].

## 2024-07-11 NOTE — DISCHARGE INSTRUCTIONS
Follow up with PCP in 1-2 weeks. Call to schedule. Bring all your discharge paperwork and medications when you go to your follow up appointment. Return to ED if your symptoms come back.    Follow-up with pulmonary clinic.  Will likely need repeat chest x-ray in 3 to 4 weeks.

## 2024-07-11 NOTE — CARE PLAN
Problem: Nutrition  Goal: Oral intake greater 75%  7/11/2024 1219 by Lyly Naylor RN  Outcome: Met  7/11/2024 1037 by Lyly Naylor RN  Outcome: Progressing  Goal: Consume prescribed supplement  7/11/2024 1219 by Lyly Naylor RN  Outcome: Met  7/11/2024 1037 by Lyly Naylor RN  Outcome: Progressing  Goal: BG  mg/dL  7/11/2024 1219 by Lyly Naylor RN  Outcome: Met  7/11/2024 1037 by Lyly Naylor RN  Outcome: Progressing  Goal: Lab values WNL  7/11/2024 1219 by Lyly Naylor RN  Outcome: Met  7/11/2024 1037 by Lyly Naylor RN  Outcome: Progressing  Goal: Electrolytes WNL  7/11/2024 1219 by Lyly Naylor RN  Outcome: Met  7/11/2024 1037 by Lyly Naylor RN  Outcome: Progressing  Goal: Maintain stable weight  7/11/2024 1219 by Lyly Naylor RN  Outcome: Met  7/11/2024 1037 by Lyly Naylor RN  Outcome: Progressing     Problem: Respiratory  Goal: Clear secretions with interventions this shift  7/11/2024 1219 by Lyly Naylor RN  Outcome: Met  7/11/2024 1037 by Lyly Naylor RN  Outcome: Progressing  Goal: Minimize anxiety/maximize coping throughout shift  7/11/2024 1219 by Lyly Naylor RN  Outcome: Met  7/11/2024 1037 by Lyly Naylor RN  Outcome: Progressing  Goal: Minimal/no exertional discomfort or dyspnea this shift  7/11/2024 1219 by Lyly Naylor RN  Outcome: Met  7/11/2024 1037 by Lyly Naylor RN  Outcome: Progressing  Goal: No signs of respiratory distress (eg. Use of accessory muscles. Peds grunting)  7/11/2024 1219 by Lyly Naylor RN  Outcome: Met  7/11/2024 1037 by Lyly Naylor RN  Outcome: Progressing  Goal: Patent airway maintained this shift  7/11/2024 1219 by Lyly Naylor RN  Outcome: Met  7/11/2024 1037 by Lyly Naylor RN  Outcome: Progressing  Goal: Tolerate pulmonary toileting this shift  7/11/2024 1219 by Lyly Naylor RN  Outcome: Met  7/11/2024 1037 by Lyly Naylor RN  Outcome: Progressing  Goal: Verbalize decreased shortness of  breath this shift  7/11/2024 1219 by Lyly Naylor RN  Outcome: Met  7/11/2024 1037 by Lyly Naylor RN  Outcome: Progressing  Goal: Wean oxygen to maintain O2 saturation per order/standard this shift  7/11/2024 1219 by Lyly Naylor RN  Outcome: Met  7/11/2024 1037 by Lyly Naylor RN  Outcome: Progressing  Goal: Increase self care and/or family involvement in next 24 hours  7/11/2024 1219 by Lyly Naylor RN  Outcome: Met  7/11/2024 1037 by Lyly Naylor RN  Outcome: Progressing     Problem: Pain - Adult  Goal: Verbalizes/displays adequate comfort level or baseline comfort level  7/11/2024 1219 by Lyly Naylor RN  Outcome: Met  7/11/2024 1037 by Lyly Naylor RN  Outcome: Progressing     Problem: Safety - Adult  Goal: Free from fall injury  7/11/2024 1219 by Lyly Naylor RN  Outcome: Met  7/11/2024 1037 by Lyly Naylor RN  Outcome: Progressing     Problem: Discharge Planning  Goal: Discharge to home or other facility with appropriate resources  7/11/2024 1219 by Lyly Naylor RN  Outcome: Met  7/11/2024 1037 by Lyly Naylor RN  Outcome: Progressing     Problem: Chronic Conditions and Co-morbidities  Goal: Patient's chronic conditions and co-morbidity symptoms are monitored and maintained or improved  7/11/2024 1219 by Lyly Naylor RN  Outcome: Met  7/11/2024 1037 by Lyly Naylor RN  Outcome: Progressing     Problem: Infection prevention/bleeding  Goal: Infection s/sx managed  7/11/2024 1219 by Lyly Naylor RN  Outcome: Met  7/11/2024 1037 by Lyly Naylor RN  Outcome: Progressing  Goal: No further progression of infection  7/11/2024 1219 by Lyly Naylor RN  Outcome: Met  7/11/2024 1037 by Lyly Naylor RN  Outcome: Progressing  Goal: No signs of bleeding  7/11/2024 1219 by Lyly Naylor RN  Outcome: Met  7/11/2024 1037 by Lyly Naylor RN  Outcome: Progressing  Goal: Normal coagulation studies  7/11/2024 1219 by Lyly Naylor, RN  Outcome: Met  7/11/2024 1037 by  Lyly Naylor RN  Outcome: Progressing     Problem: Perfusion/Cardiac  Goal: Adequate perfusion to organs/extremities  7/11/2024 1219 by Lyly Naylor RN  Outcome: Met  7/11/2024 1037 by Lyly Naylor RN  Outcome: Progressing  Goal: Hemodynamically stable  7/11/2024 1219 by Lyly Naylor RN  Outcome: Met  7/11/2024 1037 by Lyly Naylor RN  Outcome: Progressing  Goal: No cardiac arrhythmias  7/11/2024 1219 by Lyly Naylor RN  Outcome: Met  7/11/2024 1037 by Lyly Naylor RN  Outcome: Progressing     Problem: Respiratory/Oxygenation  Goal: No signs of respiratory compromise  7/11/2024 1219 by Lyly Naylor RN  Outcome: Met  7/11/2024 1037 by Lyly Naylor RN  Outcome: Progressing  Goal: Tolerates activity without increased O2 demands  7/11/2024 1219 by Lyly Naylor RN  Outcome: Met  7/11/2024 1037 by Lyly Naylor RN  Outcome: Progressing     Problem: Neuro/Coping  Goal: Minimal anxiety; utilize coping mechanisms  7/11/2024 1219 by Lyly Naylor RN  Outcome: Met  7/11/2024 1037 by Lyly Naylor RN  Outcome: Progressing  Goal: No signs of neurological compromise  7/11/2024 1219 by Lyly Naylor RN  Outcome: Met  7/11/2024 1037 by Lyly Naylor RN  Outcome: Progressing  Goal: Increase self care/family involvement  7/11/2024 1219 by Lyly Naylor RN  Outcome: Met  7/11/2024 1037 by Lyly Naylor RN  Outcome: Progressing     Problem: Fluid/Electrolyte/Nutrition  Goal: Fluid balance within 1 liter of normovolemia  7/11/2024 1219 by Lyly Naylor RN  Outcome: Met  7/11/2024 1037 by Lyly Naylor RN  Outcome: Progressing  Goal: No signs of renal failure  7/11/2024 1219 by Lyly Naylor RN  Outcome: Met  7/11/2024 1037 by Lyly Naylor RN  Outcome: Progressing  Goal: Normal electrolyte levels  7/11/2024 1219 by Lyly Naylor RN  Outcome: Met  7/11/2024 1037 by Lyly Naylor RN  Outcome: Progressing  Goal: Normal glucose levels  7/11/2024 1219 by Lyly Naylor RN  Outcome:  Met  7/11/2024 1037 by Lyly Naylor RN  Outcome: Progressing  Goal: Tolerates nutritional intake  7/11/2024 1219 by Lyly Naylor RN  Outcome: Met  7/11/2024 1037 by Lyly Naylor RN  Outcome: Progressing

## 2024-07-11 NOTE — PROGRESS NOTES
Physical Therapy  Physical Therapy Treatment    Patient Name: Darron Resendez  MRN: [id number]  Today's Date: 7/11/2024  Time Calculation  Start Time: 0958  Stop Time: 1022  Time Calculation (min): 24 min    Assessment/Plan   PT Plan  Treatment/Interventions: Transfer training, Gait training, Stair training, Neuromuscular re-education, Endurance training, Therapeutic exercise, Therapeutic activity  PT Plan: Ongoing PT  PT Frequency: 4 times per week  PT Discharge Recommendations: Low intensity level of continued care (PT HOME SAFETY ASSESSMENT)  PT Recommended Transfer Status: Assist x1  PT - OK to Discharge: Yes    General Visit Information:   PT  Visit  PT Received On: 07/11/24  Response to Previous Treatment: Patient with no complaints from previous session.    Reason for Referral: Impaired mobility  Room: [facility]    Subjective   Precautions:  O2    Objective   Pain:  No pain    Cognition:  Orieted x4    Activity Tolerance:  Activity Tolerance  Endurance: Tolerates 10 - 20 min exercise with multiple rests    Treatments:  Bed Mobility  Supine to sitting: Independent  Sitting to supine: Independent  Scooting: Independent    Transfers  Sit to stand: Close supervision  Stand to sit: Close supervision    Ambulation/Gait Training  50 feet with no device, Close supervision  No buckling or LOB      Other Activity  Other Activity Performed:  (RTB following tx)    Outcome Measures:  [facility] Basic Mobility  Turning from your back to your side while in a flat bed without using bedrails: None  Moving from lying on your back to sitting on the side of a flat bed without using bedrails: None  Moving to and from bed to chair (including a wheelchair): A little  Standing up from a chair using your arms (e.g. wheelchair or bedside chair): A little  To walk in hospital room: A little  Climbing 3-5 steps with railing: A little  Basic Mobility - Total Score: 20    Education Documentation  Mobility Training, taught by Tacos Lambert PTA at  7/11/2024 12:25 PM.  Learner: Patient  Readiness: Acceptance  Method: Explanation, Demonstration  Response: Needs Reinforcement    Education Comments  No comments found.        OP EDUCATION:       Encounter Problems       Encounter Problems (Active)       ENDURANCE, ENERGY CONSERVATION       STG-pt will demo PACING/BREATHING TECH to reduce TODD with only 1 VC (Progressing)       Start:  07/03/24    Expected End:  07/17/24               Mobility       STG - Patient will ambulate household distance with improved safety awareness, use FWW for ENERGY CONSERVATION FOR longer distances only (Progressing)       Start:  07/03/24    Expected End:  07/17/24               PT Transfers       STG - Patient will transfer sit to and from stand with DEMO CORRECT HAND PLACEMENT, USE OF GRAB BARS/AD WHEN APPROP. (Progressing)       Start:  07/03/24    Expected End:  07/17/24               Pain - Adult

## 2024-07-11 NOTE — PROGRESS NOTES
Home O2 Evaluation:    SpO2 on room air at rest:   91  %    SpO2 on room air with exertion:  86   %    SpO2 on oxygen at rest:  93   %    SpO2 on oxygen with exertion:     %    SpO2 on 4L/min O2 with exertion:   93 %    Ambulation distance:    150  ft    Recommended O2 device: Nasal Cannula    Recommended O2 L/min: 4 with exertion    Recommended FiO2 %:

## 2024-07-11 NOTE — DISCHARGE SUMMARY
"Discharge Diagnosis  Acute on chronic respiratory failure with hypoxia (Multi)    Issues Requiring Follow-Up  Follow-up with PCP in 1 to 2 weeks.  Follow-up with pulmonary clinic.  Needs follow-up chest x-ray in 3 to 4 weeks.    Discharge Meds     Your medication list        ASK your doctor about these medications        Instructions Last Dose Given Next Dose Due   albuterol 90 mcg/actuation inhaler           aspirin 81 mg EC tablet           atorvastatin 80 mg tablet  Commonly known as: Lipitor      Take 1 tablet (80 mg) by mouth once daily at bedtime.       carvedilol 6.25 mg tablet  Commonly known as: Coreg      Take 1 tablet (6.25 mg) by mouth 2 times a day with meals.       cyanocobalamin 1,000 mcg tablet  Commonly known as: Vitamin B-12           Fish OiL 1,000 mg (120 mg-180 mg) capsule  Generic drug: omega 3-dha-epa-fish oil           lisinopril 5 mg tablet      Take 1 tablet (5 mg) by mouth once daily.       meclizine 25 mg tablet  Commonly known as: Antivert      Take 1 tablet (25 mg) by mouth 3 times a day as needed for dizziness.       ondansetron ODT 8 mg disintegrating tablet  Commonly known as: Zofran-ODT      Take 1 tablet (8 mg) by mouth every 8 hours if needed for nausea or vomiting.                Test Results Pending At Discharge  Pending Labs       Order Current Status    Creatine Disorder Panel, Urine In process            Hospital Course   Darron Resendez is a 63 y.o. male with PMHx s/f \"borderline\" COPD, CAD with stent hx STEMI, HTN, chronic tobacco abuse presenting with shortness of breath.  Patient states for the past 5 days he has been increasingly short of breath.  He admits to a productive cough with white/yellow sputum production, he has been experiencing subjective fevers and lightheadedness.  He has not had any cigarettes in the past 3 days.  He is felt very winded and fatigued.  He denies sick contacts or environmental exposures.  He denies chest pain, nausea, vomiting, syncope, leg " swelling, abdominal pain, or other symptoms.  He states he has been hospitalized with severe pneumonia in the past.  Chart review reveals he was admitted to this hospital back in 2020 for COPD exacerbation.     ED Course (Summary):   Vitals on presentation: 97.2 F, 81 bpm, 22 rr, 91/53 --> 76/54, 90% on Bipap  Labs: BMP glu 220, Na 130, Cl 92, BUN 64, Cr 2.12  Lactate 1.4    Trop 15 --> 14  CBC WBC 12.9, Hg 14.3, Plt 260  COVID negative  VBG pH 7.24, pCO2 77, pO2 21, HCO3 33.0  Imaging: CXR - Increased perihilar and pulmonary markings more so on the right   than the left. Findings could be on the basis of pulmonary vascular   congestion or pneumonia.   EKG - Sinus at 76 bpm.  Interventions: Duoneb X1, Rocephin and azithro given, Solumedrol 125 mg, NS 6.5 L bolus given. Pt is still hypotensive and will be admitted to the ICU. Critical care consulted.      7/2: History and chart reviewed.  Patient seen and examined.  No acute events overnight.  Patient feels a little bit better.  He denies any shortness of breath, chest pain, fevers or chills.  Critical care recommendations appreciated.  Nephrology recommendations appreciated.  Patient is off pressors.  Follow cultures.  Wean down oxygen as tolerated.  Patient will require at least another 24 to 48 hours of inpatient service.  7/3: No acute events overnight.  Patient is transferred to the stepdown unit.  He is still short of breath.  And has a dry cough with minimal expectoration.  He denies any chest pain, fevers or chills.  He still requiring at least 5 L of oxygen.  Continue aggressive pulmonary hygiene.  Continue nebulizers.  Patient will require at least another 24 to 48 hours of inpatient service.     7/4: No acute events overnight.  Patient currently requiring 10 L of oxygen.  Continue to encourage out of bed.  Continue aggressive pulm hygiene.  Continue cefepime, methylprednisone and nebulizers.  Patient will require at least another 48 hours of  inpatient service.     7/5: No acute events overnight. Patient is currently requiring 6 L of oxygen. Continue to encourage out of bed. Continue aggressive pulm hygiene. Continue cefepime, methylprednisone and nebulizers. Patient will require 24 to 48 hours of inpatient services.         7/6: No acute events.  Patient still short of breath on minimal exertion..  He still has a dry cough.  Patient denies any fevers, chills, nausea, diarrhea.  Still on 6 L of oxygen.  Continue to wean down oxygen.  Continue aggressive pulmonary hygiene.  White cell count elevated at 27 K likely due to to the steroids.  Will discontinue his IV methylprednisone and start on prednisone 40 mg patient will require at least another 24 hours of inpatient service.     7/7: No acute events overnight.  Patient is still short of breath although he is improving.  He denies any cough.  He feels congested.  Patient had multiple complaints and this is likely due to frustration with prolonged hospital stay.  I again reiterated to him that COPD and his pneumonia will take time to be treated.  He is on 6 L of oxygen.  Will add Symbicort.  Will order a chest x-ray for today.  Patient will likely require another evaluation by the pulmonary team.  White cell count is up to 30 K.  Will see if the x-ray has new findings although this is likely still steroid-induced.  Cultures are negative.  He is currently on prednisone 40 mg.     7/8: Patient seen.  Remains on 3 L.  Weaning.  Chest x-ray shows similar right-sided infiltrates with less left-sided infiltrates.  On exam patient has no crackles, mild wheezing.  BMP will be checked for open this morning's labs.  Continue to wean O2.  Patient complains of humidifier, will remove from his O2 line.  Reassess in AM.     7/9: Patient remains hypoxic.  Feels better.  Has received 8 days of cefepime.  Had suspected patient had Pseudomonas earlier in admission but cultures were negative.  Will DC antibiotic therapy.   Check CT evaluation to rule out chronic changes.  Will have SLP evaluate patient.  Okay to transfer to general medical floor.  Continue to wean O2.  BNP low, do not suspect heart failure.     7/10: No acute events overnight. Patient denies nausea, vomiting, fever, chills, abdominal pain, chest pain, increased dyspnea. Patient currently on 2 to 3 L oxygen. Continue to wean O2. Patient is not on oxygen at home. CT chest read as bilateral pleural effusions presents larger on the right. The lobes of the right lung have scattered peripheral areas of atelectasis versus pneumonia consolidation. IR stated there was inadequate amount of fluid to complete thoracocentesis. Pulmonology consult recommendations appreciated.     7/11: Patient seen.  Doing well.  No fevers or chills.  Will perform home O2 evaluation.  Follow-up with pulmonary as outpatient.  Needs follow-up chest x-ray in 3 to 4 weeks.  Checking for home O2 prior to discharge.  Working with pharmacy to adjust medications for discharge.    This discharge took greater than 35 minutes to arrange.    Presumed pneumococcal pneumonia.  Completed full course of antibiotic therapy.  Needs follow-up chest x-ray in 3 to 4 weeks.  Had right greater than left infiltrates on chest x-ray.  Sepsis secondary to pneumonia.  Respiratory failure.  Acute hypoxemic respiratory failure.  Likely secondary to COPD exacerbation and pneumonia.  Remains on O2 at the time of discharge and will likely be discharged with home O2.  Follow-up with pulmonary as outpatient.  COPD with acute exacerbation.  Will taper steroids on discharge.  Follow-up in pulmonary clinic on discharge.  May have an element of chronic COPD as well.  Presumed right parapneumonic effusion.  Did not have enough to tap on IR attempt.  Follow-up with pulmonary as outpatient.  LARRY.  Likely secondary to problem #1 and associated with sepsis.  Resolved.  Ongoing tobacco use.  Patient states he will quit smoking.  History of  CAD.    Pertinent Physical Exam At Time of Discharge  Physical Exam  General: Alert and oriented x 3, no distress  Cardiovascular: Normal S1-S2, sinus rhythm, no murmurs  Respiratory: Decreased air entry bilaterally, scattered wheeze, no crackles   Abdomen: Abdomen is soft, not distended with no tenderness  Extremities: No edema or deformities  Neurology: Alert and oriented x 3, no acute focal deficits     Outpatient Follow-Up  Future Appointments   Date Time Provider Department [place]   8/8/2024 10:40 AM OTONIEL Dc-CNP PORP[facility]1 [facility]   3/20/2025  1:40 PM Corrine Zelaya MD [id number] [facility]         Eliseo Chappell MD

## 2024-07-12 LAB
CREAT UR-SCNC: 9821.4 UMOL/L
CREATINE URINE DISORDER URINE INTERPRETATION FOR CDP: ABNORMAL
CREATINE/CREAT UR-SRTO: 33 MMOL/MOL CRT (ref 10–370)
GUANIDINOACETATE/CREAT UR-SRTO: 0 MMOL/MOL CRT (ref 7–130)

## 2024-07-12 NOTE — NURSING NOTE
COPD Follow Up Call    Patient Reports Feelings (Symptoms) are: better    The Patient discharged With the following Diagnosis: COPD    If You were Referred to Pulmonary Rehab, Have You Followed up With them? Will see a pulmonologist in August    How is your breathing? improving    How is your activity? Slow moving, just taking it easy     How is your cough? none    Mucus: none    How often Are you using a Quick Relief/ Rescue Inhaler / Nebulizer:  as directed and using O2

## 2024-08-08 ENCOUNTER — OFFICE VISIT (OUTPATIENT)
Dept: PULMONOLOGY | Facility: HOSPITAL | Age: 64
End: 2024-08-08
Payer: MEDICARE

## 2024-08-08 VITALS
HEART RATE: 69 BPM | BODY MASS INDEX: 31.55 KG/M2 | TEMPERATURE: 97.5 F | DIASTOLIC BLOOD PRESSURE: 66 MMHG | SYSTOLIC BLOOD PRESSURE: 99 MMHG | WEIGHT: 201 LBS | OXYGEN SATURATION: 92 % | RESPIRATION RATE: 20 BRPM | HEIGHT: 67 IN

## 2024-08-08 DIAGNOSIS — J43.9 PULMONARY EMPHYSEMA, UNSPECIFIED EMPHYSEMA TYPE (MULTI): Primary | ICD-10-CM

## 2024-08-08 DIAGNOSIS — F17.210 CIGARETTE NICOTINE DEPENDENCE WITHOUT COMPLICATION: ICD-10-CM

## 2024-08-08 DIAGNOSIS — J96.01 ACUTE RESPIRATORY FAILURE WITH HYPOXIA (MULTI): ICD-10-CM

## 2024-08-08 PROCEDURE — 1111F DSCHRG MED/CURRENT MED MERGE: CPT | Performed by: NURSE PRACTITIONER

## 2024-08-08 PROCEDURE — 99214 OFFICE O/P EST MOD 30 MIN: CPT | Performed by: NURSE PRACTITIONER

## 2024-08-08 PROCEDURE — 1160F RVW MEDS BY RX/DR IN RCRD: CPT | Performed by: NURSE PRACTITIONER

## 2024-08-08 PROCEDURE — 3008F BODY MASS INDEX DOCD: CPT | Performed by: NURSE PRACTITIONER

## 2024-08-08 PROCEDURE — 1159F MED LIST DOCD IN RCRD: CPT | Performed by: NURSE PRACTITIONER

## 2024-08-08 PROCEDURE — 1157F ADVNC CARE PLAN IN RCRD: CPT | Performed by: NURSE PRACTITIONER

## 2024-08-08 PROCEDURE — 3078F DIAST BP <80 MM HG: CPT | Performed by: NURSE PRACTITIONER

## 2024-08-08 PROCEDURE — 3074F SYST BP LT 130 MM HG: CPT | Performed by: NURSE PRACTITIONER

## 2024-08-08 RX ORDER — ALBUTEROL SULFATE 90 UG/1
2 INHALANT RESPIRATORY (INHALATION) EVERY 6 HOURS PRN
Qty: 18 G | Refills: 5 | Status: SHIPPED | OUTPATIENT
Start: 2024-08-08

## 2024-08-08 RX ORDER — HYDROXYZINE HYDROCHLORIDE 25 MG/1
TABLET, FILM COATED ORAL
COMMUNITY

## 2024-08-08 ASSESSMENT — COLUMBIA-SUICIDE SEVERITY RATING SCALE - C-SSRS
6. HAVE YOU EVER DONE ANYTHING, STARTED TO DO ANYTHING, OR PREPARED TO DO ANYTHING TO END YOUR LIFE?: NO
2. HAVE YOU ACTUALLY HAD ANY THOUGHTS OF KILLING YOURSELF?: NO
1. IN THE PAST MONTH, HAVE YOU WISHED YOU WERE DEAD OR WISHED YOU COULD GO TO SLEEP AND NOT WAKE UP?: NO

## 2024-08-08 ASSESSMENT — ENCOUNTER SYMPTOMS
DEPRESSION: 0
OCCASIONAL FEELINGS OF UNSTEADINESS: 0
LOSS OF SENSATION IN FEET: 0

## 2024-08-08 ASSESSMENT — PATIENT HEALTH QUESTIONNAIRE - PHQ9
1. LITTLE INTEREST OR PLEASURE IN DOING THINGS: NOT AT ALL
2. FEELING DOWN, DEPRESSED OR HOPELESS: NOT AT ALL
SUM OF ALL RESPONSES TO PHQ9 QUESTIONS 1 AND 2: 0

## 2024-08-08 NOTE — PROGRESS NOTES
Patient: Darron Resendez    [id number]  : 1960 -- AGE 64 y.o.    Provider: SHIKHA Dc     Location [facility]   Service Date: 2024       Department of Medicine  Division of Pulmonary, Critical Care, and Sleep Medicine       [facility]  Established Patient Visit Note    HISTORY OF PRESENT ILLNESS     The patient's referring provider is: No ref. provider found    Chief complaint: Darron Resendez is a 64 y.o. male who presents to a [facility] for an evaluation with concerns of Post Hospitalization (Patient here for follow up visit post hospital stay. Patient states his breathing is still having a hard time breathing. Patient states at rest he is okay. Patient admits to a productive cough with white mucous. Patient was sent home with oxygen but has not been using it. Patient admits to smoking 1 Pack of cigarettes since leaving the hospital but hasn't had any since Tuesday. Patient not using rescue inhaler. Patient has no other concerns for today.).       HISTORY OF PRESENT ILLNESS   Darron Resendez is a 64 y.o. male (current smoker, ~ pack year history) with a PMHx of ?COPD (no PFT on file), CAD (STEMI) s/p stent, DM, HTN, HLD, HFrEF (EF 35% 2020, 65% 2020). Here for pulmonary follow-up for recent hospital admission for pneumonia, acute hypoxic respiratory failure and pleural effusion.     I have independently interviewed and examined the patient in the office and reviewed available records.    DATE OF LAST VISIT: 7/10/24 while inpatient     Current History    On today's visit, the patient reports that is breathing is not good and he has having shortness of breath with minimal activity. Denies fever, chills, chest pain/tightness. He was discharged from the hospital with Breztri & PRN albuterol but has not used either of them. Has chronic intermittent cough with small amount white sputum at times. Patient  "was discharged with oxygen but he does not use it.     Prior Notes & History     7/10/24 inpatient consult note:   Darron Resendez is a 64 y.o. male (current smoker, ~ pack year history) with a PMHx of ?COPD (no PFT on file), CAD (STEMI) s/p stent, DM, HTN, HLD, HFrEF (EF 35% 6/2020, 65% 7/2020), and pneumonia requiring hospital admission. Admitted 7/1/24 after presented to ED with c/o increasing SOB x 3 days with associated productive cough, decreased oral intake and fever. Patient admitted and treated for septic shock (on pressors briefly), R pneumonia, acute respiratory failure (on BiPAP initially) and LARRY. Since admission patient's O2 needs have decreased and has been weaned to 3 L NC (not on home O2 prior). Pulmonary consulted for R pleural effusion vs other.     From H&P:  ED Course (Summary):   Vitals on presentation: 97.2 F, 81 bpm, 22 rr, 91/53 --> 76/54, 90% on Bipap  Labs: BMP glu 220, Na 130, Cl 92, BUN 64, Cr 2.12  Lactate 1.4    Trop 15 --> 14  CBC WBC 12.9, Hg 14.3, Plt 260  COVID negative  VBG pH 7.24, pCO2 77, pO2 21, HCO3 33.0  Imaging: CXR - Increased perihilar and pulmonary markings more so on the right   than the left. Findings could be on the basis of pulmonary vascular   congestion or pneumonia.   EKG - Sinus at 76 bpm.  Interventions: Duoneb X1, Rocephin and azithro given, Solumedrol 125 mg, NS 6.5 L bolus given. Pt is still hypotensive and will be admitted to the ICU. Critical care consulted.      Patient seen and examined, on 3 L NC and in no apparent distress. He reports he went for thoracentesis but was told there was not enough fluid to drain. Prior to coming to hospital patient reports he was havig issues with chronic shortness of breath with activity. He has occasional cough, was coughing up sputum, now dry. He denies fever, chills, chest pain, tightness, leg swelling. He reports he was told in the past he had \"borderline COPD\" but never had PFTs. He was given inhalers in " "the past but had not been using them. He was smoking prior to coming to hospital, reports last cigarette 7/1/24, he is hopeful to continue to avoid smoking after discharge.      Overall patient reports he is feeling much betters since admission -- SOB and cough improved, energy level improving, able to be more mobile and reports is walking around room.         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, plastics factory, aluminum factory, [employer], [employer])  -exposures: + chemicals/fumes, dusts, farms     Fam Hx:   - denies known history of lung disease  - maternal uncles: cancer (unsure what kind)    Impression:  - R pneumonia - on CXR; SOB & productive cough on admission; leukocytosis (30 at highest, down trending now); negative legionella & strep pneumo urine antigens; BC x 2 negative; no sputum culture sent; s/p cefepime x 7 days, solumedrol 40 mg q8hr x 5 days now prednisone 40 mg po once daily  - acute hypoxic respiratory failure  - Bilateral pleural effusions - appears layered/loculated on imaging (R>L); parapneumonic vs other; IR unable to complete thora   - Cigarette nicotine dependence - current smoker, last cigarette 7/1 prior to admission  - ?COPD - emphysema on CT;  \"borderline COPD\" listed in chart as PMHx, no PFT on file; no prior home O2     Recommendations:  - enourage use of Acapella & IS for bronchopulmonary hygiene (educated importance of devices)  - continue supplemental O2, wean to maintain pulse ox 90-92%  - continue Breo while inpatient  - start spiriva   - continue Duonebs, consider changing to PRN since will be on triple therapy      Discharge recommendations (ongoing, will update as needed)  - has outpatient pulmonary " follow-up scheduled 8/8 at 10:40am (will plan for PFT and repeat imaging in 6-8 weeks)  - discharge on triple therapy (ICS/LAMA/LABA), whichever is covered by insurance (Trelegy would be ideal)  - discharge on PRN albuterol inhaler  - discharge with nicotine patches  -home O2 study prior to discharge   - continue prednisone taper at discharge    REVIEW OF SYSTEMS     REVIEW OF SYSTEMS  Review of Systems  10-point ROS complete and negative except as documented in HPI    ALLERGIES AND MEDICATIONS     ALLERGIES  Allergies   Allergen Reactions    Penicillin G Other       MEDICATIONS  Current Outpatient Medications   Medication Sig Dispense Refill    albuterol 90 mcg/actuation inhaler Inhale 2 puffs every 6 hours if needed for shortness of breath.      aspirin 81 mg EC tablet Take 1 tablet (81 mg) by mouth once daily.      atorvastatin (Lipitor) 80 mg tablet Take 1 tablet (80 mg) by mouth once daily at bedtime. 90 tablet 3    carvedilol (Coreg) 6.25 mg tablet Take 1 tablet (6.25 mg) by mouth 2 times a day with meals. 180 tablet 3    cyanocobalamin (Vitamin B-12) 1,000 mcg tablet Take 1 tablet (1,000 mcg) by mouth once daily.      hydrOXYzine HCL (Atarax) 25 mg tablet Take by mouth.      lisinopril 5 mg tablet Take 1 tablet (5 mg) by mouth once daily. 90 tablet 3    omega 3-dha-epa-fish oil (Fish OiL) 1,000 mg (120 mg-180 mg) capsule Take 1 capsule (1,000 mg) by mouth once daily.      budesonide-glycopyr-formoterol (Breztri Aerosphere) 160-9-4.8 mcg/actuation HFA aerosol inhaler Inhale 2 puffs 2 times a day. (Patient not taking: Reported on 8/8/2024) 10.7 g 0     No current facility-administered medications for this visit.       PAST HISTORY     PAST MEDICAL HISTORY  He  has a past medical history of Chronic systolic (congestive) heart failure (Multi) (09/22/2020), Myocardial infarction (Multi) (07/01/2024), Old myocardial infarction, Old myocardial infarction (09/22/2020), Old myocardial infarction, Personal history of  "other diseases of the circulatory system, Personal history of other endocrine, nutritional and metabolic disease, and Presence of stent in anterior descending branch of left coronary artery (07/01/2024).    PAST SURGICAL HISTORY  Past Surgical History:   Procedure Laterality Date    MANDIBLE SURGERY  06/01/2016    Jaw Surgery    OTHER SURGICAL HISTORY  06/29/2020    Coronary artery stent placement    TONSILLECTOMY  06/01/2016    Tonsillectomy With Adenoidectomy    VASECTOMY  06/01/2016    Surgery Vas Deferens Vasectomy       PHYSICAL EXAM     VITAL SIGNS: BP 99/66   Pulse 69   Temp 36.4 °C (97.5 °F)   Resp 20   Ht 1.702 m (5' 7\")   Wt 91.2 kg (201 lb)   SpO2 92% Comment: RA  BMI 31.48 kg/m²      PREVIOUS WEIGHTS:  Wt Readings from Last 3 Encounters:   08/08/24 91.2 kg (201 lb)   07/09/24 90.5 kg (199 lb 8.3 oz)   03/20/24 86.2 kg (190 lb)       Physical Exam  Vitals reviewed.   Constitutional:       General: He is not in acute distress.     Appearance: He is obese.   HENT:      Mouth/Throat:      Mouth: Mucous membranes are moist.   Eyes:      General: No scleral icterus.  Cardiovascular:      Rate and Rhythm: Normal rate and regular rhythm.      Pulses: Normal pulses.      Heart sounds: Normal heart sounds.   Pulmonary:      Effort: Pulmonary effort is normal. No respiratory distress.      Breath sounds: Normal breath sounds. No wheezing, rhonchi or rales.      Comments: Diminished bilaterally  Musculoskeletal:         General: Normal range of motion.   Skin:     General: Skin is warm and dry.   Neurological:      Mental Status: He is alert and oriented to person, place, and time.   Psychiatric:         Mood and Affect: Mood normal.         Behavior: Behavior normal.         RESULTS/DATA     Pulmonary Function Test Results     No PFT on record    Chest Radiograph     XR chest 2 views 07/07/2024    Narrative  Interpreted By:  Anil Regan,  STUDY:  XR CHEST 2 VIEWS;  7/7/2024 6:37 " pm    INDICATION:  Signs/Symptoms:Still shortness of breath, recent pneumonia with  severe COPD.  Rule out heart failure or worsening pneumonia.    COMPARISON:  Chest radiograph 07/01/2024.    ACCESSION NUMBER(S):  [id number]    ORDERING CLINICIAN:  REUBEN FLOOD    FINDINGS:          CARDIOMEDIASTINAL SILHOUETTE:  Cardiomediastinal silhouette is normal in size and configuration.  Left coronary artery stent is visualized.    LUNGS:  Blunting of the right costophrenic angle suggestive of small right  pleural effusion. No evidence of consolidation or es pulmonary  edema.    ABDOMEN:  No remarkable upper abdominal findings.    BONES:  No acute osseous changes.    Impression  1. Small right pleural effusion. No consolidation or es pulmonary  edema.        MACRO:  None    Signed by: Anil Regan 7/7/2024 6:58 PM  Dictation workstation:   [id number]      Chest CT Scan     CT chest w IV contrast 7/9/24  FINDINGS:  The right hemithorax has a small layering pleural effusion with trace  layering pleural effusion in the left hemithorax.      The posterior aspects of the right upper lobe and right lower lobe  pleural-based areas of atelectasis versus pneumonia the lateral  aspect of the right middle lobe has a few peripheral areas of  consolidation.      The right hilum and precarinal region of the mediastinum have benign  lymph nodes with granulomatous calcification. No mediastinal  adenopathy is noted otherwise.      The left anterior descending coronary artery has metal artifact from  stents.      The calibers of the thoracic aorta and the main pulmonary arteries  are normal.      The T12 vertebral body has old compression injury centrally and  anteriorly with 20% loss of height, unchanged.      The spleen has numerous benign calcified granulomas.      IMPRESSION:  1.  Bilateral pleural effusions are present larger on the right.  2. The lobes of the right lung have scattered peripheral areas of  atelectasis versus  pneumonic consolidation.  3. Old compression injury of the T12 vertebral body      Echocardiogram & Cardiac Studies     No results found for this or any previous visit from the past 365 days.       Labwork & Pathology   Complete Blood Count  Lab Results   Component Value Date    WBC 13.3 (H) 07/10/2024    HGB 11.8 (L) 07/10/2024    HCT 35.7 (L) 07/10/2024    MCV 95 07/10/2024     07/10/2024       Peripheral Eosinophil Count:   Eosinophils Absolute, Manual (x10*3/uL)   Date Value   07/08/2024 0.63   07/07/2024 0.60   07/06/2024 0.00     ASSESSMENT/PLAN     Darron Resendez is a 64 y.o. male (current smoker, ~ pack year history) with a PMHx of ?COPD (no PFT on file), CAD (STEMI) s/p stent, DM, HTN, HLD, HFrEF (EF 35% 6/2020, 65% 7/2020). Here for pulmonary follow-up for recent hospital admission for pneumonia, hypoxic respiratory failure and pleural effusion.      Problem List and Orders  Problem List Items Addressed This Visit             ICD-10-CM    Cigarette nicotine dependence F17.210     Other Visit Diagnoses         Codes    Pulmonary emphysema, unspecified emphysema type (Multi)    -  Primary J43.9    Relevant Medications    albuterol 90 mcg/actuation inhaler    Other Relevant Orders    Complete Pulmonary Function Test Pre/Post Bronchodialator (Spirometry Pre/Post/DLCO/Lung Volumes)    Pulmonary Stress Test (6 Min. Walk)    Acute respiratory failure with hypoxia (Multi)     J96.01            Assessment and Plan / Recommendations:  1) Emphysema - likely COPD, no PFTs on file; current smoker -- was discharged with Breztri & PRN albuterol but not using because he didn't know if he needed them  - encouarged him to use Breztri 2 puffs BID and albuterol q4-6hr PRN  - ordered PFT    2) Acute hypoxic respiratory failure - discharged from hospital 7/11/24 on new O2, he is not using  - encouarged patient to use oxygen as prescribed at discharge  - ordered 6MW to reevaluate O2 needs    3) Cigarette nicotine  dependence - current smoker, ~ pack year history; had not smoked for 39 days however this week bought a pack of cigarettes and smoked them, has not smoked again for 2 days  - encouraged ongoing smoking cessation    4) Recent hospital discharge for pneumonia & pleural effusions (7/1-7/11/24) - treated with cefepime & solumedrol inpatient; discharged with prednisone taper  - cough/SOB improved, denies sputum  - will consider repeat imaging at next visit to ensure resolved if patient still having issues with SOB/cough after starting inhalers       RTC 2 months

## 2024-08-08 NOTE — PATIENT INSTRUCTIONS
"Darron Resendez    It was a pleasure to see you in clinic today. We discussed your recent hospital admission for pneumonia and your emphysema.    Based on our conversation, here are my recommendations:   - Start using your Breztri inhaler, 2 puffs twice daily; RINSE MOUTH & SPIT AFTER USE  - Start using your albuterol inhaler every 4-6 hours as needed for shortness of breath or wheezing. You can use this 10 minutes prior to activity to help \"prime\" your lungs.  - I ordered breathing tests, please schedule and complete these  - Please use your oxygen as recommended at discharge from the hospital. We will re-evaluate your oxygen needs with the breathing tests ordered.  - Good job on reducing your smoking. Continue to work on not smoking.    Thank you for visiting the Pulmonary clinic today!   Return to clinic  2 months after PFT/6MW or sooner if needed   India Katz CNP  My office number is [phone] - Yanira is my  and Denia is my nurse.     [phone] - scheduling    Any test results will be discussed at next visit -- please make sure to make a follow up appt after testing.    "

## 2024-09-03 ENCOUNTER — HOSPITAL ENCOUNTER (OUTPATIENT)
Dept: RESPIRATORY THERAPY | Facility: HOSPITAL | Age: 64
Discharge: HOME | End: 2024-09-03
Payer: MEDICARE

## 2024-09-03 DIAGNOSIS — J43.9 PULMONARY EMPHYSEMA, UNSPECIFIED EMPHYSEMA TYPE (MULTI): ICD-10-CM

## 2024-09-03 LAB
MGC ASCENT PFT - FEV1 - POST: 0.87
MGC ASCENT PFT - FEV1 - PRE: 0.79
MGC ASCENT PFT - FEV1 - PREDICTED: 2.86
MGC ASCENT PFT - FVC - POST: 3.01
MGC ASCENT PFT - FVC - PRE: 2.74
MGC ASCENT PFT - FVC - PREDICTED: 3.7

## 2024-09-03 PROCEDURE — 94726 PLETHYSMOGRAPHY LUNG VOLUMES: CPT

## 2024-09-03 PROCEDURE — 2500000001 HC RX 250 WO HCPCS SELF ADMINISTERED DRUGS (ALT 637 FOR MEDICARE OP): Performed by: NURSE PRACTITIONER

## 2024-09-03 PROCEDURE — 94618 PULMONARY STRESS TESTING: CPT

## 2024-09-03 PROCEDURE — 94640 AIRWAY INHALATION TREATMENT: CPT

## 2024-09-03 RX ORDER — ALBUTEROL SULFATE 90 UG/1
4 INHALANT RESPIRATORY (INHALATION) ONCE
Status: COMPLETED | OUTPATIENT
Start: 2024-09-03 | End: 2024-09-03

## 2024-09-09 DIAGNOSIS — J43.9 PULMONARY EMPHYSEMA, UNSPECIFIED EMPHYSEMA TYPE (MULTI): Primary | ICD-10-CM

## 2024-09-09 RX ORDER — BUDESONIDE, GLYCOPYRROLATE, AND FORMOTEROL FUMARATE 160; 9; 4.8 UG/1; UG/1; UG/1
2 AEROSOL, METERED RESPIRATORY (INHALATION)
Qty: 10.7 G | Refills: 3 | Status: SHIPPED | OUTPATIENT
Start: 2024-09-09

## 2024-09-09 NOTE — TELEPHONE ENCOUNTER
Called and spoke with patient about results. He had acknowledgment and understanding.       ----- Message from India Katz sent at 9/9/2024  8:27 AM EDT -----  Please call Mr. Resendez and ensure he understands that based off his 6MW he should be using 3 L O2 with exertion and RA at rest. His PFT also showed he has severe COPD so he should be using the Breztri 2 puffs twice daily and albuterol 1-2 puffs every 4-6 hours as needed for shortness of breath or wheezing.

## 2024-10-10 ENCOUNTER — APPOINTMENT (OUTPATIENT)
Dept: PULMONOLOGY | Facility: HOSPITAL | Age: 64
End: 2024-10-10
Payer: MEDICARE

## 2024-10-10 ENCOUNTER — OFFICE VISIT (OUTPATIENT)
Dept: PULMONOLOGY | Facility: HOSPITAL | Age: 64
End: 2024-10-10
Payer: MEDICARE

## 2024-10-10 VITALS
HEART RATE: 60 BPM | TEMPERATURE: 97.4 F | OXYGEN SATURATION: 90 % | RESPIRATION RATE: 20 BRPM | SYSTOLIC BLOOD PRESSURE: 94 MMHG | DIASTOLIC BLOOD PRESSURE: 60 MMHG

## 2024-10-10 DIAGNOSIS — F17.210 CIGARETTE NICOTINE DEPENDENCE WITHOUT COMPLICATION: ICD-10-CM

## 2024-10-10 DIAGNOSIS — J44.9 STAGE 4 VERY SEVERE COPD BY GOLD CLASSIFICATION (MULTI): Primary | ICD-10-CM

## 2024-10-10 DIAGNOSIS — J96.11 CHRONIC HYPOXIC RESPIRATORY FAILURE (MULTI): ICD-10-CM

## 2024-10-10 PROCEDURE — 3078F DIAST BP <80 MM HG: CPT | Performed by: NURSE PRACTITIONER

## 2024-10-10 PROCEDURE — 99406 BEHAV CHNG SMOKING 3-10 MIN: CPT | Performed by: NURSE PRACTITIONER

## 2024-10-10 PROCEDURE — 3074F SYST BP LT 130 MM HG: CPT | Performed by: NURSE PRACTITIONER

## 2024-10-10 PROCEDURE — 99215 OFFICE O/P EST HI 40 MIN: CPT | Performed by: NURSE PRACTITIONER

## 2024-10-10 PROCEDURE — 1157F ADVNC CARE PLAN IN RCRD: CPT | Performed by: NURSE PRACTITIONER

## 2024-10-10 PROCEDURE — 1159F MED LIST DOCD IN RCRD: CPT | Performed by: NURSE PRACTITIONER

## 2024-10-10 RX ORDER — FLUTICASONE FUROATE, UMECLIDINIUM BROMIDE AND VILANTEROL TRIFENATATE 200; 62.5; 25 UG/1; UG/1; UG/1
1 POWDER RESPIRATORY (INHALATION) DAILY
Qty: 60 EACH | Refills: 3 | Status: SHIPPED | OUTPATIENT
Start: 2024-10-10

## 2024-10-10 RX ORDER — IPRATROPIUM BROMIDE AND ALBUTEROL SULFATE 2.5; .5 MG/3ML; MG/3ML
3 SOLUTION RESPIRATORY (INHALATION) EVERY 6 HOURS PRN
Qty: 360 ML | Refills: 5 | Status: SHIPPED | OUTPATIENT
Start: 2024-10-10

## 2024-10-10 RX ORDER — PREDNISONE 10 MG/1
TABLET ORAL
Qty: 30 TABLET | Refills: 0 | Status: SHIPPED | OUTPATIENT
Start: 2024-10-10 | End: 2024-11-09

## 2024-10-10 ASSESSMENT — ENCOUNTER SYMPTOMS
OCCASIONAL FEELINGS OF UNSTEADINESS: 0
LOSS OF SENSATION IN FEET: 0
DEPRESSION: 0

## 2024-10-10 ASSESSMENT — PATIENT HEALTH QUESTIONNAIRE - PHQ9
2. FEELING DOWN, DEPRESSED OR HOPELESS: NOT AT ALL
1. LITTLE INTEREST OR PLEASURE IN DOING THINGS: NOT AT ALL
SUM OF ALL RESPONSES TO PHQ9 QUESTIONS 1 AND 2: 0

## 2024-10-10 ASSESSMENT — COLUMBIA-SUICIDE SEVERITY RATING SCALE - C-SSRS
2. HAVE YOU ACTUALLY HAD ANY THOUGHTS OF KILLING YOURSELF?: NO
6. HAVE YOU EVER DONE ANYTHING, STARTED TO DO ANYTHING, OR PREPARED TO DO ANYTHING TO END YOUR LIFE?: NO
1. IN THE PAST MONTH, HAVE YOU WISHED YOU WERE DEAD OR WISHED YOU COULD GO TO SLEEP AND NOT WAKE UP?: NO

## 2024-10-10 NOTE — PATIENT INSTRUCTIONS
"Darron Resendez    It was a pleasure to see you in clinic today. We discussed your breathing test results that show you have very severe COPD.    Based on our conversation, here are my recommendations:   - Do not use Breztri since too expensive.  - Start Trelegy 1 puff once daily, RINSE MOUTH & SPIT AFTER USE (please call if too expensive so we can try to find something more affordable)  - I ordered a nebulizer machine   - I ordered duoneb solution - use albuterol Inhaler 1-2 puffs or DuoNeb nebulizer every 4-6 hours as needed for shortness of breath. You can also take this 10-15 minutes prior to exertional activity to help \"prime\" your lungs.  - Start prednisone taper as prescribed on package from pharmacy.     As we discussed, I am leaving and will no longer be seeing patients. Please schedule your follow-up appointment with Jacqueline Nava CNP in 3 months.     Any test results will be discussed at next visit -- please make sure to make a follow up appt after testing.    Thank you for visiting the Pulmonary clinic today!   India Katz CNP    My office number is [phone] -  Denia is my nurse.     Appointment scheduling [phone]            "

## 2024-10-29 ENCOUNTER — APPOINTMENT (OUTPATIENT)
Dept: RADIOLOGY | Facility: HOSPITAL | Age: 64
End: 2024-10-29
Payer: MEDICARE

## 2024-10-29 ENCOUNTER — HOSPITAL ENCOUNTER (EMERGENCY)
Facility: HOSPITAL | Age: 64
Discharge: HOME | End: 2024-10-29
Attending: EMERGENCY MEDICINE
Payer: MEDICARE

## 2024-10-29 VITALS
BODY MASS INDEX: 31.39 KG/M2 | HEART RATE: 80 BPM | DIASTOLIC BLOOD PRESSURE: 79 MMHG | TEMPERATURE: 97.2 F | RESPIRATION RATE: 14 BRPM | SYSTOLIC BLOOD PRESSURE: 102 MMHG | OXYGEN SATURATION: 95 % | WEIGHT: 200 LBS | HEIGHT: 67 IN

## 2024-10-29 DIAGNOSIS — J18.9 PNEUMONIA OF RIGHT LOWER LOBE DUE TO INFECTIOUS ORGANISM: Primary | ICD-10-CM

## 2024-10-29 LAB
ANION GAP BLDV CALCULATED.4IONS-SCNC: 1 MMOL/L (ref 10–25)
ANION GAP SERPL CALC-SCNC: 9 MMOL/L (ref 10–20)
BASE EXCESS BLDV CALC-SCNC: 6.1 MMOL/L (ref -2–3)
BASE EXCESS BLDV CALC-SCNC: 8.4 MMOL/L (ref -2–3)
BASOPHILS # BLD AUTO: 0.07 X10*3/UL (ref 0–0.1)
BASOPHILS NFR BLD AUTO: 0.7 %
BNP SERPL-MCNC: 39 PG/ML (ref 0–99)
BODY TEMPERATURE: 37 DEGREES CELSIUS
BODY TEMPERATURE: 37 DEGREES CELSIUS
BUN SERPL-MCNC: 12 MG/DL (ref 6–23)
CA-I BLDV-SCNC: 1.23 MMOL/L (ref 1.1–1.33)
CALCIUM SERPL-MCNC: 9.5 MG/DL (ref 8.6–10.3)
CARDIAC TROPONIN I PNL SERPL HS: 6 NG/L (ref 0–20)
CARDIAC TROPONIN I PNL SERPL HS: 6 NG/L (ref 0–20)
CHLORIDE BLDV-SCNC: 100 MMOL/L (ref 98–107)
CHLORIDE SERPL-SCNC: 99 MMOL/L (ref 98–107)
CO2 SERPL-SCNC: 34 MMOL/L (ref 21–32)
CREAT SERPL-MCNC: 1.14 MG/DL (ref 0.5–1.3)
EGFRCR SERPLBLD CKD-EPI 2021: 71 ML/MIN/1.73M*2
EOSINOPHIL # BLD AUTO: 0.72 X10*3/UL (ref 0–0.7)
EOSINOPHIL NFR BLD AUTO: 7.2 %
ERYTHROCYTE [DISTWIDTH] IN BLOOD BY AUTOMATED COUNT: 11.9 % (ref 11.5–14.5)
GLUCOSE BLDV-MCNC: 135 MG/DL (ref 74–99)
GLUCOSE SERPL-MCNC: 132 MG/DL (ref 74–99)
HCO3 BLDV-SCNC: 34 MMOL/L (ref 22–26)
HCO3 BLDV-SCNC: 38.5 MMOL/L (ref 22–26)
HCT VFR BLD AUTO: 44.1 % (ref 41–52)
HCT VFR BLD EST: 44 % (ref 41–52)
HGB BLD-MCNC: 14.3 G/DL (ref 13.5–17.5)
HGB BLDV-MCNC: 14.7 G/DL (ref 13.5–17.5)
IMM GRANULOCYTES # BLD AUTO: 0.01 X10*3/UL (ref 0–0.7)
IMM GRANULOCYTES NFR BLD AUTO: 0.1 % (ref 0–0.9)
INHALED O2 CONCENTRATION: 32 %
INHALED O2 CONCENTRATION: 32 %
LACTATE BLDV-SCNC: 1.4 MMOL/L (ref 0.4–2)
LACTATE SERPL-SCNC: 0.8 MMOL/L (ref 0.4–2)
LYMPHOCYTES # BLD AUTO: 2.09 X10*3/UL (ref 1.2–4.8)
LYMPHOCYTES NFR BLD AUTO: 20.8 %
MCH RBC QN AUTO: 31.4 PG (ref 26–34)
MCHC RBC AUTO-ENTMCNC: 32.4 G/DL (ref 32–36)
MCV RBC AUTO: 97 FL (ref 80–100)
MONOCYTES # BLD AUTO: 0.46 X10*3/UL (ref 0.1–1)
MONOCYTES NFR BLD AUTO: 4.6 %
NEUTROPHILS # BLD AUTO: 6.69 X10*3/UL (ref 1.2–7.7)
NEUTROPHILS NFR BLD AUTO: 66.6 %
NRBC BLD-RTO: 0 /100 WBCS (ref 0–0)
OXYHGB MFR BLDV: 54.7 % (ref 45–75)
OXYHGB MFR BLDV: 60.6 % (ref 45–75)
PCO2 BLDV: 63 MM HG (ref 41–51)
PCO2 BLDV: 80 MM HG (ref 41–51)
PH BLDV: 7.29 PH (ref 7.33–7.43)
PH BLDV: 7.34 PH (ref 7.33–7.43)
PLATELET # BLD AUTO: 239 X10*3/UL (ref 150–450)
PO2 BLDV: 36 MM HG (ref 35–45)
PO2 BLDV: 39 MM HG (ref 35–45)
POTASSIUM BLDV-SCNC: 5 MMOL/L (ref 3.5–5.3)
POTASSIUM SERPL-SCNC: 4.8 MMOL/L (ref 3.5–5.3)
RBC # BLD AUTO: 4.56 X10*6/UL (ref 4.5–5.9)
SAO2 % BLDV: 56 % (ref 45–75)
SAO2 % BLDV: 62 % (ref 45–75)
SODIUM BLDV-SCNC: 134 MMOL/L (ref 136–145)
SODIUM SERPL-SCNC: 137 MMOL/L (ref 136–145)
WBC # BLD AUTO: 10 X10*3/UL (ref 4.4–11.3)

## 2024-10-29 PROCEDURE — 2500000002 HC RX 250 W HCPCS SELF ADMINISTERED DRUGS (ALT 637 FOR MEDICARE OP, ALT 636 FOR OP/ED): Mod: MUE | Performed by: NURSE PRACTITIONER

## 2024-10-29 PROCEDURE — 83605 ASSAY OF LACTIC ACID: CPT | Performed by: NURSE PRACTITIONER

## 2024-10-29 PROCEDURE — 84484 ASSAY OF TROPONIN QUANT: CPT | Performed by: NURSE PRACTITIONER

## 2024-10-29 PROCEDURE — 36415 COLL VENOUS BLD VENIPUNCTURE: CPT | Performed by: NURSE PRACTITIONER

## 2024-10-29 PROCEDURE — 2500000001 HC RX 250 WO HCPCS SELF ADMINISTERED DRUGS (ALT 637 FOR MEDICARE OP): Performed by: NURSE PRACTITIONER

## 2024-10-29 PROCEDURE — 85025 COMPLETE CBC W/AUTO DIFF WBC: CPT | Performed by: NURSE PRACTITIONER

## 2024-10-29 PROCEDURE — 94640 AIRWAY INHALATION TREATMENT: CPT

## 2024-10-29 PROCEDURE — 82805 BLOOD GASES W/O2 SATURATION: CPT | Performed by: NURSE PRACTITIONER

## 2024-10-29 PROCEDURE — 71045 X-RAY EXAM CHEST 1 VIEW: CPT

## 2024-10-29 PROCEDURE — 99284 EMERGENCY DEPT VISIT MOD MDM: CPT | Mod: 25 | Performed by: EMERGENCY MEDICINE

## 2024-10-29 PROCEDURE — 83880 ASSAY OF NATRIURETIC PEPTIDE: CPT | Performed by: NURSE PRACTITIONER

## 2024-10-29 PROCEDURE — 71045 X-RAY EXAM CHEST 1 VIEW: CPT | Performed by: RADIOLOGY

## 2024-10-29 PROCEDURE — 82435 ASSAY OF BLOOD CHLORIDE: CPT | Performed by: NURSE PRACTITIONER

## 2024-10-29 RX ORDER — IPRATROPIUM BROMIDE AND ALBUTEROL SULFATE 2.5; .5 MG/3ML; MG/3ML
3 SOLUTION RESPIRATORY (INHALATION) ONCE
Status: COMPLETED | OUTPATIENT
Start: 2024-10-29 | End: 2024-10-29

## 2024-10-29 RX ORDER — AZITHROMYCIN 250 MG/1
500 TABLET, FILM COATED ORAL ONCE
Status: DISCONTINUED | OUTPATIENT
Start: 2024-10-29 | End: 2024-10-29

## 2024-10-29 RX ORDER — LEVOFLOXACIN 750 MG/1
750 TABLET ORAL EVERY 24 HOURS
Qty: 4 TABLET | Refills: 0 | Status: SHIPPED | OUTPATIENT
Start: 2024-10-29 | End: 2024-11-02

## 2024-10-29 RX ORDER — PREDNISONE 20 MG/1
40 TABLET ORAL DAILY
Qty: 10 TABLET | Refills: 0 | Status: SHIPPED | OUTPATIENT
Start: 2024-10-29 | End: 2024-11-03

## 2024-10-29 ASSESSMENT — PAIN SCALES - GENERAL
PAINLEVEL_OUTOF10: 0 - NO PAIN
PAINLEVEL_OUTOF10: 0 - NO PAIN

## 2024-10-29 ASSESSMENT — COLUMBIA-SUICIDE SEVERITY RATING SCALE - C-SSRS
1. IN THE PAST MONTH, HAVE YOU WISHED YOU WERE DEAD OR WISHED YOU COULD GO TO SLEEP AND NOT WAKE UP?: NO
2. HAVE YOU ACTUALLY HAD ANY THOUGHTS OF KILLING YOURSELF?: NO
6. HAVE YOU EVER DONE ANYTHING, STARTED TO DO ANYTHING, OR PREPARED TO DO ANYTHING TO END YOUR LIFE?: NO

## 2024-10-29 ASSESSMENT — PAIN - FUNCTIONAL ASSESSMENT: PAIN_FUNCTIONAL_ASSESSMENT: 0-10

## 2024-11-05 ENCOUNTER — HOSPITAL ENCOUNTER (EMERGENCY)
Facility: HOSPITAL | Age: 64
Discharge: HOME | End: 2024-11-05
Attending: EMERGENCY MEDICINE
Payer: MEDICARE

## 2024-11-05 ENCOUNTER — APPOINTMENT (OUTPATIENT)
Dept: RADIOLOGY | Facility: HOSPITAL | Age: 64
End: 2024-11-05
Payer: MEDICARE

## 2024-11-05 ENCOUNTER — APPOINTMENT (OUTPATIENT)
Dept: CARDIOLOGY | Facility: HOSPITAL | Age: 64
End: 2024-11-05
Payer: MEDICARE

## 2024-11-05 ENCOUNTER — TELEPHONE (OUTPATIENT)
Dept: CARDIOLOGY | Facility: HOSPITAL | Age: 64
End: 2024-11-05
Payer: MEDICARE

## 2024-11-05 VITALS
OXYGEN SATURATION: 97 % | HEART RATE: 72 BPM | TEMPERATURE: 98.5 F | RESPIRATION RATE: 18 BRPM | DIASTOLIC BLOOD PRESSURE: 74 MMHG | SYSTOLIC BLOOD PRESSURE: 125 MMHG

## 2024-11-05 DIAGNOSIS — R05.1 ACUTE COUGH: ICD-10-CM

## 2024-11-05 DIAGNOSIS — J06.9 VIRAL UPPER RESPIRATORY ILLNESS: Primary | ICD-10-CM

## 2024-11-05 LAB
ALBUMIN SERPL BCP-MCNC: 3.4 G/DL (ref 3.4–5)
ALP SERPL-CCNC: 43 U/L (ref 33–136)
ALT SERPL W P-5'-P-CCNC: 20 U/L (ref 10–52)
ANION GAP BLDV CALCULATED.4IONS-SCNC: 4 MMOL/L (ref 10–25)
ANION GAP SERPL CALC-SCNC: 9 MMOL/L (ref 10–20)
APPEARANCE UR: CLEAR
AST SERPL W P-5'-P-CCNC: 16 U/L (ref 9–39)
BASE EXCESS BLDV CALC-SCNC: 7 MMOL/L (ref -2–3)
BASOPHILS # BLD AUTO: 0.09 X10*3/UL (ref 0–0.1)
BASOPHILS NFR BLD AUTO: 0.7 %
BILIRUB DIRECT SERPL-MCNC: 0 MG/DL (ref 0–0.3)
BILIRUB SERPL-MCNC: 0.3 MG/DL (ref 0–1.2)
BILIRUB UR STRIP.AUTO-MCNC: NEGATIVE MG/DL
BNP SERPL-MCNC: 64 PG/ML (ref 0–99)
BODY TEMPERATURE: 37 DEGREES CELSIUS
BUN SERPL-MCNC: 21 MG/DL (ref 6–23)
CA-I BLDV-SCNC: 1.22 MMOL/L (ref 1.1–1.33)
CALCIUM SERPL-MCNC: 8.5 MG/DL (ref 8.6–10.3)
CARDIAC TROPONIN I PNL SERPL HS: 8 NG/L (ref 0–20)
CARDIAC TROPONIN I PNL SERPL HS: 8 NG/L (ref 0–20)
CHLORIDE BLDV-SCNC: 102 MMOL/L (ref 98–107)
CHLORIDE SERPL-SCNC: 102 MMOL/L (ref 98–107)
CO2 SERPL-SCNC: 32 MMOL/L (ref 21–32)
COLOR UR: NORMAL
CREAT SERPL-MCNC: 1.11 MG/DL (ref 0.5–1.3)
EGFRCR SERPLBLD CKD-EPI 2021: 73 ML/MIN/1.73M*2
EOSINOPHIL # BLD AUTO: 0.6 X10*3/UL (ref 0–0.7)
EOSINOPHIL NFR BLD AUTO: 4.7 %
ERYTHROCYTE [DISTWIDTH] IN BLOOD BY AUTOMATED COUNT: 12.4 % (ref 11.5–14.5)
GLUCOSE BLDV-MCNC: 106 MG/DL (ref 74–99)
GLUCOSE SERPL-MCNC: 108 MG/DL (ref 74–99)
GLUCOSE UR STRIP.AUTO-MCNC: NORMAL MG/DL
HCO3 BLDV-SCNC: 33.4 MMOL/L (ref 22–26)
HCT VFR BLD AUTO: 41.3 % (ref 41–52)
HCT VFR BLD EST: 41 % (ref 41–52)
HGB BLD-MCNC: 13.7 G/DL (ref 13.5–17.5)
HGB BLDV-MCNC: 13.7 G/DL (ref 13.5–17.5)
IMM GRANULOCYTES # BLD AUTO: 0.39 X10*3/UL (ref 0–0.7)
IMM GRANULOCYTES NFR BLD AUTO: 3.1 % (ref 0–0.9)
INHALED O2 CONCENTRATION: 36 %
KETONES UR STRIP.AUTO-MCNC: NEGATIVE MG/DL
LACTATE BLDV-SCNC: 0.9 MMOL/L (ref 0.4–2)
LEUKOCYTE ESTERASE UR QL STRIP.AUTO: NEGATIVE
LYMPHOCYTES # BLD AUTO: 3.98 X10*3/UL (ref 1.2–4.8)
LYMPHOCYTES NFR BLD AUTO: 31.4 %
MAGNESIUM SERPL-MCNC: 1.91 MG/DL (ref 1.6–2.4)
MCH RBC QN AUTO: 31.5 PG (ref 26–34)
MCHC RBC AUTO-ENTMCNC: 33.2 G/DL (ref 32–36)
MCV RBC AUTO: 95 FL (ref 80–100)
MONOCYTES # BLD AUTO: 0.84 X10*3/UL (ref 0.1–1)
MONOCYTES NFR BLD AUTO: 6.6 %
NEUTROPHILS # BLD AUTO: 6.78 X10*3/UL (ref 1.2–7.7)
NEUTROPHILS NFR BLD AUTO: 53.5 %
NITRITE UR QL STRIP.AUTO: NEGATIVE
NRBC BLD-RTO: 0 /100 WBCS (ref 0–0)
OXYHGB MFR BLDV: 72.9 % (ref 45–75)
PCO2 BLDV: 54 MM HG (ref 41–51)
PH BLDV: 7.4 PH (ref 7.33–7.43)
PH UR STRIP.AUTO: 6.5 [PH]
PLATELET # BLD AUTO: 257 X10*3/UL (ref 150–450)
PO2 BLDV: 42 MM HG (ref 35–45)
POTASSIUM BLDV-SCNC: 3.9 MMOL/L (ref 3.5–5.3)
POTASSIUM SERPL-SCNC: 3.9 MMOL/L (ref 3.5–5.3)
PROT SERPL-MCNC: 5.4 G/DL (ref 6.4–8.2)
PROT UR STRIP.AUTO-MCNC: NEGATIVE MG/DL
RBC # BLD AUTO: 4.35 X10*6/UL (ref 4.5–5.9)
RBC # UR STRIP.AUTO: NEGATIVE /UL
SAO2 % BLDV: 75 % (ref 45–75)
SODIUM BLDV-SCNC: 135 MMOL/L (ref 136–145)
SODIUM SERPL-SCNC: 139 MMOL/L (ref 136–145)
SP GR UR STRIP.AUTO: 1.02
UROBILINOGEN UR STRIP.AUTO-MCNC: NORMAL MG/DL
WBC # BLD AUTO: 12.7 X10*3/UL (ref 4.4–11.3)

## 2024-11-05 PROCEDURE — 36415 COLL VENOUS BLD VENIPUNCTURE: CPT | Performed by: EMERGENCY MEDICINE

## 2024-11-05 PROCEDURE — 81003 URINALYSIS AUTO W/O SCOPE: CPT | Performed by: EMERGENCY MEDICINE

## 2024-11-05 PROCEDURE — 84132 ASSAY OF SERUM POTASSIUM: CPT | Performed by: EMERGENCY MEDICINE

## 2024-11-05 PROCEDURE — 93005 ELECTROCARDIOGRAM TRACING: CPT

## 2024-11-05 PROCEDURE — 85025 COMPLETE CBC W/AUTO DIFF WBC: CPT | Performed by: EMERGENCY MEDICINE

## 2024-11-05 PROCEDURE — 71045 X-RAY EXAM CHEST 1 VIEW: CPT

## 2024-11-05 PROCEDURE — 84484 ASSAY OF TROPONIN QUANT: CPT | Performed by: EMERGENCY MEDICINE

## 2024-11-05 PROCEDURE — 83735 ASSAY OF MAGNESIUM: CPT | Performed by: EMERGENCY MEDICINE

## 2024-11-05 PROCEDURE — 83880 ASSAY OF NATRIURETIC PEPTIDE: CPT | Performed by: EMERGENCY MEDICINE

## 2024-11-05 PROCEDURE — 71045 X-RAY EXAM CHEST 1 VIEW: CPT | Mod: FOREIGN READ | Performed by: RADIOLOGY

## 2024-11-05 PROCEDURE — 99283 EMERGENCY DEPT VISIT LOW MDM: CPT | Mod: 25

## 2024-11-05 RX ORDER — BENZONATATE 100 MG/1
100 CAPSULE ORAL EVERY 8 HOURS
Qty: 21 CAPSULE | Refills: 0 | Status: SHIPPED | OUTPATIENT
Start: 2024-11-05 | End: 2024-11-12

## 2024-11-05 ASSESSMENT — LIFESTYLE VARIABLES
HAVE YOU EVER FELT YOU SHOULD CUT DOWN ON YOUR DRINKING: NO
TOTAL SCORE: 0
EVER FELT BAD OR GUILTY ABOUT YOUR DRINKING: NO
EVER HAD A DRINK FIRST THING IN THE MORNING TO STEADY YOUR NERVES TO GET RID OF A HANGOVER: NO
HAVE PEOPLE ANNOYED YOU BY CRITICIZING YOUR DRINKING: NO

## 2024-11-05 ASSESSMENT — PAIN DESCRIPTION - FREQUENCY: FREQUENCY: CONSTANT/CONTINUOUS

## 2024-11-05 ASSESSMENT — PAIN DESCRIPTION - ORIENTATION: ORIENTATION: MID

## 2024-11-05 ASSESSMENT — PAIN - FUNCTIONAL ASSESSMENT: PAIN_FUNCTIONAL_ASSESSMENT: 0-10

## 2024-11-05 ASSESSMENT — PAIN DESCRIPTION - LOCATION: LOCATION: CHEST

## 2024-11-05 ASSESSMENT — COLUMBIA-SUICIDE SEVERITY RATING SCALE - C-SSRS
6. HAVE YOU EVER DONE ANYTHING, STARTED TO DO ANYTHING, OR PREPARED TO DO ANYTHING TO END YOUR LIFE?: NO
1. IN THE PAST MONTH, HAVE YOU WISHED YOU WERE DEAD OR WISHED YOU COULD GO TO SLEEP AND NOT WAKE UP?: NO
2. HAVE YOU ACTUALLY HAD ANY THOUGHTS OF KILLING YOURSELF?: NO

## 2024-11-05 ASSESSMENT — PAIN DESCRIPTION - DESCRIPTORS
DESCRIPTORS: TIGHTNESS
DESCRIPTORS: TIGHTNESS

## 2024-11-05 ASSESSMENT — PAIN SCALES - GENERAL: PAINLEVEL_OUTOF10: 4

## 2024-11-05 ASSESSMENT — PAIN DESCRIPTION - PAIN TYPE: TYPE: ACUTE PAIN

## 2024-11-05 NOTE — TELEPHONE ENCOUNTER
Patient called and left voicemail with concerns regarding swelling in the ankles and fee. Patient is wondering if respiratory virus medications are contributing to this swelling.   Routing to nurse pool, thank you.

## 2024-11-06 LAB
ATRIAL RATE: 70 BPM
HOLD SPECIMEN: NORMAL
P AXIS: 65 DEGREES
PR INTERVAL: 145 MS
Q ONSET: 252 MS
QRS COUNT: 11 BEATS
QRS DURATION: 85 MS
QT INTERVAL: 367 MS
QTC CALCULATION(BAZETT): 396 MS
QTC FREDERICIA: 386 MS
R AXIS: 69 DEGREES
T AXIS: 66 DEGREES
T OFFSET: 436 MS
VENTRICULAR RATE: 70 BPM

## 2024-11-06 NOTE — TELEPHONE ENCOUNTER
Called him back.  He went to ER and got checked out.  I let him know the prednisone can cause/contribute to swelling.  If he isn't having other CHF symptoms, increased shortness of breath or trouble laying flat (orthopnea), we are less worried about swelling.  He denies orthopnea. He probably needs the steroids for the inflammation with the respiratory illness.  He verbalized understanding.

## 2024-11-06 NOTE — ED PROVIDER NOTES
HPI   Chief Complaint   Patient presents with    Chest Pain       HPI: []  64-year-old white male history of advanced COPD on home oxygen 3 L around-the-clock, hypertension, CAD, coronary stents comes a cough.  Chest pain with coughing.  And leg swelling.  Cough nonproductive.  Hurts in the left lower chest when coughing.  Pain only with cough.  No shortness of breath no PND no orthopnea no trauma no fall no recent travel or hospitalization no hemoptysis no hematemesis no hematochezia no change in mental status no trauma no falls.  No syncope onus syncope.    Past history: Hypertension, COPD, CAD, coronary stents  Social: Patient denies current tobacco alcohol drug abuse.  REVIEW OF SYSTEMS:    GENERAL.: No weight loss, fatigue, anorexia, insomnia, fever.    EYES: No vision loss, double vision, drainage, eye pain.    ENT: No pharyngitis, dry mouth.    CARDIOPULMONARY: No chest pain, palpitations, syncope, near syncope. No shortness of breath, positive for cough, hemoptysis.    GI: No abdominal pain, change in bowel habits, melena, hematemesis, hematochezia, nausea, vomiting, diarrhea.    : No discharge, dysuria, frequency, urgency, hematuria.    MS: No limb pain, joint pain, joint swelling.  Positive for chest wall pain with coughing, positive leg swelling    SKIN: No rashes.    PSYCH: No depression, anxiety, suicidality, homicidality.    Review of systems is otherwise negative unless stated above or in history of present illness.  Social history, family history, allergies reviewed.  PHYSICAL EXAM:    GENERAL: Vitals noted, no distress. Alert and oriented  x 3. Non-toxic.      EENT: TMs clear. Posterior oropharynx unremarkable. No meningismus. No LAD.     NECK: Supple. Nontender. No midline tenderness.     CARDIAC: Regular, rate, rhythm. No murmurs rubs or gallops. No JVD    PULMONARY: Lungs clear bilaterally with good aeration.  Distant breath sounds poor air exchange due to advanced COPD chronic tachypnea no  wheezes rales or rhonchi. No respiratory distress.     ABDOMEN: Soft, nonsurgical. Nontender. No peritoneal signs. Normoactive bowel sounds. No pulsatile masses.     EXTREMITIES: No peripheral edema. Negative Homans bilaterally, no cords.  2+ bounding pulses well-perfused.    SKIN: No rash. Intact.     NEURO: No focal neurologic deficits, NIH score of 0. Cranial nerves normal as tested from II through XII.     MEDICAL DECISION MAKING:  EKG on my interpretation shows a normal sinus rhythm normal axis rate mid 60s with no acute ischemic changes.  CBC with shows microcytosis 12,000, chemistries LFTs are normal BNP is normal troponin x 2 is negative chest ray shows advanced COPD changes otherwise negative chest x-ray.    Treatment: IV established placed on a cardiac monitor.  ED course: He remained stable hemodynamic.  Remained euvolemic not requiring treatment.  Impression: #1 viral URI, #2 chest wall pain due to coughing,     Plan/MDM: 61-year-old white male history of hypertension advance COPD CAD coronary stents on home oxygen comes in with a cough which is nonproductive with chest wall pain with coughing.  Low concern for STEMI NSTEMI dissection pulm embolism pneumothorax or pneumonia.  Patient clinically euvolemic no JVD chest clear to auscultation no pedal edema BNP is normal reassuring EKG is nonischemic patient reassured will be discharged home with Tessalon Perles with a cough Tylenol for the chest wall pain close outpatient follow-up primary doctor with strict return precaution.              Patient History   Past Medical History:   Diagnosis Date    Chronic systolic (congestive) heart failure 09/22/2020    Chronic systolic heart failure    Myocardial infarction (Multi) 07/01/2024    Old myocardial infarction     Old non-ST elevation myocardial infarction (NSTEMI)    Old myocardial infarction 09/22/2020    History of ST elevation myocardial infarction (STEMI)    Old myocardial infarction     History of ST  elevation myocardial infarction (STEMI)    Personal history of other diseases of the circulatory system     History of heart disease    Personal history of other endocrine, nutritional and metabolic disease     History of hyperlipidemia    Presence of stent in anterior descending branch of left coronary artery 07/01/2024     Past Surgical History:   Procedure Laterality Date    MANDIBLE SURGERY  06/01/2016    Jaw Surgery    OTHER SURGICAL HISTORY  06/29/2020    Coronary artery stent placement    TONSILLECTOMY  06/01/2016    Tonsillectomy With Adenoidectomy    VASECTOMY  06/01/2016    Surgery Vas Deferens Vasectomy     No family history on file.  Social History     Tobacco Use    Smoking status: Every Day     Current packs/day: 1.50     Types: Cigarettes    Smokeless tobacco: Never   Substance Use Topics    Alcohol use: Not Currently    Drug use: Never       Physical Exam   ED Triage Vitals [11/05/24 1744]   Temperature Heart Rate Respirations BP   36.9 °C (98.5 °F) 71 20 139/69      Pulse Ox Temp src Heart Rate Source Patient Position   95 % -- -- --      BP Location FiO2 (%)     -- --       Physical Exam      ED Course & [facility]   ED Course as of 11/05/24 2054 Tue Nov 05, 2024 2053 EKG has no ischemic changes, troponin x 2 is negative, chest x-ray unremarkable advanced COPD changes CBC with differential shows a mild cytosis 12,000, hemoglobin stable, BNP is normal, venous gas shows no hypercapnia, clinically euvolemic, pain is clear he is mechanical due to coughing.  Patient is a viral URI will be discharged home with supportive care Tessalon Perles close outpatient follow-up with strict precautions low concern for STEMI NSTEMI dissection pulm embolism or congestive heart failure. [MT]      ED Course User Index  [MT] Quirino Fernandez MD         Diagnoses as of 11/05/24 2054   Viral upper respiratory illness   Acute cough                 No data recorded     Caro Coma Scale Score: 15 (11/05/24 1748 : Nhung  QUINN Wyatt)                           Medical Decision Making      Procedure  Procedures     Quirino Fernandez MD  11/05/24 7106

## 2024-11-11 ENCOUNTER — HOSPITAL ENCOUNTER (INPATIENT)
Facility: HOSPITAL | Age: 64
LOS: 3 days | Discharge: HOME | End: 2024-11-14
Attending: EMERGENCY MEDICINE | Admitting: INTERNAL MEDICINE
Payer: MEDICARE

## 2024-11-11 ENCOUNTER — APPOINTMENT (OUTPATIENT)
Dept: RADIOLOGY | Facility: HOSPITAL | Age: 64
End: 2024-11-11
Payer: MEDICARE

## 2024-11-11 ENCOUNTER — APPOINTMENT (OUTPATIENT)
Dept: CARDIOLOGY | Facility: HOSPITAL | Age: 64
End: 2024-11-11
Payer: MEDICARE

## 2024-11-11 DIAGNOSIS — J96.02 ACUTE RESPIRATORY FAILURE WITH HYPERCAPNIA (MULTI): Primary | ICD-10-CM

## 2024-11-11 PROBLEM — J44.1 CHRONIC OBSTRUCTIVE PULMONARY DISEASE WITH ACUTE EXACERBATION (MULTI): Status: ACTIVE | Noted: 2024-11-11

## 2024-11-11 PROBLEM — I25.10 CORONARY ARTERY DISEASE INVOLVING NATIVE CORONARY ARTERY OF NATIVE HEART WITHOUT ANGINA PECTORIS: Status: ACTIVE | Noted: 2024-11-11

## 2024-11-11 PROBLEM — I10 PRIMARY HYPERTENSION: Status: ACTIVE | Noted: 2024-11-11

## 2024-11-11 LAB
ALBUMIN SERPL BCP-MCNC: 3.9 G/DL (ref 3.4–5)
ALP SERPL-CCNC: 47 U/L (ref 33–136)
ALT SERPL W P-5'-P-CCNC: 28 U/L (ref 10–52)
ANION GAP BLDV CALCULATED.4IONS-SCNC: 5 MMOL/L (ref 10–25)
ANION GAP BLDV CALCULATED.4IONS-SCNC: 8 MMOL/L (ref 10–25)
ANION GAP SERPL CALC-SCNC: 10 MMOL/L (ref 10–20)
AST SERPL W P-5'-P-CCNC: 20 U/L (ref 9–39)
ATRIAL RATE: 91 BPM
BASE EXCESS BLDV CALC-SCNC: 6 MMOL/L (ref -2–3)
BASE EXCESS BLDV CALC-SCNC: 6.3 MMOL/L (ref -2–3)
BASOPHILS # BLD AUTO: 0.09 X10*3/UL (ref 0–0.1)
BASOPHILS NFR BLD AUTO: 0.6 %
BILIRUB SERPL-MCNC: 0.5 MG/DL (ref 0–1.2)
BNP SERPL-MCNC: 17 PG/ML (ref 0–99)
BODY TEMPERATURE: 37 DEGREES CELSIUS
BODY TEMPERATURE: 37 DEGREES CELSIUS
BUN SERPL-MCNC: 17 MG/DL (ref 6–23)
CA-I BLDV-SCNC: 1.24 MMOL/L (ref 1.1–1.33)
CA-I BLDV-SCNC: 1.25 MMOL/L (ref 1.1–1.33)
CALCIUM SERPL-MCNC: 9 MG/DL (ref 8.6–10.3)
CARDIAC TROPONIN I PNL SERPL HS: 6 NG/L (ref 0–20)
CARDIAC TROPONIN I PNL SERPL HS: 8 NG/L (ref 0–20)
CHLORIDE BLDV-SCNC: 96 MMOL/L (ref 98–107)
CHLORIDE BLDV-SCNC: 98 MMOL/L (ref 98–107)
CHLORIDE SERPL-SCNC: 98 MMOL/L (ref 98–107)
CO2 SERPL-SCNC: 33 MMOL/L (ref 21–32)
CREAT SERPL-MCNC: 1.19 MG/DL (ref 0.5–1.3)
EGFRCR SERPLBLD CKD-EPI 2021: 67 ML/MIN/1.73M*2
EOSINOPHIL # BLD AUTO: 0.85 X10*3/UL (ref 0–0.7)
EOSINOPHIL NFR BLD AUTO: 5.3 %
ERYTHROCYTE [DISTWIDTH] IN BLOOD BY AUTOMATED COUNT: 12.5 % (ref 11.5–14.5)
EST. AVERAGE GLUCOSE BLD GHB EST-MCNC: 157 MG/DL
FLUAV RNA RESP QL NAA+PROBE: NOT DETECTED
FLUBV RNA RESP QL NAA+PROBE: NOT DETECTED
GLUCOSE BLD MANUAL STRIP-MCNC: 183 MG/DL (ref 74–99)
GLUCOSE BLD MANUAL STRIP-MCNC: 208 MG/DL (ref 74–99)
GLUCOSE BLD MANUAL STRIP-MCNC: 283 MG/DL (ref 74–99)
GLUCOSE BLDV-MCNC: 167 MG/DL (ref 74–99)
GLUCOSE BLDV-MCNC: 174 MG/DL (ref 74–99)
GLUCOSE SERPL-MCNC: 172 MG/DL (ref 74–99)
HBA1C MFR BLD: 7.1 %
HCO3 BLDV-SCNC: 35.2 MMOL/L (ref 22–26)
HCO3 BLDV-SCNC: 36.3 MMOL/L (ref 22–26)
HCT VFR BLD AUTO: 45.5 % (ref 41–52)
HCT VFR BLD EST: 43 % (ref 41–52)
HCT VFR BLD EST: 44 % (ref 41–52)
HGB BLD-MCNC: 14.9 G/DL (ref 13.5–17.5)
HGB BLDV-MCNC: 14.2 G/DL (ref 13.5–17.5)
HGB BLDV-MCNC: 14.6 G/DL (ref 13.5–17.5)
IMM GRANULOCYTES # BLD AUTO: 0.24 X10*3/UL (ref 0–0.7)
IMM GRANULOCYTES NFR BLD AUTO: 1.5 % (ref 0–0.9)
INHALED O2 CONCENTRATION: 40 %
INHALED O2 CONCENTRATION: 44 %
LACTATE BLDV-SCNC: 1 MMOL/L (ref 0.4–2)
LACTATE BLDV-SCNC: 1.1 MMOL/L (ref 0.4–2)
LYMPHOCYTES # BLD AUTO: 4.29 X10*3/UL (ref 1.2–4.8)
LYMPHOCYTES NFR BLD AUTO: 26.7 %
MCH RBC QN AUTO: 31.8 PG (ref 26–34)
MCHC RBC AUTO-ENTMCNC: 32.7 G/DL (ref 32–36)
MCV RBC AUTO: 97 FL (ref 80–100)
MONOCYTES # BLD AUTO: 0.88 X10*3/UL (ref 0.1–1)
MONOCYTES NFR BLD AUTO: 5.5 %
NEUTROPHILS # BLD AUTO: 9.71 X10*3/UL (ref 1.2–7.7)
NEUTROPHILS NFR BLD AUTO: 60.4 %
NRBC BLD-RTO: 0 /100 WBCS (ref 0–0)
OXYHGB MFR BLDV: 69.5 % (ref 45–75)
OXYHGB MFR BLDV: 77.1 % (ref 45–75)
P AXIS: 80 DEGREES
PCO2 BLDV: 70 MM HG (ref 41–51)
PCO2 BLDV: 81 MM HG (ref 41–51)
PH BLDV: 7.26 PH (ref 7.33–7.43)
PH BLDV: 7.31 PH (ref 7.33–7.43)
PLATELET # BLD AUTO: 277 X10*3/UL (ref 150–450)
PO2 BLDV: 47 MM HG (ref 35–45)
PO2 BLDV: 54 MM HG (ref 35–45)
POTASSIUM BLDV-SCNC: 5.4 MMOL/L (ref 3.5–5.3)
POTASSIUM BLDV-SCNC: 5.5 MMOL/L (ref 3.5–5.3)
POTASSIUM SERPL-SCNC: 5.1 MMOL/L (ref 3.5–5.3)
PR INTERVAL: 156 MS
PROT SERPL-MCNC: 6.2 G/DL (ref 6.4–8.2)
Q ONSET: 253 MS
QRS COUNT: 14 BEATS
QRS DURATION: 85 MS
QT INTERVAL: 343 MS
QTC CALCULATION(BAZETT): 423 MS
QTC FREDERICIA: 394 MS
R AXIS: 83 DEGREES
RBC # BLD AUTO: 4.68 X10*6/UL (ref 4.5–5.9)
RSV RNA RESP QL NAA+PROBE: NOT DETECTED
SAO2 % BLDV: 71 % (ref 45–75)
SAO2 % BLDV: 79 % (ref 45–75)
SARS-COV-2 RNA RESP QL NAA+PROBE: NOT DETECTED
SODIUM BLDV-SCNC: 133 MMOL/L (ref 136–145)
SODIUM BLDV-SCNC: 135 MMOL/L (ref 136–145)
SODIUM SERPL-SCNC: 136 MMOL/L (ref 136–145)
T AXIS: 83 DEGREES
T OFFSET: 424 MS
VENTRICULAR RATE: 91 BPM
WBC # BLD AUTO: 16.1 X10*3/UL (ref 4.4–11.3)

## 2024-11-11 PROCEDURE — 36415 COLL VENOUS BLD VENIPUNCTURE: CPT | Performed by: NURSE PRACTITIONER

## 2024-11-11 PROCEDURE — 99291 CRITICAL CARE FIRST HOUR: CPT | Mod: 25 | Performed by: NURSE PRACTITIONER

## 2024-11-11 PROCEDURE — 96375 TX/PRO/DX INJ NEW DRUG ADDON: CPT

## 2024-11-11 PROCEDURE — 85025 COMPLETE CBC W/AUTO DIFF WBC: CPT | Performed by: NURSE PRACTITIONER

## 2024-11-11 PROCEDURE — 96372 THER/PROPH/DIAG INJ SC/IM: CPT | Performed by: NURSE PRACTITIONER

## 2024-11-11 PROCEDURE — 36415 COLL VENOUS BLD VENIPUNCTURE: CPT | Performed by: EMERGENCY MEDICINE

## 2024-11-11 PROCEDURE — 2500000001 HC RX 250 WO HCPCS SELF ADMINISTERED DRUGS (ALT 637 FOR MEDICARE OP): Performed by: NURSE PRACTITIONER

## 2024-11-11 PROCEDURE — 96365 THER/PROPH/DIAG IV INF INIT: CPT | Mod: 59

## 2024-11-11 PROCEDURE — 87040 BLOOD CULTURE FOR BACTERIA: CPT | Mod: PORLAB | Performed by: EMERGENCY MEDICINE

## 2024-11-11 PROCEDURE — 2500000002 HC RX 250 W HCPCS SELF ADMINISTERED DRUGS (ALT 637 FOR MEDICARE OP, ALT 636 FOR OP/ED): Performed by: NURSE PRACTITIONER

## 2024-11-11 PROCEDURE — 96376 TX/PRO/DX INJ SAME DRUG ADON: CPT

## 2024-11-11 PROCEDURE — 84132 ASSAY OF SERUM POTASSIUM: CPT | Performed by: EMERGENCY MEDICINE

## 2024-11-11 PROCEDURE — 2500000004 HC RX 250 GENERAL PHARMACY W/ HCPCS (ALT 636 FOR OP/ED): Performed by: NURSE PRACTITIONER

## 2024-11-11 PROCEDURE — 2060000001 HC INTERMEDIATE ICU ROOM DAILY

## 2024-11-11 PROCEDURE — 84484 ASSAY OF TROPONIN QUANT: CPT | Performed by: NURSE PRACTITIONER

## 2024-11-11 PROCEDURE — 5A09357 ASSISTANCE WITH RESPIRATORY VENTILATION, LESS THAN 24 CONSECUTIVE HOURS, CONTINUOUS POSITIVE AIRWAY PRESSURE: ICD-10-PCS | Performed by: EMERGENCY MEDICINE

## 2024-11-11 PROCEDURE — 82947 ASSAY GLUCOSE BLOOD QUANT: CPT

## 2024-11-11 PROCEDURE — 71045 X-RAY EXAM CHEST 1 VIEW: CPT

## 2024-11-11 PROCEDURE — 83036 HEMOGLOBIN GLYCOSYLATED A1C: CPT | Performed by: NURSE PRACTITIONER

## 2024-11-11 PROCEDURE — 2500000004 HC RX 250 GENERAL PHARMACY W/ HCPCS (ALT 636 FOR OP/ED): Performed by: EMERGENCY MEDICINE

## 2024-11-11 PROCEDURE — 94640 AIRWAY INHALATION TREATMENT: CPT

## 2024-11-11 PROCEDURE — 99292 CRITICAL CARE ADDL 30 MIN: CPT

## 2024-11-11 PROCEDURE — 99223 1ST HOSP IP/OBS HIGH 75: CPT | Performed by: NURSE PRACTITIONER

## 2024-11-11 PROCEDURE — 83880 ASSAY OF NATRIURETIC PEPTIDE: CPT | Performed by: NURSE PRACTITIONER

## 2024-11-11 PROCEDURE — 71045 X-RAY EXAM CHEST 1 VIEW: CPT | Mod: FOREIGN READ | Performed by: RADIOLOGY

## 2024-11-11 PROCEDURE — 96367 TX/PROPH/DG ADDL SEQ IV INF: CPT

## 2024-11-11 PROCEDURE — 93005 ELECTROCARDIOGRAM TRACING: CPT

## 2024-11-11 PROCEDURE — 84132 ASSAY OF SERUM POTASSIUM: CPT | Performed by: NURSE PRACTITIONER

## 2024-11-11 PROCEDURE — 99291 CRITICAL CARE FIRST HOUR: CPT | Mod: 25 | Performed by: EMERGENCY MEDICINE

## 2024-11-11 PROCEDURE — 94660 CPAP INITIATION&MGMT: CPT

## 2024-11-11 PROCEDURE — 87637 SARSCOV2&INF A&B&RSV AMP PRB: CPT | Performed by: NURSE PRACTITIONER

## 2024-11-11 RX ORDER — IPRATROPIUM BROMIDE AND ALBUTEROL SULFATE 2.5; .5 MG/3ML; MG/3ML
3 SOLUTION RESPIRATORY (INHALATION)
Status: DISCONTINUED | OUTPATIENT
Start: 2024-11-11 | End: 2024-11-14 | Stop reason: HOSPADM

## 2024-11-11 RX ORDER — ALBUTEROL SULFATE 90 UG/1
2 INHALANT RESPIRATORY (INHALATION) EVERY 6 HOURS PRN
COMMUNITY
End: 2024-11-18 | Stop reason: SDUPTHER

## 2024-11-11 RX ORDER — NAPROXEN SODIUM 220 MG/1
81 TABLET, FILM COATED ORAL DAILY
Status: DISCONTINUED | OUTPATIENT
Start: 2024-11-11 | End: 2024-11-14 | Stop reason: HOSPADM

## 2024-11-11 RX ORDER — CARVEDILOL 6.25 MG/1
6.25 TABLET ORAL 2 TIMES DAILY
Status: DISCONTINUED | OUTPATIENT
Start: 2024-11-11 | End: 2024-11-14 | Stop reason: HOSPADM

## 2024-11-11 RX ORDER — CARVEDILOL 6.25 MG/1
6.25 TABLET ORAL 2 TIMES DAILY
COMMUNITY
End: 2024-11-24 | Stop reason: HOSPADM

## 2024-11-11 RX ORDER — CEFTRIAXONE 2 G/50ML
2 INJECTION, SOLUTION INTRAVENOUS ONCE
Status: COMPLETED | OUTPATIENT
Start: 2024-11-11 | End: 2024-11-11

## 2024-11-11 RX ORDER — IPRATROPIUM BROMIDE AND ALBUTEROL SULFATE 2.5; .5 MG/3ML; MG/3ML
3 SOLUTION RESPIRATORY (INHALATION) EVERY 6 HOURS PRN
COMMUNITY

## 2024-11-11 RX ORDER — INSULIN LISPRO 100 [IU]/ML
0-5 INJECTION, SOLUTION INTRAVENOUS; SUBCUTANEOUS
Status: DISCONTINUED | OUTPATIENT
Start: 2024-11-11 | End: 2024-11-14 | Stop reason: HOSPADM

## 2024-11-11 RX ORDER — ONDANSETRON 4 MG/1
4 TABLET, ORALLY DISINTEGRATING ORAL EVERY 8 HOURS PRN
Status: DISCONTINUED | OUTPATIENT
Start: 2024-11-11 | End: 2024-11-14 | Stop reason: HOSPADM

## 2024-11-11 RX ORDER — DEXTROSE 50 % IN WATER (D50W) INTRAVENOUS SYRINGE
25
Status: DISCONTINUED | OUTPATIENT
Start: 2024-11-11 | End: 2024-11-14 | Stop reason: HOSPADM

## 2024-11-11 RX ORDER — IPRATROPIUM BROMIDE AND ALBUTEROL SULFATE 2.5; .5 MG/3ML; MG/3ML
3 SOLUTION RESPIRATORY (INHALATION)
Status: DISCONTINUED | OUTPATIENT
Start: 2024-11-11 | End: 2024-11-11

## 2024-11-11 RX ORDER — ATORVASTATIN CALCIUM 40 MG/1
80 TABLET, FILM COATED ORAL DAILY
Status: DISCONTINUED | OUTPATIENT
Start: 2024-11-11 | End: 2024-11-14 | Stop reason: HOSPADM

## 2024-11-11 RX ORDER — PANTOPRAZOLE SODIUM 40 MG/10ML
40 INJECTION, POWDER, LYOPHILIZED, FOR SOLUTION INTRAVENOUS
Status: DISCONTINUED | OUTPATIENT
Start: 2024-11-12 | End: 2024-11-14 | Stop reason: HOSPADM

## 2024-11-11 RX ORDER — BENZONATATE 100 MG/1
100 CAPSULE ORAL 3 TIMES DAILY PRN
COMMUNITY

## 2024-11-11 RX ORDER — IPRATROPIUM BROMIDE AND ALBUTEROL SULFATE 2.5; .5 MG/3ML; MG/3ML
3 SOLUTION RESPIRATORY (INHALATION) EVERY 10 MIN PRN
Status: DISCONTINUED | OUTPATIENT
Start: 2024-11-11 | End: 2024-11-14 | Stop reason: HOSPADM

## 2024-11-11 RX ORDER — HYDRALAZINE HYDROCHLORIDE 25 MG/1
25 TABLET, FILM COATED ORAL 3 TIMES DAILY PRN
COMMUNITY
End: 2024-11-24 | Stop reason: HOSPADM

## 2024-11-11 RX ORDER — POLYETHYLENE GLYCOL 3350 17 G/17G
17 POWDER, FOR SOLUTION ORAL DAILY
Status: DISCONTINUED | OUTPATIENT
Start: 2024-11-11 | End: 2024-11-14 | Stop reason: HOSPADM

## 2024-11-11 RX ORDER — ENOXAPARIN SODIUM 100 MG/ML
40 INJECTION SUBCUTANEOUS EVERY 24 HOURS
Status: DISCONTINUED | OUTPATIENT
Start: 2024-11-11 | End: 2024-11-14 | Stop reason: HOSPADM

## 2024-11-11 RX ORDER — ONDANSETRON HYDROCHLORIDE 2 MG/ML
4 INJECTION, SOLUTION INTRAVENOUS ONCE
Status: COMPLETED | OUTPATIENT
Start: 2024-11-11 | End: 2024-11-11

## 2024-11-11 RX ORDER — DEXTROSE 50 % IN WATER (D50W) INTRAVENOUS SYRINGE
12.5
Status: DISCONTINUED | OUTPATIENT
Start: 2024-11-11 | End: 2024-11-14 | Stop reason: HOSPADM

## 2024-11-11 RX ORDER — GUAIFENESIN 600 MG/1
600 TABLET, EXTENDED RELEASE ORAL EVERY 12 HOURS PRN
Status: DISCONTINUED | OUTPATIENT
Start: 2024-11-11 | End: 2024-11-14 | Stop reason: HOSPADM

## 2024-11-11 RX ORDER — BISACODYL 10 MG/1
10 SUPPOSITORY RECTAL DAILY PRN
Status: DISCONTINUED | OUTPATIENT
Start: 2024-11-11 | End: 2024-11-14 | Stop reason: HOSPADM

## 2024-11-11 RX ORDER — ATORVASTATIN CALCIUM 80 MG/1
80 TABLET, FILM COATED ORAL DAILY
COMMUNITY
End: 2024-11-24 | Stop reason: HOSPADM

## 2024-11-11 RX ORDER — TALC
3 POWDER (GRAM) TOPICAL NIGHTLY PRN
Status: DISCONTINUED | OUTPATIENT
Start: 2024-11-11 | End: 2024-11-14 | Stop reason: HOSPADM

## 2024-11-11 RX ORDER — LISINOPRIL 5 MG/1
5 TABLET ORAL DAILY
Status: DISCONTINUED | OUTPATIENT
Start: 2024-11-11 | End: 2024-11-14 | Stop reason: HOSPADM

## 2024-11-11 RX ORDER — IPRATROPIUM BROMIDE AND ALBUTEROL SULFATE 2.5; .5 MG/3ML; MG/3ML
3 SOLUTION RESPIRATORY (INHALATION) ONCE
Status: COMPLETED | OUTPATIENT
Start: 2024-11-11 | End: 2024-11-11

## 2024-11-11 RX ORDER — ALBUTEROL SULFATE 0.83 MG/ML
2.5 SOLUTION RESPIRATORY (INHALATION) ONCE
Status: COMPLETED | OUTPATIENT
Start: 2024-11-11 | End: 2024-11-11

## 2024-11-11 RX ORDER — LISINOPRIL 5 MG/1
TABLET ORAL DAILY
COMMUNITY
End: 2024-11-24 | Stop reason: HOSPADM

## 2024-11-11 RX ORDER — ONDANSETRON HYDROCHLORIDE 2 MG/ML
4 INJECTION, SOLUTION INTRAVENOUS EVERY 8 HOURS PRN
Status: DISCONTINUED | OUTPATIENT
Start: 2024-11-11 | End: 2024-11-14 | Stop reason: HOSPADM

## 2024-11-11 RX ORDER — BISACODYL 5 MG
10 TABLET, DELAYED RELEASE (ENTERIC COATED) ORAL DAILY PRN
Status: DISCONTINUED | OUTPATIENT
Start: 2024-11-11 | End: 2024-11-14 | Stop reason: HOSPADM

## 2024-11-11 RX ORDER — PANTOPRAZOLE SODIUM 40 MG/1
40 TABLET, DELAYED RELEASE ORAL
Status: DISCONTINUED | OUTPATIENT
Start: 2024-11-12 | End: 2024-11-14 | Stop reason: HOSPADM

## 2024-11-11 SDOH — ECONOMIC STABILITY: INCOME INSECURITY: IN THE PAST 12 MONTHS HAS THE ELECTRIC, GAS, OIL, OR WATER COMPANY THREATENED TO SHUT OFF SERVICES IN YOUR HOME?: NO

## 2024-11-11 SDOH — SOCIAL STABILITY: SOCIAL NETWORK: HOW OFTEN DO YOU ATTEND CHURCH OR RELIGIOUS SERVICES?: NEVER

## 2024-11-11 SDOH — ECONOMIC STABILITY: FOOD INSECURITY: WITHIN THE PAST 12 MONTHS, YOU WORRIED THAT YOUR FOOD WOULD RUN OUT BEFORE YOU GOT THE MONEY TO BUY MORE.: NEVER TRUE

## 2024-11-11 SDOH — SOCIAL STABILITY: SOCIAL NETWORK
DO YOU BELONG TO ANY CLUBS OR ORGANIZATIONS SUCH AS CHURCH GROUPS, UNIONS, FRATERNAL OR ATHLETIC GROUPS, OR SCHOOL GROUPS?: NO

## 2024-11-11 SDOH — HEALTH STABILITY: PHYSICAL HEALTH: ON AVERAGE, HOW MANY DAYS PER WEEK DO YOU ENGAGE IN MODERATE TO STRENUOUS EXERCISE (LIKE A BRISK WALK)?: 0 DAYS

## 2024-11-11 SDOH — SOCIAL STABILITY: SOCIAL INSECURITY
WITHIN THE LAST YEAR, HAVE YOU BEEN KICKED, HIT, SLAPPED, OR OTHERWISE PHYSICALLY HURT BY YOUR PARTNER OR EX-PARTNER?: NO

## 2024-11-11 SDOH — HEALTH STABILITY: PHYSICAL HEALTH: ON AVERAGE, HOW MANY MINUTES DO YOU ENGAGE IN EXERCISE AT THIS LEVEL?: 0 MIN

## 2024-11-11 SDOH — SOCIAL STABILITY: SOCIAL INSECURITY: ARE YOU MARRIED, WIDOWED, DIVORCED, SEPARATED, NEVER MARRIED, OR LIVING WITH A PARTNER?: MARRIED

## 2024-11-11 SDOH — SOCIAL STABILITY: SOCIAL INSECURITY: WERE YOU ABLE TO COMPLETE ALL THE BEHAVIORAL HEALTH SCREENINGS?: YES

## 2024-11-11 SDOH — ECONOMIC STABILITY: FOOD INSECURITY: WITHIN THE PAST 12 MONTHS, THE FOOD YOU BOUGHT JUST DIDN'T LAST AND YOU DIDN'T HAVE MONEY TO GET MORE.: NEVER TRUE

## 2024-11-11 SDOH — SOCIAL STABILITY: SOCIAL INSECURITY: WITHIN THE LAST YEAR, HAVE YOU BEEN AFRAID OF YOUR PARTNER OR EX-PARTNER?: NO

## 2024-11-11 SDOH — ECONOMIC STABILITY: TRANSPORTATION INSECURITY: IN THE PAST 12 MONTHS, HAS LACK OF TRANSPORTATION KEPT YOU FROM MEDICAL APPOINTMENTS OR FROM GETTING MEDICATIONS?: NO

## 2024-11-11 SDOH — SOCIAL STABILITY: SOCIAL INSECURITY: WITHIN THE LAST YEAR, HAVE YOU BEEN HUMILIATED OR EMOTIONALLY ABUSED IN OTHER WAYS BY YOUR PARTNER OR EX-PARTNER?: NO

## 2024-11-11 SDOH — HEALTH STABILITY: MENTAL HEALTH
DO YOU FEEL STRESS - TENSE, RESTLESS, NERVOUS, OR ANXIOUS, OR UNABLE TO SLEEP AT NIGHT BECAUSE YOUR MIND IS TROUBLED ALL THE TIME - THESE DAYS?: NOT AT ALL

## 2024-11-11 SDOH — ECONOMIC STABILITY: HOUSING INSECURITY: IN THE LAST 12 MONTHS, WAS THERE A TIME WHEN YOU WERE NOT ABLE TO PAY THE MORTGAGE OR RENT ON TIME?: NO

## 2024-11-11 SDOH — ECONOMIC STABILITY: HOUSING INSECURITY: AT ANY TIME IN THE PAST 12 MONTHS, WERE YOU HOMELESS OR LIVING IN A SHELTER (INCLUDING NOW)?: NO

## 2024-11-11 SDOH — SOCIAL STABILITY: SOCIAL NETWORK: HOW OFTEN DO YOU ATTEND MEETINGS OF THE CLUBS OR ORGANIZATIONS YOU BELONG TO?: NEVER

## 2024-11-11 SDOH — SOCIAL STABILITY: SOCIAL INSECURITY
WITHIN THE LAST YEAR, HAVE YOU BEEN RAPED OR FORCED TO HAVE ANY KIND OF SEXUAL ACTIVITY BY YOUR PARTNER OR EX-PARTNER?: NO

## 2024-11-11 SDOH — ECONOMIC STABILITY: FOOD INSECURITY: HOW HARD IS IT FOR YOU TO PAY FOR THE VERY BASICS LIKE FOOD, HOUSING, MEDICAL CARE, AND HEATING?: NOT VERY HARD

## 2024-11-11 SDOH — HEALTH STABILITY: PHYSICAL HEALTH
HOW OFTEN DO YOU NEED TO HAVE SOMEONE HELP YOU WHEN YOU READ INSTRUCTIONS, PAMPHLETS, OR OTHER WRITTEN MATERIAL FROM YOUR DOCTOR OR PHARMACY?: NEVER

## 2024-11-11 SDOH — SOCIAL STABILITY: SOCIAL NETWORK: IN A TYPICAL WEEK, HOW MANY TIMES DO YOU TALK ON THE PHONE WITH FAMILY, FRIENDS, OR NEIGHBORS?: ONCE A WEEK

## 2024-11-11 SDOH — SOCIAL STABILITY: SOCIAL INSECURITY: HAVE YOU HAD THOUGHTS OF HARMING ANYONE ELSE?: NO

## 2024-11-11 SDOH — SOCIAL STABILITY: SOCIAL INSECURITY: ABUSE: ADULT

## 2024-11-11 SDOH — ECONOMIC STABILITY: HOUSING INSECURITY: IN THE PAST 12 MONTHS, HOW MANY TIMES HAVE YOU MOVED WHERE YOU WERE LIVING?: 1

## 2024-11-11 SDOH — SOCIAL STABILITY: SOCIAL NETWORK: HOW OFTEN DO YOU GET TOGETHER WITH FRIENDS OR RELATIVES?: ONCE A WEEK

## 2024-11-11 ASSESSMENT — LIFESTYLE VARIABLES
HOW MANY STANDARD DRINKS CONTAINING ALCOHOL DO YOU HAVE ON A TYPICAL DAY: PATIENT DOES NOT DRINK
HOW OFTEN DO YOU HAVE 6 OR MORE DRINKS ON ONE OCCASION: NEVER
HOW OFTEN DO YOU HAVE A DRINK CONTAINING ALCOHOL: NEVER
AUDIT-C TOTAL SCORE: 0
SKIP TO QUESTIONS 9-10: 1
AUDIT-C TOTAL SCORE: 0

## 2024-11-11 ASSESSMENT — COGNITIVE AND FUNCTIONAL STATUS - GENERAL
MOBILITY SCORE: 24
PATIENT BASELINE BEDBOUND: NO
DAILY ACTIVITIY SCORE: 24
DAILY ACTIVITIY SCORE: 24
MOBILITY SCORE: 24

## 2024-11-11 ASSESSMENT — ACTIVITIES OF DAILY LIVING (ADL)
GROOMING: INDEPENDENT
PATIENT'S MEMORY ADEQUATE TO SAFELY COMPLETE DAILY ACTIVITIES?: YES
DRESSING YOURSELF: INDEPENDENT
FEEDING YOURSELF: INDEPENDENT
WALKS IN HOME: INDEPENDENT
LACK_OF_TRANSPORTATION: NO
ADEQUATE_TO_COMPLETE_ADL: YES
TOILETING: INDEPENDENT
HEARING - LEFT EAR: FUNCTIONAL
JUDGMENT_ADEQUATE_SAFELY_COMPLETE_DAILY_ACTIVITIES: YES
HEARING - RIGHT EAR: FUNCTIONAL
BATHING: INDEPENDENT

## 2024-11-11 ASSESSMENT — ENCOUNTER SYMPTOMS
NUMBNESS: 0
NECK PAIN: 0
FLANK PAIN: 0
HEMATURIA: 0
ADENOPATHY: 0
VOMITING: 0
UNEXPECTED WEIGHT CHANGE: 0
VOICE CHANGE: 0
PALPITATIONS: 0
SLEEP DISTURBANCE: 0
DIARRHEA: 0
CHILLS: 0
HEADACHES: 0
DIFFICULTY URINATING: 0
FREQUENCY: 0
TROUBLE SWALLOWING: 0
TREMORS: 0
BRUISES/BLEEDS EASILY: 0
ABDOMINAL DISTENTION: 0
LIGHT-HEADEDNESS: 0
POLYDIPSIA: 0
COLOR CHANGE: 0
CONSTIPATION: 0
NAUSEA: 1
PHOTOPHOBIA: 0
CHOKING: 0
SHORTNESS OF BREATH: 1
FATIGUE: 0
HALLUCINATIONS: 0
WOUND: 0
DYSURIA: 0
BLOOD IN STOOL: 0
DYSPHORIC MOOD: 0
WEAKNESS: 0
APPETITE CHANGE: 0
APNEA: 0
POLYPHAGIA: 0
BACK PAIN: 0
CONFUSION: 0
NERVOUS/ANXIOUS: 0
EYE ITCHING: 0
ARTHRALGIAS: 0
DIZZINESS: 0
NECK STIFFNESS: 0
FEVER: 0
ABDOMINAL PAIN: 0
SPEECH DIFFICULTY: 0
MYALGIAS: 0
SINUS PAIN: 0
EYE DISCHARGE: 0
SEIZURES: 0
WHEEZING: 1
SORE THROAT: 0
CHEST TIGHTNESS: 0
EYE PAIN: 0
COUGH: 1

## 2024-11-11 ASSESSMENT — PAIN SCALES - GENERAL
PAINLEVEL_OUTOF10: 0 - NO PAIN
PAINLEVEL_OUTOF10: 3

## 2024-11-11 ASSESSMENT — HEART SCORE
HISTORY: SLIGHTLY SUSPICIOUS
ECG: NORMAL
RISK FACTORS: >2 RISK FACTORS OR HX OF ATHEROSCLEROTIC DISEASE
TROPONIN: LESS THAN OR EQUAL TO NORMAL LIMIT
HEART SCORE: 4
AGE: 65+

## 2024-11-11 ASSESSMENT — PAIN - FUNCTIONAL ASSESSMENT
PAIN_FUNCTIONAL_ASSESSMENT: 0-10
PAIN_FUNCTIONAL_ASSESSMENT: 0-10

## 2024-11-11 ASSESSMENT — PAIN DESCRIPTION - DESCRIPTORS: DESCRIPTORS: DULL

## 2024-11-11 ASSESSMENT — COLUMBIA-SUICIDE SEVERITY RATING SCALE - C-SSRS
6. HAVE YOU EVER DONE ANYTHING, STARTED TO DO ANYTHING, OR PREPARED TO DO ANYTHING TO END YOUR LIFE?: NO
1. IN THE PAST MONTH, HAVE YOU WISHED YOU WERE DEAD OR WISHED YOU COULD GO TO SLEEP AND NOT WAKE UP?: NO

## 2024-11-11 ASSESSMENT — PATIENT HEALTH QUESTIONNAIRE - PHQ9
2. FEELING DOWN, DEPRESSED OR HOPELESS: NOT AT ALL
1. LITTLE INTEREST OR PLEASURE IN DOING THINGS: NOT AT ALL
SUM OF ALL RESPONSES TO PHQ9 QUESTIONS 1 & 2: 0

## 2024-11-11 NOTE — H&P
History Obtained From: Patient    History Of Present Illness:  Darron Dumas is a 64 y.o. male with PMHx s/f coronary artery disease, hypertension, dyslipidemia, COPD, emphysema presenting with shortness of breath.  Patient had recently been treated for COPD exacerbation with steroids and had completed steroid taper.  Patient has progressively come down more more short of breath.  He does have some cough no purulent sputum he denies any fevers chills or rigors he denies any urinary symptoms.  No report of chest pain or palpitations.  Patient originally presented afebrile at 98.9 heart rate 93 he was tachypneic at 24 blood pressure 135/86 SpO2 was 91% on room air.  Venous blood gas showed the patient to have a significantly elevated pCO2 of 81 pO2 was 54 pH 7.26 patient was subsequently placed on BiPAP with improvement in his pCO2 to 70 and pH 7.31 patient was found to have leukocytosis with white blood cell count 16.1 hemoglobin 14.9 hematocrit 45.5 platelets 277.  Chemistry panel showed glucose elevated at 172 otherwise unremarkable the BN peptide was not elevated troponin was not elevated given the leukocytosis a chest x-ray was done. Showing no evidence of pneumonia no acute radiographic chest findings.  Patient was started on Rocephin and Zithromax due to the elevated white blood cell count given dose of IV steroids and referred for admission.      ED Course:  ED Course as of 11/11/24 0907 Mon Nov 11, 2024   0726 Blood Gas Venous Full Panel(!!)  Called for BiPAP [WJ]   0729 WBC(!): 16.1  Sepsis protocol initiated [WJ]   0735 15/5, rate 18 [WJ]   0745 Patient twelve-lead EKG interpreted by myself shows sinus rhythm, vent rate of 91, normal axis, normal OR interval, normal QRS duration, normal QT, no STEMI. [WJ]      ED Course User Index  [WJ] Arron aMce DO         Diagnoses as of 11/11/24 0907   Acute respiratory failure with hypercapnia (Multi)     Relevant Results  Results for orders placed or performed  during the hospital encounter of 11/11/24 (from the past 24 hours)   CBC and Auto Differential   Result Value Ref Range    WBC 16.1 (H) 4.4 - 11.3 x10*3/uL    nRBC 0.0 0.0 - 0.0 /100 WBCs    RBC 4.68 4.50 - 5.90 x10*6/uL    Hemoglobin 14.9 13.5 - 17.5 g/dL    Hematocrit 45.5 41.0 - 52.0 %    MCV 97 80 - 100 fL    MCH 31.8 26.0 - 34.0 pg    MCHC 32.7 32.0 - 36.0 g/dL    RDW 12.5 11.5 - 14.5 %    Platelets 277 150 - 450 x10*3/uL    Neutrophils % 60.4 40.0 - 80.0 %    Immature Granulocytes %, Automated 1.5 (H) 0.0 - 0.9 %    Lymphocytes % 26.7 13.0 - 44.0 %    Monocytes % 5.5 2.0 - 10.0 %    Eosinophils % 5.3 0.0 - 6.0 %    Basophils % 0.6 0.0 - 2.0 %    Neutrophils Absolute 9.71 (H) 1.20 - 7.70 x10*3/uL    Immature Granulocytes Absolute, Automated 0.24 0.00 - 0.70 x10*3/uL    Lymphocytes Absolute 4.29 1.20 - 4.80 x10*3/uL    Monocytes Absolute 0.88 0.10 - 1.00 x10*3/uL    Eosinophils Absolute 0.85 (H) 0.00 - 0.70 x10*3/uL    Basophils Absolute 0.09 0.00 - 0.10 x10*3/uL   Comprehensive metabolic panel   Result Value Ref Range    Glucose 172 (H) 74 - 99 mg/dL    Sodium 136 136 - 145 mmol/L    Potassium 5.1 3.5 - 5.3 mmol/L    Chloride 98 98 - 107 mmol/L    Bicarbonate 33 (H) 21 - 32 mmol/L    Anion Gap 10 10 - 20 mmol/L    Urea Nitrogen 17 6 - 23 mg/dL    Creatinine 1.19 0.50 - 1.30 mg/dL    eGFR 67 >60 mL/min/1.73m*2    Calcium 9.0 8.6 - 10.3 mg/dL    Albumin 3.9 3.4 - 5.0 g/dL    Alkaline Phosphatase 47 33 - 136 U/L    Total Protein 6.2 (L) 6.4 - 8.2 g/dL    AST 20 9 - 39 U/L    Bilirubin, Total 0.5 0.0 - 1.2 mg/dL    ALT 28 10 - 52 U/L   B-Type Natriuretic Peptide   Result Value Ref Range    BNP 17 0 - 99 pg/mL   Blood Gas Venous Full Panel   Result Value Ref Range    POCT pH, Venous 7.26 (L) 7.33 - 7.43 pH    POCT pCO2, Venous 81 (HH) 41 - 51 mm Hg    POCT pO2, Venous 54 (H) 35 - 45 mm Hg    POCT SO2, Venous 79 (H) 45 - 75 %    POCT Oxy Hemoglobin, Venous 77.1 (H) 45.0 - 75.0 %    POCT Hematocrit Calculated, Venous  44.0 41.0 - 52.0 %    POCT Sodium, Venous 135 (L) 136 - 145 mmol/L    POCT Potassium, Venous 5.4 (H) 3.5 - 5.3 mmol/L    POCT Chloride, Venous 96 (L) 98 - 107 mmol/L    POCT Ionized Calicum, Venous 1.25 1.10 - 1.33 mmol/L    POCT Glucose, Venous 174 (H) 74 - 99 mg/dL    POCT Lactate, Venous 1.1 0.4 - 2.0 mmol/L    POCT Base Excess, Venous 6.0 (H) -2.0 - 3.0 mmol/L    POCT HCO3 Calculated, Venous 36.3 (H) 22.0 - 26.0 mmol/L    POCT Hemoglobin, Venous 14.6 13.5 - 17.5 g/dL    POCT Anion Gap, Venous 8.0 (L) 10.0 - 25.0 mmol/L    Patient Temperature 37.0 degrees Celsius    FiO2 44 %   Troponin I, High Sensitivity, Initial   Result Value Ref Range    Troponin I, High Sensitivity 6 0 - 20 ng/L   Sars-CoV-2 PCR   Result Value Ref Range    Coronavirus 2019, PCR Not Detected Not Detected   Influenza A, and B PCR   Result Value Ref Range    Flu A Result Not Detected Not Detected    Flu B Result Not Detected Not Detected   RSV PCR   Result Value Ref Range    RSV PCR Not Detected Not Detected   BLOOD GAS VENOUS FULL PANEL   Result Value Ref Range    POCT pH, Venous 7.31 (L) 7.33 - 7.43 pH    POCT pCO2, Venous 70 (HH) 41 - 51 mm Hg    POCT pO2, Venous 47 (H) 35 - 45 mm Hg    POCT SO2, Venous 71 45 - 75 %    POCT Oxy Hemoglobin, Venous 69.5 45.0 - 75.0 %    POCT Hematocrit Calculated, Venous 43.0 41.0 - 52.0 %    POCT Sodium, Venous 133 (L) 136 - 145 mmol/L    POCT Potassium, Venous 5.5 (H) 3.5 - 5.3 mmol/L    POCT Chloride, Venous 98 98 - 107 mmol/L    POCT Ionized Calicum, Venous 1.24 1.10 - 1.33 mmol/L    POCT Glucose, Venous 167 (H) 74 - 99 mg/dL    POCT Lactate, Venous 1.0 0.4 - 2.0 mmol/L    POCT Base Excess, Venous 6.3 (H) -2.0 - 3.0 mmol/L    POCT HCO3 Calculated, Venous 35.2 (H) 22.0 - 26.0 mmol/L    POCT Hemoglobin, Venous 14.2 13.5 - 17.5 g/dL    POCT Anion Gap, Venous 5.0 (L) 10.0 - 25.0 mmol/L    Patient Temperature 37.0 degrees Celsius    FiO2 40 %      XR chest 1 view    Result Date: 11/11/2024  STUDY: Chest  Radiograph;  11/11/2024 at 7:28 AM INDICATION: Shortness of breath. COMPARISON: None Available ACCESSION NUMBER(S): [id number] ORDERING CLINICIAN: ALFONSO LANE TECHNIQUE:  Frontal chest was obtained at 0728 hours. FINDINGS: Flattening of the hemidiaphragms suggests COPD. Cardiac silhouette normal in size. No significant pulmonary vascular congestion. No significant pleural effusion. No visible pneumothorax.     No acute radiographic chest finding. Signed by Emmanuel Ward MD     Scheduled medications:  azithromycin, 500 mg, intravenous, Once  [START ON 11/12/2024] azithromycin, 500 mg, intravenous, q24h  enoxaparin, 40 mg, subcutaneous, q24h  insulin lispro, 0-5 Units, subcutaneous, TID AC  ipratropium-albuteroL, 3 mL, nebulization, TID  methylPREDNISolone sodium succinate (PF), 40 mg, intravenous, q8h  ondansetron, 4 mg, intravenous, Once  [START ON 11/12/2024] pantoprazole, 40 mg, oral, Daily before breakfast   Or  [START ON 11/12/2024] pantoprazole, 40 mg, intravenous, Daily before breakfast  polyethylene glycol, 17 g, oral, Daily      Continuous medications:     PRN medications:  PRN medications: bisacodyl, bisacodyl, dextrose, dextrose, glucagon, glucagon, guaiFENesin, ipratropium-albuteroL, melatonin, ondansetron ODT **OR** ondansetron      Past Medical History  He has no past medical history on file.    Surgical History  He has no past surgical history on file.     Social History  He has no history on file for tobacco use, alcohol use, and drug use.    Family History  No family history on file.     Allergies  Patient has no known allergies.    Code Status  Full Code     Review of Systems   Constitutional:  Negative for appetite change, chills, fatigue, fever and unexpected weight change.   HENT:  Negative for congestion, ear discharge, ear pain, mouth sores, nosebleeds, sinus pain, sore throat, trouble swallowing and voice change.    Eyes:  Negative for photophobia, pain, discharge, itching and visual  disturbance.   Respiratory:  Positive for cough, shortness of breath and wheezing. Negative for apnea, choking and chest tightness.    Cardiovascular:  Negative for chest pain, palpitations and leg swelling.   Gastrointestinal:  Positive for nausea. Negative for abdominal distention, abdominal pain, blood in stool, constipation, diarrhea and vomiting.   Endocrine: Negative for cold intolerance, heat intolerance, polydipsia, polyphagia and polyuria.   Genitourinary:  Negative for decreased urine volume, difficulty urinating, dysuria, flank pain, frequency, hematuria and urgency.   Musculoskeletal:  Negative for arthralgias, back pain, gait problem, myalgias, neck pain and neck stiffness.   Skin:  Negative for color change, pallor and wound.   Allergic/Immunologic: Negative for food allergies and immunocompromised state.   Neurological:  Negative for dizziness, tremors, seizures, syncope, speech difficulty, weakness, light-headedness, numbness and headaches.   Hematological:  Negative for adenopathy. Does not bruise/bleed easily.   Psychiatric/Behavioral:  Negative for confusion, dysphoric mood, hallucinations, sleep disturbance and suicidal ideas. The patient is not nervous/anxious.        Last Recorded Vitals  /86   Pulse 63   Temp 37.2 °C (98.9 °F)   Resp 20   Wt 90.7 kg (200 lb)   SpO2 (!) 91%      Physical Exam  Vitals reviewed.   Constitutional:       General: He is not in acute distress.  HENT:      Head: Normocephalic and atraumatic.      Right Ear: External ear normal.      Left Ear: External ear normal.      Nose: Nose normal.      Mouth/Throat:      Mouth: Mucous membranes are moist.      Pharynx: Oropharynx is clear.   Eyes:      General: No scleral icterus.     Extraocular Movements: Extraocular movements intact.      Conjunctiva/sclera: Conjunctivae normal.      Pupils: Pupils are equal, round, and reactive to light.   Cardiovascular:      Rate and Rhythm: Normal rate and regular rhythm.       Pulses: Normal pulses.      Heart sounds: Normal heart sounds. No murmur heard.  Pulmonary:      Effort: Pulmonary effort is normal. No respiratory distress.      Breath sounds: Wheezing present. No rhonchi or rales.   Chest:      Chest wall: No tenderness.   Abdominal:      General: Bowel sounds are normal. There is no distension.      Palpations: Abdomen is soft. There is no mass.      Tenderness: There is no abdominal tenderness. There is no rebound.   Musculoskeletal:         General: No swelling or deformity. Normal range of motion.      Cervical back: Normal range of motion.      Right lower leg: No edema.      Left lower leg: No edema.   Skin:     General: Skin is warm and dry.      Capillary Refill: Capillary refill takes less than 2 seconds.   Neurological:      General: No focal deficit present.      Mental Status: He is alert and oriented to person, place, and time.   Psychiatric:         Mood and Affect: Mood normal.         Behavior: Behavior normal.         Assessment/Plan   Assessment & Plan    Acute respiratory failure with hypercapnia secondary to exacerbation of COPD  Reports history of COPD, emphysema no longer smoking  Continue patient on routine around-the-clock IV steroids  Frequent nebulizer treatments with DuoNeb  Continue Zithromax  Will review and reconcile his home respiratory treatments when available    Coronary artery disease history of PCI and stent  Continue patient on aspirin and other medications when list is available    Hypertension  Patient's blood pressure is stable right now  We will restart patient's home medications when listing is available    Hyperglycemia  Patient states he is borderline diabetic  We will check A1c continue carb controlled diet  Add sliding scale coverage as patient likely to have hyperglycemia from steroids          No new Assessment & Plan notes have been filed under this hospital service since the last note was generated.  Service: Internal Medicine           Tonny Hackett, APRN-CNP    Dragon dictation software was used to dictate this note and thus there may be minor errors in translation/transcription including garbled speech or misspellings. Please contact for clarification if needed.

## 2024-11-11 NOTE — CARE PLAN
Problem: Respiratory  Goal: No signs of respiratory distress (eg. Use of accessory muscles. Peds grunting)  Outcome: Progressing       The clinical goals for the shift include pox > 93%

## 2024-11-11 NOTE — ED PROCEDURE NOTE
Procedure  Critical Care    Performed by: Arron Mace DO  Authorized by: Arron Mace DO    Critical care provider statement:     Critical care time (minutes):  41    Critical care time was exclusive of:  Separately billable procedures and treating other patients and teaching time    Critical care was necessary to treat or prevent imminent or life-threatening deterioration of the following conditions:  Respiratory failure    Critical care was time spent personally by me on the following activities:  Blood draw for specimens, development of treatment plan with patient or surrogate, evaluation of patient's response to treatment, examination of patient, ordering and review of laboratory studies, ordering and performing treatments and interventions, ordering and review of radiographic studies, re-evaluation of patient's condition, review of old charts, pulse oximetry and obtaining history from patient or surrogate    Care discussed with: admitting provider                 Arron Mace DO  11/11/24 9247

## 2024-11-11 NOTE — PROGRESS NOTES
Darron Dumas is a 64 y.o. male admitted for Acute respiratory failure with hypercapnia (Multi). Pharmacy reviewed the patient's [id number] medications and allergies for accuracy.    The list below reflects the PTA list prior to pharmacy medication history. A summary a changes to the PTA medication list has been listed below. Please review each medication in order reconciliation for additional clarification and justification.    Source of information:  [facility]  patient  Medications added:  ADDED ALL MEDS    Medications modified:    Medications to be removed:    Medications of concern:      None       Audra Phillips

## 2024-11-11 NOTE — ED PROVIDER NOTES
Chief Complaint   Patient presents with    Shortness of Breath     Hx copd       HPI       64 year old male presents to the Emergency Department today complaining of increased shortness of breath since last evening. Notes that he has an underlying history of COPD for which he wears oxygen as needed. Reports that he has been using the oxygen more frequently and is up to 6LNC. Denies any associated fever, chills, headache, neck pain, chest pain, abdominal pain, nausea, vomiting, diarrhea, constipation, or urinary symptoms.       History provided by:  Patient             Patient History   No past medical history on file.  No past surgical history on file.  No family history on file.  Social History     Tobacco Use    Smoking status: Not on file    Smokeless tobacco: Not on file   Substance Use Topics    Alcohol use: Not on file    Drug use: Not on file           Physical Exam  Constitutional:       Appearance: Normal appearance.   HENT:      Head: Normocephalic.      Right Ear: Tympanic membrane, ear canal and external ear normal.      Left Ear: Tympanic membrane, ear canal and external ear normal.      Nose: Nose normal.      Mouth/Throat:      Mouth: Mucous membranes are moist.      Pharynx: Oropharynx is clear. No oropharyngeal exudate or posterior oropharyngeal erythema.   Eyes:      Conjunctiva/sclera: Conjunctivae normal.      Pupils: Pupils are equal, round, and reactive to light.   Cardiovascular:      Rate and Rhythm: Normal rate and regular rhythm.      Pulses:           Radial pulses are 3+ on the right side and 3+ on the left side.        Dorsalis pedis pulses are 3+ on the right side and 3+ on the left side.      Heart sounds: Normal heart sounds. No murmur heard.     No friction rub. No gallop.   Pulmonary:      Effort: Tachypnea and accessory muscle usage present.      Breath sounds: Examination of the right-upper field reveals decreased breath sounds and wheezing. Examination of the left-upper field  reveals decreased breath sounds and wheezing. Examination of the right-middle field reveals decreased breath sounds and wheezing. Examination of the left-middle field reveals decreased breath sounds and wheezing. Examination of the right-lower field reveals decreased breath sounds and wheezing. Examination of the left-lower field reveals decreased breath sounds and wheezing. Decreased breath sounds and wheezing present. No rhonchi or rales.   Abdominal:      General: Abdomen is flat. Bowel sounds are normal.      Palpations: Abdomen is soft.      Tenderness: There is no abdominal tenderness. There is no right CVA tenderness, left CVA tenderness, guarding or rebound. Negative signs include Galloway's sign and McBurney's sign.   Musculoskeletal:         General: No swelling or deformity.      Cervical back: Full passive range of motion without pain.      Right lower leg: No edema.      Left lower leg: No edema.   Lymphadenopathy:      Cervical: No cervical adenopathy.   Skin:     Capillary Refill: Capillary refill takes less than 2 seconds.      Coloration: Skin is not jaundiced.      Findings: No rash.   Neurological:      General: No focal deficit present.      Mental Status: He is alert and oriented to person, place, and time. Mental status is at baseline.      Gait: Gait is intact.   Psychiatric:         Mood and Affect: Mood normal.         Behavior: Behavior is cooperative.         Labs Reviewed   CBC WITH AUTO DIFFERENTIAL - Abnormal       Result Value    WBC 16.1 (*)     nRBC 0.0      RBC 4.68      Hemoglobin 14.9      Hematocrit 45.5      MCV 97      MCH 31.8      MCHC 32.7      RDW 12.5      Platelets 277      Neutrophils % 60.4      Immature Granulocytes %, Automated 1.5 (*)     Lymphocytes % 26.7      Monocytes % 5.5      Eosinophils % 5.3      Basophils % 0.6      Neutrophils Absolute 9.71 (*)     Immature Granulocytes Absolute, Automated 0.24      Lymphocytes Absolute 4.29      Monocytes Absolute 0.88       Eosinophils Absolute 0.85 (*)     Basophils Absolute 0.09     COMPREHENSIVE METABOLIC PANEL - Abnormal    Glucose 172 (*)     Sodium 136      Potassium 5.1      Chloride 98      Bicarbonate 33 (*)     Anion Gap 10      Urea Nitrogen 17      Creatinine 1.19      eGFR 67      Calcium 9.0      Albumin 3.9      Alkaline Phosphatase 47      Total Protein 6.2 (*)     AST 20      Bilirubin, Total 0.5      ALT 28     BLOOD GAS VENOUS FULL PANEL - Abnormal    POCT pH, Venous 7.26 (*)     POCT pCO2, Venous 81 (*)     POCT pO2, Venous 54 (*)     POCT SO2, Venous 79 (*)     POCT Oxy Hemoglobin, Venous 77.1 (*)     POCT Hematocrit Calculated, Venous 44.0      POCT Sodium, Venous 135 (*)     POCT Potassium, Venous 5.4 (*)     POCT Chloride, Venous 96 (*)     POCT Ionized Calicum, Venous 1.25      POCT Glucose, Venous 174 (*)     POCT Lactate, Venous 1.1      POCT Base Excess, Venous 6.0 (*)     POCT HCO3 Calculated, Venous 36.3 (*)     POCT Hemoglobin, Venous 14.6      POCT Anion Gap, Venous 8.0 (*)     Patient Temperature 37.0      FiO2 44     BLOOD GAS VENOUS FULL PANEL - Abnormal    POCT pH, Venous 7.31 (*)     POCT pCO2, Venous 70 (*)     POCT pO2, Venous 47 (*)     POCT SO2, Venous 71      POCT Oxy Hemoglobin, Venous 69.5      POCT Hematocrit Calculated, Venous 43.0      POCT Sodium, Venous 133 (*)     POCT Potassium, Venous 5.5 (*)     POCT Chloride, Venous 98      POCT Ionized Calicum, Venous 1.24      POCT Glucose, Venous 167 (*)     POCT Lactate, Venous 1.0      POCT Base Excess, Venous 6.3 (*)     POCT HCO3 Calculated, Venous 35.2 (*)     POCT Hemoglobin, Venous 14.2      POCT Anion Gap, Venous 5.0 (*)     Patient Temperature 37.0      FiO2 40     HEMOGLOBIN A1C - Abnormal    Hemoglobin A1C 7.1 (*)     Estimated Average Glucose 157      Narrative:     Diagnosis of Diabetes-Adults  Non-Diabetic: < or = 5.6%  Increased risk for developing diabetes: 5.7-6.4%  Diagnostic of diabetes: > or = 6.5%       B-TYPE NATRIURETIC  PEPTIDE - Normal    BNP 17      Narrative:        <100 pg/mL - Heart failure unlikely  100-299 pg/mL - Intermediate probability of acute heart                  failure exacerbation. Correlate with clinical                  context and patient history.    >=300 pg/mL - Heart Failure likely. Correlate with clinical                  context and patient history.    BNP testing is performed using different testing methodology at [facility] than at [facility]. Direct result comparisons should only be made within the same method.      SARS-COV-2 PCR - Normal    Coronavirus 2019, PCR Not Detected      Narrative:     This assay has received FDA Emergency Use Authorization (EUA) and is only authorized for the duration of time that circumstances exist to justify the authorization of the emergency use of in vitro diagnostic tests for the detection of SARS-CoV-2 virus and/or diagnosis of COVID-19 infection under section 564(b)(1) of the Act, 21 U.S.C. 360bbb-3(b)(1). This assay is an in vitro diagnostic nucleic acid amplification test for the qualitative detection of SARS-CoV-2 from nasopharyngeal specimens and has been validated for use at [facility]. Negative results do not preclude COVID-19 infections and should not be used as the sole basis for diagnosis, treatment, or other management decisions.     INFLUENZA A AND B PCR - Normal    Flu A Result Not Detected      Flu B Result Not Detected      Narrative:     This assay is an in vitro diagnostic multiplex nucleic acid amplification test for the detection and discrimination of Influenza A & B from nasopharyngeal specimens, and has been validated for use at [facility]. Negative results do not preclude Influenza A/B infections, and should not be used as the sole basis for diagnosis, treatment, or other management decisions. If Influenza A/B and RSV PCR results are negative, testing for Parainfluenza  virus, Adenovirus and Metapneumovirus is routinely performed for [facility] pediatric oncology and intensive care inpatients, and is available on other patients by placing an add-on request.   RSV PCR - Normal    RSV PCR Not Detected      Narrative:     This assay is an FDA-cleared, in vitro diagnostic nucleic acid amplification test for the detection of RSV from nasopharyngeal specimens, and has been validated for use at [facility]. Negative results do not preclude RSV infections, and should not be used as the sole basis for diagnosis, treatment, or other management decisions. If Influenza A/B and RSV PCR results are negative, testing for Parainfluenza virus, Adenovirus and Metapneumovirus is routinely performed for pediatric oncology and intensive care inpatients at [facility], and is available on other patients by placing an add-on request.       SERIAL TROPONIN-INITIAL - Normal    Troponin I, High Sensitivity 6      Narrative:     Less than 99th percentile of normal range cutoff-  Female and children under 18 years old <14 ng/L; Male <21 ng/L: Negative  Repeat testing should be performed if clinically indicated.     Female and children under 18 years old 14-50 ng/L; Male 21-50 ng/L:  Consistent with possible cardiac damage and possible increased clinical   risk. Serial measurements may help to assess extent of myocardial damage.     >50 ng/L: Consistent with cardiac damage, increased clinical risk and  myocardial infarction. Serial measurements may help assess extent of   myocardial damage.      NOTE: Children less than 1 year old may have higher baseline troponin   levels and results should be interpreted in conjunction with the overall   clinical context.     NOTE: Troponin I testing is performed using a different   testing methodology at [facility] than at other   [facility]. Direct result comparisons should only   be made within the same method.   SERIAL TROPONIN, 1 HOUR -  Normal    Troponin I, High Sensitivity 8      Narrative:     Less than 99th percentile of normal range cutoff-  Female and children under 18 years old <14 ng/L; Male <21 ng/L: Negative  Repeat testing should be performed if clinically indicated.     Female and children under 18 years old 14-50 ng/L; Male 21-50 ng/L:  Consistent with possible cardiac damage and possible increased clinical   risk. Serial measurements may help to assess extent of myocardial damage.     >50 ng/L: Consistent with cardiac damage, increased clinical risk and  myocardial infarction. Serial measurements may help assess extent of   myocardial damage.      NOTE: Children less than 1 year old may have higher baseline troponin   levels and results should be interpreted in conjunction with the overall   clinical context.     NOTE: Troponin I testing is performed using a different   testing methodology at [facility] than at other   [facility]. Direct result comparisons should only   be made within the same method.   BLOOD CULTURE   BLOOD CULTURE   TROPONIN SERIES- (INITIAL, 1 HR)    Narrative:     The following orders were created for panel order Troponin I Series, High Sensitivity (0, 1 HR).  Procedure                               Abnormality         Status                     ---------                               -----------         ------                     Troponin I, High Sensiti...[[id number]]  Normal              Final result               Troponin, High Sensitivi...[[id number]]  Normal              Final result                 Please view results for these tests on the individual orders.       XR chest 1 view   Final Result   No acute radiographic chest finding.   Signed by Emmanuel Ward MD               ED Course & MDM   ED Course as of 11/11/24 1033   Mon Nov 11, 2024   0726 Blood Gas Venous Full Panel(!!)  Called for BiPAP [WJ]   0729 WBC(!): 16.1  Sepsis protocol initiated [WJ]   0735 15/5, rate 18 [WJ]   0735  Patient twelve-lead EKG interpreted by myself shows sinus rhythm, vent rate of 91, normal axis, normal MD interval, normal QRS duration, normal QT, no STEMI. [WJ]      ED Course User Index  [WJ] Arron Mace,          Diagnoses as of 11/11/24 1033   Acute respiratory failure with hypercapnia (Multi)           Medical Decision Making  Patient was seen and evaluated by Dr. Mace. He was given a Duoneb and Solu-Medrol in route to the hospital. Placed on a cardiac monitor which showed normal sinus rhythm without ectopy throughout ED stay. Rhythm strip obtained showed normal sinus rhythm. Impression: No acute pathology. Continuous pulse oximetry monitoring showed no signs of hypoxia while on oxygen. Although, he was working to breath. Saline lock was established with labs drawn and results as above. Blood cultures x 2 were drawn and pending. Venous blood gas shows pH of 7.31 with CO2 of 70 and HCO3 of 35.2. Following such, we felt that he would benefit from Bi-Pap. Given Albuterol x 2 and Atrovent x 1 aerosols. After receiving the aerosols and being on Bi-Pap for a short time, he was feeling improved. No longer using accessory muscles to breath. Noted to have an elevated WBC count which is likely secondary to demargination. Remainder of blood counts, electrolytes, kidney function, and liver function were all unremarkable. Heart Score- 4 with normal EKG and troponin and delta troponin. At this time, we feel that the chest pain is atypical for acute coronary syndrome. We find no underlying evidence of an acute infectious process or pneumothorax on CXR. Clinically, we do not feel they are exhibiting signs of pulmonary embolism or thoracic aortic dissection (no connective tissue disorder, no tachycardia, hypoxia, and mediastinum normal in size on CXR). Normal cardiac BNP without evidence of CHF on CXR. Influenza, COVID, and RSV were all negative. We treated his acute exacerbation of COPD with Rocephin and Zithromax. Case  was discussed with JENNIFER Hackett CNP, [facility], who agrees to admit the patient for further evaluation and care. Will be transferred to the medical telemetry floor in stable condition.     Diagnostic Impression:    1. Acute respiratory failure with hypercapnea    2. Acute exacerbation of COPD    3.  Leukocytosis    4. IV meds in ED    5. Critical care time 50 minutes           Your medication list      You have not been prescribed any medications.           Procedure  Critical Care    Performed by: SHIKHA Ying  Authorized by: Arron Mace DO    Critical care provider statement:     Critical care time (minutes):  50    Critical care time was exclusive of:  Separately billable procedures and treating other patients    Critical care was necessary to treat or prevent imminent or life-threatening deterioration of the following conditions:  Respiratory failure    Critical care was time spent personally by me on the following activities:  Blood draw for specimens, development of treatment plan with patient or surrogate, discussions with consultants, evaluation of patient's response to treatment, examination of patient, interpretation of cardiac output measurements, obtaining history from patient or surrogate, review of old charts, re-evaluation of patient's condition, pulse oximetry, ordering and review of radiographic studies, ordering and review of laboratory studies and ordering and performing treatments and interventions       SHIKHA Ying  11/11/24 1054

## 2024-11-12 LAB
ANION GAP SERPL CALC-SCNC: 11 MMOL/L (ref 10–20)
APPEARANCE UR: CLEAR
BILIRUB UR STRIP.AUTO-MCNC: NEGATIVE MG/DL
BUN SERPL-MCNC: 40 MG/DL (ref 6–23)
CALCIUM SERPL-MCNC: 8.3 MG/DL (ref 8.6–10.3)
CHLORIDE SERPL-SCNC: 96 MMOL/L (ref 98–107)
CO2 SERPL-SCNC: 30 MMOL/L (ref 21–32)
COLOR UR: COLORLESS
CREAT SERPL-MCNC: 1.55 MG/DL (ref 0.5–1.3)
EGFRCR SERPLBLD CKD-EPI 2021: 49 ML/MIN/1.73M*2
ERYTHROCYTE [DISTWIDTH] IN BLOOD BY AUTOMATED COUNT: 12.2 % (ref 11.5–14.5)
FERRITIN SERPL-MCNC: 108 NG/ML (ref 20–300)
GLUCOSE BLD MANUAL STRIP-MCNC: 213 MG/DL (ref 74–99)
GLUCOSE BLD MANUAL STRIP-MCNC: 274 MG/DL (ref 74–99)
GLUCOSE BLD MANUAL STRIP-MCNC: 274 MG/DL (ref 74–99)
GLUCOSE BLD MANUAL STRIP-MCNC: 284 MG/DL (ref 74–99)
GLUCOSE SERPL-MCNC: 202 MG/DL (ref 74–99)
GLUCOSE UR STRIP.AUTO-MCNC: NORMAL MG/DL
HCT VFR BLD AUTO: 39.4 % (ref 41–52)
HGB BLD-MCNC: 12.7 G/DL (ref 13.5–17.5)
HGB RETIC QN: 36 PG (ref 28–38)
IMMATURE RETIC FRACTION: 10 %
IRON SATN MFR SERPL: 33 % (ref 25–45)
IRON SERPL-MCNC: 98 UG/DL (ref 35–150)
KETONES UR STRIP.AUTO-MCNC: NEGATIVE MG/DL
LEUKOCYTE ESTERASE UR QL STRIP.AUTO: NEGATIVE
MCH RBC QN AUTO: 31.3 PG (ref 26–34)
MCHC RBC AUTO-ENTMCNC: 32.2 G/DL (ref 32–36)
MCV RBC AUTO: 97 FL (ref 80–100)
NITRITE UR QL STRIP.AUTO: NEGATIVE
NRBC BLD-RTO: 0 /100 WBCS (ref 0–0)
PH UR STRIP.AUTO: 7 [PH]
PLATELET # BLD AUTO: 241 X10*3/UL (ref 150–450)
POTASSIUM SERPL-SCNC: 5 MMOL/L (ref 3.5–5.3)
PROT UR STRIP.AUTO-MCNC: NEGATIVE MG/DL
RBC # BLD AUTO: 4.06 X10*6/UL (ref 4.5–5.9)
RBC # UR STRIP.AUTO: NEGATIVE /UL
RETICS #: 0.06 X10*6/UL (ref 0.02–0.11)
RETICS/RBC NFR AUTO: 1.5 % (ref 0.5–2)
SODIUM SERPL-SCNC: 132 MMOL/L (ref 136–145)
SP GR UR STRIP.AUTO: 1.01
TIBC SERPL-MCNC: 299 UG/DL (ref 240–445)
UIBC SERPL-MCNC: 201 UG/DL (ref 110–370)
UROBILINOGEN UR STRIP.AUTO-MCNC: NORMAL MG/DL
WBC # BLD AUTO: 23.2 X10*3/UL (ref 4.4–11.3)

## 2024-11-12 PROCEDURE — 85027 COMPLETE CBC AUTOMATED: CPT | Performed by: NURSE PRACTITIONER

## 2024-11-12 PROCEDURE — 82728 ASSAY OF FERRITIN: CPT | Performed by: NURSE PRACTITIONER

## 2024-11-12 PROCEDURE — 2500000005 HC RX 250 GENERAL PHARMACY W/O HCPCS: Performed by: NURSE PRACTITIONER

## 2024-11-12 PROCEDURE — 81003 URINALYSIS AUTO W/O SCOPE: CPT | Performed by: NURSE PRACTITIONER

## 2024-11-12 PROCEDURE — 94640 AIRWAY INHALATION TREATMENT: CPT

## 2024-11-12 PROCEDURE — 2500000001 HC RX 250 WO HCPCS SELF ADMINISTERED DRUGS (ALT 637 FOR MEDICARE OP): Performed by: NURSE PRACTITIONER

## 2024-11-12 PROCEDURE — 2500000005 HC RX 250 GENERAL PHARMACY W/O HCPCS: Performed by: STUDENT IN AN ORGANIZED HEALTH CARE EDUCATION/TRAINING PROGRAM

## 2024-11-12 PROCEDURE — 83540 ASSAY OF IRON: CPT | Performed by: NURSE PRACTITIONER

## 2024-11-12 PROCEDURE — 85045 AUTOMATED RETICULOCYTE COUNT: CPT | Performed by: NURSE PRACTITIONER

## 2024-11-12 PROCEDURE — 2500000004 HC RX 250 GENERAL PHARMACY W/ HCPCS (ALT 636 FOR OP/ED): Performed by: NURSE PRACTITIONER

## 2024-11-12 PROCEDURE — 99233 SBSQ HOSP IP/OBS HIGH 50: CPT | Performed by: NURSE PRACTITIONER

## 2024-11-12 PROCEDURE — 80048 BASIC METABOLIC PNL TOTAL CA: CPT | Performed by: NURSE PRACTITIONER

## 2024-11-12 PROCEDURE — 94660 CPAP INITIATION&MGMT: CPT

## 2024-11-12 PROCEDURE — 2060000001 HC INTERMEDIATE ICU ROOM DAILY

## 2024-11-12 PROCEDURE — 82947 ASSAY GLUCOSE BLOOD QUANT: CPT

## 2024-11-12 PROCEDURE — 2500000002 HC RX 250 W HCPCS SELF ADMINISTERED DRUGS (ALT 637 FOR MEDICARE OP, ALT 636 FOR OP/ED): Performed by: NURSE PRACTITIONER

## 2024-11-12 PROCEDURE — 99222 1ST HOSP IP/OBS MODERATE 55: CPT | Performed by: NURSE PRACTITIONER

## 2024-11-12 PROCEDURE — 36415 COLL VENOUS BLD VENIPUNCTURE: CPT | Performed by: NURSE PRACTITIONER

## 2024-11-12 RX ORDER — WATER
500 LIQUID (ML) MISCELLANEOUS EVERY 8 HOURS SCHEDULED
Status: COMPLETED | OUTPATIENT
Start: 2024-11-12 | End: 2024-11-13

## 2024-11-12 RX ORDER — CEFTRIAXONE 2 G/50ML
2 INJECTION, SOLUTION INTRAVENOUS EVERY 24 HOURS
Status: DISCONTINUED | OUTPATIENT
Start: 2024-11-12 | End: 2024-11-13

## 2024-11-12 RX ORDER — FLUTICASONE FUROATE AND VILANTEROL 200; 25 UG/1; UG/1
1 POWDER RESPIRATORY (INHALATION) DAILY
Status: DISCONTINUED | OUTPATIENT
Start: 2024-11-12 | End: 2024-11-14 | Stop reason: HOSPADM

## 2024-11-12 ASSESSMENT — ENCOUNTER SYMPTOMS
WHEEZING: 1
FEVER: 0
COUGH: 1
SHORTNESS OF BREATH: 1
DYSURIA: 0
CHILLS: 0

## 2024-11-12 ASSESSMENT — PAIN SCALES - GENERAL
PAINLEVEL_OUTOF10: 0 - NO PAIN

## 2024-11-12 ASSESSMENT — PAIN - FUNCTIONAL ASSESSMENT
PAIN_FUNCTIONAL_ASSESSMENT: 0-10
PAIN_FUNCTIONAL_ASSESSMENT: 0-10

## 2024-11-12 NOTE — PROGRESS NOTES
Darron Resendez is a 64 y.o. male on day 1 of admission presenting with Acute respiratory failure with hypercapnia (Multi).      Subjective   Darron Dumas is a 64 y.o. male with PMHx s/f coronary artery disease, hypertension, dyslipidemia, COPD, emphysema presenting with shortness of breath.  Patient had recently been treated for COPD exacerbation with steroids and had completed steroid taper.  Patient has progressively come down more more short of breath.  He does have some cough no purulent sputum he denies any fevers chills or rigors he denies any urinary symptoms.  No report of chest pain or palpitations.  Patient originally presented afebrile at 98.9 heart rate 93 he was tachypneic at 24 blood pressure 135/86 SpO2 was 91% on room air.  Venous blood gas showed the patient to have a significantly elevated pCO2 of 81 pO2 was 54 pH 7.26 patient was subsequently placed on BiPAP with improvement in his pCO2 to 70 and pH 7.31 patient was found to have leukocytosis with white blood cell count 16.1 hemoglobin 14.9 hematocrit 45.5 platelets 277.  Chemistry panel showed glucose elevated at 172 otherwise unremarkable the BN peptide was not elevated troponin was not elevated given the leukocytosis a chest x-ray was done. Showing no evidence of pneumonia no acute radiographic chest findings.  Patient was started on Rocephin and Zithromax due to the elevated white blood cell count given dose of IV steroids and referred for admission.       11/12/24: Pt afebrile, was borderline hypotensive yesterday with elevation of Creatinine today, there is also significant leukocytosis. No fevers. Subjectively patient feels he is doing much better.  Will expand antibiotic coverage to cover possible pneumonia, check procalcitonin level request pulmonology consult.  Will also check urine and request nephrology consult.  There was a drop in his hemoglobin we will check iron studies and reticulocyte count by adding onto the morning labs.          Review of Systems   Constitutional:  Negative for chills and fever.   Respiratory:  Positive for cough, shortness of breath and wheezing.    Cardiovascular:  Negative for chest pain.   Genitourinary:  Negative for dysuria.          Objective     Last Recorded Vitals  /64 (BP Location: Right arm, Patient Position: Lying)   Pulse 89   Temp 36.8 °C (98.2 °F) (Temporal)   Resp 18   Wt 92.2 kg (203 lb 4.2 oz)   SpO2 94%     Image Results  ECG 12 lead    Result Date: 11/11/2024  Sinus rhythm Borderline right axis deviation Anteroseptal infarct, old    XR chest 1 view    Result Date: 11/11/2024  STUDY: Chest Radiograph;  11/11/2024 at 7:28 AM INDICATION: Shortness of breath. COMPARISON: None Available ACCESSION NUMBER(S): [id number] ORDERING CLINICIAN: ALFONSO LANE TECHNIQUE:  Frontal chest was obtained at 0728 hours. FINDINGS: Flattening of the hemidiaphragms suggests COPD. Cardiac silhouette normal in size. No significant pulmonary vascular congestion. No significant pleural effusion. No visible pneumothorax.     No acute radiographic chest finding. Signed by Emmanuel Ward MD       Lab Results  Results for orders placed or performed during the hospital encounter of 11/11/24 (from the past 24 hours)   POCT GLUCOSE   Result Value Ref Range    POCT Glucose 208 (H) 74 - 99 mg/dL   POCT GLUCOSE   Result Value Ref Range    POCT Glucose 283 (H) 74 - 99 mg/dL   Basic metabolic panel   Result Value Ref Range    Glucose 202 (H) 74 - 99 mg/dL    Sodium 132 (L) 136 - 145 mmol/L    Potassium 5.0 3.5 - 5.3 mmol/L    Chloride 96 (L) 98 - 107 mmol/L    Bicarbonate 30 21 - 32 mmol/L    Anion Gap 11 10 - 20 mmol/L    Urea Nitrogen 40 (H) 6 - 23 mg/dL    Creatinine 1.55 (H) 0.50 - 1.30 mg/dL    eGFR 49 (L) >60 mL/min/1.73m*2    Calcium 8.3 (L) 8.6 - 10.3 mg/dL   CBC   Result Value Ref Range    WBC 23.2 (H) 4.4 - 11.3 x10*3/uL    nRBC 0.0 0.0 - 0.0 /100 WBCs    RBC 4.06 (L) 4.50 - 5.90 x10*6/uL    Hemoglobin 12.7 (L)  13.5 - 17.5 g/dL    Hematocrit 39.4 (L) 41.0 - 52.0 %    MCV 97 80 - 100 fL    MCH 31.3 26.0 - 34.0 pg    MCHC 32.2 32.0 - 36.0 g/dL    RDW 12.2 11.5 - 14.5 %    Platelets 241 150 - 450 x10*3/uL   POCT GLUCOSE   Result Value Ref Range    POCT Glucose 213 (H) 74 - 99 mg/dL   POCT GLUCOSE   Result Value Ref Range    POCT Glucose 274 (H) 74 - 99 mg/dL        Medications  Scheduled medications:  aspirin, 81 mg, oral, Daily  atorvastatin, 80 mg, oral, Daily  azithromycin, 500 mg, intravenous, q24h  carvedilol, 6.25 mg, oral, BID  cefTRIAXone, 2 g, intravenous, q24h  enoxaparin, 40 mg, subcutaneous, q24h  insulin lispro, 0-5 Units, subcutaneous, TID AC  ipratropium-albuteroL, 3 mL, nebulization, TID  [Held by provider] lisinopril, 5 mg, oral, Daily  methylPREDNISolone sodium succinate (PF), 40 mg, intravenous, q8h  oxygen, , inhalation, Continuous - Inhalation  pantoprazole, 40 mg, oral, Daily before breakfast   Or  pantoprazole, 40 mg, intravenous, Daily before breakfast  polyethylene glycol, 17 g, oral, Daily  sodium chloride, 1,000 mL, intravenous, Once      Continuous medications:     PRN medications:  PRN medications: bisacodyl, bisacodyl, dextrose, dextrose, glucagon, glucagon, guaiFENesin, ipratropium-albuteroL, melatonin, ondansetron ODT **OR** ondansetron     Physical Exam  Vitals reviewed.   Constitutional:       General: He is not in acute distress.  HENT:      Head: Normocephalic and atraumatic.      Right Ear: External ear normal.      Left Ear: External ear normal.      Nose: Nose normal.      Mouth/Throat:      Mouth: Mucous membranes are moist.      Pharynx: Oropharynx is clear.   Eyes:      General: No scleral icterus.     Extraocular Movements: Extraocular movements intact.      Conjunctiva/sclera: Conjunctivae normal.      Pupils: Pupils are equal, round, and reactive to light.   Neck:      Vascular: No carotid bruit.   Cardiovascular:      Rate and Rhythm: Normal rate and regular rhythm.      Pulses:  Normal pulses.      Heart sounds: Normal heart sounds. No murmur heard.  Pulmonary:      Effort: Pulmonary effort is normal. No respiratory distress.      Breath sounds: Normal breath sounds. No wheezing, rhonchi or rales.   Chest:      Chest wall: No tenderness.   Abdominal:      General: Bowel sounds are normal. There is no distension.      Palpations: Abdomen is soft. There is no mass.      Tenderness: There is no abdominal tenderness. There is no rebound.   Musculoskeletal:         General: No swelling or deformity. Normal range of motion.      Cervical back: Normal range of motion.      Right lower leg: No edema.      Left lower leg: No edema.   Skin:     General: Skin is warm and dry.      Capillary Refill: Capillary refill takes less than 2 seconds.   Neurological:      General: No focal deficit present.      Mental Status: He is alert and oriented to person, place, and time.   Psychiatric:         Mood and Affect: Mood normal.         Behavior: Behavior normal.                  Assessment/Plan   This patient currently has cardiac telemetry ordered; if you would like to modify or discontinue the telemetry order, click here to go to the orders activity to modify/discontinue the order.    Acute respiratory failure with hypercapnia, COPD exacerbation  Patient has history of COPD emphysema is oxygen dependent  Typically not on any long-acting medications  We will request pulmonary consult he is known to the local clinic  Continue IV steroids given leukocytosis will also check procalcitonin level and expand antibiotic coverage for pneumonia    Coronary artery disease history of PCI and stent  Patient continued on his routine home medications including aspirin    Hypertension  Patient on carvedilol    Diabetes type 2  Continue diet control A1c 7.1  With sliding scale insulin due to hyperglycemia induced by steroids    Elevated creatinine  Suspect acute kidney injury due to lower blood pressure readings  Check urine  request nephrology consult    Anemia  Unclear if this is lab aberrancy  Patient does have elevated potassium level, elevated BUN however that could be from steroids as well  Will check iron studies reticulocyte count  The patient is continued on proton pump inhibitor                 Code Status: Full Code          DVT ppx:      Please see orders for more complete plan    Tonny Hackett, APRN-CNP

## 2024-11-12 NOTE — CONSULTS
Inpatient consult to Pulmonology  Consult performed by: OTONIEL Dc-CNP  Consult ordered by: Tonny Hackett, OTONIEL-CNP      Reason For Consult  COPD exacerbation    History Of Present Illness  Darron Resendez is a 64 y.o. male (current smoker, ~ pack year history) with a PMHx of very severe COPD (FEV1 27%), chronic hypoxic respiratory failure (6MW 09/2024, RA at rest, 3 L O2 with exertion), CAD (STEMI) s/p stent, DM, HTN, HLD, HFrEF (EF 35% 6/2020, 65% 7/2020). Admitted 11/11/24 after presented with increased shortness of breath and non-productive cough. Workup significant for WBC 16.1, VBG pH 7.26 pCO2 81 (placed on BiPAP improved to 7.31, pCO2 70), CXR with chronic COPD changes and no acute cardiopulmonary findings. Of note, patient seen in ED 10/29/24 and treated for pneumonia with levaquin and prednisone. Pulmonary consulted for AECOPD.    Patient follows with [facility] outpatient pulmonary, last seen 10/10/29 and was treated with prednisone for AECOPD and also noted to be non-compliant, was not using any inhalers. Was prescribed Trelegy and PRN duonebs (nebulizer machine ordered), prednisone taper and received extensive smoking cessation but patient declined pharmacological options to assist with quitting.     Patient seen and examined, resting in bed on 4 L NC; significant other at his bedside. He reports that he noted increased shortness of breath and cough with white sputum (denies increased quantity from baseline). He denies fever, chills, known sick contacts. He had recent foot/lower leg swelling that resolved.  He reports he has only been using duonebs once daily, did not get Trelegy because it was too expensive and did not call office to see about different options.      Past Medical History  History reviewed. No pertinent past medical history.    Surgical History  History reviewed. No pertinent surgical history.     Social History  Social History     Tobacco Use    Smoking status:  "Former     Types: Cigarettes    Smokeless tobacco: Never       Family History  No family history on file.       Allergies  Patient has no known allergies.    Review of Systems  10-point ROS complete and negative except as documented in HPI     Physical Exam  Vitals reviewed.   Constitutional:       General: He is not in acute distress.     Appearance: He is obese. He is not ill-appearing.   HENT:      Mouth/Throat:      Mouth: Mucous membranes are moist.   Eyes:      General: No scleral icterus.  Cardiovascular:      Rate and Rhythm: Normal rate and regular rhythm.      Pulses: Normal pulses.      Heart sounds: Normal heart sounds.   Pulmonary:      Effort: Pulmonary effort is normal. No respiratory distress.      Breath sounds: Wheezing (expiratory) present. No rhonchi or rales.   Musculoskeletal:         General: Normal range of motion.      Right lower leg: No edema.      Left lower leg: No edema.   Skin:     General: Skin is warm and dry.   Neurological:      General: No focal deficit present.      Mental Status: He is alert and oriented to person, place, and time.   Psychiatric:         Mood and Affect: Mood normal.         Behavior: Behavior normal.       Vital Signs  Blood pressure 104/64, pulse 89, temperature 36.8 °C (98.2 °F), temperature source Temporal, resp. rate 18, height 1.702 m (5' 7\"), weight 92.2 kg (203 lb 4.2 oz), SpO2 94%.  Oxygen Therapy  SpO2: 94 %  Medical Gas Therapy: Supplemental oxygen  Medical Gas Delivery Method: Nasal cannula     Medications:  Scheduled medications  aspirin, 81 mg, oral, Daily  atorvastatin, 80 mg, oral, Daily  azithromycin, 500 mg, intravenous, q24h  carvedilol, 6.25 mg, oral, BID  cefTRIAXone, 2 g, intravenous, q24h  enoxaparin, 40 mg, subcutaneous, q24h  insulin lispro, 0-5 Units, subcutaneous, TID AC  ipratropium-albuteroL, 3 mL, nebulization, TID  [Held by provider] lisinopril, 5 mg, oral, Daily  methylPREDNISolone sodium succinate (PF), 40 mg, intravenous, " q8h  oxygen, , inhalation, Continuous - Inhalation  pantoprazole, 40 mg, oral, Daily before breakfast   Or  pantoprazole, 40 mg, intravenous, Daily before breakfast  polyethylene glycol, 17 g, oral, Daily  sodium chloride, 1,000 mL, intravenous, Once       PRN medications  PRN medications: bisacodyl, bisacodyl, dextrose, dextrose, glucagon, glucagon, guaiFENesin, ipratropium-albuteroL, melatonin, ondansetron ODT **OR** ondansetron      Relevant Results  Labs:  Results for orders placed or performed during the hospital encounter of 11/11/24 (from the past 24 hours)   POCT GLUCOSE   Result Value Ref Range    POCT Glucose 208 (H) 74 - 99 mg/dL   POCT GLUCOSE   Result Value Ref Range    POCT Glucose 283 (H) 74 - 99 mg/dL   Basic metabolic panel   Result Value Ref Range    Glucose 202 (H) 74 - 99 mg/dL    Sodium 132 (L) 136 - 145 mmol/L    Potassium 5.0 3.5 - 5.3 mmol/L    Chloride 96 (L) 98 - 107 mmol/L    Bicarbonate 30 21 - 32 mmol/L    Anion Gap 11 10 - 20 mmol/L    Urea Nitrogen 40 (H) 6 - 23 mg/dL    Creatinine 1.55 (H) 0.50 - 1.30 mg/dL    eGFR 49 (L) >60 mL/min/1.73m*2    Calcium 8.3 (L) 8.6 - 10.3 mg/dL   CBC   Result Value Ref Range    WBC 23.2 (H) 4.4 - 11.3 x10*3/uL    nRBC 0.0 0.0 - 0.0 /100 WBCs    RBC 4.06 (L) 4.50 - 5.90 x10*6/uL    Hemoglobin 12.7 (L) 13.5 - 17.5 g/dL    Hematocrit 39.4 (L) 41.0 - 52.0 %    MCV 97 80 - 100 fL    MCH 31.3 26.0 - 34.0 pg    MCHC 32.2 32.0 - 36.0 g/dL    RDW 12.2 11.5 - 14.5 %    Platelets 241 150 - 450 x10*3/uL   POCT GLUCOSE   Result Value Ref Range    POCT Glucose 213 (H) 74 - 99 mg/dL   POCT GLUCOSE   Result Value Ref Range    POCT Glucose 274 (H) 74 - 99 mg/dL     Imaging:  XR chest 1 view  Result Date: 11/11/2024  STUDY: Chest Radiograph;  11/11/2024 at 7:28 AM INDICATION: Shortness of breath. COMPARISON: None Available ACCESSION NUMBER(S): [id number] ORDERING CLINICIAN: ALFONSO LANE TECHNIQUE:  Frontal chest was obtained at 0728 hours. FINDINGS: Flattening of  the hemidiaphragms suggests COPD. Cardiac silhouette normal in size. No significant pulmonary vascular congestion. No significant pleural effusion. No visible pneumothorax.     No acute radiographic chest finding. Signed by Emmanuel Ward MD        Assessment/Plan   Darron Resendez is a 64 y.o. male (current smoker, ~ pack year history) with a PMHx of very severe COPD (FEV1 27%), chronic hypoxic respiratory failure (6MW 09/2024, RA at rest, 3 L O2 with exertion), CAD (STEMI) s/p stent, DM, HTN, HLD, HFrEF (EF 35% 6/2020, 65% 7/2020). Admitted 11/11/24 after presented with increased shortness of breath and non-productive cough. Workup significant for WBC 16.1, VBG pH 7.26 pCO2 81 (placed on BiPAP improved to 7.31, pCO2 70), CXR with chronic COPD changes and no acute cardiopulmonary findings. Of note, patient seen in ED 10/29/24 and treated for pneumonia with levaquin and prednisone. Pulmonary consulted for AECOPD.    Patient follows with [facility] outpatient pulmonary, last seen 10/10/29 and was treated with prednisone for AECOPD and also noted to be non-compliant, was not using any inhalers. Was prescribed Trelegy and PRN duonebs (nebulizer machine ordered), prednisone taper and received extensive smoking cessation but patient declined pharmacological options to assist with quitting.     Impression:  1) Very severe COPD with acute exacerbation - PFT 09/2024 with severe COPD (FEV1 27%), patient non-compliant with inhalers (at 08/2024 & 10/2024 outpatient visits was not using any inhalers) and continues to smoke - was prescribed Trelegy and PRN duonebs at last outpatient visit     2) Chronic hypoxic respiratory failure - 6MW 09/2024 results needs 3 L O2 with exertion but okay for RA at rest; patient initially on 4 L NC, weaned down to 3 L NC with pulse ox remaining 94%      Recommendations:  - continue antibiotics (defer to hospitalist, currently on azithromycin & ceftriaxone)  - continue duonebs TID  - continue  solumedrol 40 mg q8hr, if wheezing improved tomorrow consider changing to BID  - start Breo & Spiriva  - continue supplemental O2, wean to maintain pulse ox 88-90%  - BiPAP at night and PRN  - VBG tomorrow morning   - acapella TID for bronchopulmonary hygiene       - Patient reports he could not afford Trelegy that was ordered at 10/2024 outpatient pulm appt (did not notify office) and has only been using duonebs once daily  --- Patient will need discharged with ICS/LAMA/LABA, please work with pharmacy to determine which is covered by insurance and please use meds-to-beds to ensure he has prior to discharge.     - Patient scheduled for outpatient pulmonary 12/5/24 at 11:40      Thank you for for the consult. Pulmonary will continue to follow at this time.       India Katz, APRN-CNP

## 2024-11-12 NOTE — CARE PLAN
The patient's goals for the shift include get rest and stat informed.    The clinical goals for the shift include Patient will maintain SpO2 >92% throughout the shift.    Over the shift, the patient did make progress toward the following goals. Barriers to progression include understanding. Recommendations to address these barriers include education.

## 2024-11-12 NOTE — PROGRESS NOTES
11/12/24 1229   Discharge Planning   Living Arrangements Spouse/significant other   Support Systems Spouse/significant other;Children   Assistance Needed independent   Type of Residence Private residence   Home or Post Acute Services None   Expected Discharge Disposition Home   Does the patient need discharge transport arranged? No     Met with patient at bedside, introduced self and role as RN TCC. Patient lives at home with his significant other. He states he is independent in his own care at baseline. Girlfriend is able to assist with whatever is needed. Patient is on Home O2 at 3-4L NC, he has a home concentrator and portable concentrator through [employer]. Patient is admitted for COPD exacerbation. He does follow with Pulmonologist and Cardiologist but not PCP, he is not interested in a PCP at this time. He says he does see an NP in [place] when needed. He denies home going needs at this time, he prefers to return home when medically ready. TCC to follow

## 2024-11-12 NOTE — CONSULTS
Reason For Consult  LARRY     History Of Present Illness  Darron Resendez is a 64 y.o. male    with history of COPD not on oxygen, CAD with stents, history of STEMI, hypertension, chronic tobacco abuse.  Presents with shortness of breath        Nephrology consulted for acute kidney injury, currently 1.55  patient has a baseline of 0.8-1  Patient has hyponatremia as well, sodium 132  No CT with contrast  Patient's blood pressure has been low since admission as low as 51/37, levo was started for a short period of time, discontinued this morning at o937  Patient is on lisinopril at home and  has been held.    .     Past Medical History  He has a past medical history of Chronic systolic (congestive) heart failure (Multi) (09/22/2020), Myocardial infarction (Multi) (07/01/2024), Old myocardial infarction, Old myocardial infarction (09/22/2020), Old myocardial infarction, Personal history of other diseases of the circulatory system, Personal history of other endocrine, nutritional and metabolic disease, and Presence of stent in anterior descending branch of left coronary artery (07/01/2024).     Surgical History  He has a past surgical history that includes Tonsillectomy (06/01/2016); Vasectomy (06/01/2016); Mandible surgery (06/01/2016); and Other surgical history (06/29/2020).     Social History  He reports that he has been smoking cigarettes. He has never used smokeless tobacco. He reports that he does not currently use alcohol. He reports that he does not use drugs.     Family History  No family hx of renal failure per pt knowledge      luan History  No family hx of renal failure per pt knowledge       Allergies  Penicillin g     Review of Systems  .Review of Systems   Constitutional:  Positive for fatigue.   HENT: Negative.     Eyes: Negative.    Respiratory:  Positive for shortness of breath and wheezing.    Cardiovascular: Negative.    Gastrointestinal: Negative.    Endocrine: Negative.    Genitourinary: Negative.     Musculoskeletal: Negative.    Allergic/Immunologic: Negative.    Neurological: Negative.    Hematological: Negative.    Psychiatric/Behavioral: Negative.           Physical Exam  .Physical Exam  Constitutional:       Appearance: He is obese.   HENT:      Mouth/Throat:      Mouth: Mucous membranes are dry.   Eyes:      Pupils: Pupils are equal, round, and reactive to light.   Cardiovascular:      Rate and Rhythm: Tachycardia present.   Pulmonary:      Breath sounds: Wheezing present.   Abdominal:      General: Bowel sounds are normal. There is distension.   Skin:     Capillary Refill: Capillary refill takes less than 2 seconds.   Neurological:      Mental Status: He is alert and oriented to person, place, and time.   Psychiatric:         Mood and Affect: Mood normal.        I&O 24HR    Intake/Output Summary (Last 24 hours) at 11/12/2024 1428  Last data filed at 11/12/2024 1316  Gross per 24 hour   Intake 522 ml   Output 375 ml   Net 147 ml       Vitals 24HR  Heart Rate:  [74-89]   Temp:  [36.2 °C (97.2 °F)-36.8 °C (98.2 °F)]   Resp:  [16-19]   BP: ()/(59-72)   Weight:  [92.2 kg (203 lb 4.2 oz)]   SpO2:  [92 %-99 %]         Relevant Results  Results from last 7 days   Lab Units 11/12/24  0535 11/11/24  0711   SODIUM mmol/L 132* 136   POTASSIUM mmol/L 5.0 5.1   CHLORIDE mmol/L 96* 98   CO2 mmol/L 30 33*   BUN mg/dL 40* 17   CREATININE mg/dL 1.55* 1.19   EGFR mL/min/1.73m*2 49* 67   GLUCOSE mg/dL 202* 172*   CALCIUM mg/dL 8.3* 9.0     Results from last 7 days   Lab Units 11/12/24  0535 11/11/24  0711   WBC AUTO x10*3/uL 23.2* 16.1*   HEMOGLOBIN g/dL 12.7* 14.9   HEMATOCRIT % 39.4* 45.5   PLATELETS AUTO x10*3/uL 241 277          Assessment/Plan       Acute kidney injury on chronic kidney disease baseline creatinine 1.1 peak creatinine 1.55.  Acute COPD exacerbation  Hypertension with hypertensive kidney disease.    Recommendations  Obtain urine studies.  Denied NSAIDs no recent contrast exposure.  Defer  management of COPD exacerbation to primary service.  Has been volume expanded getting antibiotic therapy.  Lisinopril has been held.  Will administer electrolyte drinks to expand volume.  Hopefully with these measures kidney function will start to improve.           Mateus Donald MD

## 2024-11-13 ENCOUNTER — TELEPHONE (OUTPATIENT)
Dept: PHARMACY | Facility: HOSPITAL | Age: 64
End: 2024-11-13
Payer: MEDICARE

## 2024-11-13 LAB
ANION GAP SERPL CALC-SCNC: 10 MMOL/L (ref 10–20)
BASE EXCESS BLDV CALC-SCNC: 6.9 MMOL/L (ref -2–3)
BODY TEMPERATURE: 37 DEGREES CELSIUS
BUN SERPL-MCNC: 23 MG/DL (ref 6–23)
CALCIUM SERPL-MCNC: 8.3 MG/DL (ref 8.6–10.3)
CHLORIDE SERPL-SCNC: 99 MMOL/L (ref 98–107)
CO2 SERPL-SCNC: 30 MMOL/L (ref 21–32)
CREAT SERPL-MCNC: 1.06 MG/DL (ref 0.5–1.3)
EGFRCR SERPLBLD CKD-EPI 2021: 77 ML/MIN/1.73M*2
ERYTHROCYTE [DISTWIDTH] IN BLOOD BY AUTOMATED COUNT: 12.5 % (ref 11.5–14.5)
GLUCOSE BLD MANUAL STRIP-MCNC: 181 MG/DL (ref 74–99)
GLUCOSE BLD MANUAL STRIP-MCNC: 236 MG/DL (ref 74–99)
GLUCOSE BLD MANUAL STRIP-MCNC: 274 MG/DL (ref 74–99)
GLUCOSE BLD MANUAL STRIP-MCNC: 290 MG/DL (ref 74–99)
GLUCOSE SERPL-MCNC: 190 MG/DL (ref 74–99)
HCO3 BLDV-SCNC: 32.5 MMOL/L (ref 22–26)
HCT VFR BLD AUTO: 36.5 % (ref 41–52)
HGB BLD-MCNC: 12 G/DL (ref 13.5–17.5)
HOLD SPECIMEN: NORMAL
INHALED O2 CONCENTRATION: 32 %
MCH RBC QN AUTO: 31.4 PG (ref 26–34)
MCHC RBC AUTO-ENTMCNC: 32.9 G/DL (ref 32–36)
MCV RBC AUTO: 96 FL (ref 80–100)
NRBC BLD-RTO: 0 /100 WBCS (ref 0–0)
OXYHGB MFR BLDV: 96.1 % (ref 45–75)
PCO2 BLDV: 49 MM HG (ref 41–51)
PH BLDV: 7.43 PH (ref 7.33–7.43)
PLATELET # BLD AUTO: 242 X10*3/UL (ref 150–450)
PO2 BLDV: 97 MM HG (ref 35–45)
POTASSIUM SERPL-SCNC: 4.7 MMOL/L (ref 3.5–5.3)
PROCALCITONIN SERPL-MCNC: 0.02 NG/ML
RBC # BLD AUTO: 3.82 X10*6/UL (ref 4.5–5.9)
SAO2 % BLDV: 98 % (ref 45–75)
SODIUM SERPL-SCNC: 134 MMOL/L (ref 136–145)
WBC # BLD AUTO: 23.2 X10*3/UL (ref 4.4–11.3)

## 2024-11-13 PROCEDURE — 2060000001 HC INTERMEDIATE ICU ROOM DAILY

## 2024-11-13 PROCEDURE — 85027 COMPLETE CBC AUTOMATED: CPT | Performed by: NURSE PRACTITIONER

## 2024-11-13 PROCEDURE — 97161 PT EVAL LOW COMPLEX 20 MIN: CPT | Mod: GP

## 2024-11-13 PROCEDURE — 99232 SBSQ HOSP IP/OBS MODERATE 35: CPT | Performed by: NURSE PRACTITIONER

## 2024-11-13 PROCEDURE — 84145 PROCALCITONIN (PCT): CPT | Mod: PORLAB | Performed by: NURSE PRACTITIONER

## 2024-11-13 PROCEDURE — 2500000002 HC RX 250 W HCPCS SELF ADMINISTERED DRUGS (ALT 637 FOR MEDICARE OP, ALT 636 FOR OP/ED): Performed by: NURSE PRACTITIONER

## 2024-11-13 PROCEDURE — 2500000001 HC RX 250 WO HCPCS SELF ADMINISTERED DRUGS (ALT 637 FOR MEDICARE OP): Performed by: NURSE PRACTITIONER

## 2024-11-13 PROCEDURE — 97165 OT EVAL LOW COMPLEX 30 MIN: CPT | Mod: GO

## 2024-11-13 PROCEDURE — 2500000005 HC RX 250 GENERAL PHARMACY W/O HCPCS: Performed by: NURSE PRACTITIONER

## 2024-11-13 PROCEDURE — 36415 COLL VENOUS BLD VENIPUNCTURE: CPT | Performed by: NURSE PRACTITIONER

## 2024-11-13 PROCEDURE — 94668 MNPJ CHEST WALL SBSQ: CPT

## 2024-11-13 PROCEDURE — 94660 CPAP INITIATION&MGMT: CPT

## 2024-11-13 PROCEDURE — 82805 BLOOD GASES W/O2 SATURATION: CPT | Performed by: NURSE PRACTITIONER

## 2024-11-13 PROCEDURE — 82374 ASSAY BLOOD CARBON DIOXIDE: CPT | Performed by: NURSE PRACTITIONER

## 2024-11-13 PROCEDURE — 2500000004 HC RX 250 GENERAL PHARMACY W/ HCPCS (ALT 636 FOR OP/ED): Performed by: NURSE PRACTITIONER

## 2024-11-13 PROCEDURE — 99233 SBSQ HOSP IP/OBS HIGH 50: CPT | Performed by: NURSE PRACTITIONER

## 2024-11-13 PROCEDURE — 94640 AIRWAY INHALATION TREATMENT: CPT

## 2024-11-13 PROCEDURE — 82947 ASSAY GLUCOSE BLOOD QUANT: CPT

## 2024-11-13 ASSESSMENT — COGNITIVE AND FUNCTIONAL STATUS - GENERAL
CLIMB 3 TO 5 STEPS WITH RAILING: TOTAL
DAILY ACTIVITIY SCORE: 24
DAILY ACTIVITIY SCORE: 22
MOBILITY SCORE: 21
MOBILITY SCORE: 24
DRESSING REGULAR LOWER BODY CLOTHING: A LITTLE
HELP NEEDED FOR BATHING: A LITTLE

## 2024-11-13 ASSESSMENT — ACTIVITIES OF DAILY LIVING (ADL)
BATHING_ASSISTANCE: STAND BY
ADL_ASSISTANCE: INDEPENDENT
ADL_ASSISTANCE: INDEPENDENT

## 2024-11-13 ASSESSMENT — PAIN SCALES - GENERAL
PAINLEVEL_OUTOF10: 0 - NO PAIN

## 2024-11-13 ASSESSMENT — PAIN - FUNCTIONAL ASSESSMENT
PAIN_FUNCTIONAL_ASSESSMENT: 0-10

## 2024-11-13 ASSESSMENT — ENCOUNTER SYMPTOMS
WHEEZING: 1
DYSURIA: 0
COUGH: 1
SHORTNESS OF BREATH: 1
CHILLS: 0
FEVER: 0

## 2024-11-13 NOTE — CARE PLAN
The patient's goals for the shift include      The clinical goals for the shift include Pt will have no complaint of SOB with exertion by end of shift      Problem: Chronic Conditions and Co-morbidities  Goal: Patient's chronic conditions and co-morbidity symptoms are monitored and maintained or improved  Outcome: Progressing     Problem: Respiratory  Goal: Minimal/no exertional discomfort or dyspnea this shift  Outcome: Progressing     Problem: Respiratory  Goal: No signs of respiratory distress (eg. Use of accessory muscles. Peds grunting)  Outcome: Progressing     Problem: Respiratory  Goal: Wean oxygen to maintain O2 saturation per order/standard this shift  Outcome: Progressing

## 2024-11-13 NOTE — TELEPHONE ENCOUNTER
Patient is currently in the hospital. Having trouble with cost assistance with inhalers. Reached out to his partner India who states Trelegy is over $1,000. I briefly told her about our patient assistance program. She is unsure how much he makes. She is to send me his 1040 so we can get him enrolled in PAP if he qualifies. States she may have to wait until he's discharged to get the tax form. I told her the quicker she gets it to me, the faster we can have him start on that medication when he is discharged.    I scheduled us a phone appointment in 2 weeks to fully go over medications/ pulm related symptoms.    Rosalina Lopez, PharmD  Clinical Pharmacy Specialist - Primary Care  [phone]  [email or web address]

## 2024-11-13 NOTE — NURSING NOTE
Assessment    I met with Darron Mal and girlfriend at the bedside, and my impressions include: Patient was alert and oriented. Booklet on Living Well with COPD given and content reviewed. Pamphlet on [place],  Pharmacy, and [facility] given and reviewed. Patient voiced inability to pay for inhalers. Reviewed [employer] program and how they may be able to assist the patient. Working with the [facility] to get Meds to Beds set up for the patient.           2

## 2024-11-13 NOTE — PROGRESS NOTES
Occupational Therapy    Initial Evaluation and Discharge    Patient Name: Darron Resendez  MRN: [id number]  Department: [facility]  Room: 2026/2026-A  Today's Date: 11/13/2024  Time Calculation  Start Time: 1153  Stop Time: 1205  Time Calculation (min): 12 min        Assessment:  OT Assessment: Pt is 64 year old male admitted for SOB. Pt demo'd SBA for mobility and reports independently getting to bathroom. No OT concerns for discharge. No skilled OT intervention indicated during hospital stay. Encouarge mobility with nursing.  Prognosis: Excellent  Evaluation/Treatment Tolerance: Patient tolerated treatment well  End of Session Communication: Bedside nurse  End of Session Patient Position: Bed, 3 rail up, Alarm off, not on at start of session  Prognosis: Excellent  Evaluation/Treatment Tolerance: Patient tolerated treatment well  Plan:  No Skilled OT: No acute OT goals identified  OT Frequency: OT eval only  OT Discharge Recommendations: No further acute OT, No OT needed after discharge  OT Recommended Transfer Status: Stand by assist  OT - OK to Discharge: Yes (OT evaluation complete and discharge recommendation documented)       Subjective   Current Problem:  1. Acute respiratory failure with hypercapnia (Multi)          General:  General  Reason for Referral: Acute respiratory failure  Referred By: Roxann  Past Medical History Relevant to Rehab: coronary artery disease, hypertension, dyslipidemia, COPD, emphysema  Missed Visit: No  Family/Caregiver Present: Yes  Caregiver Feedback: Girlfriend present during session  Co-Treatment: PT  Co-Treatment Reason: to optimize mobility and safety for expedited discharge planning  Patient Position Received: Bed, 2 rail up, Alarm off, not on at start of session  General Comment: Pt seated EOB upon arrival. Pt reports improved SOB since admission. Pt pleasant and cooperative during session.  Precautions:  Medical Precautions: Oxygen therapy device and L/min    Vital Sign (Past 2hrs)         Date/Time Vitals Session Patient Position Pulse Resp SpO2 BP MAP (mmHg)    11/13/24 1153 --  --  82  --  93 %  --  --     11/13/24 1154 During PT  Sitting  82  --  93 %  --  --                         Pain:  Pain Assessment  Pain Assessment: 0-10  0-10 (Numeric) Pain Score: 0 - No pain    Objective   Cognition:  Overall Cognitive Status: Within Functional Limits  Orientation Level: Oriented X4           Home Living:  Type of Home: House  Lives With: Significant other  Home Adaptive Equipment: Walker rolling or standard, Cane  Home Layout: One level  Home Access: Stairs to enter with rails  Entrance Stairs-Number of Steps: 4  Bathroom Shower/Tub: Tub/shower unit, Walk-in shower  Bathroom Toilet: Standard  Bathroom Equipment: Shower chair with back  Prior Function:  Level of [place]: Independent with ADLs and functional transfers  ADL Assistance: Independent  Homemaking Assistance: Needs assistance  Ambulatory Assistance: Independent (no device)  Hand Dominance: Right  Prior Function Comments: (-) falls  IADL History:  IADL Comments: (+) drive  ADL:  Eating Assistance: Independent  Grooming Assistance: Independent (Anticipated)  Bathing Assistance: Stand by (Anticipated)  UE Dressing Assistance: Stand by (Anticipated)  LE Dressing Assistance: Stand by (Anticipated)  Toileting Assistance with Device: Stand by (Anticipated)  Functional Assistance: Stand by (Anticipated)  Activity Tolerance:  Endurance: Endurance does not limit participation in activity  Bed Mobility/Transfers: Bed Mobility  Bed Mobility: No (Seated EOB at beginning and end of session)    Transfers  Transfer: Yes  Transfer 1  Transfer From 1: Sit to, Stand to  Transfer to 1: Sit, Stand  Technique 1: Sit to stand, Stand to sit  Transfer Level of Assistance 1: Close supervision (SBA)      Functional Mobility:  Functional Mobility  Functional Mobility Performed: Yes  Functional Mobility 1  Surface 1: Level tile  Device 1: No device  Assistance  1: Close supervision (SBA)  Comments 1: Functional mobility for community distance with SBA, no LOB noted  Sitting Balance:  Static Sitting Balance  Static Sitting-Balance Support: No upper extremity supported  Static Sitting-Level of Assistance: Independent  Dynamic Sitting Balance  Dynamic Sitting-Balance Support: No upper extremity supported  Dynamic Sitting-Level of Assistance: Distant supervision  Standing Balance:  Static Standing Balance  Static Standing-Balance Support: No upper extremity supported  Static Standing-Level of Assistance: Distant supervision  Dynamic Standing Balance  Dynamic Standing-Balance Support: No upper extremity supported  Dynamic Standing-Level of Assistance: Close supervision   Modalities:     Vision:Vision - Basic Assessment  Current Vision: No visual deficits  Sensation:  Light Touch: No apparent deficits  Strength:  Strength Comments: SHEKHAR acevedo 4/5  Perception:     Coordination:      Hand Function:  Gross Grasp: Functional  Coordination: Functional      Outcome Measures:[facility] Daily Activity  Putting on and taking off regular lower body clothing: A little  Bathing (including washing, rinsing, drying): A little  Putting on and taking off regular upper body clothing: None  Toileting, which includes using toilet, bedpan or urinal: None  Taking care of personal grooming such as brushing teeth: None  Eating Meals: None  Daily Activity - Total Score: 22        Education Documentation  ADL Training, taught by Kandi Thayer OT at 11/13/2024  1:17 PM.  Learner: Patient  Readiness: Acceptance  Method: Explanation  Response: Verbalizes Understanding    Education Comments  No comments found.        OP EDUCATION:       Goals: N/A

## 2024-11-13 NOTE — PROGRESS NOTES
Physical Therapy    Physical Therapy Evaluation    Patient Name: Darron Resendez  MRN: [id number]  Today's Date: 11/13/2024   Time Calculation  Start Time: 1154  Stop Time: 1206  Time Calculation (min): 12 min  2026/2026-A    Assessment/Plan   PT Assessment  PT Assessment Results: Decreased mobility  Rehab Prognosis: Excellent  Barriers to Discharge: None  Evaluation/Treatment Tolerance: Patient tolerated treatment well  Medical Staff Made Aware: Yes  End of Session Communication: Bedside nurse  Assessment Comment: Patient presents with acute respiratory failure with hypercapnia, LARRY. He does present with decreased endurance, though this is chronic. The patient presents with no PT needs. He vocalizes no need for PT at this time. Plan to discontinue PT.  End of Session Patient Position: Bed, 3 rail up, Alarm off, not on at start of session  IP OR SWING BED PT PLAN  Inpatient or Swing Bed: Inpatient     11/13/24 1154   PT Plan   Treatment/Interventions Endurance training;Home exercise program   PT Plan PT Eval only   PT Eval Only Reason At baseline function   PT Frequency PT eval only   PT Discharge Recommendations No further acute PT;No PT needed after discharge   Equipment Recommended upon Discharge   (None)   PT Recommended Transfer Status Independent   PT - OK to Discharge Yes  (Once medically cleared.)     Subjective     Current Problem:  1. Acute respiratory failure with hypercapnia (Multi)          Patient Active Problem List   Diagnosis    Acute respiratory failure with hypercapnia (Multi)    Coronary artery disease involving native coronary artery of native heart without angina pectoris    Primary hypertension    Chronic obstructive pulmonary disease with acute exacerbation (Multi)       General Visit Information:  General  Reason for Referral: Acute respiratory failure with hypercapnia, LARRY.  Referred By: Tonny Hackett, APRN-CNP  Past Medical History Relevant to Rehab: PMHx: Severe COPD, chronic hypoxic  respiratory failure, STEMI with stent, DM, HTN, recent PNA 10/29, CKD. (65 y/o male admitted with acute respiratory failure with hypercapnia, LARRY.)  Family/Caregiver Present: Yes (Girlfriend)  Co-Treatment: OT  Co-Treatment Reason: To conserve energy with consideration of current and past medical history.  Prior to Session Communication: Bedside nurse  Patient Position Received: Bed, 3 rail up, Alarm off, not on at start of session  General Comment: Patient sitting EOB with O2 removed, reapplied as stated nursing approved applying only with exertion but reports becoming dizzy. Patient agreeable to participate in PT.    Home Living:  Home Living  Type of Home: House  Lives With: Significant other  Home Adaptive Equipment: None (Patient reports he would have access to a walker, cane, w/c PRN.)  Home Layout: One level  Home Access: Stairs to enter with rails  Entrance Stairs-Rails:  (1)  Entrance Stairs-Number of Steps: 4  Bathroom Shower/Tub: Tub/shower unit, Walk-in shower (Tendency to use tub/shower.)    Prior Level of Function:  Prior Function Per Pt/Caregiver Report  Level of [place]: Independent with ADLs and functional transfers  Receives Help From:  (Girlfriend)  ADL Assistance: Independent  Homemaking Assistance: Needs assistance  Ambulatory Assistance: Independent (without AD)  Prior Function Comments: +drives    Precautions:       Vital Signs:  Vital Signs  Vitals Session: During PT  Heart Rate: 82  Heart Rate Source: Monitor  SpO2: 93 % (on 3LO2 during/after ambulation)  Patient Position: Sitting  Objective     Pain:  Pain Assessment  Pain Assessment: 0-10  0-10 (Numeric) Pain Score: 0 - No pain    Cognition:  Cognition  Overall Cognitive Status: Within Functional Limits  Orientation Level: Oriented X4    General Assessments:  General Observation  General Observation: Patient reports that he sits alot during the day, gets up only to use the bathroom, move around the house. Educated pt in potential  benefits of increasing ambulation around his home, adding a lap every 1-2 days as tolerates.   Activity Tolerance  Endurance: Endurance does not limit participation in activity  Rate of Perceived Exertion (RPE): MODIFIED RONA RPE: unable to rate, reports on the low end.  Sensation  Light Touch: No apparent deficits  Strength  Strength Comments: B LE WFL  Perception  Inattention/Neglect: Appears intact  Coordination  Movements are Fluid and Coordinated: Yes     Static Sitting Balance  Static Sitting-Balance Support: Feet supported, No upper extremity supported  Static Sitting-Level of Assistance: Independent  Static Standing Balance  Static Standing-Balance Support: No upper extremity supported  Static Standing-Level of Assistance: Close supervision    Functional Assessments:        Transfers  Transfer:  (Sit <> stand MOD I.)  Ambulation/Gait Training  Ambulation/Gait Training Performed:  (Ambulates 150 feet without AD with continuous gait, B UE swing, good pace with SBA. No LOB during turns.)  Stairs  Stairs:  (Patient declines stair negotiation this date, feels too fatigued.)       Extremity/Trunk Assessments:        RLE   RLE : Within Functional Limits  LLE   LLE : Within Functional Limits    Outcome Measures:     [facility] Basic Mobility  Turning from your back to your side while in a flat bed without using bedrails: None  Moving from lying on your back to sitting on the side of a flat bed without using bedrails: None  Moving to and from bed to chair (including a wheelchair): None  Standing up from a chair using your arms (e.g. wheelchair or bedside chair): None  To walk in hospital room: None  Climbing 3-5 steps with railing: Total  Basic Mobility - Total Score: 21                                                             Goals:  Encounter Problems       Encounter Problems (Active)       Pain - Adult            Education Documentation  Home Exercise Program, taught by India Kohli PT at 11/13/2024  1:12  PM.  Learner: Patient  Readiness: Acceptance  Method: Explanation  Response: Verbalizes Understanding    Education Comments  No comments found.

## 2024-11-13 NOTE — PROGRESS NOTES
"Darron Resendez is a 64 y.o. male on day 2 of admission presenting with Acute respiratory failure with hypercapnia (Multi).    Subjective   Chart reviewed, no acute events documented overnight. Per chart, wore BiPAP for about 1 hour, otherwise was on 3 L NC. Pulse ox 94-97%    Patient seen and examined, sitting at edge of bed, in no apparent distress. Significant other at his bedside. He reports that his breathing is feeling much better. He has been able to ambulate in the halls today. He feels his SOB and cough are almost back at baseline. He is hopeful to go home soon.          Objective     Physical Exam  Vitals reviewed.   Constitutional:       General: He is not in acute distress.     Appearance: He is not ill-appearing.   HENT:      Mouth/Throat:      Mouth: Mucous membranes are moist.   Eyes:      General: No scleral icterus.  Cardiovascular:      Rate and Rhythm: Normal rate and regular rhythm.      Pulses: Normal pulses.      Heart sounds: Normal heart sounds.   Pulmonary:      Effort: Pulmonary effort is normal. No respiratory distress.      Comments: Very faint expiratory wheeze throughout; no rhonchi or crackles noted  Skin:     General: Skin is warm and dry.   Neurological:      General: No focal deficit present.      Mental Status: He is alert and oriented to person, place, and time.   Psychiatric:         Mood and Affect: Mood normal.         Behavior: Behavior normal.       Last Recorded Vitals  Blood pressure 127/78, pulse 75, temperature 36.2 °C (97.1 °F), temperature source Temporal, resp. rate 18, height 1.702 m (5' 7\"), weight 96.4 kg (212 lb 8.4 oz), SpO2 97%.  Intake/Output last 3 Shifts:  I/O last 3 completed shifts:  In: 2982 (30.9 mL/kg) [P.O.:2982]  Out: 250 (2.6 mL/kg) [Urine:250 (0.1 mL/kg/hr)]  Weight: 96.4 kg     Meds:  Scheduled medications  aspirin, 81 mg, oral, Daily  atorvastatin, 80 mg, oral, Daily  azithromycin, 500 mg, intravenous, q24h  carvedilol, 6.25 mg, oral, BID  cefTRIAXone, " 2 g, intravenous, q24h  enoxaparin, 40 mg, subcutaneous, q24h  fluticasone furoate-vilanteroL, 1 puff, inhalation, Daily  insulin lispro, 0-5 Units, subcutaneous, TID AC  ipratropium-albuteroL, 3 mL, nebulization, TID  [Held by provider] lisinopril, 5 mg, oral, Daily  methylPREDNISolone sodium succinate (PF), 40 mg, intravenous, q12h  oral hydration, 500 mL, oral, q8h SHELLIE  oxygen, , inhalation, Continuous - Inhalation  pantoprazole, 40 mg, oral, Daily before breakfast   Or  pantoprazole, 40 mg, intravenous, Daily before breakfast  polyethylene glycol, 17 g, oral, Daily  tiotropium, 2 puff, inhalation, Daily       PRN medications  PRN medications: bisacodyl, bisacodyl, dextrose, dextrose, glucagon, glucagon, guaiFENesin, ipratropium-albuteroL, melatonin, ondansetron ODT **OR** ondansetron      Relevant Results  Labs:  Results for orders placed or performed during the hospital encounter of 11/11/24 (from the past 24 hours)   POCT GLUCOSE   Result Value Ref Range    POCT Glucose 274 (H) 74 - 99 mg/dL   POCT GLUCOSE   Result Value Ref Range    POCT Glucose 274 (H) 74 - 99 mg/dL   Urinalysis with Reflex Culture and Microscopic   Result Value Ref Range    Color, Urine Colorless (N) Light-Yellow, Yellow, Dark-Yellow    Appearance, Urine Clear Clear    Specific Gravity, Urine 1.009 1.005 - 1.035    pH, Urine 7.0 5.0, 5.5, 6.0, 6.5, 7.0, 7.5, 8.0    Protein, Urine NEGATIVE NEGATIVE, 10 (TRACE), 20 (TRACE) mg/dL    Glucose, Urine Normal Normal mg/dL    Blood, Urine NEGATIVE NEGATIVE    Ketones, Urine NEGATIVE NEGATIVE mg/dL    Bilirubin, Urine NEGATIVE NEGATIVE    Urobilinogen, Urine Normal Normal mg/dL    Nitrite, Urine NEGATIVE NEGATIVE    Leukocyte Esterase, Urine NEGATIVE NEGATIVE   Extra Urine Gray Tube   Result Value Ref Range    Extra Tube Hold for add-ons.    POCT GLUCOSE   Result Value Ref Range    POCT Glucose 284 (H) 74 - 99 mg/dL   Basic Metabolic Panel   Result Value Ref Range    Glucose 190 (H) 74 - 99 mg/dL     Sodium 134 (L) 136 - 145 mmol/L    Potassium 4.7 3.5 - 5.3 mmol/L    Chloride 99 98 - 107 mmol/L    Bicarbonate 30 21 - 32 mmol/L    Anion Gap 10 10 - 20 mmol/L    Urea Nitrogen 23 6 - 23 mg/dL    Creatinine 1.06 0.50 - 1.30 mg/dL    eGFR 77 >60 mL/min/1.73m*2    Calcium 8.3 (L) 8.6 - 10.3 mg/dL   CBC   Result Value Ref Range    WBC 23.2 (H) 4.4 - 11.3 x10*3/uL    nRBC 0.0 0.0 - 0.0 /100 WBCs    RBC 3.82 (L) 4.50 - 5.90 x10*6/uL    Hemoglobin 12.0 (L) 13.5 - 17.5 g/dL    Hematocrit 36.5 (L) 41.0 - 52.0 %    MCV 96 80 - 100 fL    MCH 31.4 26.0 - 34.0 pg    MCHC 32.9 32.0 - 36.0 g/dL    RDW 12.5 11.5 - 14.5 %    Platelets 242 150 - 450 x10*3/uL   BLOOD GAS VENOUS   Result Value Ref Range    POCT pH, Venous 7.43 7.33 - 7.43 pH    POCT pCO2, Venous 49 41 - 51 mm Hg    POCT pO2, Venous 97 (H) 35 - 45 mm Hg    POCT SO2, Venous 98 (H) 45 - 75 %    POCT Oxy Hemoglobin, Venous 96.1 (H) 45.0 - 75.0 %    POCT Base Excess, Venous 6.9 (H) -2.0 - 3.0 mmol/L    POCT HCO3 Calculated, Venous 32.5 (H) 22.0 - 26.0 mmol/L    Patient Temperature 37.0 degrees Celsius    FiO2 32 %   POCT GLUCOSE   Result Value Ref Range    POCT Glucose 181 (H) 74 - 99 mg/dL     Imaging:  XR chest 1 view  Result Date: 11/11/2024  STUDY: Chest Radiograph;  11/11/2024 at 7:28 AM INDICATION: Shortness of breath. COMPARISON: None Available ACCESSION NUMBER(S): [id number] ORDERING CLINICIAN: ALFONSO LANE TECHNIQUE:  Frontal chest was obtained at 0728 hours. FINDINGS: Flattening of the hemidiaphragms suggests COPD. Cardiac silhouette normal in size. No significant pulmonary vascular congestion. No significant pleural effusion. No visible pneumothorax.     No acute radiographic chest finding. Signed by Emmanuel Ward MD       Assessment/Plan   Darron Resendez is a 64 y.o. male (current smoker, ~ pack year history) with a PMHx of very severe COPD (FEV1 27%), chronic hypoxic respiratory failure (6MW 09/2024, RA at rest, 3 L O2 with exertion), CAD (STEMI)  s/p stent, DM, HTN, HLD, HFrEF (EF 35% 6/2020, 65% 7/2020). Admitted 11/11/24 after presented with increased shortness of breath and non-productive cough. Workup significant for WBC 16.1, VBG pH 7.26 pCO2 81 (placed on BiPAP improved to 7.31, pCO2 70), CXR with chronic COPD changes and no acute cardiopulmonary findings. Of note, patient seen in ED 10/29/24 and treated for pneumonia with levaquin and prednisone. Pulmonary consulted for AECOPD.     Patient follows with [facility] outpatient pulmonary, last seen 10/10/29 and was treated with prednisone for AECOPD and also noted to be non-compliant, was not using any inhalers. Was prescribed Trelegy and PRN duonebs (nebulizer machine ordered), prednisone taper and received extensive smoking cessation but patient declined pharmacological options to assist with quitting.      Impression:  1) Very severe COPD with acute exacerbation - PFT 09/2024 with severe COPD (FEV1 27%), patient non-compliant with inhalers (at 08/2024 & 10/2024 outpatient visits was not using any inhalers) and continues to smoke - was prescribed Trelegy and PRN duonebs at last outpatient visit   -- VBG 11/13 improved, pH 7.43 and pCO2 49 (only wore BiPAP for about 1 hour overnight so likely not a result of BiPAP)     2) Chronic hypoxic respiratory failure - 6MW 09/2024 results needs 3 L O2 with exertion but okay for RA at rest; patient weaned to RA at rest and 3 L with exertion     Recommendations:  - continue antibiotics (defer to hospitalist, currently on azithromycin & ceftriaxone)  - continue duonebs TID  - continue solumedrol 40 mg q8hr, if wheezing improved tomorrow consider changing to BID  - continue Breo & Spiriva  - continue supplemental O2, wean to maintain pulse ox 88-90%  - BiPAP PRN  - acapella TID for bronchopulmonary hygiene     - will need home O2 eval prior to discharge to evaluate for change in O2 needs      - Patient reports he could not afford Trelegy that was ordered at 10/2024  outpatient pulm appt (did not notify office) and has only been using duonebs once daily  --- Patient will need discharged with ICS/LAMA/LABA, pharmacy is aware and working to help get coverage for patient.    Patient has follow-up scheduled with Jacqueline Nava CNP on 12/5/24 for ongoing pulmonary care.     OTONIEL Dc-CNP

## 2024-11-13 NOTE — PROGRESS NOTES
Darron Resendez is a 64 y.o. male on day 2 of admission presenting with Acute respiratory failure with hypercapnia (Multi).      Subjective   .Interval History    Patient is doing sitting on the side of the bed eating lunch family at bedside    ROS  Denies chest pain, shortness of breath,  Nausea, vomiting , diarrhea, fever or chills.     No change in Past Medical History        Objective          Vitals 24HR  Heart Rate:  [71-95]   Temp:  [36.2 °C (97.1 °F)-36.9 °C (98.4 °F)]   Resp:  [16-21]   BP: (119-136)/(68-79)   Weight:  [96.4 kg (212 lb 8.4 oz)]   SpO2:  [94 %-98 %]     .  Vitals:    11/13/24 0334 11/13/24 0700 11/13/24 0805 11/13/24 1100   BP:  127/78  119/73   BP Location:  Left arm  Left arm   Patient Position:  Sitting  Lying   Pulse:  75  71   Resp:  18  16   Temp:  36.2 °C (97.1 °F)  36.6 °C (97.8 °F)   TempSrc:  Temporal  Temporal   SpO2: 94% 97% 97% 95%   Weight:       Height:        .  Results from last 7 days   Lab Units 11/13/24  0400 11/12/24  0535 11/11/24  0711   SODIUM mmol/L 134* 132* 136   POTASSIUM mmol/L 4.7 5.0 5.1   CHLORIDE mmol/L 99 96* 98   CO2 mmol/L 30 30 33*   BUN mg/dL 23 40* 17   CREATININE mg/dL 1.06 1.55* 1.19   EGFR mL/min/1.73m*2 77 49* 67   GLUCOSE mg/dL 190* 202* 172*   CALCIUM mg/dL 8.3* 8.3* 9.0    .  Results from last 7 days   Lab Units 11/13/24  0400 11/12/24  0535 11/11/24  0711   WBC AUTO x10*3/uL 23.2* 23.2* 16.1*   HEMOGLOBIN g/dL 12.0* 12.7* 14.9   HEMATOCRIT % 36.5* 39.4* 45.5   PLATELETS AUTO x10*3/uL 242 241 277        Intake/Output last 3 Shifts:    Intake/Output Summary (Last 24 hours) at 11/13/2024 1120  Last data filed at 11/13/2024 0555  Gross per 24 hour   Intake 2682 ml   Output 250 ml   Net 2432 ml       Physical Exam  Constitutional:       Appearance: Normal appearance.   HENT:      Mouth/Throat:      Mouth: Mucous membranes are moist.      Pharynx: Oropharynx is clear.   Eyes:      Extraocular Movements: Extraocular movements intact.      Pupils: Pupils  are equal, round, and reactive to light.   Cardiovascular:      Rate and Rhythm: Normal rate and regular rhythm.   Pulmonary:      Effort: Pulmonary effort is normal.      Breath sounds: Normal breath sounds.   Abdominal:      General: Abdomen is flat. Bowel sounds are normal.      Palpations: Abdomen is soft.   Musculoskeletal:      Cervical back: Normal range of motion and neck supple.      Right lower leg: No edema.      Left lower leg: No edema.   Skin:     General: Skin is warm and dry.      Capillary Refill: Capillary refill takes more than 3 seconds.   Neurological:      Mental Status: He is alert and oriented to person, place, and time. Mental status is at baseline.   Psychiatric:         Mood and Affect: Mood normal.         Relevant Results               Assessment/Plan   This patient currently has cardiac telemetry ordered; if you would like to modify or discontinue the telemetry order, click here to go to the orders activity to modify/discontinue the order.      Acute kidney injury on chronic kidney disease baseline creatinine 1.1 peak creatinine 1.55.  Hyponatremia E87. 1  Acute COPD exacerbation  Hypertension with hypertensive kidney disease.     Recommendations  Serum creatinine back to baseline  Denied NSAIDs no recent contrast exposure.  Defer management of COPD exacerbation to primary service.  Has been volume expanded getting antibiotic therapy.  Lisinopril has been held.  Okay for discharge from a renal standpoint BMP in 3 days and follow-up in office in 1 week,       Assessment & Plan                  .Thank you for the consult and the opportunity to participate inn the care of this patient. Please do not hesitate to contact us with any questions or concern  OTONIEL Hooker, CNP  [facility] [employer]  [phone]   OTONIEL Styles-CNP

## 2024-11-13 NOTE — PROGRESS NOTES
Darron Resendez is a 64 y.o. male on day 2 of admission presenting with Acute respiratory failure with hypercapnia (Multi).      Subjective   Darron Dumas is a 64 y.o. male with PMHx s/f coronary artery disease, hypertension, dyslipidemia, COPD, emphysema presenting with shortness of breath.  Patient had recently been treated for COPD exacerbation with steroids and had completed steroid taper.  Patient has progressively come down more more short of breath.  He does have some cough no purulent sputum he denies any fevers chills or rigors he denies any urinary symptoms.  No report of chest pain or palpitations.  Patient originally presented afebrile at 98.9 heart rate 93 he was tachypneic at 24 blood pressure 135/86 SpO2 was 91% on room air.  Venous blood gas showed the patient to have a significantly elevated pCO2 of 81 pO2 was 54 pH 7.26 patient was subsequently placed on BiPAP with improvement in his pCO2 to 70 and pH 7.31 patient was found to have leukocytosis with white blood cell count 16.1 hemoglobin 14.9 hematocrit 45.5 platelets 277.  Chemistry panel showed glucose elevated at 172 otherwise unremarkable the BN peptide was not elevated troponin was not elevated given the leukocytosis a chest x-ray was done. Showing no evidence of pneumonia no acute radiographic chest findings.  Patient was started on Rocephin and Zithromax due to the elevated white blood cell count given dose of IV steroids and referred for admission.       11.12.24 Pt afebrile, was borderline hypotensive yesterday with elevation of Creatinine today, there is also significant leukocytosis. No fevers. Subjectively patient feels he is doing much better.  Will expand antibiotic coverage to cover possible pneumonia, check procalcitonin level request pulmonology consult.  Will also check urine and request nephrology consult.  There was a drop in his hemoglobin we will check iron studies and reticulocyte count by adding onto the morning labs.    11.13.24  Pt afebrile, subjectively feeling much better with the addition of Breo and spiriva. Has had issues affording them as an outpt. Renal function appears stable to improved. Procalcitonin level is pending. Will decrease IV steroids, work on getting out patient medications for patient    Review of Systems   Constitutional:  Negative for chills and fever.   Respiratory:  Positive for cough, shortness of breath and wheezing.    Cardiovascular:  Negative for chest pain.   Genitourinary:  Negative for dysuria.          Objective     Last Recorded Vitals  /78 (BP Location: Left arm, Patient Position: Sitting)   Pulse 75   Temp 36.2 °C (97.1 °F) (Temporal)   Resp 18   Wt 96.4 kg (212 lb 8.4 oz)   SpO2 97%     Image Results  ECG 12 lead    Result Date: 11/11/2024  Sinus rhythm Borderline right axis deviation Anteroseptal infarct, old    XR chest 1 view    Result Date: 11/11/2024  STUDY: Chest Radiograph;  11/11/2024 at 7:28 AM INDICATION: Shortness of breath. COMPARISON: None Available ACCESSION NUMBER(S): [id number] ORDERING CLINICIAN: ALFONSO LANE TECHNIQUE:  Frontal chest was obtained at 0728 hours. FINDINGS: Flattening of the hemidiaphragms suggests COPD. Cardiac silhouette normal in size. No significant pulmonary vascular congestion. No significant pleural effusion. No visible pneumothorax.     No acute radiographic chest finding. Signed by Emmanuel Ward MD       Lab Results  Results for orders placed or performed during the hospital encounter of 11/11/24 (from the past 24 hours)   POCT GLUCOSE   Result Value Ref Range    POCT Glucose 274 (H) 74 - 99 mg/dL   POCT GLUCOSE   Result Value Ref Range    POCT Glucose 274 (H) 74 - 99 mg/dL   Urinalysis with Reflex Culture and Microscopic   Result Value Ref Range    Color, Urine Colorless (N) Light-Yellow, Yellow, Dark-Yellow    Appearance, Urine Clear Clear    Specific Gravity, Urine 1.009 1.005 - 1.035    pH, Urine 7.0 5.0, 5.5, 6.0, 6.5, 7.0, 7.5, 8.0     Protein, Urine NEGATIVE NEGATIVE, 10 (TRACE), 20 (TRACE) mg/dL    Glucose, Urine Normal Normal mg/dL    Blood, Urine NEGATIVE NEGATIVE    Ketones, Urine NEGATIVE NEGATIVE mg/dL    Bilirubin, Urine NEGATIVE NEGATIVE    Urobilinogen, Urine Normal Normal mg/dL    Nitrite, Urine NEGATIVE NEGATIVE    Leukocyte Esterase, Urine NEGATIVE NEGATIVE   Extra Urine Gray Tube   Result Value Ref Range    Extra Tube Hold for add-ons.    POCT GLUCOSE   Result Value Ref Range    POCT Glucose 284 (H) 74 - 99 mg/dL   Basic Metabolic Panel   Result Value Ref Range    Glucose 190 (H) 74 - 99 mg/dL    Sodium 134 (L) 136 - 145 mmol/L    Potassium 4.7 3.5 - 5.3 mmol/L    Chloride 99 98 - 107 mmol/L    Bicarbonate 30 21 - 32 mmol/L    Anion Gap 10 10 - 20 mmol/L    Urea Nitrogen 23 6 - 23 mg/dL    Creatinine 1.06 0.50 - 1.30 mg/dL    eGFR 77 >60 mL/min/1.73m*2    Calcium 8.3 (L) 8.6 - 10.3 mg/dL   CBC   Result Value Ref Range    WBC 23.2 (H) 4.4 - 11.3 x10*3/uL    nRBC 0.0 0.0 - 0.0 /100 WBCs    RBC 3.82 (L) 4.50 - 5.90 x10*6/uL    Hemoglobin 12.0 (L) 13.5 - 17.5 g/dL    Hematocrit 36.5 (L) 41.0 - 52.0 %    MCV 96 80 - 100 fL    MCH 31.4 26.0 - 34.0 pg    MCHC 32.9 32.0 - 36.0 g/dL    RDW 12.5 11.5 - 14.5 %    Platelets 242 150 - 450 x10*3/uL   BLOOD GAS VENOUS   Result Value Ref Range    POCT pH, Venous 7.43 7.33 - 7.43 pH    POCT pCO2, Venous 49 41 - 51 mm Hg    POCT pO2, Venous 97 (H) 35 - 45 mm Hg    POCT SO2, Venous 98 (H) 45 - 75 %    POCT Oxy Hemoglobin, Venous 96.1 (H) 45.0 - 75.0 %    POCT Base Excess, Venous 6.9 (H) -2.0 - 3.0 mmol/L    POCT HCO3 Calculated, Venous 32.5 (H) 22.0 - 26.0 mmol/L    Patient Temperature 37.0 degrees Celsius    FiO2 32 %   POCT GLUCOSE   Result Value Ref Range    POCT Glucose 181 (H) 74 - 99 mg/dL        Medications  Scheduled medications:  aspirin, 81 mg, oral, Daily  atorvastatin, 80 mg, oral, Daily  azithromycin, 500 mg, intravenous, q24h  carvedilol, 6.25 mg, oral, BID  cefTRIAXone, 2 g, intravenous,  q24h  enoxaparin, 40 mg, subcutaneous, q24h  fluticasone furoate-vilanteroL, 1 puff, inhalation, Daily  insulin lispro, 0-5 Units, subcutaneous, TID AC  ipratropium-albuteroL, 3 mL, nebulization, TID  [Held by provider] lisinopril, 5 mg, oral, Daily  methylPREDNISolone sodium succinate (PF), 40 mg, intravenous, q12h  oral hydration, 500 mL, oral, q8h SHELLIE  oxygen, , inhalation, Continuous - Inhalation  pantoprazole, 40 mg, oral, Daily before breakfast   Or  pantoprazole, 40 mg, intravenous, Daily before breakfast  polyethylene glycol, 17 g, oral, Daily  tiotropium, 2 puff, inhalation, Daily      Continuous medications:     PRN medications:  PRN medications: bisacodyl, bisacodyl, dextrose, dextrose, glucagon, glucagon, guaiFENesin, ipratropium-albuteroL, melatonin, ondansetron ODT **OR** ondansetron     Physical Exam  Vitals reviewed.   Constitutional:       General: He is not in acute distress.  HENT:      Head: Normocephalic and atraumatic.      Right Ear: External ear normal.      Left Ear: External ear normal.      Nose: Nose normal.      Mouth/Throat:      Mouth: Mucous membranes are moist.      Pharynx: Oropharynx is clear.   Eyes:      General: No scleral icterus.     Extraocular Movements: Extraocular movements intact.      Conjunctiva/sclera: Conjunctivae normal.      Pupils: Pupils are equal, round, and reactive to light.   Neck:      Vascular: No carotid bruit.   Cardiovascular:      Rate and Rhythm: Normal rate and regular rhythm.      Pulses: Normal pulses.      Heart sounds: Normal heart sounds. No murmur heard.  Pulmonary:      Effort: Pulmonary effort is normal. No respiratory distress.      Breath sounds: Normal breath sounds. No wheezing, rhonchi or rales.   Chest:      Chest wall: No tenderness.   Abdominal:      General: Bowel sounds are normal. There is no distension.      Palpations: Abdomen is soft. There is no mass.      Tenderness: There is no abdominal tenderness. There is no rebound.    Musculoskeletal:         General: No swelling or deformity. Normal range of motion.      Cervical back: Normal range of motion.      Right lower leg: No edema.      Left lower leg: No edema.   Skin:     General: Skin is warm and dry.      Capillary Refill: Capillary refill takes less than 2 seconds.   Neurological:      General: No focal deficit present.      Mental Status: He is alert and oriented to person, place, and time.   Psychiatric:         Mood and Affect: Mood normal.         Behavior: Behavior normal.                  Assessment/Plan   This patient currently has cardiac telemetry ordered; if you would like to modify or discontinue the telemetry order, click here to go to the orders activity to modify/discontinue the order.    Acute respiratory failure with hypercapnia, COPD exacerbation  Patient has history of COPD emphysema is oxygen dependent  Typically not on any long-acting medications due to cost issues  Seen by pulmonary and is improving, will decrease IV steroids  We will request pulmonary consult he is known to the local clinic  Continue IV steroids , continue expanded antibiotic coverage pending procal level.     Coronary artery disease history of PCI and stent  Patient continued on his routine home medications including aspirin    Hypertension  Patient on carvedilol    Diabetes type 2  Continue diet control A1c 7.1  With sliding scale insulin due to hyperglycemia induced by steroids    Elevated creatinine  Suspect acute kidney injury due to lower blood pressure readings  Improved w hydration  Nephrology input appreciated    Anemia  No indications of bleeding  Given leukocytosis would follow up as outpt  w PCP, consider referral to hematology.     Leukocytosis  Appears reactive  Blood cx negative  Follow up pro calcitonin level             Code Status: Full Code          DVT ppx:      Please see orders for more complete plan    Tonny Hackett, APRN-CNP

## 2024-11-13 NOTE — CARE PLAN
The patient's goals for the shift include get rest and stay informed.    The clinical goals for the shift include Patient will maintain SpO2 >92% throughout the shift.    Over the shift, the patient did make progress toward the following goals. Barriers to progression include understanding. Recommendations to address these barriers include education.

## 2024-11-14 ENCOUNTER — PHARMACY VISIT (OUTPATIENT)
Dept: PHARMACY | Facility: CLINIC | Age: 64
End: 2024-11-14
Payer: COMMERCIAL

## 2024-11-14 VITALS
RESPIRATION RATE: 16 BRPM | HEART RATE: 80 BPM | DIASTOLIC BLOOD PRESSURE: 77 MMHG | TEMPERATURE: 98.1 F | WEIGHT: 213.52 LBS | HEIGHT: 67 IN | BODY MASS INDEX: 33.51 KG/M2 | OXYGEN SATURATION: 95 % | SYSTOLIC BLOOD PRESSURE: 158 MMHG

## 2024-11-14 LAB — GLUCOSE BLD MANUAL STRIP-MCNC: 287 MG/DL (ref 74–99)

## 2024-11-14 PROCEDURE — 2500000002 HC RX 250 W HCPCS SELF ADMINISTERED DRUGS (ALT 637 FOR MEDICARE OP, ALT 636 FOR OP/ED): Performed by: NURSE PRACTITIONER

## 2024-11-14 PROCEDURE — 2500000004 HC RX 250 GENERAL PHARMACY W/ HCPCS (ALT 636 FOR OP/ED): Performed by: NURSE PRACTITIONER

## 2024-11-14 PROCEDURE — RXMED WILLOW AMBULATORY MEDICATION CHARGE

## 2024-11-14 PROCEDURE — 82947 ASSAY GLUCOSE BLOOD QUANT: CPT

## 2024-11-14 PROCEDURE — 2500000001 HC RX 250 WO HCPCS SELF ADMINISTERED DRUGS (ALT 637 FOR MEDICARE OP): Performed by: NURSE PRACTITIONER

## 2024-11-14 PROCEDURE — 94640 AIRWAY INHALATION TREATMENT: CPT

## 2024-11-14 PROCEDURE — 99239 HOSP IP/OBS DSCHRG MGMT >30: CPT | Performed by: NURSE PRACTITIONER

## 2024-11-14 PROCEDURE — 94668 MNPJ CHEST WALL SBSQ: CPT

## 2024-11-14 RX ORDER — PREDNISONE 10 MG/1
TABLET ORAL
Qty: 30 TABLET | Refills: 0 | Status: SHIPPED | OUTPATIENT
Start: 2024-11-14 | End: 2024-11-24 | Stop reason: HOSPADM

## 2024-11-14 RX ORDER — TIOTROPIUM BROMIDE 18 UG/1
1 CAPSULE ORAL; RESPIRATORY (INHALATION)
Qty: 30 CAPSULE | Refills: 0 | Status: SHIPPED | OUTPATIENT
Start: 2024-11-14 | End: 2024-11-18 | Stop reason: WASHOUT

## 2024-11-14 RX ORDER — FLUTICASONE PROPIONATE AND SALMETEROL 250; 50 UG/1; UG/1
1 POWDER RESPIRATORY (INHALATION)
Qty: 1 EACH | Refills: 0 | Status: SHIPPED | OUTPATIENT
Start: 2024-11-14 | End: 2024-11-18 | Stop reason: ALTCHOICE

## 2024-11-14 RX ORDER — FLUTICASONE FUROATE AND VILANTEROL 200; 25 UG/1; UG/1
1 POWDER RESPIRATORY (INHALATION) DAILY
Start: 2024-11-14 | End: 2024-11-18 | Stop reason: WASHOUT

## 2024-11-14 ASSESSMENT — PAIN SCALES - GENERAL: PAINLEVEL_OUTOF10: 0 - NO PAIN

## 2024-11-14 NOTE — DOCUMENTATION CLARIFICATION NOTE
"    PATIENT:               DEBORAH POLK  ACCT #:                  [id number]  MRN:                       [id number]  :                       1960  ADMIT DATE:       2024 7:00 AM  DISCH DATE:        2024 10:06 AM  RESPONDING PROVIDER #:        [id number]          PROVIDER RESPONSE TEXT:    Non-infectious SIRS with acute organ dysfunction of hypotension and LARRY    CDI QUERY TEXT:    Clarification        Instruction:    Based on your assessment of the patient and the clinical information, please provide the requested documentation by clicking on the appropriate radio button and enter any additional information if prompted.    Question: Please further clarify if there is a diagnosis related to the clinical information    When answering this query, please exercise your independent professional judgment. The fact that a question is being asked, does not imply that any particular answer is desired or expected.    The patient's clinical indicators include:  Clinical Information: 24 Discharge Diagnosis: \" Acute respiratory failure with hypoxia and hypercapnia secondary to COPD exacerbation Reactive leukocytosis pneumonia ruled out\"    Clinical Indicators:  Vital signs on admit: T 98.9,  HR 93, 66.    RR 20 24.  /86, 96/78, 94/64.  Pox 91% on bipap  Labs on admit: Wbc 16.1.   Creatinine 1.19 repeat 1.55.  repeat 1.06.  Absolute neutrophil 9.71  24 PN: \"24 Pt afebrile, was borderline hypotensive yesterday with elevation of Creatinine today, there is also significant leukocytosis\"    Treatment: IV NS 1000 cc FB, Bipap, Duonebs, IV Rocephin, IV Azithromycin, IV Solumedrol. Oxygen    Risk Factors: Acute respiratory failure.  AECOPD.  Options provided:  -- Non-infectious SIRS with acute organ dysfunction of hypotension and LARRY  -- Non-infectious SIRS without acute organ dysfunction  -- Other - I will add my own diagnosis  -- Refer to Clinical Documentation Reviewer    Query created by: Emre, " Raisa on 11/14/2024 9:59 AM      Electronically signed by:  DENNY TRIVEDI 11/14/2024 10:13 AM

## 2024-11-14 NOTE — PROGRESS NOTES
Darron Resendez is a 64 y.o. male on day 3 of admission presenting with No Principal Problem: There is no principal problem currently on the Problem List. Please update the Problem List and refresh..      Subjective   .Interval History    Patient is doing sitting on the side of the bed eating lunch family at bedside    ROS  Denies chest pain, shortness of breath,  Nausea, vomiting , diarrhea, fever or chills.     No change in Past Medical History        Objective          Vitals 24HR  Heart Rate:  [71-91]   Temp:  [36.6 °C (97.8 °F)-37.2 °C (99 °F)]   Resp:  [16-18]   BP: (119-158)/(68-77)   Weight:  [96.9 kg (213 lb 8.3 oz)]   SpO2:  [91 %-96 %]     .  Vitals:    11/13/24 2016 11/13/24 2325 11/14/24 0335 11/14/24 0755   BP:  119/71 135/76 158/77   BP Location:  Left arm Left arm Left arm   Patient Position:  Lying Lying Sitting   Pulse:  82 73 80   Resp:    16   Temp:  37.2 °C (98.9 °F) 37.2 °C (99 °F) 36.7 °C (98.1 °F)   TempSrc:  Temporal Temporal Temporal   SpO2: 96% 96% 92% 95%   Weight:   96.9 kg (213 lb 8.3 oz)    Height:        .  Results from last 7 days   Lab Units 11/13/24  0400 11/12/24  0535 11/11/24  0711   SODIUM mmol/L 134* 132* 136   POTASSIUM mmol/L 4.7 5.0 5.1   CHLORIDE mmol/L 99 96* 98   CO2 mmol/L 30 30 33*   BUN mg/dL 23 40* 17   CREATININE mg/dL 1.06 1.55* 1.19   EGFR mL/min/1.73m*2 77 49* 67   GLUCOSE mg/dL 190* 202* 172*   CALCIUM mg/dL 8.3* 8.3* 9.0    .  Results from last 7 days   Lab Units 11/13/24  0400 11/12/24  0535 11/11/24  0711   WBC AUTO x10*3/uL 23.2* 23.2* 16.1*   HEMOGLOBIN g/dL 12.0* 12.7* 14.9   HEMATOCRIT % 36.5* 39.4* 45.5   PLATELETS AUTO x10*3/uL 242 241 277        Intake/Output last 3 Shifts:    Intake/Output Summary (Last 24 hours) at 11/14/2024 0944  Last data filed at 11/13/2024 2325  Gross per 24 hour   Intake 1180 ml   Output --   Net 1180 ml       Physical Exam  Constitutional:       Appearance: Normal appearance.   HENT:      Mouth/Throat:      Mouth: Mucous membranes  are moist.      Pharynx: Oropharynx is clear.   Eyes:      Extraocular Movements: Extraocular movements intact.      Pupils: Pupils are equal, round, and reactive to light.   Cardiovascular:      Rate and Rhythm: Normal rate and regular rhythm.   Pulmonary:      Effort: Pulmonary effort is normal.      Breath sounds: Normal breath sounds.   Abdominal:      General: Abdomen is flat. Bowel sounds are normal.      Palpations: Abdomen is soft.   Musculoskeletal:      Cervical back: Normal range of motion and neck supple.      Right lower leg: No edema.      Left lower leg: No edema.   Skin:     General: Skin is warm and dry.      Capillary Refill: Capillary refill takes more than 3 seconds.   Neurological:      Mental Status: He is alert and oriented to person, place, and time. Mental status is at baseline.   Psychiatric:         Mood and Affect: Mood normal.         Relevant Results  {If you would like to pull in Medications, type .meds     If you would like to pull in Lab results for the last 24 hours, type .[id number]    If you would like to pull in Imaging results, type .imgrslt :99}      {Link to Stroke Scoring tools - Link :99}       Assessment/Plan   This patient currently has cardiac telemetry ordered; if you would like to modify or discontinue the telemetry order, click here to go to the orders activity to modify/discontinue the order.      Acute kidney injury on chronic kidney disease baseline creatinine 1.1 peak creatinine 1.55.  Hyponatremia E87. 1  Acute COPD exacerbation  Hypertension with hypertensive kidney disease.     Recommendations  Serum creatinine back to baseline  Denied NSAIDs no recent contrast exposure.  Defer management of COPD exacerbation to primary service.  Has been volume expanded getting antibiotic therapy.  Lisinopril has been held.  Okay for discharge from a renal standpoint BMP in 3 days and follow-up in office in 1 week,       Assessment & Plan                  .Thank you for the  consult and the opportunity to participate inn the care of this patient. Please do not hesitate to contact us with any questions or concern  OTONIEL Hooker, CNP  [facility]  [phone]   OTONIEL Styles-CNP

## 2024-11-14 NOTE — CARE PLAN
The clinical goals for the shift include SPO2 will remain greater than 88% throughout shift.   Problem: Pain - Adult  Goal: Verbalizes/displays adequate comfort level or baseline comfort level  Outcome: Progressing

## 2024-11-14 NOTE — DISCHARGE SUMMARY
Discharge Diagnosis  Acute respiratory failure with hypoxia and hypercapnia secondary to COPD exacerbation  Reactive leukocytosis pneumonia ruled out  Coronary artery disease history PCI and stent  Hypertension  Type 2 diabetes A1c 7.1  Anemia of unclear etiology      Issues Requiring Follow-Up  Pulmonary follow-up for ongoing management of COPD  Referral to primary care, consider hematology evaluation      Discharge Meds     Medication List      START taking these medications     fluticasone furoate-vilanteroL 200-25 mcg/dose inhaler; Commonly known   as: Breo Ellipta; Inhale 1 puff once daily.   fluticasone propion-salmeteroL 250-50 mcg/dose diskus inhaler; Commonly   known as: Advair Diskus; Inhale 1 puff 2 times a day. Rinse mouth with   water after use to reduce aftertaste and incidence of candidiasis. Do not   swallow.   predniSONE 10 mg tablet; Commonly known as: Deltasone; Take 5 tablets   (50 mg) by mouth every other day for 2 days, THEN 4 tablets (40 mg) every   other day for 2 days, THEN 3 tablets (30 mg) every other day for 2 days,   THEN 2 tablets (20 mg) every other day for 2 days, THEN 1 tablet (10 mg)   every other day for 2 days.; Start taking on: November 14, 2024   * tiotropium 2.5 mcg/actuation inhaler; Commonly known as: Spiriva   Respimat; Inhale 2 puffs once daily.   * tiotropium 18 mcg inhalation capsule; Commonly known as: Spiriva;   Place 1 capsule (18 mcg) into inhaler and inhale once daily.  * This list has 2 medication(s) that are the same as other medications   prescribed for you. Read the directions carefully, and ask your doctor or   other care provider to review them with you.     CONTINUE taking these medications     albuterol 90 mcg/actuation inhaler   atorvastatin 80 mg tablet; Commonly known as: Lipitor   benzonatate 100 mg capsule; Commonly known as: Tessalon   carvedilol 6.25 mg tablet; Commonly known as: Coreg   hydrALAZINE 25 mg tablet; Commonly known as: Apresoline    ipratropium-albuteroL 0.5-2.5 mg/3 mL nebulizer solution; Commonly known   as: Duo-Neb   lisinopril 5 mg tablet       Test Results Pending At Discharge  Pending Labs       Order Current Status    Blood Culture Preliminary result    Blood Culture Preliminary result            Hospital Course   Darron Dumas is a 64 y.o. male with PMHx s/f coronary artery disease, hypertension, dyslipidemia, COPD, emphysema presenting with shortness of breath.  Patient had recently been treated for COPD exacerbation with steroids and had completed steroid taper.  Patient has progressively come down more more short of breath.  He does have some cough no purulent sputum he denies any fevers chills or rigors he denies any urinary symptoms.  No report of chest pain or palpitations.  Patient originally presented afebrile at 98.9 heart rate 93 he was tachypneic at 24 blood pressure 135/86 SpO2 was 91% on room air.  Venous blood gas showed the patient to have a significantly elevated pCO2 of 81 pO2 was 54 pH 7.26 patient was subsequently placed on BiPAP with improvement in his pCO2 to 70 and pH 7.31 patient was found to have leukocytosis with white blood cell count 16.1 hemoglobin 14.9 hematocrit 45.5 platelets 277.  Chemistry panel showed glucose elevated at 172 otherwise unremarkable the BN peptide was not elevated troponin was not elevated given the leukocytosis a chest x-ray was done. Showing no evidence of pneumonia no acute radiographic chest findings.  Patient was started on Rocephin and Zithromax due to the elevated white blood cell count given dose of IV steroids and referred for admission.         11.12.24 Pt afebrile, was borderline hypotensive yesterday with elevation of Creatinine today, there is also significant leukocytosis. No fevers. Subjectively patient feels he is doing much better.  Will expand antibiotic coverage to cover possible pneumonia, check procalcitonin level request pulmonology consult.  Will also check urine and  request nephrology consult.  There was a drop in his hemoglobin we will check iron studies and reticulocyte count by adding onto the morning labs.     11.13.24 Pt afebrile, subjectively feeling much better with the addition of Breo and spiriva. Has had issues affording them as an outpt. Renal function appears stable to improved. Procalcitonin level is pending. Will decrease IV steroids, work on getting out patient medications for patient    11.14.24 the patient continued to do well with tapering of the steroids no longer requiring BiPAP or CPAP therapy.  At times he was doing very well without any supplemental oxygen.  Patient was able to ambulate around the unit freely.  The patient was counseled about the importance of his medication adherence and the importance of filling his prescription for Spiriva as well as for the Advair.  Extensive research was put into finding the patient the lowest price between myself pharmacy and pulmonary patient can obtain these medications and agrees that it they are relatively affordable through [facility].  Patient was given referral for primary care provider to follow-up with due to his multiple medical issues COPD coronary artery disease new diabetes as well as possibly blood dyscrasia with reactive leukocytosis and an unspecified anemia. Approximately 45 minutes spent preparing discharge    Pertinent Physical Exam At Time of Discharge  Physical Exam  Vitals reviewed.   Constitutional:       General: He is not in acute distress.  HENT:      Head: Normocephalic and atraumatic.      Right Ear: External ear normal.      Left Ear: External ear normal.      Nose: Nose normal.      Mouth/Throat:      Mouth: Mucous membranes are moist.      Pharynx: Oropharynx is clear.   Eyes:      General: No scleral icterus.     Extraocular Movements: Extraocular movements intact.      Conjunctiva/sclera: Conjunctivae normal.      Pupils: Pupils are equal, round, and reactive to light.   Cardiovascular:       Rate and Rhythm: Normal rate and regular rhythm.      Pulses: Normal pulses.      Heart sounds: Normal heart sounds.   Pulmonary:      Effort: Pulmonary effort is normal. No respiratory distress.      Breath sounds: Wheezing present. No rhonchi or rales.   Chest:      Chest wall: No tenderness.   Abdominal:      General: Bowel sounds are normal. There is no distension.      Palpations: Abdomen is soft. There is no mass.      Tenderness: There is no abdominal tenderness. There is no rebound.   Musculoskeletal:         General: No swelling or deformity. Normal range of motion.      Cervical back: Normal range of motion.      Right lower leg: No edema.      Left lower leg: No edema.   Skin:     General: Skin is warm and dry.      Capillary Refill: Capillary refill takes less than 2 seconds.   Neurological:      General: No focal deficit present.      Mental Status: He is alert and oriented to person, place, and time.   Psychiatric:         Mood and Affect: Mood normal.         Behavior: Behavior normal.         Outpatient Follow-Up  No future appointments.      Tonny Hackett, APRN-CNP

## 2024-11-14 NOTE — CARE PLAN
The patient's goals for the shift include      The clinical goals for the shift include SPO2 will remain greater than 88% throughout shift.      Problem: Pain - Adult  Goal: Verbalizes/displays adequate comfort level or baseline comfort level  Outcome: Met     Problem: Safety - Adult  Goal: Free from fall injury  Outcome: Met     Problem: Discharge Planning  Goal: Discharge to home or other facility with appropriate resources  Outcome: Met     Problem: Chronic Conditions and Co-morbidities  Goal: Patient's chronic conditions and co-morbidity symptoms are monitored and maintained or improved  Outcome: Met     Problem: Fall/Injury  Goal: Not fall by end of shift  Outcome: Met  Goal: Be free from injury by end of the shift  Outcome: Met  Goal: Verbalize understanding of personal risk factors for fall in the hospital  Outcome: Met  Goal: Verbalize understanding of risk factor reduction measures to prevent injury from fall in the home  Outcome: Met  Goal: Use assistive devices by end of the shift  Outcome: Met  Goal: Pace activities to prevent fatigue by end of the shift  Outcome: Met     Problem: Respiratory  Goal: Clear secretions with interventions this shift  Outcome: Met  Goal: Minimize anxiety/maximize coping throughout shift  Outcome: Met  Goal: Minimal/no exertional discomfort or dyspnea this shift  Outcome: Met  Goal: No signs of respiratory distress (eg. Use of accessory muscles. Peds grunting)  Outcome: Met  Goal: Patent airway maintained this shift  Outcome: Met  Goal: Tolerate mechanical ventilation evidenced by VS/agitation level this shift  Outcome: Met  Goal: Tolerate pulmonary toileting this shift  Outcome: Met  Goal: Verbalize decreased shortness of breath this shift  Outcome: Met  Goal: Wean oxygen to maintain O2 saturation per order/standard this shift  Outcome: Met  Goal: Increase self care and/or family involvement in next 24 hours  Outcome: Met

## 2024-11-15 LAB
ATRIAL RATE: 70 BPM
BACTERIA BLD CULT: NORMAL
BACTERIA BLD CULT: NORMAL
P AXIS: 65 DEGREES
PR INTERVAL: 145 MS
Q ONSET: 252 MS
QRS COUNT: 11 BEATS
QRS DURATION: 85 MS
QT INTERVAL: 367 MS
QTC CALCULATION(BAZETT): 396 MS
QTC FREDERICIA: 386 MS
R AXIS: 69 DEGREES
T AXIS: 66 DEGREES
T OFFSET: 436 MS
VENTRICULAR RATE: 70 BPM

## 2024-11-15 NOTE — SIGNIFICANT EVENT
COPD Follow Up Call    Patient Reports Feelings (Symptoms) are: improved    The Patient discharged With the following Diagnosis: COPD    If You were Referred to Pulmonary Rehab, Have You Followed up With them?NA    How is your breathing? better    How is your activity? improving    How is your cough? Usual amount    Mucus: usual amount and color    How often Are you using a Quick Relief/ Rescue Inhaler / Nebulizer:    as prescribed

## 2024-11-18 ENCOUNTER — TELEPHONE (OUTPATIENT)
Dept: PHARMACY | Facility: HOSPITAL | Age: 64
End: 2024-11-18
Payer: MEDICARE

## 2024-11-18 DIAGNOSIS — J96.02 ACUTE RESPIRATORY FAILURE WITH HYPERCAPNIA (MULTI): ICD-10-CM

## 2024-11-18 DIAGNOSIS — J44.9 STAGE 4 VERY SEVERE COPD BY GOLD CLASSIFICATION (MULTI): ICD-10-CM

## 2024-11-18 LAB
ATRIAL RATE: 91 BPM
P AXIS: 80 DEGREES
PR INTERVAL: 156 MS
Q ONSET: 253 MS
QRS COUNT: 14 BEATS
QRS DURATION: 85 MS
QT INTERVAL: 343 MS
QTC CALCULATION(BAZETT): 423 MS
QTC FREDERICIA: 394 MS
R AXIS: 83 DEGREES
T AXIS: 83 DEGREES
T OFFSET: 424 MS
VENTRICULAR RATE: 91 BPM

## 2024-11-18 RX ORDER — ALBUTEROL SULFATE 90 UG/1
2 INHALANT RESPIRATORY (INHALATION) EVERY 6 HOURS PRN
Qty: 18 G | Refills: 11 | Status: SHIPPED | OUTPATIENT
Start: 2024-11-18

## 2024-11-18 RX ORDER — FLUTICASONE FUROATE, UMECLIDINIUM BROMIDE AND VILANTEROL TRIFENATATE 200; 62.5; 25 UG/1; UG/1; UG/1
1 POWDER RESPIRATORY (INHALATION) DAILY
Qty: 60 EACH | Refills: 11 | Status: SHIPPED | OUTPATIENT
Start: 2024-11-18

## 2024-11-18 RX ORDER — FLUTICASONE PROPIONATE AND SALMETEROL 250; 50 UG/1; UG/1
1 POWDER RESPIRATORY (INHALATION)
Qty: 60 EACH | Refills: 0 | Status: SHIPPED | OUTPATIENT
Start: 2024-11-18 | End: 2024-12-21

## 2024-11-18 NOTE — PROGRESS NOTES
Received patient's tax document. We will enroll for PAP. Patient was switched from Trelegy to Spiriva, Advair, and Breo. I will discontinue those and switch to Trelegy.     Problem List Items Addressed This Visit    None  Visit Diagnoses       Stage 4 very severe COPD by GOLD classification (Multi)        Relevant Medications    fluticasone-umeclidin-vilanter (Trelegy Ellipta) 200-62.5-25 mcg blister with device    albuterol 90 mcg/actuation inhaler             Rosalina Lopez, PharmD  Clinical Pharmacy Specialist - Primary Care  [phone]  [email or web address]

## 2024-11-18 NOTE — TELEPHONE ENCOUNTER
Received patient's tax document. Spoke with patient on the phone and he does not have prescription insurance. He is planning to call Part D today to try to inquire/enroll. Told him he would need RX insurance in order for us to enroll him in PAP. On discharge, he picked up Spiriva and Advair. Will have him continue that until we can enroll in PAP and have him start Trelegy instead.     Rosalina Lopez, PharmD  Clinical Pharmacy Specialist - Primary Care  [phone]  [email or web address]

## 2024-11-22 ENCOUNTER — APPOINTMENT (OUTPATIENT)
Dept: RADIOLOGY | Facility: HOSPITAL | Age: 64
End: 2024-11-22
Payer: MEDICARE

## 2024-11-22 ENCOUNTER — HOSPITAL ENCOUNTER (INPATIENT)
Facility: HOSPITAL | Age: 64
LOS: 1 days | Discharge: HOME | End: 2024-11-24
Attending: EMERGENCY MEDICINE | Admitting: FAMILY MEDICINE
Payer: MEDICARE

## 2024-11-22 ENCOUNTER — APPOINTMENT (OUTPATIENT)
Dept: CARDIOLOGY | Facility: HOSPITAL | Age: 64
End: 2024-11-22
Payer: MEDICARE

## 2024-11-22 DIAGNOSIS — E87.5 HYPERKALEMIA: ICD-10-CM

## 2024-11-22 DIAGNOSIS — N17.9 ACUTE KIDNEY INJURY (CMS-HCC): ICD-10-CM

## 2024-11-22 DIAGNOSIS — E87.1 HYPONATREMIA: ICD-10-CM

## 2024-11-22 DIAGNOSIS — J44.1 COPD EXACERBATION (MULTI): Primary | ICD-10-CM

## 2024-11-22 LAB
ALBUMIN SERPL BCP-MCNC: 3.8 G/DL (ref 3.4–5)
ALP SERPL-CCNC: 41 U/L (ref 33–136)
ALT SERPL W P-5'-P-CCNC: 24 U/L (ref 10–52)
ANION GAP BLDV CALCULATED.4IONS-SCNC: 9 MMOL/L (ref 10–25)
ANION GAP SERPL CALC-SCNC: 12 MMOL/L (ref 10–20)
AST SERPL W P-5'-P-CCNC: 20 U/L (ref 9–39)
BASE EXCESS BLDV CALC-SCNC: 3 MMOL/L (ref -2–3)
BASOPHILS # BLD AUTO: 0.03 X10*3/UL (ref 0–0.1)
BASOPHILS NFR BLD AUTO: 0.2 %
BILIRUB SERPL-MCNC: 0.5 MG/DL (ref 0–1.2)
BNP SERPL-MCNC: 31 PG/ML (ref 0–99)
BODY TEMPERATURE: 37 DEGREES CELSIUS
BUN SERPL-MCNC: 18 MG/DL (ref 6–23)
CA-I BLDV-SCNC: 1.24 MMOL/L (ref 1.1–1.33)
CALCIUM SERPL-MCNC: 9.1 MG/DL (ref 8.6–10.3)
CARDIAC TROPONIN I PNL SERPL HS: 9 NG/L (ref 0–20)
CHLORIDE BLDV-SCNC: 92 MMOL/L (ref 98–107)
CHLORIDE SERPL-SCNC: 93 MMOL/L (ref 98–107)
CO2 SERPL-SCNC: 29 MMOL/L (ref 21–32)
CREAT SERPL-MCNC: 1.37 MG/DL (ref 0.5–1.3)
EGFRCR SERPLBLD CKD-EPI 2021: 57 ML/MIN/1.73M*2
EOSINOPHIL # BLD AUTO: 0.15 X10*3/UL (ref 0–0.7)
EOSINOPHIL NFR BLD AUTO: 1.2 %
ERYTHROCYTE [DISTWIDTH] IN BLOOD BY AUTOMATED COUNT: 13 % (ref 11.5–14.5)
GLUCOSE BLD MANUAL STRIP-MCNC: 172 MG/DL (ref 74–99)
GLUCOSE BLDV-MCNC: 163 MG/DL (ref 74–99)
GLUCOSE SERPL-MCNC: 148 MG/DL (ref 74–99)
HCO3 BLDV-SCNC: 29.5 MMOL/L (ref 22–26)
HCT VFR BLD AUTO: 43.9 % (ref 41–52)
HCT VFR BLD EST: 44 % (ref 41–52)
HGB BLD-MCNC: 14.3 G/DL (ref 13.5–17.5)
HGB BLDV-MCNC: 14.8 G/DL (ref 13.5–17.5)
IMM GRANULOCYTES # BLD AUTO: 0.17 X10*3/UL (ref 0–0.7)
IMM GRANULOCYTES NFR BLD AUTO: 1.3 % (ref 0–0.9)
INHALED O2 CONCENTRATION: 32 %
LACTATE BLDV-SCNC: 2.1 MMOL/L (ref 0.4–2)
LACTATE SERPL-SCNC: 1.9 MMOL/L (ref 0.4–2)
LYMPHOCYTES # BLD AUTO: 0.91 X10*3/UL (ref 1.2–4.8)
LYMPHOCYTES NFR BLD AUTO: 7 %
MCH RBC QN AUTO: 31.2 PG (ref 26–34)
MCHC RBC AUTO-ENTMCNC: 32.6 G/DL (ref 32–36)
MCV RBC AUTO: 96 FL (ref 80–100)
MONOCYTES # BLD AUTO: 0.71 X10*3/UL (ref 0.1–1)
MONOCYTES NFR BLD AUTO: 5.5 %
NEUTROPHILS # BLD AUTO: 11 X10*3/UL (ref 1.2–7.7)
NEUTROPHILS NFR BLD AUTO: 84.8 %
NRBC BLD-RTO: 0 /100 WBCS (ref 0–0)
OVALOCYTES BLD QL SMEAR: NORMAL
OXYHGB MFR BLDV: 36.3 % (ref 45–75)
PCO2 BLDV: 51 MM HG (ref 41–51)
PH BLDV: 7.37 PH (ref 7.33–7.43)
PLATELET # BLD AUTO: 186 X10*3/UL (ref 150–450)
PLATELET CLUMP BLD QL SMEAR: PRESENT
PO2 BLDV: 33 MM HG (ref 35–45)
POLYCHROMASIA BLD QL SMEAR: NORMAL
POTASSIUM BLDV-SCNC: 5.8 MMOL/L (ref 3.5–5.3)
POTASSIUM SERPL-SCNC: 5.6 MMOL/L (ref 3.5–5.3)
PROT SERPL-MCNC: 6.8 G/DL (ref 6.4–8.2)
RBC # BLD AUTO: 4.58 X10*6/UL (ref 4.5–5.9)
RBC MORPH BLD: NORMAL
SAO2 % BLDV: 37 % (ref 45–75)
SODIUM BLDV-SCNC: 125 MMOL/L (ref 136–145)
SODIUM SERPL-SCNC: 128 MMOL/L (ref 136–145)
WBC # BLD AUTO: 13 X10*3/UL (ref 4.4–11.3)

## 2024-11-22 PROCEDURE — 2500000001 HC RX 250 WO HCPCS SELF ADMINISTERED DRUGS (ALT 637 FOR MEDICARE OP): Performed by: STUDENT IN AN ORGANIZED HEALTH CARE EDUCATION/TRAINING PROGRAM

## 2024-11-22 PROCEDURE — 99223 1ST HOSP IP/OBS HIGH 75: CPT | Performed by: STUDENT IN AN ORGANIZED HEALTH CARE EDUCATION/TRAINING PROGRAM

## 2024-11-22 PROCEDURE — 84132 ASSAY OF SERUM POTASSIUM: CPT | Performed by: NURSE PRACTITIONER

## 2024-11-22 PROCEDURE — G0378 HOSPITAL OBSERVATION PER HR: HCPCS

## 2024-11-22 PROCEDURE — 84484 ASSAY OF TROPONIN QUANT: CPT | Performed by: NURSE PRACTITIONER

## 2024-11-22 PROCEDURE — 71046 X-RAY EXAM CHEST 2 VIEWS: CPT

## 2024-11-22 PROCEDURE — 83880 ASSAY OF NATRIURETIC PEPTIDE: CPT | Performed by: NURSE PRACTITIONER

## 2024-11-22 PROCEDURE — 2500000005 HC RX 250 GENERAL PHARMACY W/O HCPCS: Performed by: STUDENT IN AN ORGANIZED HEALTH CARE EDUCATION/TRAINING PROGRAM

## 2024-11-22 PROCEDURE — 93005 ELECTROCARDIOGRAM TRACING: CPT

## 2024-11-22 PROCEDURE — 82947 ASSAY GLUCOSE BLOOD QUANT: CPT

## 2024-11-22 PROCEDURE — 2500000002 HC RX 250 W HCPCS SELF ADMINISTERED DRUGS (ALT 637 FOR MEDICARE OP, ALT 636 FOR OP/ED): Performed by: STUDENT IN AN ORGANIZED HEALTH CARE EDUCATION/TRAINING PROGRAM

## 2024-11-22 PROCEDURE — 71046 X-RAY EXAM CHEST 2 VIEWS: CPT | Mod: FOREIGN READ | Performed by: RADIOLOGY

## 2024-11-22 PROCEDURE — 94640 AIRWAY INHALATION TREATMENT: CPT

## 2024-11-22 PROCEDURE — 83605 ASSAY OF LACTIC ACID: CPT | Performed by: NURSE PRACTITIONER

## 2024-11-22 PROCEDURE — 36415 COLL VENOUS BLD VENIPUNCTURE: CPT | Performed by: NURSE PRACTITIONER

## 2024-11-22 PROCEDURE — 2500000004 HC RX 250 GENERAL PHARMACY W/ HCPCS (ALT 636 FOR OP/ED): Performed by: STUDENT IN AN ORGANIZED HEALTH CARE EDUCATION/TRAINING PROGRAM

## 2024-11-22 PROCEDURE — 96372 THER/PROPH/DIAG INJ SC/IM: CPT | Performed by: STUDENT IN AN ORGANIZED HEALTH CARE EDUCATION/TRAINING PROGRAM

## 2024-11-22 PROCEDURE — 85025 COMPLETE CBC W/AUTO DIFF WBC: CPT | Performed by: NURSE PRACTITIONER

## 2024-11-22 PROCEDURE — 99285 EMERGENCY DEPT VISIT HI MDM: CPT | Mod: 25

## 2024-11-22 RX ORDER — BISACODYL 5 MG
10 TABLET, DELAYED RELEASE (ENTERIC COATED) ORAL DAILY PRN
Status: DISCONTINUED | OUTPATIENT
Start: 2024-11-22 | End: 2024-11-24 | Stop reason: HOSPADM

## 2024-11-22 RX ORDER — SODIUM CHLORIDE 9 MG/ML
100 INJECTION, SOLUTION INTRAVENOUS CONTINUOUS
Status: ACTIVE | OUTPATIENT
Start: 2024-11-22 | End: 2024-11-23

## 2024-11-22 RX ORDER — ATORVASTATIN CALCIUM 40 MG/1
80 TABLET, FILM COATED ORAL DAILY
Status: DISCONTINUED | OUTPATIENT
Start: 2024-11-22 | End: 2024-11-22

## 2024-11-22 RX ORDER — FLUTICASONE FUROATE AND VILANTEROL 200; 25 UG/1; UG/1
1 POWDER RESPIRATORY (INHALATION)
Status: DISCONTINUED | OUTPATIENT
Start: 2024-11-23 | End: 2024-11-24 | Stop reason: HOSPADM

## 2024-11-22 RX ORDER — GUAIFENESIN 600 MG/1
600 TABLET, EXTENDED RELEASE ORAL EVERY 12 HOURS PRN
Status: DISCONTINUED | OUTPATIENT
Start: 2024-11-22 | End: 2024-11-24 | Stop reason: HOSPADM

## 2024-11-22 RX ORDER — PANTOPRAZOLE SODIUM 40 MG/1
40 TABLET, DELAYED RELEASE ORAL
Status: DISCONTINUED | OUTPATIENT
Start: 2024-11-23 | End: 2024-11-24 | Stop reason: HOSPADM

## 2024-11-22 RX ORDER — ONDANSETRON HYDROCHLORIDE 2 MG/ML
4 INJECTION, SOLUTION INTRAVENOUS EVERY 8 HOURS PRN
Status: DISCONTINUED | OUTPATIENT
Start: 2024-11-22 | End: 2024-11-24 | Stop reason: HOSPADM

## 2024-11-22 RX ORDER — IPRATROPIUM BROMIDE AND ALBUTEROL SULFATE 2.5; .5 MG/3ML; MG/3ML
3 SOLUTION RESPIRATORY (INHALATION)
Status: DISCONTINUED | OUTPATIENT
Start: 2024-11-22 | End: 2024-11-22

## 2024-11-22 RX ORDER — CARVEDILOL 6.25 MG/1
6.25 TABLET ORAL
Status: DISCONTINUED | OUTPATIENT
Start: 2024-11-23 | End: 2024-11-24 | Stop reason: HOSPADM

## 2024-11-22 RX ORDER — ASPIRIN 81 MG/1
81 TABLET ORAL DAILY
Status: DISCONTINUED | OUTPATIENT
Start: 2024-11-22 | End: 2024-11-24 | Stop reason: HOSPADM

## 2024-11-22 RX ORDER — ENOXAPARIN SODIUM 100 MG/ML
40 INJECTION SUBCUTANEOUS EVERY 24 HOURS
Status: DISCONTINUED | OUTPATIENT
Start: 2024-11-22 | End: 2024-11-24 | Stop reason: HOSPADM

## 2024-11-22 RX ORDER — LEVOFLOXACIN 5 MG/ML
750 INJECTION, SOLUTION INTRAVENOUS EVERY 24 HOURS
Status: DISCONTINUED | OUTPATIENT
Start: 2024-11-22 | End: 2024-11-23

## 2024-11-22 RX ORDER — IPRATROPIUM BROMIDE AND ALBUTEROL SULFATE 2.5; .5 MG/3ML; MG/3ML
3 SOLUTION RESPIRATORY (INHALATION) 3 TIMES DAILY
Status: DISCONTINUED | OUTPATIENT
Start: 2024-11-23 | End: 2024-11-24 | Stop reason: HOSPADM

## 2024-11-22 RX ORDER — BISACODYL 10 MG/1
10 SUPPOSITORY RECTAL DAILY PRN
Status: DISCONTINUED | OUTPATIENT
Start: 2024-11-22 | End: 2024-11-24 | Stop reason: HOSPADM

## 2024-11-22 RX ORDER — LISINOPRIL 5 MG/1
5 TABLET ORAL DAILY
Status: DISCONTINUED | OUTPATIENT
Start: 2024-11-22 | End: 2024-11-24 | Stop reason: HOSPADM

## 2024-11-22 RX ORDER — PANTOPRAZOLE SODIUM 40 MG/10ML
40 INJECTION, POWDER, LYOPHILIZED, FOR SOLUTION INTRAVENOUS
Status: DISCONTINUED | OUTPATIENT
Start: 2024-11-23 | End: 2024-11-24 | Stop reason: HOSPADM

## 2024-11-22 RX ORDER — TALC
3 POWDER (GRAM) TOPICAL NIGHTLY PRN
Status: DISCONTINUED | OUTPATIENT
Start: 2024-11-22 | End: 2024-11-24 | Stop reason: HOSPADM

## 2024-11-22 RX ORDER — ALBUTEROL SULFATE 90 UG/1
2 INHALANT RESPIRATORY (INHALATION) EVERY 6 HOURS PRN
Status: DISCONTINUED | OUTPATIENT
Start: 2024-11-22 | End: 2024-11-24 | Stop reason: HOSPADM

## 2024-11-22 RX ORDER — ATORVASTATIN CALCIUM 40 MG/1
80 TABLET, FILM COATED ORAL NIGHTLY
Status: DISCONTINUED | OUTPATIENT
Start: 2024-11-22 | End: 2024-11-24 | Stop reason: HOSPADM

## 2024-11-22 RX ORDER — ONDANSETRON 4 MG/1
4 TABLET, FILM COATED ORAL EVERY 8 HOURS PRN
Status: DISCONTINUED | OUTPATIENT
Start: 2024-11-22 | End: 2024-11-24 | Stop reason: HOSPADM

## 2024-11-22 RX ORDER — BENZONATATE 100 MG/1
100 CAPSULE ORAL 3 TIMES DAILY PRN
Status: DISCONTINUED | OUTPATIENT
Start: 2024-11-22 | End: 2024-11-24 | Stop reason: HOSPADM

## 2024-11-22 RX ADMIN — ATORVASTATIN CALCIUM 80 MG: 40 TABLET, FILM COATED ORAL at 22:00

## 2024-11-22 RX ADMIN — Medication 5 L/MIN: at 22:10

## 2024-11-22 RX ADMIN — ENOXAPARIN SODIUM 40 MG: 40 INJECTION SUBCUTANEOUS at 22:00

## 2024-11-22 RX ADMIN — METHYLPREDNISOLONE SODIUM SUCCINATE 40 MG: 40 INJECTION, POWDER, FOR SOLUTION INTRAMUSCULAR; INTRAVENOUS at 22:12

## 2024-11-22 RX ADMIN — SODIUM CHLORIDE 100 ML/HR: 9 INJECTION, SOLUTION INTRAVENOUS at 22:00

## 2024-11-22 RX ADMIN — LEVOFLOXACIN 750 MG: 750 INJECTION, SOLUTION INTRAVENOUS at 22:00

## 2024-11-22 RX ADMIN — IPRATROPIUM BROMIDE AND ALBUTEROL SULFATE 3 ML: 2.5; .5 SOLUTION RESPIRATORY (INHALATION) at 20:21

## 2024-11-22 SDOH — SOCIAL STABILITY: SOCIAL INSECURITY: WITHIN THE LAST YEAR, HAVE YOU BEEN HUMILIATED OR EMOTIONALLY ABUSED IN OTHER WAYS BY YOUR PARTNER OR EX-PARTNER?: NO

## 2024-11-22 SDOH — SOCIAL STABILITY: SOCIAL INSECURITY: HAVE YOU HAD ANY THOUGHTS OF HARMING ANYONE ELSE?: NO

## 2024-11-22 SDOH — SOCIAL STABILITY: SOCIAL INSECURITY: WITHIN THE LAST YEAR, HAVE YOU BEEN AFRAID OF YOUR PARTNER OR EX-PARTNER?: NO

## 2024-11-22 SDOH — ECONOMIC STABILITY: FOOD INSECURITY: WITHIN THE PAST 12 MONTHS, YOU WORRIED THAT YOUR FOOD WOULD RUN OUT BEFORE YOU GOT THE MONEY TO BUY MORE.: NEVER TRUE

## 2024-11-22 SDOH — SOCIAL STABILITY: SOCIAL INSECURITY: DOES ANYONE TRY TO KEEP YOU FROM HAVING/CONTACTING OTHER FRIENDS OR DOING THINGS OUTSIDE YOUR HOME?: NO

## 2024-11-22 SDOH — ECONOMIC STABILITY: INCOME INSECURITY: IN THE PAST 12 MONTHS HAS THE ELECTRIC, GAS, OIL, OR WATER COMPANY THREATENED TO SHUT OFF SERVICES IN YOUR HOME?: NO

## 2024-11-22 SDOH — SOCIAL STABILITY: SOCIAL INSECURITY: ARE YOU OR HAVE YOU BEEN THREATENED OR ABUSED PHYSICALLY, EMOTIONALLY, OR SEXUALLY BY ANYONE?: NO

## 2024-11-22 SDOH — SOCIAL STABILITY: SOCIAL INSECURITY: HAS ANYONE EVER THREATENED TO HURT YOUR FAMILY OR YOUR PETS?: NO

## 2024-11-22 SDOH — ECONOMIC STABILITY: FOOD INSECURITY: WITHIN THE PAST 12 MONTHS, THE FOOD YOU BOUGHT JUST DIDN'T LAST AND YOU DIDN'T HAVE MONEY TO GET MORE.: NEVER TRUE

## 2024-11-22 SDOH — SOCIAL STABILITY: SOCIAL INSECURITY: DO YOU FEEL ANYONE HAS EXPLOITED OR TAKEN ADVANTAGE OF YOU FINANCIALLY OR OF YOUR PERSONAL PROPERTY?: NO

## 2024-11-22 SDOH — SOCIAL STABILITY: SOCIAL INSECURITY: ARE THERE ANY APPARENT SIGNS OF INJURIES/BEHAVIORS THAT COULD BE RELATED TO ABUSE/NEGLECT?: NO

## 2024-11-22 SDOH — SOCIAL STABILITY: SOCIAL INSECURITY: HAVE YOU HAD THOUGHTS OF HARMING ANYONE ELSE?: NO

## 2024-11-22 SDOH — SOCIAL STABILITY: SOCIAL INSECURITY: DO YOU FEEL UNSAFE GOING BACK TO THE PLACE WHERE YOU ARE LIVING?: NO

## 2024-11-22 SDOH — SOCIAL STABILITY: SOCIAL INSECURITY: WERE YOU ABLE TO COMPLETE ALL THE BEHAVIORAL HEALTH SCREENINGS?: YES

## 2024-11-22 SDOH — SOCIAL STABILITY: SOCIAL INSECURITY: ABUSE: ADULT

## 2024-11-22 ASSESSMENT — PAIN SCALES - GENERAL
PAINLEVEL_OUTOF10: 0 - NO PAIN
PAINLEVEL_OUTOF10: 0 - NO PAIN

## 2024-11-22 ASSESSMENT — ENCOUNTER SYMPTOMS
DYSURIA: 0
STRIDOR: 0
MYALGIAS: 1
WHEEZING: 1
SORE THROAT: 0
JOINT SWELLING: 0
DIZZINESS: 0
FATIGUE: 1
AGITATION: 0
CHILLS: 0
HEADACHES: 0
DIARRHEA: 0
PALPITATIONS: 0
BACK PAIN: 0
SINUS PAIN: 0
ABDOMINAL DISTENTION: 0
COUGH: 1
FEVER: 0
FLANK PAIN: 0
NUMBNESS: 0
DIFFICULTY URINATING: 0
NERVOUS/ANXIOUS: 0
DECREASED CONCENTRATION: 0
WOUND: 0
LIGHT-HEADEDNESS: 0
WEAKNESS: 0
FREQUENCY: 0
CONFUSION: 0
DIAPHORESIS: 0
NAUSEA: 0
RHINORRHEA: 0
APPETITE CHANGE: 1
COLOR CHANGE: 0
APNEA: 0
HEMATURIA: 0
ABDOMINAL PAIN: 0
SHORTNESS OF BREATH: 1
ACTIVITY CHANGE: 0
VOMITING: 0
CONSTIPATION: 0
CHEST TIGHTNESS: 0
ARTHRALGIAS: 0

## 2024-11-22 ASSESSMENT — COGNITIVE AND FUNCTIONAL STATUS - GENERAL
MOBILITY SCORE: 24
PATIENT BASELINE BEDBOUND: NO
DAILY ACTIVITIY SCORE: 24

## 2024-11-22 ASSESSMENT — LIFESTYLE VARIABLES
HOW OFTEN DO YOU HAVE A DRINK CONTAINING ALCOHOL: NEVER
AUDIT-C TOTAL SCORE: 0
HOW OFTEN DO YOU HAVE 6 OR MORE DRINKS ON ONE OCCASION: NEVER
SKIP TO QUESTIONS 9-10: 1
AUDIT-C TOTAL SCORE: 0
HOW MANY STANDARD DRINKS CONTAINING ALCOHOL DO YOU HAVE ON A TYPICAL DAY: PATIENT DOES NOT DRINK

## 2024-11-22 ASSESSMENT — HEART SCORE
ECG: NORMAL
HISTORY: SLIGHTLY SUSPICIOUS
AGE: 65+
HEART SCORE: 4
RISK FACTORS: >2 RISK FACTORS OR HX OF ATHEROSCLEROTIC DISEASE
TROPONIN: LESS THAN OR EQUAL TO NORMAL LIMIT

## 2024-11-22 ASSESSMENT — ACTIVITIES OF DAILY LIVING (ADL)
JUDGMENT_ADEQUATE_SAFELY_COMPLETE_DAILY_ACTIVITIES: YES
GROOMING: INDEPENDENT
ADEQUATE_TO_COMPLETE_ADL: YES
LACK_OF_TRANSPORTATION: NO
BATHING: INDEPENDENT
FEEDING YOURSELF: INDEPENDENT
PATIENT'S MEMORY ADEQUATE TO SAFELY COMPLETE DAILY ACTIVITIES?: YES
DRESSING YOURSELF: INDEPENDENT
WALKS IN HOME: INDEPENDENT
HEARING - RIGHT EAR: FUNCTIONAL
HEARING - LEFT EAR: FUNCTIONAL
TOILETING: INDEPENDENT

## 2024-11-22 ASSESSMENT — PATIENT HEALTH QUESTIONNAIRE - PHQ9
2. FEELING DOWN, DEPRESSED OR HOPELESS: NOT AT ALL
SUM OF ALL RESPONSES TO PHQ9 QUESTIONS 1 & 2: 0
1. LITTLE INTEREST OR PLEASURE IN DOING THINGS: NOT AT ALL

## 2024-11-22 ASSESSMENT — PAIN - FUNCTIONAL ASSESSMENT: PAIN_FUNCTIONAL_ASSESSMENT: 0-10

## 2024-11-22 NOTE — ED TRIAGE NOTES
Patient stated he has been having respiratory symptoms for a couple weeks, he was recently discharged.  He stated he hasn't been feeling good since he was discharged.  Patient came in on RA and was stating at 86%.  Patient stated he is normally on 4L.  Patient placed on 4L and O2 staturation went up to  96%

## 2024-11-22 NOTE — ED PROVIDER NOTES
Chief Complaint   Patient presents with   • Shortness of Breath       HPI       64 year old male presents to the Emergency Department today complaining of a 1 week of gradual worsening of shortness of breath. States that he normally wears oxygen as needed, but has been using it more frequently as of late. Denies any associated fever, chills, headache, neck pain, chest pain, abdominal pain, nausea, vomiting, diarrhea, constipation, or urinary symptoms. Discharged from the hospital 1 week ago for an underlying exacerbation of COPD.       History provided by:  Patient             Patient History   Past Medical History:   Diagnosis Date   • Chronic systolic (congestive) heart failure 09/22/2020    Chronic systolic heart failure   • Myocardial infarction (Multi) 07/01/2024   • Old myocardial infarction     Old non-ST elevation myocardial infarction (NSTEMI)   • Old myocardial infarction 09/22/2020    History of ST elevation myocardial infarction (STEMI)   • Old myocardial infarction     History of ST elevation myocardial infarction (STEMI)   • Personal history of other diseases of the circulatory system     History of heart disease   • Personal history of other endocrine, nutritional and metabolic disease     History of hyperlipidemia   • Presence of stent in anterior descending branch of left coronary artery 07/01/2024     Past Surgical History:   Procedure Laterality Date   • MANDIBLE SURGERY  06/01/2016    Jaw Surgery   • OTHER SURGICAL HISTORY  06/29/2020    Coronary artery stent placement   • TONSILLECTOMY  06/01/2016    Tonsillectomy With Adenoidectomy   • VASECTOMY  06/01/2016    Surgery Vas Deferens Vasectomy     No family history on file.  Social History     Tobacco Use   • Smoking status: Former     Types: Cigarettes   • Smokeless tobacco: Never   Substance Use Topics   • Alcohol use: Not Currently   • Drug use: Never           Physical Exam  Constitutional:       Appearance: Normal appearance.   HENT:       Head: Normocephalic.      Right Ear: Tympanic membrane, ear canal and external ear normal.      Left Ear: Tympanic membrane, ear canal and external ear normal.      Nose: Nose normal.      Mouth/Throat:      Mouth: Mucous membranes are moist.      Pharynx: Oropharynx is clear. No oropharyngeal exudate or posterior oropharyngeal erythema.   Eyes:      Conjunctiva/sclera: Conjunctivae normal.      Pupils: Pupils are equal, round, and reactive to light.   Cardiovascular:      Rate and Rhythm: Normal rate and regular rhythm.      Pulses:           Radial pulses are 3+ on the right side and 3+ on the left side.        Dorsalis pedis pulses are 3+ on the right side and 3+ on the left side.      Heart sounds: Normal heart sounds. No murmur heard.     No friction rub. No gallop.   Pulmonary:      Effort: Pulmonary effort is normal. No respiratory distress.      Breath sounds: Examination of the right-lower field reveals decreased breath sounds. Examination of the left-lower field reveals decreased breath sounds. Decreased breath sounds present. No wheezing, rhonchi or rales.   Abdominal:      General: Abdomen is flat. Bowel sounds are normal.      Palpations: Abdomen is soft.      Tenderness: There is no abdominal tenderness. There is no right CVA tenderness, left CVA tenderness, guarding or rebound. Negative signs include Galloway's sign and McBurney's sign.   Musculoskeletal:         General: No swelling or deformity.      Cervical back: Full passive range of motion without pain.      Right lower leg: No edema.      Left lower leg: No edema.   Lymphadenopathy:      Cervical: No cervical adenopathy.   Skin:     Capillary Refill: Capillary refill takes less than 2 seconds.      Coloration: Skin is not jaundiced.      Findings: No rash.   Neurological:      General: No focal deficit present.      Mental Status: He is alert and oriented to person, place, and time. Mental status is at baseline.      Gait: Gait is intact.    Psychiatric:         Mood and Affect: Mood normal.         Behavior: Behavior is cooperative.         Labs Reviewed   CBC WITH AUTO DIFFERENTIAL - Abnormal       Result Value    WBC 13.0 (*)     nRBC 0.0      RBC 4.58      Hemoglobin 14.3      Hematocrit 43.9      MCV 96      MCH 31.2      MCHC 32.6      RDW 13.0      Platelets 186      Neutrophils % 84.8      Immature Granulocytes %, Automated 1.3 (*)     Lymphocytes % 7.0      Monocytes % 5.5      Eosinophils % 1.2      Basophils % 0.2      Neutrophils Absolute 11.00 (*)     Immature Granulocytes Absolute, Automated 0.17      Lymphocytes Absolute 0.91 (*)     Monocytes Absolute 0.71      Eosinophils Absolute 0.15      Basophils Absolute 0.03     COMPREHENSIVE METABOLIC PANEL - Abnormal    Glucose 148 (*)     Sodium 128 (*)     Potassium 5.6 (*)     Chloride 93 (*)     Bicarbonate 29      Anion Gap 12      Urea Nitrogen 18      Creatinine 1.37 (*)     eGFR 57 (*)     Calcium 9.1      Albumin 3.8      Alkaline Phosphatase 41      Total Protein 6.8      AST 20      Bilirubin, Total 0.5      ALT 24     BLOOD GAS VENOUS FULL PANEL - Abnormal    POCT pH, Venous 7.37      POCT pCO2, Venous 51      POCT pO2, Venous 33 (*)     POCT SO2, Venous 37 (*)     POCT Oxy Hemoglobin, Venous 36.3 (*)     POCT Hematocrit Calculated, Venous 44.0      POCT Sodium, Venous 125 (*)     POCT Potassium, Venous 5.8 (*)     POCT Chloride, Venous 92 (*)     POCT Ionized Calicum, Venous 1.24      POCT Glucose, Venous 163 (*)     POCT Lactate, Venous 2.1 (*)     POCT Base Excess, Venous 3.0      POCT HCO3 Calculated, Venous 29.5 (*)     POCT Hemoglobin, Venous 14.8      POCT Anion Gap, Venous 9.0 (*)     Patient Temperature 37.0      FiO2 32     POCT GLUCOSE - Abnormal    POCT Glucose 172 (*)    TROPONIN I, HIGH SENSITIVITY - Normal    Troponin I, High Sensitivity 9      Narrative:     Less than 99th percentile of normal range cutoff-  Female and children under 18 years old <14 ng/L; Male <21  ng/L: Negative  Repeat testing should be performed if clinically indicated.     Female and children under 18 years old 14-50 ng/L; Male 21-50 ng/L:  Consistent with possible cardiac damage and possible increased clinical   risk. Serial measurements may help to assess extent of myocardial damage.     >50 ng/L: Consistent with cardiac damage, increased clinical risk and  myocardial infarction. Serial measurements may help assess extent of   myocardial damage.      NOTE: Children less than 1 year old may have higher baseline troponin   levels and results should be interpreted in conjunction with the overall   clinical context.     NOTE: Troponin I testing is performed using a different   testing methodology at [facility] than at other   [facility]. Direct result comparisons should only   be made within the same method.   B-TYPE NATRIURETIC PEPTIDE - Normal    BNP 31      Narrative:        <100 pg/mL - Heart failure unlikely  100-299 pg/mL - Intermediate probability of acute heart                  failure exacerbation. Correlate with clinical                  context and patient history.    >=300 pg/mL - Heart Failure likely. Correlate with clinical                  context and patient history.    BNP testing is performed using different testing methodology at [facility] than at other [facility]. Direct result comparisons should only be made within the same method.      LACTATE - Normal    Lactate 1.9      Narrative:     Venipuncture immediately after or during the administration of Metamizole may lead to falsely low results. Testing should be performed immediately prior to Metamizole dosing.   BLOOD GAS LACTIC ACID, VENOUS   MORPHOLOGY    RBC Morphology See Below      Polychromasia Mild      Ovalocytes Few      Clumped Platelets Present         XR chest 2 views   Final Result   No evidence consolidating infiltrate or effusion.   Signed by Ranjan Meza MD               ED Course &  [facility]   Diagnoses as of 11/22/24 1932   COPD exacerbation (Multi)   Hyponatremia   Hyperkalemia   Acute kidney injury (CMS-HCC)           Medical Decision Making  EKG interpreted by Dr. Cronin. Indication: shortness of breath. Findings: NSR with a ventricular rate of 79, normal axis, normal intervals, and no acute ischemic or injury pattern. Impression: No acute pathology.       Patient was seen and evaluated by Dr. Sauceda. Saline lock was established with labs drawn and results as above. Noted to have an elevated WBC count of 13.0 that is improveing and likely secondary to steroid use. Potassium of 5.6 wihout any EKG changes. Sodium of 128 with an acute kidney injury (BUN/Creatinine- 18/1.37). Venous blood gas, remainder of blood counts, electrolytes, liver function, and lactate were unremarkable. Heart Score- 4 with normal EKG and troponin. At this time, we feel that the chest pain is atypical for acute coronary syndrome. We find no underlying evidence of an acute infectious process or pneumothorax on CXR. Clinically, we do not feel they are exhibiting signs of pulmonary embolism or thoracic aortic dissection (no connective tissue disorder, no tachycardia, tachypnea, hypoxia, and mediastinum normal in size on CXR). Normal cardiac BNP without evidence of CHF on CXR. Case was discussed with Dr. Back [facility], who agrees to place the patient under 23 hour observation for further evaluation and care. Will be transferred to the medical floor in stable condition.     Diagnostic Impression:    1. Acute exacerbation of COPD    2. Hyponatremia with hyperkalemia    3. Acute kidney injury           Your medication list        ASK your doctor about these medications        Instructions Last Dose Given Next Dose Due   albuterol 90 mcg/actuation inhaler      Inhale 2 puffs every 6 hours if needed for wheezing.       aspirin 81 mg EC tablet           atorvastatin 80 mg tablet  Commonly known as: Lipitor           atorvastatin 80 mg  tablet  Commonly known as: Lipitor      Take 1 tablet (80 mg) by mouth once daily at bedtime.       benzonatate 100 mg capsule  Commonly known as: Tessalon           carvedilol 6.25 mg tablet  Commonly known as: Coreg           carvedilol 6.25 mg tablet  Commonly known as: Coreg      Take 1 tablet (6.25 mg) by mouth 2 times a day with meals.       cyanocobalamin 1,000 mcg tablet  Commonly known as: Vitamin B-12           Fish OiL 1,000 (120-180) mg capsule  Generic drug: omega 3-dha-epa-fish oil           fluticasone propion-salmeteroL 250-50 mcg/dose diskus inhaler  Commonly known as: Advair Diskus      Inhale 1 puff by mouth into the lungs 2 times a day. Rinse mouth with water after use to reduce aftertaste and incidence of candidiasis. Do not swallow.       hydrALAZINE 25 mg tablet  Commonly known as: Apresoline           hydrOXYzine HCL 25 mg tablet  Commonly known as: Atarax           ipratropium-albuteroL 0.5-2.5 mg/3 mL nebulizer solution  Commonly known as: Duo-Neb           lisinopril 5 mg tablet           lisinopril 5 mg tablet      Take 1 tablet (5 mg) by mouth once daily.       predniSONE 10 mg tablet  Commonly known as: Deltasone  Start taking on: November 14, 2024      Take 5 tablets (50 mg) by mouth once daily for 2 days, THEN 4 tablets (40 mg) once daily for 2 days, THEN 3 tablets (30 mg) once daily for 2 days, THEN 2 tablets (20 mg) once daily for 2 days, THEN 1 tablet (10 mg) once daily for 2 days.       tiotropium 2.5 mcg/actuation inhaler  Commonly known as: Spiriva Respimat      Inhale 2 puffs by mouth into the lungs once daily.       Trelegy Ellipta 200-62.5-25 mcg blister with device  Generic drug: fluticasone-umeclidin-vilanter      Inhale 1 puff once daily. Rinse mouth with water after use to reduce aftertaste and incidence of candidiasis. Do not swallow.                  Procedure  Procedures     Chase Smith, OTONIEL-LING  11/22/24 1932

## 2024-11-23 LAB
ANION GAP SERPL CALC-SCNC: 12 MMOL/L (ref 10–20)
BUN SERPL-MCNC: 22 MG/DL (ref 6–23)
CALCIUM SERPL-MCNC: 7.8 MG/DL (ref 8.6–10.3)
CHLORIDE SERPL-SCNC: 95 MMOL/L (ref 98–107)
CO2 SERPL-SCNC: 26 MMOL/L (ref 21–32)
CREAT SERPL-MCNC: 1.17 MG/DL (ref 0.5–1.3)
EGFRCR SERPLBLD CKD-EPI 2021: 69 ML/MIN/1.73M*2
ERYTHROCYTE [DISTWIDTH] IN BLOOD BY AUTOMATED COUNT: 12.8 % (ref 11.5–14.5)
GLUCOSE BLD MANUAL STRIP-MCNC: 217 MG/DL (ref 74–99)
GLUCOSE BLD MANUAL STRIP-MCNC: 302 MG/DL (ref 74–99)
GLUCOSE SERPL-MCNC: 208 MG/DL (ref 74–99)
HCT VFR BLD AUTO: 36.6 % (ref 41–52)
HGB BLD-MCNC: 12.2 G/DL (ref 13.5–17.5)
LACTATE BLDV-SCNC: 1.6 MMOL/L (ref 0.4–2)
MCH RBC QN AUTO: 31.4 PG (ref 26–34)
MCHC RBC AUTO-ENTMCNC: 33.3 G/DL (ref 32–36)
MCV RBC AUTO: 94 FL (ref 80–100)
NRBC BLD-RTO: 0 /100 WBCS (ref 0–0)
PLATELET # BLD AUTO: 177 X10*3/UL (ref 150–450)
POTASSIUM SERPL-SCNC: 4.8 MMOL/L (ref 3.5–5.3)
RBC # BLD AUTO: 3.88 X10*6/UL (ref 4.5–5.9)
SODIUM SERPL-SCNC: 128 MMOL/L (ref 136–145)
WBC # BLD AUTO: 7.5 X10*3/UL (ref 4.4–11.3)

## 2024-11-23 PROCEDURE — 85027 COMPLETE CBC AUTOMATED: CPT | Performed by: STUDENT IN AN ORGANIZED HEALTH CARE EDUCATION/TRAINING PROGRAM

## 2024-11-23 PROCEDURE — 2500000004 HC RX 250 GENERAL PHARMACY W/ HCPCS (ALT 636 FOR OP/ED): Performed by: STUDENT IN AN ORGANIZED HEALTH CARE EDUCATION/TRAINING PROGRAM

## 2024-11-23 PROCEDURE — 2500000002 HC RX 250 W HCPCS SELF ADMINISTERED DRUGS (ALT 637 FOR MEDICARE OP, ALT 636 FOR OP/ED): Performed by: STUDENT IN AN ORGANIZED HEALTH CARE EDUCATION/TRAINING PROGRAM

## 2024-11-23 PROCEDURE — 99233 SBSQ HOSP IP/OBS HIGH 50: CPT | Performed by: FAMILY MEDICINE

## 2024-11-23 PROCEDURE — 2500000005 HC RX 250 GENERAL PHARMACY W/O HCPCS: Performed by: STUDENT IN AN ORGANIZED HEALTH CARE EDUCATION/TRAINING PROGRAM

## 2024-11-23 PROCEDURE — 99223 1ST HOSP IP/OBS HIGH 75: CPT | Performed by: INTERNAL MEDICINE

## 2024-11-23 PROCEDURE — 36415 COLL VENOUS BLD VENIPUNCTURE: CPT | Performed by: NURSE PRACTITIONER

## 2024-11-23 PROCEDURE — 96374 THER/PROPH/DIAG INJ IV PUSH: CPT | Mod: 59

## 2024-11-23 PROCEDURE — 94640 AIRWAY INHALATION TREATMENT: CPT

## 2024-11-23 PROCEDURE — 2500000002 HC RX 250 W HCPCS SELF ADMINISTERED DRUGS (ALT 637 FOR MEDICARE OP, ALT 636 FOR OP/ED): Performed by: FAMILY MEDICINE

## 2024-11-23 PROCEDURE — 2500000001 HC RX 250 WO HCPCS SELF ADMINISTERED DRUGS (ALT 637 FOR MEDICARE OP): Performed by: STUDENT IN AN ORGANIZED HEALTH CARE EDUCATION/TRAINING PROGRAM

## 2024-11-23 PROCEDURE — 1200000002 HC GENERAL ROOM WITH TELEMETRY DAILY

## 2024-11-23 PROCEDURE — 80048 BASIC METABOLIC PNL TOTAL CA: CPT | Performed by: STUDENT IN AN ORGANIZED HEALTH CARE EDUCATION/TRAINING PROGRAM

## 2024-11-23 PROCEDURE — 82947 ASSAY GLUCOSE BLOOD QUANT: CPT

## 2024-11-23 PROCEDURE — 83605 ASSAY OF LACTIC ACID: CPT | Performed by: NURSE PRACTITIONER

## 2024-11-23 RX ORDER — FLUTICASONE FUROATE AND VILANTEROL 200; 25 UG/1; UG/1
1 POWDER RESPIRATORY (INHALATION)
Status: DISCONTINUED | OUTPATIENT
Start: 2024-11-23 | End: 2024-11-23 | Stop reason: SDUPTHER

## 2024-11-23 RX ORDER — INSULIN LISPRO 100 [IU]/ML
0-5 INJECTION, SOLUTION INTRAVENOUS; SUBCUTANEOUS
Status: DISCONTINUED | OUTPATIENT
Start: 2024-11-23 | End: 2024-11-24 | Stop reason: HOSPADM

## 2024-11-23 RX ORDER — DEXTROSE 50 % IN WATER (D50W) INTRAVENOUS SYRINGE
25
Status: DISCONTINUED | OUTPATIENT
Start: 2024-11-23 | End: 2024-11-24 | Stop reason: HOSPADM

## 2024-11-23 RX ORDER — DEXTROSE 50 % IN WATER (D50W) INTRAVENOUS SYRINGE
12.5
Status: DISCONTINUED | OUTPATIENT
Start: 2024-11-23 | End: 2024-11-24 | Stop reason: HOSPADM

## 2024-11-23 RX ADMIN — TIOTROPIUM BROMIDE INHALATION SPRAY 2 PUFF: 3.12 SPRAY, METERED RESPIRATORY (INHALATION) at 09:34

## 2024-11-23 RX ADMIN — IPRATROPIUM BROMIDE AND ALBUTEROL SULFATE 3 ML: 2.5; .5 SOLUTION RESPIRATORY (INHALATION) at 08:25

## 2024-11-23 RX ADMIN — IPRATROPIUM BROMIDE AND ALBUTEROL SULFATE 3 ML: 2.5; .5 SOLUTION RESPIRATORY (INHALATION) at 11:08

## 2024-11-23 RX ADMIN — ONDANSETRON 4 MG: 2 INJECTION INTRAMUSCULAR; INTRAVENOUS at 07:34

## 2024-11-23 RX ADMIN — CARVEDILOL 6.25 MG: 6.25 TABLET, FILM COATED ORAL at 07:37

## 2024-11-23 RX ADMIN — Medication 4 L/MIN: at 08:35

## 2024-11-23 RX ADMIN — Medication 4 L/MIN: at 20:07

## 2024-11-23 RX ADMIN — INSULIN LISPRO 4 UNITS: 100 INJECTION, SOLUTION INTRAVENOUS; SUBCUTANEOUS at 17:51

## 2024-11-23 RX ADMIN — ENOXAPARIN SODIUM 40 MG: 40 INJECTION SUBCUTANEOUS at 20:06

## 2024-11-23 RX ADMIN — ASPIRIN 81 MG: 81 TABLET, COATED ORAL at 07:37

## 2024-11-23 RX ADMIN — Medication 4 L/MIN: at 20:02

## 2024-11-23 RX ADMIN — PANTOPRAZOLE SODIUM 40 MG: 40 TABLET, DELAYED RELEASE ORAL at 06:39

## 2024-11-23 RX ADMIN — Medication 3 MG: at 20:06

## 2024-11-23 RX ADMIN — FLUTICASONE FUROATE AND VILANTEROL TRIFENATATE 1 PUFF: 200; 25 POWDER RESPIRATORY (INHALATION) at 09:33

## 2024-11-23 RX ADMIN — IPRATROPIUM BROMIDE AND ALBUTEROL SULFATE 3 ML: 2.5; .5 SOLUTION RESPIRATORY (INHALATION) at 20:02

## 2024-11-23 RX ADMIN — CARVEDILOL 6.25 MG: 6.25 TABLET, FILM COATED ORAL at 17:43

## 2024-11-23 RX ADMIN — METHYLPREDNISOLONE SODIUM SUCCINATE 40 MG: 40 INJECTION, POWDER, FOR SOLUTION INTRAMUSCULAR; INTRAVENOUS at 07:43

## 2024-11-23 RX ADMIN — ATORVASTATIN CALCIUM 80 MG: 40 TABLET, FILM COATED ORAL at 20:06

## 2024-11-23 ASSESSMENT — ENCOUNTER SYMPTOMS
JOINT SWELLING: 0
COUGH: 1
SHORTNESS OF BREATH: 1
HEMATURIA: 0
SEIZURES: 0
ABDOMINAL PAIN: 0
NAUSEA: 1
VOMITING: 1
FEVER: 0
TROUBLE SWALLOWING: 0
CHILLS: 0

## 2024-11-23 ASSESSMENT — COGNITIVE AND FUNCTIONAL STATUS - GENERAL
MOBILITY SCORE: 24
DAILY ACTIVITIY SCORE: 24
MOBILITY SCORE: 24
DAILY ACTIVITIY SCORE: 24

## 2024-11-23 ASSESSMENT — PAIN SCALES - GENERAL
PAINLEVEL_OUTOF10: 0 - NO PAIN
PAINLEVEL_OUTOF10: 0 - NO PAIN

## 2024-11-23 ASSESSMENT — PAIN - FUNCTIONAL ASSESSMENT: PAIN_FUNCTIONAL_ASSESSMENT: 0-10

## 2024-11-23 NOTE — PROGRESS NOTES
Darron Resendez is a 64 y.o. male on day 0 of admission presenting with Hyponatremia.      Subjective   64 y.o. male with PMHx s/f COPD, long-time cigarette abuse, HTN, HLD, CAD, chronic respiratory failure presenting with shortness of breath. Pt was recently admitted to this hospital from 11/11/24 to 11/14/24 for COPD exacerbation. He was treated glucocorticoids and antibiotics and was discharged on Spiriva, Breo, and Advair (ICS/LAMA/LABA) along with prednisone taper. He has been taking all of these as prescribed. Says he breathing is still not at baseline. He has been using his home O2 nearly continuously and with any sort of exertion. He is still having a cough with a bit of clear sputum production. Complains of diffuse body aches. Admits he has not been eating much. No nausea, vomiting, fever, chills, chest pain, abdominal pain, leg swelling or bowel issues. He has not resumed cigarette smoking since discharge. No sick contacts or environmental exposures.     ED Course (Summary - please note all labs, imaging studies, and interventions noted below have been personally reviewed and/or interpreted on day of admission):   Vitals on presentation: 98.4 F, 85 bpm, 16 rr, 138/63, 86% on Ra -> 96% on NC  Labs: CMP glu 148, , K 5.6, Cl 93, Cr 1.37  Lactate 1.9  BNP 31  Trop 9  CBC WBC 13.0, absolute neutrophils 11.00  VBG pH 7.37, pCO2 51, pO2 33, lactate 2.1, HCO3 29.5  EKG: Sinus rhythm at 79 bpm  Imaging: CXR - No evidence consolidating infiltrate or effusion.       11/23:               Today patient is awake alert and interactive appropriately sitting on the edge of the bed.  He is eating a pizza.  Girlfriend at the bedside.  States that this time he really is not feeling any improvement over admission.  When asked if he feels he has any idea why he deteriorated since recent admission stated he did not know and states he has remained non-smoking.  Girlfriend volunteers that starting Monday she had runny nose and  sore throat.  Apparently she feels he developed a runny nose on Monday as well.  Pulmonology evaluated the patient and did not feel there existed an indication for Levaquin or benefit from Solu-Medrol in view of his likely diabetes.  Does not carry a diagnosis of this but hemoglobin A1c was 7.1.  However review of EMR shows that at recent pulmonology clinic visit diabetes is listed in his PMH though medication list shows no diabetes medications.  A prior cardiology visit does describe DM but no medications listed.  Most recent PCP visit was [facility] but very little information available.  Earlier visits do describe that he was on metformin at least at 1 point.  Blood sugars have been mild to moderately elevated through this admission as they were through previous admission.  At this point we will place him on SSI and discuss further with the patient.    Review of Systems   Constitutional:  Positive for appetite change and fatigue. Negative for activity change, chills, diaphoresis and fever.   HENT:  Negative for congestion, ear pain, rhinorrhea, sinus pain and sore throat.    Respiratory:  Positive for cough, shortness of breath and wheezing. Negative for apnea, chest tightness and stridor.    Cardiovascular:  Negative for chest pain, palpitations and leg swelling.   Gastrointestinal:  Negative for abdominal distention, abdominal pain, constipation, diarrhea, nausea and vomiting.   Genitourinary:  Negative for difficulty urinating, dysuria, flank pain, frequency, hematuria and urgency.   Musculoskeletal:  Positive for myalgias. Negative for arthralgias, back pain, gait problem and joint swelling.   Skin:  Negative for color change, pallor, rash and wound.   Neurological:  Negative for dizziness, syncope, weakness, light-headedness, numbness and headaches.   Psychiatric/Behavioral:  Negative for agitation, behavioral problems, confusion and decreased concentration. The patient is not nervous/anxious.    All other  systems reviewed and are negative.         Objective     Last Recorded Vitals  /65 (BP Location: Right arm, Patient Position: Lying)   Pulse 76   Temp 36.4 °C (97.5 °F) (Temporal)   Resp 22   Wt 96.2 kg (212 lb)   SpO2 90%   Intake/Output last 3 Shifts:    Intake/Output Summary (Last 24 hours) at 11/23/2024 1601  Last data filed at 11/23/2024 1524  Gross per 24 hour   Intake 1610 ml   Output --   Net 1610 ml       Admission Weight  Weight: 96.2 kg (212 lb) (11/22/24 1617)    Daily Weight  11/22/24 : 96.2 kg (212 lb)    Image Results  XR chest 2 views  Narrative: STUDY:  Chest Radiographs;  11/22/2024 6:01PM  INDICATION:  Shortness of breath.  COMPARISON:  11/05/2024 XR Chest, 10/29/2024 XR Chest  ACCESSION NUMBER(S):  [id number]  ORDERING CLINICIAN:  ALFONSO LANE  TECHNIQUE:  Frontal and lateral chest.   FINDINGS:  CARDIOMEDIASTINAL SILHOUETTE:  Cardiomediastinal silhouette is normal in size and configuration.     LUNGS:  There are findings consistent with COPD.     ABDOMEN:  No remarkable upper abdominal findings.     BONES:  No acute osseous changes.  Impression: No evidence consolidating infiltrate or effusion.  Signed by Ranjan Meza MD      Physical Exam  Constitutional: Pleasant and cooperative. Laying in bed in no acute distress. Conversant.   Skin: Warm and dry; no obvious lesions, rashes, pallor, or jaundice.   Eyes: EOMI. Anicteric sclera.   ENT: Mucous membranes moist; no obvious injury or deformity appreciated.   Head and Neck: Normocephalic, atraumatic. ROM preserved. Trachea midline. No appreciable JVD.   Respiratory: Nonlabored on NC. Lungs severely diminished bilaterally, mild wheezing. Chest rise is equal.  Cardiovascular: RRR. No gross murmur, gallop, or rub. Extremities are warm and well-perfused with good capillary refill (< 3 seconds). No chest wall tenderness.   GI: Abdomen soft, nontender, nondistended. No obvious organomegaly appreciated. Bowel sounds are present.  : No CVA  tenderness.   MSK: No gross abnormalities appreciated. No limitations to AROM/PROM appreciated.   Extremities: No cyanosis, edema, or clubbing evident. Neurovascularly intact.   Neuro: A&Ox3. CN 2-12 grossly intact. Able to respond to questions appropriately and clearly. No acute focal neurologic deficits appreciated.  Psych: Appropriate mood and behavior.     Relevant Results               Assessment/Plan                  Assessment & Plan  Hyponatremia    Presence of stent in LAD coronary artery    Hyperlipidemia    ASHD (arteriosclerotic heart disease)    Acute on chronic respiratory failure with hypoxia (Multi)    HTN (hypertension)    LARRY (acute kidney injury) (CMS-[facility])    Hyperkalemia    COPD exacerbation (Multi)    COPD exacerbation:  Will restart on Solumedrol BID. Continue duonebs PRN.  Levaquin to cover for any bacterial cause.  Respiratory therapy consulted for further recommendations  Wean O2 as able  Cxr clear. Not septic on admission.  Continue home inhalers.  11/23: Pulmonology evaluated patient and felt no indication for antibiotics or Solu-Medrol in view of his diabetes.  Recommended LAMA LABA Clarice ICS.  Remains on 4 L nasal cannula.  Previously had used home O2 on as needed basis.  Patient claims quit smoking 3 weeks ago.  Will need to follow-up in pulmonology clinic.     Hyponatremia:  Na 128 today, likely reduced to poor oral intake since discharge.  Will give gentle saline tonight and recheck in AM.  Consider further workup if not improved.  11/23: Sodium appears stable.  Likely related to exacerbation COPD.  Continue to monitor.     LARRY:  Cr mildly elevated at 1.37. Gentle hydration overnight and recheck in AM.     Hyperkalemia:  K 5.6 on admission, Recheck in AM after fluids.  Hold home lisinopril tonight.  11/23: Potassium normalized.  Continue to monitor.       HTN/HLD/CAD:  Continue home ASA, Lipitor, Coreg                 Andrew Michelle MD

## 2024-11-23 NOTE — H&P
[facility] - GENERAL MEDICINE HISTORY AND PHYSICAL    History Obtained From (Primary Source): Pt  Collateral History (Secondary Sources): D/w ED provider    History Of Present Illness (HPI):  Darron Resendez is a 64 y.o. male with PMHx s/f COPD, long-time cigarette abuse, HTN, HLD, CAD, chronic respiratory failure presenting with shortness of breath. Pt was recently admitted to this hospital from 11/11/24 to 11/14/24 for COPD exacerbation. He was treated glucocorticoids and antibiotics and was discharged on Spiriva, Breo, and Advair (ICS/LAMA/LABA) along with prednisone taper. He has been taking all of these as prescribed. Says he breathing is still not at baseline. He has been using his home O2 nearly continuously and with any sort of exertion. He is still having a cough with a bit of clear sputum production. Complains of diffuse body aches. Admits he has not been eating much. No nausea, vomiting, fever, chills, chest pain, abdominal pain, leg swelling or bowel issues. He has not resumed cigarette smoking since discharge. No sick contacts or environmental exposures.    ED Course (Summary - please note all labs, imaging studies, and interventions noted below have been personally reviewed and/or interpreted on day of admission):   Vitals on presentation: 98.4 F, 85 bpm, 16 rr, 138/63, 86% on Ra -> 96% on NC  Labs: CMP glu 148, , K 5.6, Cl 93, Cr 1.37  Lactate 1.9  BNP 31  Trop 9  CBC WBC 13.0, absolute neutrophils 11.00  VBG pH 7.37, pCO2 51, pO2 33, lactate 2.1, HCO3 29.5  EKG: Sinus rhythm at 79 bpm  Imaging: CXR - No evidence consolidating infiltrate or effusion.   Interventions: Pt admitted for further care of COPD exacerbation and hyponatremia.    12-point ROS reviewed and found to be negative aside from aforementioned positives in HPI and/or noted in dedicated ROS section below.     ED Course (From ED Provider):  Diagnoses as of 11/22/24 1949   COPD exacerbation (Multi)   Hyponatremia    Hyperkalemia   Acute kidney injury (CMS-HCC)     Relevant Results  Results for orders placed or performed during the hospital encounter of 11/22/24 (from the past 24 hours)   POCT GLUCOSE   Result Value Ref Range    POCT Glucose 172 (H) 74 - 99 mg/dL   CBC and Auto Differential   Result Value Ref Range    WBC 13.0 (H) 4.4 - 11.3 x10*3/uL    nRBC 0.0 0.0 - 0.0 /100 WBCs    RBC 4.58 4.50 - 5.90 x10*6/uL    Hemoglobin 14.3 13.5 - 17.5 g/dL    Hematocrit 43.9 41.0 - 52.0 %    MCV 96 80 - 100 fL    MCH 31.2 26.0 - 34.0 pg    MCHC 32.6 32.0 - 36.0 g/dL    RDW 13.0 11.5 - 14.5 %    Platelets 186 150 - 450 x10*3/uL    Neutrophils % 84.8 40.0 - 80.0 %    Immature Granulocytes %, Automated 1.3 (H) 0.0 - 0.9 %    Lymphocytes % 7.0 13.0 - 44.0 %    Monocytes % 5.5 2.0 - 10.0 %    Eosinophils % 1.2 0.0 - 6.0 %    Basophils % 0.2 0.0 - 2.0 %    Neutrophils Absolute 11.00 (H) 1.20 - 7.70 x10*3/uL    Immature Granulocytes Absolute, Automated 0.17 0.00 - 0.70 x10*3/uL    Lymphocytes Absolute 0.91 (L) 1.20 - 4.80 x10*3/uL    Monocytes Absolute 0.71 0.10 - 1.00 x10*3/uL    Eosinophils Absolute 0.15 0.00 - 0.70 x10*3/uL    Basophils Absolute 0.03 0.00 - 0.10 x10*3/uL   Comprehensive metabolic panel   Result Value Ref Range    Glucose 148 (H) 74 - 99 mg/dL    Sodium 128 (L) 136 - 145 mmol/L    Potassium 5.6 (H) 3.5 - 5.3 mmol/L    Chloride 93 (L) 98 - 107 mmol/L    Bicarbonate 29 21 - 32 mmol/L    Anion Gap 12 10 - 20 mmol/L    Urea Nitrogen 18 6 - 23 mg/dL    Creatinine 1.37 (H) 0.50 - 1.30 mg/dL    eGFR 57 (L) >60 mL/min/1.73m*2    Calcium 9.1 8.6 - 10.3 mg/dL    Albumin 3.8 3.4 - 5.0 g/dL    Alkaline Phosphatase 41 33 - 136 U/L    Total Protein 6.8 6.4 - 8.2 g/dL    AST 20 9 - 39 U/L    Bilirubin, Total 0.5 0.0 - 1.2 mg/dL    ALT 24 10 - 52 U/L   Troponin I, High Sensitivity   Result Value Ref Range    Troponin I, High Sensitivity 9 0 - 20 ng/L   B-Type Natriuretic Peptide   Result Value Ref Range    BNP 31 0 - 99 pg/mL   Lactate    Result Value Ref Range    Lactate 1.9 0.4 - 2.0 mmol/L   Blood Gas Venous Full Panel   Result Value Ref Range    POCT pH, Venous 7.37 7.33 - 7.43 pH    POCT pCO2, Venous 51 41 - 51 mm Hg    POCT pO2, Venous 33 (L) 35 - 45 mm Hg    POCT SO2, Venous 37 (L) 45 - 75 %    POCT Oxy Hemoglobin, Venous 36.3 (L) 45.0 - 75.0 %    POCT Hematocrit Calculated, Venous 44.0 41.0 - 52.0 %    POCT Sodium, Venous 125 (L) 136 - 145 mmol/L    POCT Potassium, Venous 5.8 (H) 3.5 - 5.3 mmol/L    POCT Chloride, Venous 92 (L) 98 - 107 mmol/L    POCT Ionized Calicum, Venous 1.24 1.10 - 1.33 mmol/L    POCT Glucose, Venous 163 (H) 74 - 99 mg/dL    POCT Lactate, Venous 2.1 (H) 0.4 - 2.0 mmol/L    POCT Base Excess, Venous 3.0 -2.0 - 3.0 mmol/L    POCT HCO3 Calculated, Venous 29.5 (H) 22.0 - 26.0 mmol/L    POCT Hemoglobin, Venous 14.8 13.5 - 17.5 g/dL    POCT Anion Gap, Venous 9.0 (L) 10.0 - 25.0 mmol/L    Patient Temperature 37.0 degrees Celsius    FiO2 32 %   Morphology   Result Value Ref Range    RBC Morphology See Below     Polychromasia Mild     Ovalocytes Few     Clumped Platelets Present       XR chest 2 views    Result Date: 11/22/2024  STUDY: Chest Radiographs;  11/22/2024 6:01PM INDICATION: Shortness of breath. COMPARISON: 11/05/2024 XR Chest, 10/29/2024 XR Chest ACCESSION NUMBER(S): [id number] ORDERING CLINICIAN: ALFONSO LANE TECHNIQUE:  Frontal and lateral chest. FINDINGS: CARDIOMEDIASTINAL SILHOUETTE: Cardiomediastinal silhouette is normal in size and configuration.  LUNGS: There are findings consistent with COPD.  ABDOMEN: No remarkable upper abdominal findings.  BONES: No acute osseous changes.    No evidence consolidating infiltrate or effusion. Signed by Ranjna Meza MD    Scheduled medications:  aspirin, 81 mg, oral, Daily  atorvastatin, 80 mg, oral, Nightly  [START ON 11/23/2024] carvedilol, 6.25 mg, oral, BID  enoxaparin, 40 mg, subcutaneous, q24h  [START ON 11/23/2024] tiotropium, 2 puff, inhalation, Daily    And  [START ON 11/23/2024] fluticasone furoate-vilanteroL, 1 puff, inhalation, Daily  ipratropium-albuteroL, 3 mL, nebulization, q6h  [Held by provider] lisinopril, 5 mg, oral, Daily  methylPREDNISolone sodium succinate (PF), 40 mg, intravenous, q12h  [START ON 11/23/2024] pantoprazole, 40 mg, oral, Daily before breakfast   Or  [START ON 11/23/2024] pantoprazole, 40 mg, intravenous, Daily before breakfast  [START ON 11/23/2024] tiotropium, 2 puff, inhalation, Daily      Continuous medications:  sodium chloride 0.9%, 100 mL/hr      PRN medications:  PRN medications: albuterol, benzonatate, bisacodyl, bisacodyl, guaiFENesin, melatonin, ondansetron **OR** ondansetron     Past Medical History  He has a past medical history of Chronic systolic (congestive) heart failure (09/22/2020), Myocardial infarction (Multi) (07/01/2024), Old myocardial infarction, Old myocardial infarction (09/22/2020), Old myocardial infarction, Personal history of other diseases of the circulatory system, Personal history of other endocrine, nutritional and metabolic disease, and Presence of stent in anterior descending branch of left coronary artery (07/01/2024).    Surgical History  He has a past surgical history that includes Tonsillectomy (06/01/2016); Vasectomy (06/01/2016); Mandible surgery (06/01/2016); and Other surgical history (06/29/2020).     Social History  He reports that he has quit smoking. His smoking use included cigarettes. He has never used smokeless tobacco. He reports that he does not currently use alcohol. He reports that he does not use drugs.    Family History  No family history on file.    Allergies  Penicillin g    Code Status  Full Code     Review of Systems   Constitutional:  Positive for appetite change and fatigue. Negative for activity change, chills, diaphoresis and fever.   HENT:  Negative for congestion, ear pain, rhinorrhea, sinus pain and sore throat.    Respiratory:  Positive for cough, shortness of breath  and wheezing. Negative for apnea, chest tightness and stridor.    Cardiovascular:  Negative for chest pain, palpitations and leg swelling.   Gastrointestinal:  Negative for abdominal distention, abdominal pain, constipation, diarrhea, nausea and vomiting.   Genitourinary:  Negative for difficulty urinating, dysuria, flank pain, frequency, hematuria and urgency.   Musculoskeletal:  Positive for myalgias. Negative for arthralgias, back pain, gait problem and joint swelling.   Skin:  Negative for color change, pallor, rash and wound.   Neurological:  Negative for dizziness, syncope, weakness, light-headedness, numbness and headaches.   Psychiatric/Behavioral:  Negative for agitation, behavioral problems, confusion and decreased concentration. The patient is not nervous/anxious.    All other systems reviewed and are negative.      Last Recorded Vitals  /67   Pulse 75   Temp 36.9 °C (98.4 °F)   Resp 16   Wt 96.2 kg (212 lb)   SpO2 96%      Physical Exam:  Vital signs and nursing notes reviewed.   Constitutional: Pleasant and cooperative. Laying in bed in no acute distress. Conversant.   Skin: Warm and dry; no obvious lesions, rashes, pallor, or jaundice.   Eyes: EOMI. Anicteric sclera.   ENT: Mucous membranes moist; no obvious injury or deformity appreciated.   Head and Neck: Normocephalic, atraumatic. ROM preserved. Trachea midline. No appreciable JVD.   Respiratory: Nonlabored on NC. Lungs severely diminished bilaterally, mild wheezing. Chest rise is equal.  Cardiovascular: RRR. No gross murmur, gallop, or rub. Extremities are warm and well-perfused with good capillary refill (< 3 seconds). No chest wall tenderness.   GI: Abdomen soft, nontender, nondistended. No obvious organomegaly appreciated. Bowel sounds are present.  : No CVA tenderness.   MSK: No gross abnormalities appreciated. No limitations to AROM/PROM appreciated.   Extremities: No cyanosis, edema, or clubbing evident. Neurovascularly intact.    Neuro: A&Ox3. CN 2-12 grossly intact. Able to respond to questions appropriately and clearly. No acute focal neurologic deficits appreciated.  Psych: Appropriate mood and behavior.    Assessment/Plan     64 y.o. male with PMHx s/f COPD, long-time cigarette abuse, HTN, HLD, CAD, chronic respiratory failure presenting with shortness of breath.    Plan:  Admit to observation    COPD exacerbation:  Will restart on Solumedrol BID. Continue duonebs PRN.  Levaquin to cover for any bacterial cause.  Respiratory therapy consulted for further recommendations  Wean O2 as able  Cxr clear. Not septic on admission.  Continue home inhalers.    Hyponatremia:  Na 128 today, likely reduced to poor oral intake since discharge.  Will give gentle saline tonight and recheck in AM.  Consider further workup if not improved.    LARRY:  Cr mildly elevated at 1.37. Gentle hydration overnight and recheck in AM.    Hyperkalemia:  K 5.6 on admission, Recheck in AM after fluids.  Hold home lisinopril tonight.    HTN/HLD/CAD:  Continue home ASA, Lipitor, Coreg    Diet: Regular  DVT Prophylaxis: Lovenox SQ  Code Status: Full code per disucssion with pt  Case Discussed With: ED provider  Additional Sources Reviewed: Prior admission    Anticipated Length of Stay (LOS): 1-2 midnights for improvement in respiratory status and resolution of hyponatremia and LARRY.     DO Mateusz Trotter dictation software was used to dictate this note and thus there may be minor errors in translation/transcription including garbled speech or misspellings. Please contact for clarification if needed.

## 2024-11-23 NOTE — CARE PLAN
The patient's goals for the shift include      The clinical goals for the shift include Patient will be free from respiratory distress this shift.    Over the shift, the patient has no s/sx of respiratory distress. Is resting comfortably at this time.

## 2024-11-23 NOTE — CONSULTS
"    Reason For Consult  \"Worsening COPD\"    History Of Present Illness  Darron Resendez is a 64 y.o. male presenting with chief complaint of shortness of breath.  Patient has chronic dyspnea on exertion and is unable to walk more than about 50 to 100 feet on a good day.  He has a chronic productive cough.  He has no hemoptysis.  He quit smoking completely 3 weeks ago.  He has a very long significant tobacco use history and has been diagnosed with Gold stage IV COPD and followed in pulmonary clinic.  He has had difficulty getting inhalers due to cost but says that he now is using Breo and Spiriva at home.  He was diagnosed with pneumonia in October and has been back to the hospital since with COPD exacerbation and followed in clinic.  Patient says that he did not eat or drink much of anything yesterday, the day of admission.  Patient presented to the emergency department yesterday with shortness of breath, it seems he was admitted to observation due to hyponatremia and LARRY.  Pulmonary consultation was ordered for the above-mentioned indication.    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  He has 1 cat at home.  He has no aero allergies.    No prior thoracic or abdominal surgeries although he did have an MI with 2 stents.     Past Medical History  Past Medical History:   Diagnosis Date    Chronic systolic (congestive) heart failure 09/22/2020    Chronic systolic heart failure    Myocardial infarction (Multi) 07/01/2024    Old myocardial infarction     Old non-ST elevation myocardial infarction (NSTEMI)    Old myocardial infarction 09/22/2020    History of ST elevation myocardial infarction (STEMI)    Old myocardial infarction     History of ST elevation myocardial infarction (STEMI)    Personal history of other diseases of the circulatory system     History of heart disease    Personal history of other endocrine, nutritional and metabolic disease     History of " hyperlipidemia    Presence of stent in anterior descending branch of left coronary artery 07/01/2024       Surgical History  Past Surgical History:   Procedure Laterality Date    MANDIBLE SURGERY  06/01/2016    Jaw Surgery    OTHER SURGICAL HISTORY  06/29/2020    Coronary artery stent placement    TONSILLECTOMY  06/01/2016    Tonsillectomy With Adenoidectomy    VASECTOMY  06/01/2016    Surgery Vas Deferens Vasectomy        Social History  Social History     Tobacco Use    Smoking status: Former     Types: Cigarettes    Smokeless tobacco: Never   Substance Use Topics    Alcohol use: Not Currently    Drug use: Never       Family History  No family history on file.  No hereditary forms of lung disease noted     Allergies  Penicillin g    Review of Systems   Constitutional:  Negative for chills and fever.   HENT:  Negative for trouble swallowing.    Eyes:  Positive for visual disturbance (Vision has been a bit blurry lately).   Respiratory:  Positive for cough and shortness of breath.    Cardiovascular:  Negative for chest pain and leg swelling.   Gastrointestinal:  Positive for nausea and vomiting. Negative for abdominal pain.   Genitourinary:  Negative for hematuria.   Musculoskeletal:  Negative for joint swelling.   Skin:  Negative for rash.   Neurological:  Negative for seizures.        Physical Exam  Vitals reviewed.   Constitutional:       General: He is not in acute distress.     Appearance: He is not toxic-appearing.      Comments: Gentleman appears about 10 years older than his reported chronological age, he is in no acute distress speaking in complete sentences without conversational dyspnea.   HENT:      Head: Normocephalic and atraumatic.      Mouth/Throat:      Pharynx: No oropharyngeal exudate.   Eyes:      General: No scleral icterus.  Cardiovascular:      Rate and Rhythm: Normal rate and regular rhythm.      Heart sounds: No murmur heard.  Pulmonary:      Breath sounds: Wheezing present.      Comments:  "Sounds are distant, bilaterally.  No percussion dullness.  No chest wall tenderness or abnormal fremitus.  There are bilateral end expiratory wheezes heard in all 4 quadrants.  Expiratory phase is prolonged.  Abdominal:      General: There is no distension.      Palpations: Abdomen is soft.   Musculoskeletal:      Cervical back: Neck supple. No rigidity.      Right lower leg: No edema.      Left lower leg: No edema.      Comments: No digital clubbing or stigmata of severe rheumatic disease   Skin:     Findings: No rash.   Neurological:      General: No focal deficit present.      Mental Status: He is alert and oriented to person, place, and time. Mental status is at baseline.          Vital Signs  Blood pressure 115/72, pulse 67, temperature 36.6 °C (97.8 °F), temperature source Temporal, resp. rate 16, height 1.702 m (5' 7\"), weight 96.2 kg (212 lb), SpO2 96%.  Oxygen Therapy  SpO2: 96 %  Medical Gas Therapy: Supplemental oxygen  Medical Gas Delivery Method: Nasal cannula       Relevant Results  XR chest 2 views 11/22/2024    Narrative  STUDY:  Chest Radiographs;  11/22/2024 6:01PM  INDICATION:  Shortness of breath.  COMPARISON:  11/05/2024 XR Chest, 10/29/2024 XR Chest  ACCESSION NUMBER(S):  [id number]  ORDERING CLINICIAN:  ALFONSO LANE  TECHNIQUE:  Frontal and lateral chest.  FINDINGS:  CARDIOMEDIASTINAL SILHOUETTE:  Cardiomediastinal silhouette is normal in size and configuration.    LUNGS:  There are findings consistent with COPD.    ABDOMEN:  No remarkable upper abdominal findings.    BONES:  No acute osseous changes.    Impression  No evidence consolidating infiltrate or effusion.  Signed by Ranjan Meza MD    Thoracic imaging studies are personally reviewed.  2 view chest x-ray done yesterday shows hyperinflation with flattened hemidiaphragms.  Chest CT scan done with pulmonary embolism protocol on August 7, 2020 was reviewed.  The study shows rather impressive centrilobular emphysema. Chest CT scan " from July 9, 2024 was also reviewed.  In addition to centrilobular emphysema, this study shows a minute/small right pleural effusion with pneumonic changes in the right lower lobe also bilateral lower lobe bronchiectasis which was not seen on the 2020 scan.  Tree-in-bud opacity in the right middle lobe which is also new and easily seen on image 171 of 329.    Pulmonary function tests from September 3, 2024 were reviewed.  See study in epic.    Assessment/Plan   Severe centrilobular emphysema and chronic bronchitis.  Due to decades of heavy tobacco smoking.  FEV1 27% of predicted.  Significant air trapping on lung volume measurements in September.  Course complicated due by inability to adhere to medications and tobacco use, both of which seem to be improving lately.  Not sure that I find much evidence of acute pathology presently since the patient may always have some wheeze on exam; clearly he has quite severe chronic pathology.  Patient also has bronchiectasis as evidenced by the July 2024 CT scan findings, which appears worse on the right side with tree-in-bud opacities begging the question whether or not he has been aspirating.  He denies dysphagia or reflux.    Recommend discontinuation of levofloxacin therapy.  Recommend discontinuation of systemic steroids secondary to recent course of same and diabetes.  Recommend continuation of supplemental oxygen, LAMA, LABA, WANDA, ICS, tobacco abstinence.  Recommend weight loss to BMI less than 30 and participation in outpatient pulmonary rehabilitation.  Given severe COPD and chronic bronchitis component, roflumilast may be considered as add-on therapy and/or theophylline or a novel biologic when in the outpatient setting.  If patient has maintenance of tobacco abstinence, weight loss, participation in pulmonary rehab, and continued progression of his COPD, he may be a candidate for transplant referral.    Of note, patient's glycohemoglobin is elevated consistent with  diabetes for which he is not on any medical therapy.  Would consider elective endocrinology evaluation.    Dr. Cedrick Valdes will round for the pulmonary service tomorrow if the patient remains in hospital.    Andrea Zayas MDInpatient consult to Pulmonology  Consult performed by: Andrea Zayas MD  Consult ordered by: Andrea Back DO

## 2024-11-23 NOTE — CARE PLAN
Problem: Pain - Adult  Goal: Verbalizes/displays adequate comfort level or baseline comfort level  Outcome: Progressing     Problem: Safety - Adult  Goal: Free from fall injury  Outcome: Progressing     Problem: Discharge Planning  Goal: Discharge to home or other facility with appropriate resources  Outcome: Progressing     Problem: Chronic Conditions and Co-morbidities  Goal: Patient's chronic conditions and co-morbidity symptoms are monitored and maintained or improved  Outcome: Progressing     Problem: Respiratory  Goal: Clear secretions with interventions this shift  Outcome: Progressing  Goal: Minimize anxiety/maximize coping throughout shift  Outcome: Progressing  Goal: Minimal/no exertional discomfort or dyspnea this shift  Outcome: Progressing  Goal: No signs of respiratory distress (eg. Use of accessory muscles. Peds grunting)  Outcome: Progressing  Goal: Patent airway maintained this shift  Outcome: Progressing  Goal: Tolerate mechanical ventilation evidenced by VS/agitation level this shift  Outcome: Progressing  Goal: Tolerate pulmonary toileting this shift  Outcome: Progressing  Goal: Verbalize decreased shortness of breath this shift  Outcome: Progressing  Goal: Wean oxygen to maintain O2 saturation per order/standard this shift  Outcome: Progressing  Goal: Increase self care and/or family involvement in next 24 hours  Outcome: Progressing

## 2024-11-24 VITALS
WEIGHT: 212 LBS | BODY MASS INDEX: 33.27 KG/M2 | SYSTOLIC BLOOD PRESSURE: 102 MMHG | TEMPERATURE: 99.1 F | RESPIRATION RATE: 16 BRPM | DIASTOLIC BLOOD PRESSURE: 58 MMHG | HEIGHT: 67 IN | OXYGEN SATURATION: 95 % | HEART RATE: 77 BPM

## 2024-11-24 PROBLEM — N17.9 AKI (ACUTE KIDNEY INJURY) (CMS-HCC): Status: RESOLVED | Noted: 2024-07-01 | Resolved: 2024-11-24

## 2024-11-24 PROBLEM — E87.5 HYPERKALEMIA: Status: RESOLVED | Noted: 2024-11-22 | Resolved: 2024-11-24

## 2024-11-24 PROBLEM — J44.1 COPD EXACERBATION (MULTI): Status: RESOLVED | Noted: 2024-11-22 | Resolved: 2024-11-24

## 2024-11-24 PROBLEM — J96.21 ACUTE ON CHRONIC RESPIRATORY FAILURE WITH HYPOXIA (MULTI): Status: RESOLVED | Noted: 2024-07-01 | Resolved: 2024-11-24

## 2024-11-24 LAB
ANION GAP SERPL CALC-SCNC: 9 MMOL/L (ref 10–20)
BUN SERPL-MCNC: 21 MG/DL (ref 6–23)
CALCIUM SERPL-MCNC: 8 MG/DL (ref 8.6–10.3)
CHLORIDE SERPL-SCNC: 99 MMOL/L (ref 98–107)
CO2 SERPL-SCNC: 26 MMOL/L (ref 21–32)
CREAT SERPL-MCNC: 0.96 MG/DL (ref 0.5–1.3)
EGFRCR SERPLBLD CKD-EPI 2021: 87 ML/MIN/1.73M*2
ERYTHROCYTE [DISTWIDTH] IN BLOOD BY AUTOMATED COUNT: 12.2 % (ref 11.5–14.5)
GLUCOSE BLD MANUAL STRIP-MCNC: 144 MG/DL (ref 74–99)
GLUCOSE BLD MANUAL STRIP-MCNC: 159 MG/DL (ref 74–99)
GLUCOSE SERPL-MCNC: 165 MG/DL (ref 74–99)
HCT VFR BLD AUTO: 34.4 % (ref 41–52)
HGB BLD-MCNC: 11.5 G/DL (ref 13.5–17.5)
LACTATE BLDV-SCNC: 1.1 MMOL/L (ref 0.4–2)
MAGNESIUM SERPL-MCNC: 1.55 MG/DL (ref 1.6–2.4)
MCH RBC QN AUTO: 31.5 PG (ref 26–34)
MCHC RBC AUTO-ENTMCNC: 33.4 G/DL (ref 32–36)
MCV RBC AUTO: 94 FL (ref 80–100)
NRBC BLD-RTO: 0 /100 WBCS (ref 0–0)
PLATELET # BLD AUTO: 172 X10*3/UL (ref 150–450)
POTASSIUM SERPL-SCNC: 4.4 MMOL/L (ref 3.5–5.3)
RBC # BLD AUTO: 3.65 X10*6/UL (ref 4.5–5.9)
SODIUM SERPL-SCNC: 130 MMOL/L (ref 136–145)
WBC # BLD AUTO: 15.5 X10*3/UL (ref 4.4–11.3)

## 2024-11-24 PROCEDURE — 36415 COLL VENOUS BLD VENIPUNCTURE: CPT | Performed by: FAMILY MEDICINE

## 2024-11-24 PROCEDURE — 2500000001 HC RX 250 WO HCPCS SELF ADMINISTERED DRUGS (ALT 637 FOR MEDICARE OP): Performed by: STUDENT IN AN ORGANIZED HEALTH CARE EDUCATION/TRAINING PROGRAM

## 2024-11-24 PROCEDURE — 99239 HOSP IP/OBS DSCHRG MGMT >30: CPT | Performed by: FAMILY MEDICINE

## 2024-11-24 PROCEDURE — 82947 ASSAY GLUCOSE BLOOD QUANT: CPT

## 2024-11-24 PROCEDURE — 2500000005 HC RX 250 GENERAL PHARMACY W/O HCPCS: Performed by: STUDENT IN AN ORGANIZED HEALTH CARE EDUCATION/TRAINING PROGRAM

## 2024-11-24 PROCEDURE — 2500000004 HC RX 250 GENERAL PHARMACY W/ HCPCS (ALT 636 FOR OP/ED): Performed by: INTERNAL MEDICINE

## 2024-11-24 PROCEDURE — 80048 BASIC METABOLIC PNL TOTAL CA: CPT | Performed by: FAMILY MEDICINE

## 2024-11-24 PROCEDURE — 36415 COLL VENOUS BLD VENIPUNCTURE: CPT | Performed by: STUDENT IN AN ORGANIZED HEALTH CARE EDUCATION/TRAINING PROGRAM

## 2024-11-24 PROCEDURE — 83605 ASSAY OF LACTIC ACID: CPT | Performed by: STUDENT IN AN ORGANIZED HEALTH CARE EDUCATION/TRAINING PROGRAM

## 2024-11-24 PROCEDURE — 2500000002 HC RX 250 W HCPCS SELF ADMINISTERED DRUGS (ALT 637 FOR MEDICARE OP, ALT 636 FOR OP/ED): Performed by: STUDENT IN AN ORGANIZED HEALTH CARE EDUCATION/TRAINING PROGRAM

## 2024-11-24 PROCEDURE — 94640 AIRWAY INHALATION TREATMENT: CPT

## 2024-11-24 PROCEDURE — 85027 COMPLETE CBC AUTOMATED: CPT | Performed by: FAMILY MEDICINE

## 2024-11-24 PROCEDURE — 2500000002 HC RX 250 W HCPCS SELF ADMINISTERED DRUGS (ALT 637 FOR MEDICARE OP, ALT 636 FOR OP/ED): Performed by: FAMILY MEDICINE

## 2024-11-24 PROCEDURE — 99232 SBSQ HOSP IP/OBS MODERATE 35: CPT | Performed by: INTERNAL MEDICINE

## 2024-11-24 PROCEDURE — 83735 ASSAY OF MAGNESIUM: CPT | Performed by: FAMILY MEDICINE

## 2024-11-24 RX ORDER — MAGNESIUM SULFATE HEPTAHYDRATE 40 MG/ML
2 INJECTION, SOLUTION INTRAVENOUS ONCE
Status: COMPLETED | OUTPATIENT
Start: 2024-11-24 | End: 2024-11-24

## 2024-11-24 RX ADMIN — TIOTROPIUM BROMIDE INHALATION SPRAY 2 PUFF: 3.12 SPRAY, METERED RESPIRATORY (INHALATION) at 07:28

## 2024-11-24 RX ADMIN — PANTOPRAZOLE SODIUM 40 MG: 40 TABLET, DELAYED RELEASE ORAL at 06:07

## 2024-11-24 RX ADMIN — IPRATROPIUM BROMIDE AND ALBUTEROL SULFATE 3 ML: 2.5; .5 SOLUTION RESPIRATORY (INHALATION) at 11:18

## 2024-11-24 RX ADMIN — CARVEDILOL 6.25 MG: 6.25 TABLET, FILM COATED ORAL at 07:28

## 2024-11-24 RX ADMIN — BENZONATATE 100 MG: 100 CAPSULE ORAL at 07:28

## 2024-11-24 RX ADMIN — Medication 4 L/MIN: at 07:47

## 2024-11-24 RX ADMIN — ASPIRIN 81 MG: 81 TABLET, COATED ORAL at 07:28

## 2024-11-24 RX ADMIN — MAGNESIUM SULFATE HEPTAHYDRATE 2 G: 40 INJECTION, SOLUTION INTRAVENOUS at 10:28

## 2024-11-24 RX ADMIN — FLUTICASONE FUROATE AND VILANTEROL TRIFENATATE 1 PUFF: 200; 25 POWDER RESPIRATORY (INHALATION) at 07:27

## 2024-11-24 RX ADMIN — IPRATROPIUM BROMIDE AND ALBUTEROL SULFATE 3 ML: 2.5; .5 SOLUTION RESPIRATORY (INHALATION) at 07:44

## 2024-11-24 RX ADMIN — INSULIN LISPRO 1 UNITS: 100 INJECTION, SOLUTION INTRAVENOUS; SUBCUTANEOUS at 07:27

## 2024-11-24 ASSESSMENT — COGNITIVE AND FUNCTIONAL STATUS - GENERAL
DAILY ACTIVITIY SCORE: 24
MOBILITY SCORE: 24

## 2024-11-24 ASSESSMENT — PAIN SCALES - GENERAL: PAINLEVEL_OUTOF10: 0 - NO PAIN

## 2024-11-24 NOTE — DISCHARGE SUMMARY
Discharge Diagnosis  Hyponatremia    Issues Requiring Follow-Up  Exacerbation COPD, hyponatremia, hypomagnesemia    This discharge took greater than 35 minutes.    Test Results Pending At Discharge  Pending Labs       No current pending labs.            Hospital Course   64 y.o. male with PMHx s/f COPD, long-time cigarette abuse, HTN, HLD, CAD, chronic respiratory failure presenting with shortness of breath. Pt was recently admitted to this hospital from 11/11/24 to 11/14/24 for COPD exacerbation. He was treated glucocorticoids and antibiotics and was discharged on Spiriva, Breo, and Advair (ICS/LAMA/LABA) along with prednisone taper. He has been taking all of these as prescribed. Says he breathing is still not at baseline. He has been using his home O2 nearly continuously and with any sort of exertion. He is still having a cough with a bit of clear sputum production. Complains of diffuse body aches. Admits he has not been eating much. No nausea, vomiting, fever, chills, chest pain, abdominal pain, leg swelling or bowel issues. He has not resumed cigarette smoking since discharge. No sick contacts or environmental exposures.     ED Course (Summary - please note all labs, imaging studies, and interventions noted below have been personally reviewed and/or interpreted on day of admission):   Vitals on presentation: 98.4 F, 85 bpm, 16 rr, 138/63, 86% on Ra -> 96% on NC  Labs: CMP glu 148, , K 5.6, Cl 93, Cr 1.37  Lactate 1.9  BNP 31  Trop 9  CBC WBC 13.0, absolute neutrophils 11.00  VBG pH 7.37, pCO2 51, pO2 33, lactate 2.1, HCO3 29.5  EKG: Sinus rhythm at 79 bpm  Imaging: CXR - No evidence consolidating infiltrate or effusion.         11/23:               Today patient is awake alert and interactive appropriately sitting on the edge of the bed.  He is eating a pizza.  Girlfriend at the bedside.  States that this time he really is not feeling any improvement over admission.  When asked if he feels he has any idea  why he deteriorated since recent admission stated he did not know and states he has remained non-smoking.  Girlfriend volunteers that starting Monday she had runny nose and sore throat.  Apparently she feels he developed a runny nose on Monday as well.  Pulmonology evaluated the patient and did not feel there existed an indication for Levaquin or benefit from Solu-Medrol in view of his likely diabetes.  Does not carry a diagnosis of this but hemoglobin A1c was 7.1.  However review of EMR shows that at recent pulmonology clinic visit diabetes is listed in his PMH though medication list shows no diabetes medications.  A prior cardiology visit does describe DM but no medications listed.  Most recent PCP visit was [facility] but very little information available.  Earlier visits do describe that he was on metformin at least at 1 point.  Blood sugars have been mild to moderately elevated through this admission as they were through previous admission.  At this point we will place him on SSI and discuss further with the patient.    11/24:               Today the patient remains awake alert and interactive appropriately.  Voices no specific complaints and no acute events overnight.  Patient desires discharge home.  Pulmonology saw the patient and felt the patient could be discharged with close follow-up with the pulmonology clinic.  He is at his home oxygen requirement.  Lisinopril at this point will be held in view of soft blood pressures and LARRY on presentation.  Creatinine has trended down to 0.96.  Sodium trended up to 130.  WBC elevated likely related to systemic corticosteroids and these have been discontinued.     Review of Systems   Constitutional:  Positive for improving appetite change and fatigue. Negative for activity change, chills, diaphoresis and fever.   HENT:  Negative for congestion, ear pain, rhinorrhea, sinus pain and sore throat.    Respiratory:  Positive for improving cough, shortness of breath and  wheezing. Negative for apnea, chest tightness and stridor.    Cardiovascular:  Negative for chest pain, palpitations and leg swelling.   Gastrointestinal:  Negative for abdominal distention, abdominal pain, constipation, diarrhea, nausea and vomiting.   Genitourinary:  Negative for difficulty urinating, dysuria, flank pain, frequency, hematuria and urgency.   Musculoskeletal:  Positive for improved myalgias. Negative for arthralgias, back pain, gait problem and joint swelling.   Skin:  Negative for color change, pallor, rash and wound.   Neurological:  Negative for dizziness, syncope, weakness, light-headedness, numbness and headaches.   Psychiatric/Behavioral:  Negative for agitation, behavioral problems, confusion and decreased concentration. The patient is not nervous/anxious.    All other systems reviewed and are negative.      COPD exacerbation:  Will restart on Solumedrol BID. Continue duonebs PRN.  Levaquin to cover for any bacterial cause.  Respiratory therapy consulted for further recommendations  Wean O2 as able  Cxr clear. Not septic on admission.  Continue home inhalers.  11/23: Pulmonology evaluated patient and felt no indication for antibiotics or Solu-Medrol in view of his diabetes.  Recommended LAMA LABA Clarice ICS.  Remains on 4 L nasal cannula.  Previously had used home O2 on as needed basis.  Patient claims quit smoking 3 weeks ago.  Will need to follow-up in pulmonology clinic.  11/24: Remains at his home oxygen supplementation today.  Desires discharge home and pulmonology feels he can be discharged with close follow-up in the pulmonology clinic.     Hyponatremia:  Na 128 today, likely reduced to poor oral intake since discharge.  Will give gentle saline tonight and recheck in AM.  Consider further workup if not improved.  11/23: Sodium appears stable.  Likely related to exacerbation COPD.  Continue to monitor.  11/24: Sodium appears trending up.     LARRY:  Cr mildly elevated at 1.37. Gentle  hydration overnight and recheck in AM.  11/24: Creatinine 0.96 today.  Lisinopril will not be recommended at discharge.     Hyperkalemia:  K 5.6 on admission, Recheck in AM after fluids.  Hold home lisinopril tonight.  11/23: Potassium normalized.  Continue to monitor.        HTN/HLD/CAD:  Continue home ASA, Lipitor, Coreg  11/24: Lisinopril will be held in view of soft blood pressures and presentation with LARRY.  Follow-up as outpatient.    Pertinent Physical Exam At Time of Discharge  Physical Exam  Constitutional: Pleasant and cooperative. Laying in bed in no acute distress. Conversant.   Skin: Warm and dry; no obvious lesions, rashes, pallor, or jaundice.   Eyes: EOMI. Anicteric sclera.   ENT: Mucous membranes moist; no obvious injury or deformity appreciated.   Head and Neck: Normocephalic, atraumatic. ROM preserved. Trachea midline. No appreciable JVD.   Respiratory: Nonlabored on NC. Lungs moderately diminished bilaterally, mild primarily expiratory wheezing. Chest rise is equal.  Cardiovascular: RRR. No gross murmur, gallop, or rub. Extremities are warm and well-perfused with good capillary refill (< 3 seconds). No chest wall tenderness.   GI: Abdomen soft, nontender, nondistended. No obvious organomegaly appreciated. Bowel sounds are present.  : No CVA tenderness.   MSK: No gross abnormalities appreciated. No limitations to AROM/PROM appreciated.   Extremities: No cyanosis, edema, or clubbing evident. Neurovascularly intact.   Neuro: A&Ox3. CN 2-12 grossly intact. Able to respond to questions appropriately and clearly. No acute focal neurologic deficits appreciated.  Psych: Appropriate mood and behavior.  Home Medications     Medication List      START taking these medications     oxygen gas therapy; Commonly known as: O2; Inhale 1 each every 12 hours.     CHANGE how you take these medications     atorvastatin 80 mg tablet; Commonly known as: Lipitor; Take 1 tablet (80   mg) by mouth once daily at  bedtime.; What changed: Another medication with   the same name was removed. Continue taking this medication, and follow the   directions you see here.   carvedilol 6.25 mg tablet; Commonly known as: Coreg; Take 1 tablet (6.25   mg) by mouth 2 times a day with meals.; What changed: Another medication   with the same name was removed. Continue taking this medication, and   follow the directions you see here.     CONTINUE taking these medications     albuterol 90 mcg/actuation inhaler; Inhale 2 puffs every 6 hours if   needed for wheezing.   aspirin 81 mg EC tablet   benzonatate 100 mg capsule; Commonly known as: Tessalon   cyanocobalamin 1,000 mcg tablet; Commonly known as: Vitamin B-12   Fish OiL 1,000 (120-180) mg capsule; Generic drug: omega 3-dha-epa-fish   oil   fluticasone propion-salmeteroL 250-50 mcg/dose diskus inhaler; Commonly   known as: Advair Diskus; Inhale 1 puff by mouth into the lungs 2 times a   day. Rinse mouth with water after use to reduce aftertaste and incidence   of candidiasis. Do not swallow.   hydrOXYzine HCL 25 mg tablet; Commonly known as: Atarax   ipratropium-albuteroL 0.5-2.5 mg/3 mL nebulizer solution; Commonly known   as: Duo-Neb   tiotropium 2.5 mcg/actuation inhaler; Commonly known as: Spiriva   Respimat; Inhale 2 puffs by mouth into the lungs once daily.   Trelegy Ellipta 200-62.5-25 mcg blister with device; Generic drug:   fluticasone-umeclidin-vilanter; Inhale 1 puff once daily. Rinse mouth with   water after use to reduce aftertaste and incidence of candidiasis. Do not   swallow.     STOP taking these medications     hydrALAZINE 25 mg tablet; Commonly known as: Apresoline   lisinopril 5 mg tablet   predniSONE 10 mg tablet; Commonly known as: Deltasone       Outpatient Follow-Up  PCP, pulmonology clinic    Andrew Michelle MD

## 2024-11-24 NOTE — PROGRESS NOTES
"Darron Resendez is a 64 y.o. male on day 1 of admission presenting with Hyponatremia.    Subjective   Patient is laying comfortably in bed without respiratory distress. He is on 4 L O2. He reports his breathing is better and feels at his baseline. He has occasional dry cough. He denies chest pain, abdominal pain, nausea or vomiting.     Objective     Physical Exam  Vitals and nursing note reviewed.   Constitutional:       Appearance: He is obese.   HENT:      Head: Normocephalic.      Nose: Nose normal.      Mouth/Throat:      Pharynx: Oropharynx is clear.   Eyes:      Extraocular Movements: Extraocular movements intact.      Conjunctiva/sclera: Conjunctivae normal.      Pupils: Pupils are equal, round, and reactive to light.   Cardiovascular:      Rate and Rhythm: Normal rate and regular rhythm.      Pulses: Normal pulses.      Heart sounds: Normal heart sounds.   Pulmonary:      Effort: Pulmonary effort is normal.      Breath sounds: Examination of the right-lower field reveals decreased breath sounds. Examination of the left-lower field reveals decreased breath sounds. Decreased breath sounds present.   Abdominal:      General: Bowel sounds are normal.      Palpations: Abdomen is soft.   Musculoskeletal:         General: Normal range of motion.      Cervical back: Normal range of motion.   Skin:     General: Skin is warm.   Neurological:      General: No focal deficit present.      Mental Status: He is alert and oriented to person, place, and time. Mental status is at baseline.   Psychiatric:         Mood and Affect: Mood normal.         Behavior: Behavior normal.         Last Recorded Vitals  Blood pressure 108/71, pulse 72, temperature 37.2 °C (98.9 °F), temperature source Temporal, resp. rate 16, height 1.702 m (5' 7\"), weight 96.2 kg (212 lb), SpO2 92%.  Intake/Output last 3 Shifts:  I/O last 3 completed shifts:  In: 2340 (24.3 mL/kg) [P.O.:1190; I.V.:1000 (10.4 mL/kg); IV Piggyback:150]  Out: 1 (0 mL/kg) " [Urine:1 (0 mL/kg/hr)]  Weight: 96.2 kg     Relevant Results  Reviewed labs, imaging and medications.       Assessment/Plan   Assessment & Plan  Hyponatremia    Presence of stent in LAD coronary artery    Hyperlipidemia    ASHD (arteriosclerotic heart disease)    Acute on chronic respiratory failure with hypoxia (Multi)    HTN (hypertension)    LARRY (acute kidney injury) (CMS-[facility])    Hyperkalemia    COPD exacerbation (Multi)      Recommendations:    Patient has End Stage COPD and chronic respiratory failure with hypoxia. Currently he appears to be at his baseline. He feels symptomatically improved since yesterday and feels at his baseline. He wants to go home today. He states he has been working on smoking cessation with help of Chantix. He has not smoked in the last 3-4 weeks. He state he is regular with his therapy.     His Sodium is 130 today and he does not have any neurologic symptoms. He is AxOx3. His K is normalized. His creatinine is also improved to 0.96. He does have low Magnesium and has been repleted. His elevated WBC could be reactive and possibly due to systemic steroids. His Hb is 11.5 g which is around his baseline.     I discussed with him prognosis for his End Stage COPD especially if he does not abstain from smoking. Discussed treatment options for him from COPD standpoint and options for lung transplant and Endoscopic LVRS which may need to be reassessed as outpatient. Encourage weight loss in the meanwhile.     Follow up in the office. He is ok for discharge from pulmonary standpoint. Please establish a follow up appt in pulmonary clinic with me prior to his discharge. Please call with any questions.     I spent 45 minutes in the professional and overall care of this patient.      Cedrick Valdes MD

## 2024-11-24 NOTE — CARE PLAN
The patient's goals for the shift include      The clinical goals for the shift include Patient will be free from respiratory distress this shift.    Over the shift, the patient remains free from respiratory distress.

## 2024-11-25 ENCOUNTER — TELEPHONE (OUTPATIENT)
Dept: PHARMACY | Facility: HOSPITAL | Age: 64
End: 2024-11-25
Payer: MEDICARE

## 2024-11-25 NOTE — PROGRESS NOTES
"  Pharmacist Clinic: Pulmonary Management    Darron Resendez is a 64 y.o. male was referred to Clinical Pharmacy Team for Pulmonary assessment.   Referring Provider: India Katz APRN*  Last visit: 10/10/24  Next visit: 12/5/24    Subjective   Allergies   Allergen Reactions    Penicillin G Other       HPI    PULMONARY ASSESSMENT  Patient has been diagnosed with: COPD  has had PFT's completed in last 2 years    Current Regimen:  Spiriva 2.5 mcg 2 puff daily  Albuterol   Duoneb solution Q6HR PN  Advair 250-50 mcg 1 puff BID  Oxygen BID    Adverse Effects: None   Appropriate technique? Yes    Historical Treatment  Breztri (cost)    How often do you use your rescue inhaler? Has not been using albuterol; Uses nebulizer one time a day    Symptom Assessment:  Current symptoms: dyspnea, cough, wheezing, and increased sputum  Symptoms are remain unchanged  Triggers: Doing anything makes worse  Alleviating factors: Sit down and maintain breathing     Exacerbation Hx:  When was your last hospitalization for an exacerbation? 11/11/2024  When was the last time you were treated with antibiotics and/or steroids? 11/11/2024     Immunization History:  Influenza: Date [N/A]  PCV13: Date [N/A]  PPSV23: Date [N/A]  PCV20: Date [N/A]  COVID: Date [N/A]  RSV: Date [N/A]    Smoking history:  Quit smoking 1 month ago    Objective   There were no vitals taken for this visit.    Pulmonary Functions Testing Results:  No results found for: \"FEV1\", \"FVC\", \"ZYD8AZA\", \"TLC\", \"DLCO\"    Lab Review  Lab Results   Component Value Date    BILITOT 0.5 11/22/2024    CALCIUM 8.0 (L) 11/24/2024    CO2 26 11/24/2024    CL 99 11/24/2024    CREATININE 0.96 11/24/2024    GLUCOSE 165 (H) 11/24/2024    ALKPHOS 41 11/22/2024    K 4.4 11/24/2024    PROT 6.8 11/22/2024     (L) 11/24/2024    AST 20 11/22/2024    ALT 24 11/22/2024    BUN 21 11/24/2024    ANIONGAP 9 (L) 11/24/2024    MG 1.55 (L) 11/24/2024    ALBUMIN 3.8 11/22/2024    GFRMALE >90 04/27/2023 "     Hemoglobin   Date Value Ref Range Status   11/24/2024 11.5 (L) 13.5 - 17.5 g/dL Final     WBC   Date Value Ref Range Status   11/24/2024 15.5 (H) 4.4 - 11.3 x10*3/uL Final     Platelets   Date Value Ref Range Status   11/24/2024 172 150 - 450 x10*3/uL Final       The ASCVD Risk score (Mata MATA, et al., 2019) failed to calculate for the following reasons:    Risk score cannot be calculated because patient has a medical history suggesting prior/existing ASCVD    Current Outpatient Medications   Medication Instructions    albuterol 90 mcg/actuation inhaler 2 puffs, inhalation, Every 6 hours PRN    aspirin 81 mg, oral, Daily    atorvastatin (LIPITOR) 80 mg, oral, Nightly    benzonatate (TESSALON) 100 mg, oral, 3 times daily PRN, Do not crush or chew.    carvedilol (COREG) 6.25 mg, oral, 2 times daily (morning and late afternoon)    cyanocobalamin (VITAMIN B-12) 1,000 mcg, oral, Daily    fluticasone propion-salmeteroL (Advair Diskus) 250-50 mcg/dose diskus inhaler Inhale 1 puff by mouth into the lungs 2 times a day. Rinse mouth with water after use to reduce aftertaste and incidence of candidiasis. Do not swallow.    fluticasone-umeclidin-vilanter (Trelegy Ellipta) 200-62.5-25 mcg blister with device 1 puff, inhalation, Daily, Rinse mouth with water after use to reduce aftertaste and incidence of candidiasis. Do not swallow.    hydrOXYzine HCL (Atarax) 25 mg tablet oral    ipratropium-albuteroL (Duo-Neb) 0.5-2.5 mg/3 mL nebulizer solution 3 mL, nebulization, Every 6 hours PRN    omega 3-dha-epa-fish oil (Fish OiL) 1,000 mg (120 mg-180 mg) capsule 1 capsule, oral, Daily    oxygen (O2) gas therapy 1 each, inhalation, Every 12 hours    tiotropium (Spiriva Respimat) 2.5 mcg/actuation inhaler Inhale 2 puffs by mouth into the lungs once daily.       Drug Interactions:  None    Affordability/Accessibility:  Does not have prescription insurance; applies to [employer]    Assessment/Plan   Diagnoses and all orders for this  visit:  Stage 4 very severe COPD by GOLD classification (Multi)  -     Referral to Clinical Pharmacy      ASSESSMENT:  Patient's COPD is uncontrolled.    He recently was in the hospital and then went to ED due to increased SOB. He states he is feeling a little better but he sounded quite short of breath on the phone with some wheezing. He states that he does not use his albuterol inhaler and uses the nebulizer once a day. I encouraged him to start using them more throughout the day if he finds himself feeling short of breath. He still does not have prescription insurance and due to this, I am unable to apply him to [facility]. I submitted an application to [employer] and they denied franko because he should be able to qualify for medicaid. I let patient know this and stressed the importance of him looking into some sort of prescription insurance. If not, inhalers are coming to $600/mo. I will continue to look into more programs or less costly inhalers but unfortunately, looks like most will not accept application. He seems pulm on 12/5 and hopefully can get some samples in the mean time.    PLAN:  CONTINUE all inhalers  Follow up with clinical pharmacy: WYATT Lopez, PharmD  Clinical Pharmacy Specialist - Primary Care  [phone]  [email or web address]      Continue all meds under the continuation of care with the referring provider and clinical pharmacy team.    Verbal consent to manage patient's drug therapy was obtained from the patient. They were informed they may decline to participate or withdraw from participation in pharmacy services at any time.

## 2024-11-25 NOTE — TELEPHONE ENCOUNTER
Pharmacy states inhalers will be > $600 /month until patient gets prescription insurance. I will submit an application to [employer] Patient Assistance Program to see if we can get Breztri covered.

## 2024-11-26 ENCOUNTER — TELEPHONE (OUTPATIENT)
Dept: PHARMACY | Facility: HOSPITAL | Age: 64
End: 2024-11-26
Payer: MEDICARE

## 2024-11-26 NOTE — TELEPHONE ENCOUNTER
[employer] has denied application due to patient's income reflects they may be eligible for an  alternative funding source (see below) that covers the product (Medicaid).     Will discuss this with patient at our appointment on 11/27.     Rosalina Lopez, PharmD  Clinical Pharmacy Specialist - Primary Care  [phone]  [email or web address]

## 2024-11-27 ENCOUNTER — APPOINTMENT (OUTPATIENT)
Dept: PHARMACY | Facility: HOSPITAL | Age: 64
End: 2024-11-27
Payer: MEDICARE

## 2024-11-27 DIAGNOSIS — J44.9 STAGE 4 VERY SEVERE COPD BY GOLD CLASSIFICATION (MULTI): ICD-10-CM

## 2024-11-28 LAB
ATRIAL RATE: 79 BPM
P AXIS: 61 DEGREES
PR INTERVAL: 131 MS
Q ONSET: 253 MS
QRS COUNT: 12 BEATS
QRS DURATION: 80 MS
QT INTERVAL: 332 MS
QTC CALCULATION(BAZETT): 381 MS
QTC FREDERICIA: 364 MS
R AXIS: 62 DEGREES
T AXIS: 65 DEGREES
T OFFSET: 419 MS
VENTRICULAR RATE: 79 BPM

## 2024-12-03 ENCOUNTER — APPOINTMENT (OUTPATIENT)
Dept: PRIMARY CARE | Facility: CLINIC | Age: 64
End: 2024-12-03
Payer: MEDICARE

## 2024-12-05 ENCOUNTER — TELEPHONE (OUTPATIENT)
Dept: CARDIOLOGY | Facility: HOSPITAL | Age: 64
End: 2024-12-05

## 2024-12-05 ENCOUNTER — OFFICE VISIT (OUTPATIENT)
Dept: PULMONOLOGY | Facility: HOSPITAL | Age: 64
End: 2024-12-05
Payer: MEDICARE

## 2024-12-05 VITALS
OXYGEN SATURATION: 92 % | DIASTOLIC BLOOD PRESSURE: 81 MMHG | RESPIRATION RATE: 18 BRPM | HEART RATE: 70 BPM | TEMPERATURE: 97.5 F | SYSTOLIC BLOOD PRESSURE: 140 MMHG

## 2024-12-05 DIAGNOSIS — J96.11 CHRONIC HYPOXIC RESPIRATORY FAILURE (MULTI): ICD-10-CM

## 2024-12-05 DIAGNOSIS — J44.9 CHRONIC OBSTRUCTIVE PULMONARY DISEASE, UNSPECIFIED COPD TYPE (MULTI): Primary | ICD-10-CM

## 2024-12-05 DIAGNOSIS — F17.210 CIGARETTE NICOTINE DEPENDENCE WITHOUT COMPLICATION: ICD-10-CM

## 2024-12-05 PROCEDURE — 1159F MED LIST DOCD IN RCRD: CPT | Performed by: NURSE PRACTITIONER

## 2024-12-05 PROCEDURE — 3079F DIAST BP 80-89 MM HG: CPT | Performed by: NURSE PRACTITIONER

## 2024-12-05 PROCEDURE — 99214 OFFICE O/P EST MOD 30 MIN: CPT | Performed by: NURSE PRACTITIONER

## 2024-12-05 PROCEDURE — 1157F ADVNC CARE PLAN IN RCRD: CPT | Performed by: NURSE PRACTITIONER

## 2024-12-05 PROCEDURE — 1160F RVW MEDS BY RX/DR IN RCRD: CPT | Performed by: NURSE PRACTITIONER

## 2024-12-05 PROCEDURE — 3077F SYST BP >= 140 MM HG: CPT | Performed by: NURSE PRACTITIONER

## 2024-12-05 PROCEDURE — 1111F DSCHRG MED/CURRENT MED MERGE: CPT | Performed by: NURSE PRACTITIONER

## 2024-12-05 RX ORDER — BUDESONIDE 0.5 MG/2ML
0.5 INHALANT ORAL
Qty: 1 ML | Refills: 11 | Status: SHIPPED | OUTPATIENT
Start: 2024-12-05

## 2024-12-05 RX ORDER — IPRATROPIUM BROMIDE AND ALBUTEROL SULFATE 2.5; .5 MG/3ML; MG/3ML
3 SOLUTION RESPIRATORY (INHALATION)
Qty: 180 ML | Refills: 11 | Status: SHIPPED | OUTPATIENT
Start: 2024-12-05

## 2024-12-05 ASSESSMENT — PATIENT HEALTH QUESTIONNAIRE - PHQ9
2. FEELING DOWN, DEPRESSED OR HOPELESS: NOT AT ALL
SUM OF ALL RESPONSES TO PHQ9 QUESTIONS 1 AND 2: 0
1. LITTLE INTEREST OR PLEASURE IN DOING THINGS: NOT AT ALL

## 2024-12-05 ASSESSMENT — ENCOUNTER SYMPTOMS
CHILLS: 0
FATIGUE: 0
RHINORRHEA: 0
WHEEZING: 0
DEPRESSION: 0
FEVER: 0
COUGH: 0
UNEXPECTED WEIGHT CHANGE: 0
LOSS OF SENSATION IN FEET: 0
OCCASIONAL FEELINGS OF UNSTEADINESS: 0
SHORTNESS OF BREATH: 0

## 2024-12-05 NOTE — PROGRESS NOTES
Subjective   Patient ID: Darron Resendez is a 64 y.o. male who presents for follow up COPD.     HPI: Patient has  (current smoker, ~ pack year history) with a PMHx of ?COPD (no PFT on file), CAD (STEMI) s/p stent, DM, HTN, HLD, HFrEF (EF 35% 6/2020, 65% 7/2020). Here for pulmonary follow-up for COPD. He was hospitalized twice since last office visit in October. He is not taking any medications for COPD due to cost and he states he does not feel like he needs them. He is wearing 4L oxygen prn. He states that he has not smoked in 17 days. He is short of breath on any activity and has a daily productive cough and wheezing. He still does not have prescription insurance and is unsure if he will get any.     Review of Systems   Constitutional:  Negative for chills, fatigue, fever and unexpected weight change.   HENT:  Negative for congestion, postnasal drip and rhinorrhea.    Respiratory:  Negative for cough (denies hemoptysis.), shortness of breath and wheezing.    Cardiovascular:  Negative for chest pain and leg swelling.   All other systems reviewed and are negative.      Objective   Physical Exam  Vitals reviewed.   Constitutional:       Appearance: Normal appearance.   HENT:      Head: Normocephalic.   Cardiovascular:      Rate and Rhythm: Normal rate and regular rhythm.   Pulmonary:      Effort: Pulmonary effort is normal.      Breath sounds: Normal breath sounds.   Skin:     General: Skin is warm and dry.   Neurological:      Mental Status: He is alert.         Assessment/Plan   Assessment and Plan / Recommendations:  1) Very severe COPD (GOLD 4) - PFT 9/3/24 with FEV1 27%; was given Breztri at discharge but he lost it and was very expensive; has not been using any inhalers, he has albuterol but not using; he reports breathing is terrible, has significant TODD with minimal activity, cough and wheezing; faint wheezing and tight on exam  - He is not taking any inhalers or meds for COPD due to not having insurance  coverage our pharmacy program could not help. I explained to him at length that if he does not take the medications for his COPD that he will continue to be hospitalized frequently as his COPD is very severe and currently uncontrolled.   - Start Duonebs QID and Budesonide BID, educated on importance of compliance   - would benefit from pulmonary rehab but will get his acute issues resolved currently     2) Chronic hypoxic respiratory failure - 6MW 9/3/24, ambulated 38 m (stopped d/t back pain), 90% at rest on RA, 86% with exertion on RA, needed 3 L NC for pulse ox 89% with exertion  - continue supplemental O2 (RA at rest, 3 L with exertion and as needed)   - patient requesting Inogen concentrator because he feels it is smaller than the one he received from [facility]; ordered POC per patient request     3) Nicotine cigarette depedence - current smoker, 1 PPD   - He states that he has not smoked in 17 days.     Overall his COPD remains uncontrolled due to noncompliance with recommended medications. I explained that without taking his medications that he will continue to be hospitalized frequently. Patient has been hospitalized twice for COPD exacerbation since October 2024. If patient is readmitted and continues without medications for COPD will need to consider palliative care. I explained to patient and his daughter at length. I ordered Duonebs QID and Budesonide BID and educated on importance of compliance. I also instructed him the importance of getting prescription coverage for him. I will bring him back with Dr. Valdes in 2 months.     Total time:  30 min.

## 2024-12-05 NOTE — TELEPHONE ENCOUNTER
Called back.  His significant other, Idnia, answered.  I let her know he should not restart the lisinopril because of one elevated blood pressure reading.  The best thing to do would be to get a blood pressure monitor and log blood pressure and heart rates at home to see how often he is running above goal of 130/80.  He is welcome to make an appointment with an RICHARD for medication adjustment or he can see his PCP.  She verbalized understanding.

## 2024-12-05 NOTE — TELEPHONE ENCOUNTER
Patient was in hospital 11/24. They had him stop taking his lisinopril.   He went to a Pulmonology visit today 12/5 and his BP was 140/81. Asking what He should do from here?

## 2024-12-05 NOTE — PATIENT INSTRUCTIONS
Start taking Duonebs (Ipratropium/albuterol) four times per day, everyday.   Start on Budesonide nebulizer twice a day, everyday rinse your mouth out after use.   Continue on 4L with exertion.   Call with any questions or concerns.  Follow up with Dr. Valdes in 2-3 months.

## 2024-12-23 ENCOUNTER — TELEPHONE (OUTPATIENT)
Dept: CARDIOLOGY | Facility: HOSPITAL | Age: 64
End: 2024-12-23
Payer: MEDICARE

## 2024-12-23 NOTE — TELEPHONE ENCOUNTER
----- Message from Dominick MEYER sent at 12/23/2024 12:09 PM EST -----  Regarding: Blood Pressure Concerns.    Patient is concerned that his Blood Pressure is running high.  I scanned in his home readings in the media under Patient Records.  He would like someone to call him back as soon as you can please.    Thank you.

## 2024-12-23 NOTE — TELEPHONE ENCOUNTER
RN called pt at this time to notify him we would need to get him an appointment for med change at this time. No answer, RN left a message for the patient to call back.       ----- Message from Corrine Zelaya sent at 12/23/2024  1:54 PM EST -----  Regarding: RE: Blood Pressure Concerns.  Patient will need to be seen for any change.  ----- Message -----  From: Nan Vick RN  Sent: 12/23/2024  12:33 PM EST  To: Corrine Zelaya MD  Subject: FW: Blood Pressure Concerns.                     Pt scanned in his BP readings in media. Do you want to continue with no lisinopril or restart it?  ----- Message -----  From: Dominick Raman  Sent: 12/23/2024  12:13 PM EST  To: Por Pb100 Card1 Clinical Support Pool  Subject: Blood Pressure Concerns.                           Patient is concerned that his Blood Pressure is running high.  I scanned in his home readings in the media under Patient Records.  He would like someone to call him back as soon as you can please.    Thank you.

## 2024-12-24 ENCOUNTER — TELEPHONE (OUTPATIENT)
Dept: CARDIOLOGY | Facility: HOSPITAL | Age: 64
End: 2024-12-24
Payer: MEDICARE

## 2024-12-26 ENCOUNTER — TELEPHONE (OUTPATIENT)
Dept: CARDIOLOGY | Facility: HOSPITAL | Age: 64
End: 2024-12-26
Payer: MEDICARE

## 2024-12-30 NOTE — PROGRESS NOTES
Chief Complaint/Reason for Visit:   S/p med adjustment     History Of Present Illness:      Mr. Marin is coming in today for cardiac medication adjustment.  He was in the hospital in November for COPD and was taken off of lisinopril.  Through the month of December, his blood pressure elevated.  He called the office for advice and was instructed to make an appointment to come in.  In the pulmonary office on December 5, his blood pressure was 140/81.    We have followed this patient previously for ASHD with PCI to the LAD in 2011, hypertension, and dyslipidemia.  He had been lost to follow-up and stopped taking cardiac medications and had a STEMI in June 2020 with urgent PCI to the LAD.  Post MI his initial ejection fraction was 35% but has recovered and at last check was 65%.    Patient comes in today feeling well, he is in a wheelchair on home oxygen and accompanied by his significant other.  He denies any exertional chest pain, palpitations, orthopnea, or edema.  He has chronic dyspnea on exertion which is back to baseline.  Patient also gets sharp jabs of chest pain lasting only a couple of seconds on occasion, this is not associated with activity.  Patient has been taking all of his medication as prescribed except for the lisinopril which was recently put on hold.  Home blood pressures of anything have been running higher than normal.       Past Medical History:  He has a past medical history of Chronic systolic (congestive) heart failure (09/22/2020), Myocardial infarction (Multi) (07/01/2024), Old myocardial infarction, Old myocardial infarction (09/22/2020), Old myocardial infarction, Personal history of other diseases of the circulatory system, Personal history of other endocrine, nutritional and metabolic disease, and Presence of stent in anterior descending branch of left coronary artery (07/01/2024).    Past Surgical History:  He has a past surgical history that includes Tonsillectomy (06/01/2016);  Vasectomy (06/01/2016); Mandible surgery (06/01/2016); and Other surgical history (06/29/2020).      Social History:  He reports that he has quit smoking. His smoking use included cigarettes. He has never used smokeless tobacco. He reports that he does not currently use alcohol. He reports that he does not use drugs.    Family History:  No family history on file.     Allergies:  Penicillin g    Medications:  Current Outpatient Medications   Medication Instructions    albuterol 90 mcg/actuation inhaler 2 puffs, inhalation, Every 6 hours PRN    aspirin 81 mg, Daily    atorvastatin (LIPITOR) 80 mg, oral, Nightly    benzonatate (TESSALON) 100 mg, 3 times daily PRN    budesonide (PULMICORT) 0.5 mg, nebulization, 2 times daily RT, Rinse mouth with water after use to reduce aftertaste and incidence of candidiasis. Do not swallow.    carvedilol (COREG) 6.25 mg, oral, 2 times daily (morning and late afternoon)    cyanocobalamin (VITAMIN B-12) 1,000 mcg, Daily    hydrOXYzine HCL (Atarax) 25 mg tablet Take by mouth.    ipratropium-albuteroL (Duo-Neb) 0.5-2.5 mg/3 mL nebulizer solution 3 mL, nebulization, 4 times daily RT    omega 3-dha-epa-fish oil (Fish OiL) 1,000 mg (120 mg-180 mg) capsule 1 capsule, Daily    oxygen (O2) gas therapy 1 each, inhalation, Every 12 hours    tiotropium (Spiriva Respimat) 2.5 mcg/actuation inhaler Inhale 2 puffs by mouth into the lungs once daily.       Review of Systems:  Review of Systems   Constitutional: Negative. Negative for decreased appetite and malaise/fatigue.   HENT: Negative.     Eyes:  Negative for blurred vision and visual disturbance.   Cardiovascular:  Positive for chest pain (sharp fleeting chest pains off and on lasting only seconds, not associated withactivity) and dyspnea on exertion. Negative for irregular heartbeat, leg swelling, orthopnea, palpitations and syncope.   Respiratory:  Positive for cough (occasional). Negative for shortness of breath.    Musculoskeletal:   "Negative for arthritis and falls.   Gastrointestinal: Negative.    Neurological:  Negative for focal weakness and light-headedness.   Psychiatric/Behavioral:  Negative for depression and memory loss.         Vitals  Visit Vitals  /72 (BP Location: Right arm, Patient Position: Sitting, BP Cuff Size: Adult)   Pulse 75   Ht 1.702 m (5' 7\")   Wt 93 kg (205 lb)   SpO2 94%   BMI 32.11 kg/m²   Smoking Status Former   BSA 2.1 m²        Physical Exam:  Physical Exam  Constitutional:       Appearance: Normal appearance.   HENT:      Head: Normocephalic.   Eyes:      Conjunctiva/sclera: Conjunctivae normal.   Cardiovascular:      Rate and Rhythm: Normal rate and regular rhythm.      Pulses: Normal pulses.      Heart sounds: S1 normal and S2 normal. No murmur heard.     No friction rub. No gallop.   Pulmonary:      Effort: Pulmonary effort is normal.      Comments: On O2, lungs with expiratory wheeze. No rhonchi or crackles. Poor overall air movement.   Abdominal:      General: Bowel sounds are normal.      Palpations: Abdomen is soft.      Tenderness: There is no abdominal tenderness.   Musculoskeletal:      Cervical back: Neck supple.      Right lower leg: No edema.      Left lower leg: No edema.   Skin:     General: Skin is warm and dry.   Neurological:      General: No focal deficit present.      Mental Status: He is alert and oriented to person, place, and time.   Psychiatric:         Attention and Perception: Attention normal.         Mood and Affect: Mood normal.           Last Labs:  CBC -  Lab Results   Component Value Date    WBC 15.5 (H) 11/24/2024    HGB 11.5 (L) 11/24/2024    HCT 34.4 (L) 11/24/2024    MCV 94 11/24/2024     11/24/2024     Lab Results   Component Value Date    GLUCOSE 165 (H) 11/24/2024    CALCIUM 8.0 (L) 11/24/2024     (L) 11/24/2024    K 4.4 11/24/2024    CO2 26 11/24/2024    CL 99 11/24/2024    BUN 21 11/24/2024    CREATININE 0.96 11/24/2024      CMP -  Lab Results   Component " Value Date    CALCIUM 8.0 (L) 11/24/2024    PROT 6.8 11/22/2024    ALBUMIN 3.8 11/22/2024    AST 20 11/22/2024    ALT 24 11/22/2024    ALKPHOS 41 11/22/2024    BILITOT 0.5 11/22/2024       LIPID PANEL -   Lab Results   Component Value Date    CHOL 123 04/27/2023    TRIG 64 04/27/2023    HDL 41.1 04/27/2023    CHHDL 3.0 04/27/2023    LDLF 69 04/27/2023    VLDL 13 04/27/2023       Lab Results   Component Value Date    BNP 31 11/22/2024    HGBA1C 7.1 (H) 11/11/2024       Last Cardiology Tests:    Echo:7-10-20  CONCLUSIONS:   1. The left ventricular systolic function is normal with a 65% estimated ejection fraction.    Stress Test:7-8-20  Summary:   1. Nondiagnostic regadenoson stress test secondary to abnormal resting EKG without CP.   2. Correlate with myocardial perfusion imaging results.   3. Non-diagnostic Stress Test.   4. Nuclear image results are reported separately.  IMPRESSION:  1. Small area of fixed perfusion defect of the inferoapical segment,  consistent with myocardial scar in RCA or LAD territory. No large  area of ischemia was detected..  2. Well-maintained left ventricular function.      Lab review: I have personally reviewed the laboratory result(s)     Assessment/Plan:  Hypertension: Blood pressure in the office today is actually pretty good at 128/72.  The patient did bring in a list of home blood pressure readings most of which are too high.  I am going to start the patient back on lisinopril 5 mg daily followed by a BMP in 1 week.  During the patient's recent hospitalization he was thought to be dehydrated.  Patient tells me he is eating and drinking is back to baseline.    ASHD: Patient has a history of PCI to the LAD in 2011 followed by an additional PCI of the LAD in 2020 after a STEMI.  Patient currently is not having any anginal type chest pain.  He will continue on his current cardiac regimen which includes aspirin, atorvastatin, and carvedilol.  He is agreeable to readd  lisinopril.    Cardiomyopathy: After the patient's MI, his ejection fraction was initially 35% but has since normalized to 65%.  Patient is on carvedilol 6.25 mg twice daily and will restart lisinopril 5 mg daily.  On exam he is well compensated and does not require diuretics.    Tobacco use disorder: Patient again was counseled on the importance of smoking cessation.  He tells me he has cut back but still is smoking on a daily basis.  Patient ideally should be tobacco free.    Patient is currently scheduled to follow-up with Dr. Zelaya in March which he will keep.  I will call the patient with his BMP once resulted.  Patient instructed to call with any cardiovascular complaints. All questions were answered.       Dragon dictation was utilized to create this document. Quite often unanticipated grammatical, syntax,  and other interpretive errors are inadvertently transcribed by the computer software.  Please disregard these errors.  Please excuse any errors that have escaped final proofreading.          Renetta Morin, APRN-CNP

## 2025-01-02 ENCOUNTER — OFFICE VISIT (OUTPATIENT)
Dept: CARDIOLOGY | Facility: HOSPITAL | Age: 65
End: 2025-01-02
Payer: MEDICARE

## 2025-01-02 ENCOUNTER — TELEPHONE (OUTPATIENT)
Dept: PULMONOLOGY | Facility: HOSPITAL | Age: 65
End: 2025-01-02

## 2025-01-02 VITALS
WEIGHT: 205 LBS | BODY MASS INDEX: 32.18 KG/M2 | OXYGEN SATURATION: 94 % | DIASTOLIC BLOOD PRESSURE: 72 MMHG | HEART RATE: 75 BPM | HEIGHT: 67 IN | SYSTOLIC BLOOD PRESSURE: 128 MMHG

## 2025-01-02 DIAGNOSIS — F17.200 TOBACCO USE DISORDER: ICD-10-CM

## 2025-01-02 DIAGNOSIS — I10 PRIMARY HYPERTENSION: Primary | ICD-10-CM

## 2025-01-02 DIAGNOSIS — I42.8 OTHER CARDIOMYOPATHY: ICD-10-CM

## 2025-01-02 DIAGNOSIS — J44.9 CHRONIC OBSTRUCTIVE PULMONARY DISEASE, UNSPECIFIED COPD TYPE (MULTI): Primary | ICD-10-CM

## 2025-01-02 DIAGNOSIS — I25.10 ASHD (ARTERIOSCLEROTIC HEART DISEASE): ICD-10-CM

## 2025-01-02 PROCEDURE — 3074F SYST BP LT 130 MM HG: CPT | Performed by: NURSE PRACTITIONER

## 2025-01-02 PROCEDURE — 1159F MED LIST DOCD IN RCRD: CPT | Performed by: NURSE PRACTITIONER

## 2025-01-02 PROCEDURE — 99213 OFFICE O/P EST LOW 20 MIN: CPT | Performed by: NURSE PRACTITIONER

## 2025-01-02 PROCEDURE — 1036F TOBACCO NON-USER: CPT | Performed by: NURSE PRACTITIONER

## 2025-01-02 PROCEDURE — 3008F BODY MASS INDEX DOCD: CPT | Performed by: NURSE PRACTITIONER

## 2025-01-02 PROCEDURE — 1157F ADVNC CARE PLAN IN RCRD: CPT | Performed by: NURSE PRACTITIONER

## 2025-01-02 PROCEDURE — 3078F DIAST BP <80 MM HG: CPT | Performed by: NURSE PRACTITIONER

## 2025-01-02 PROCEDURE — 1160F RVW MEDS BY RX/DR IN RCRD: CPT | Performed by: NURSE PRACTITIONER

## 2025-01-02 RX ORDER — FLUTICASONE PROPIONATE AND SALMETEROL 250; 50 UG/1; UG/1
1 POWDER RESPIRATORY (INHALATION)
Qty: 1 EACH | Refills: 11 | Status: SHIPPED | OUTPATIENT
Start: 2025-01-02

## 2025-01-02 RX ORDER — LISINOPRIL 5 MG/1
5 TABLET ORAL DAILY
Qty: 30 TABLET | Refills: 11 | Status: SHIPPED | OUTPATIENT
Start: 2025-01-02 | End: 2026-01-02

## 2025-01-02 ASSESSMENT — ENCOUNTER SYMPTOMS
DYSPNEA ON EXERTION: 1
CONSTITUTIONAL NEGATIVE: 1
IRREGULAR HEARTBEAT: 0
SYNCOPE: 0
COUGH: 1
BLURRED VISION: 0
LIGHT-HEADEDNESS: 0
DEPRESSION: 0
SHORTNESS OF BREATH: 0
PALPITATIONS: 0
DECREASED APPETITE: 0
FALLS: 0
ORTHOPNEA: 0
GASTROINTESTINAL NEGATIVE: 1
FOCAL WEAKNESS: 0
MEMORY LOSS: 0

## 2025-01-02 NOTE — PATIENT INSTRUCTIONS
Continue on current meds  Restart Lisinopril at 5 mg daily   Heart healthy, low sodium diet  Mediterranean diet is recommended  BMP in 1 week (lab)  Work on quitting smoking  Follow up with Dr Zelaya in March as planned.

## 2025-01-02 NOTE — TELEPHONE ENCOUNTER
----- Message from Jacqueline Nava sent at 1/2/2025  4:02 PM EST -----  done  ----- Message -----  From: Britney Nam RN  Sent: 1/2/2025   3:57 PM EST  To: OTONIEL Winslow-CNP    Patient needing refill on wixela. 250/50mcg to [facility] in [place].

## 2025-01-07 ENCOUNTER — TELEPHONE (OUTPATIENT)
Dept: PULMONOLOGY | Facility: HOSPITAL | Age: 65
End: 2025-01-07
Payer: MEDICARE

## 2025-01-07 RX ORDER — PREDNISONE 10 MG/1
TABLET ORAL
Qty: 40 TABLET | Refills: 0 | Status: SHIPPED | OUTPATIENT
Start: 2025-01-07 | End: 2025-01-23

## 2025-01-07 NOTE — TELEPHONE ENCOUNTER
Patient acknowledged understanding. All questions answered at this time. Patient is agreeable to treatment plan.    ----- Message from Jacqueline Nava sent at 1/7/2025  3:10 PM EST -----  I sent him a prednisone taper.  ----- Message -----  From: Britney Nam RN  Sent: 1/7/2025   3:05 PM EST  To: OTONIEL Winslow-CNP    Patients wife called in requesting prednisone. She states he is breathing harder than normal. Patient has a cough at night that is dry and productive with clear white mucous. Patient states his breathing is a little worse than normal and prednisone always helps.

## 2025-01-08 ENCOUNTER — APPOINTMENT (OUTPATIENT)
Dept: PULMONOLOGY | Facility: HOSPITAL | Age: 65
End: 2025-01-08
Payer: MEDICARE

## 2025-01-14 ENCOUNTER — LAB (OUTPATIENT)
Dept: LAB | Facility: LAB | Age: 65
End: 2025-01-14
Payer: MEDICARE

## 2025-01-14 DIAGNOSIS — I10 PRIMARY HYPERTENSION: ICD-10-CM

## 2025-01-14 LAB
ANION GAP SERPL CALC-SCNC: 11 MMOL/L (ref 10–20)
BUN SERPL-MCNC: 18 MG/DL (ref 6–23)
CALCIUM SERPL-MCNC: 8.9 MG/DL (ref 8.6–10.3)
CHLORIDE SERPL-SCNC: 102 MMOL/L (ref 98–107)
CO2 SERPL-SCNC: 31 MMOL/L (ref 21–32)
CREAT SERPL-MCNC: 1.05 MG/DL (ref 0.5–1.3)
EGFRCR SERPLBLD CKD-EPI 2021: 78 ML/MIN/1.73M*2
GLUCOSE SERPL-MCNC: 154 MG/DL (ref 74–99)
POTASSIUM SERPL-SCNC: 5 MMOL/L (ref 3.5–5.3)
SODIUM SERPL-SCNC: 139 MMOL/L (ref 136–145)

## 2025-01-14 PROCEDURE — 80048 BASIC METABOLIC PNL TOTAL CA: CPT

## 2025-01-15 DIAGNOSIS — I10 PRIMARY HYPERTENSION: ICD-10-CM

## 2025-01-15 DIAGNOSIS — I25.10 ASHD (ARTERIOSCLEROTIC HEART DISEASE): Primary | ICD-10-CM

## 2025-01-29 ENCOUNTER — HOSPITAL ENCOUNTER (EMERGENCY)
Facility: HOSPITAL | Age: 65
Discharge: HOME | End: 2025-01-29
Payer: MEDICARE

## 2025-01-29 ENCOUNTER — APPOINTMENT (OUTPATIENT)
Dept: CARDIOLOGY | Facility: HOSPITAL | Age: 65
End: 2025-01-29
Payer: MEDICARE

## 2025-01-29 ENCOUNTER — PHARMACY VISIT (OUTPATIENT)
Dept: PHARMACY | Facility: CLINIC | Age: 65
End: 2025-01-29
Payer: COMMERCIAL

## 2025-01-29 ENCOUNTER — APPOINTMENT (OUTPATIENT)
Dept: RADIOLOGY | Facility: HOSPITAL | Age: 65
End: 2025-01-29
Payer: MEDICARE

## 2025-01-29 VITALS
OXYGEN SATURATION: 94 % | TEMPERATURE: 97 F | HEART RATE: 92 BPM | DIASTOLIC BLOOD PRESSURE: 66 MMHG | SYSTOLIC BLOOD PRESSURE: 130 MMHG | RESPIRATION RATE: 20 BRPM | WEIGHT: 200 LBS | BODY MASS INDEX: 31.39 KG/M2 | HEIGHT: 67 IN

## 2025-01-29 DIAGNOSIS — J44.1 COPD EXACERBATION (MULTI): Primary | ICD-10-CM

## 2025-01-29 LAB
ANION GAP SERPL CALC-SCNC: 11 MMOL/L (ref 10–20)
ATRIAL RATE: 76 BPM
BASOPHILS # BLD AUTO: 0.05 X10*3/UL (ref 0–0.1)
BASOPHILS NFR BLD AUTO: 0.5 %
BUN SERPL-MCNC: 14 MG/DL (ref 6–23)
CALCIUM SERPL-MCNC: 9.2 MG/DL (ref 8.6–10.3)
CHLORIDE SERPL-SCNC: 99 MMOL/L (ref 98–107)
CO2 SERPL-SCNC: 31 MMOL/L (ref 21–32)
CREAT SERPL-MCNC: 1.18 MG/DL (ref 0.5–1.3)
EGFRCR SERPLBLD CKD-EPI 2021: 68 ML/MIN/1.73M*2
EOSINOPHIL # BLD AUTO: 0.74 X10*3/UL (ref 0–0.7)
EOSINOPHIL NFR BLD AUTO: 6.8 %
ERYTHROCYTE [DISTWIDTH] IN BLOOD BY AUTOMATED COUNT: 13.3 % (ref 11.5–14.5)
FLUAV RNA RESP QL NAA+PROBE: NOT DETECTED
FLUBV RNA RESP QL NAA+PROBE: NOT DETECTED
GLUCOSE SERPL-MCNC: 157 MG/DL (ref 74–99)
HCT VFR BLD AUTO: 42.1 % (ref 41–52)
HGB BLD-MCNC: 13.5 G/DL (ref 13.5–17.5)
IMM GRANULOCYTES # BLD AUTO: 0.06 X10*3/UL (ref 0–0.7)
IMM GRANULOCYTES NFR BLD AUTO: 0.6 % (ref 0–0.9)
LYMPHOCYTES # BLD AUTO: 1.61 X10*3/UL (ref 1.2–4.8)
LYMPHOCYTES NFR BLD AUTO: 14.9 %
MCH RBC QN AUTO: 30.9 PG (ref 26–34)
MCHC RBC AUTO-ENTMCNC: 32.1 G/DL (ref 32–36)
MCV RBC AUTO: 96 FL (ref 80–100)
MONOCYTES # BLD AUTO: 0.59 X10*3/UL (ref 0.1–1)
MONOCYTES NFR BLD AUTO: 5.5 %
NEUTROPHILS # BLD AUTO: 7.76 X10*3/UL (ref 1.2–7.7)
NEUTROPHILS NFR BLD AUTO: 71.7 %
NRBC BLD-RTO: 0 /100 WBCS (ref 0–0)
P AXIS: 65 DEGREES
PLATELET # BLD AUTO: 227 X10*3/UL (ref 150–450)
POTASSIUM SERPL-SCNC: 4.7 MMOL/L (ref 3.5–5.3)
PR INTERVAL: 154 MS
Q ONSET: 253 MS
QRS COUNT: 13 BEATS
QRS DURATION: 86 MS
QT INTERVAL: 348 MS
QTC CALCULATION(BAZETT): 392 MS
QTC FREDERICIA: 376 MS
R AXIS: 72 DEGREES
RBC # BLD AUTO: 4.37 X10*6/UL (ref 4.5–5.9)
RSV RNA RESP QL NAA+PROBE: NOT DETECTED
SARS-COV-2 RNA RESP QL NAA+PROBE: NOT DETECTED
SODIUM SERPL-SCNC: 136 MMOL/L (ref 136–145)
T AXIS: 78 DEGREES
T OFFSET: 427 MS
VENTRICULAR RATE: 76 BPM
WBC # BLD AUTO: 10.8 X10*3/UL (ref 4.4–11.3)

## 2025-01-29 PROCEDURE — 71046 X-RAY EXAM CHEST 2 VIEWS: CPT

## 2025-01-29 PROCEDURE — 93005 ELECTROCARDIOGRAM TRACING: CPT

## 2025-01-29 PROCEDURE — 71046 X-RAY EXAM CHEST 2 VIEWS: CPT | Performed by: RADIOLOGY

## 2025-01-29 PROCEDURE — RXMED WILLOW AMBULATORY MEDICATION CHARGE

## 2025-01-29 PROCEDURE — 87637 SARSCOV2&INF A&B&RSV AMP PRB: CPT

## 2025-01-29 PROCEDURE — 94640 AIRWAY INHALATION TREATMENT: CPT

## 2025-01-29 PROCEDURE — 2500000002 HC RX 250 W HCPCS SELF ADMINISTERED DRUGS (ALT 637 FOR MEDICARE OP, ALT 636 FOR OP/ED)

## 2025-01-29 PROCEDURE — 36415 COLL VENOUS BLD VENIPUNCTURE: CPT

## 2025-01-29 PROCEDURE — 2500000004 HC RX 250 GENERAL PHARMACY W/ HCPCS (ALT 636 FOR OP/ED): Mod: JZ

## 2025-01-29 PROCEDURE — 82374 ASSAY BLOOD CARBON DIOXIDE: CPT

## 2025-01-29 PROCEDURE — 96374 THER/PROPH/DIAG INJ IV PUSH: CPT

## 2025-01-29 PROCEDURE — 99285 EMERGENCY DEPT VISIT HI MDM: CPT | Mod: 25

## 2025-01-29 PROCEDURE — 85025 COMPLETE CBC W/AUTO DIFF WBC: CPT

## 2025-01-29 RX ORDER — IPRATROPIUM BROMIDE AND ALBUTEROL SULFATE 2.5; .5 MG/3ML; MG/3ML
3 SOLUTION RESPIRATORY (INHALATION)
Status: COMPLETED | OUTPATIENT
Start: 2025-01-29 | End: 2025-01-29

## 2025-01-29 RX ORDER — PREDNISONE 50 MG/1
50 TABLET ORAL DAILY
Qty: 5 TABLET | Refills: 0 | Status: SHIPPED | OUTPATIENT
Start: 2025-01-29 | End: 2025-02-03

## 2025-01-29 RX ADMIN — IPRATROPIUM BROMIDE AND ALBUTEROL SULFATE 3 ML: .5; 3 SOLUTION RESPIRATORY (INHALATION) at 12:51

## 2025-01-29 RX ADMIN — IPRATROPIUM BROMIDE AND ALBUTEROL SULFATE 3 ML: .5; 3 SOLUTION RESPIRATORY (INHALATION) at 10:34

## 2025-01-29 RX ADMIN — IPRATROPIUM BROMIDE AND ALBUTEROL SULFATE 3 ML: .5; 3 SOLUTION RESPIRATORY (INHALATION) at 12:52

## 2025-01-29 RX ADMIN — IPRATROPIUM BROMIDE AND ALBUTEROL SULFATE 3 ML: .5; 3 SOLUTION RESPIRATORY (INHALATION) at 10:32

## 2025-01-29 RX ADMIN — METHYLPREDNISOLONE SODIUM SUCCINATE 125 MG: 125 INJECTION, POWDER, FOR SOLUTION INTRAMUSCULAR; INTRAVENOUS at 10:32

## 2025-01-29 RX ADMIN — IPRATROPIUM BROMIDE AND ALBUTEROL SULFATE 3 ML: .5; 3 SOLUTION RESPIRATORY (INHALATION) at 10:33

## 2025-01-29 ASSESSMENT — PAIN SCALES - GENERAL
PAINLEVEL_OUTOF10: 0 - NO PAIN
PAINLEVEL_OUTOF10: 0 - NO PAIN

## 2025-01-29 ASSESSMENT — PAIN - FUNCTIONAL ASSESSMENT: PAIN_FUNCTIONAL_ASSESSMENT: 0-10

## 2025-01-29 NOTE — DISCHARGE INSTRUCTIONS
Take the steroids as prescribed for your COPD exacerbation.  Return to the emergency department for any new or worsening symptoms including new chest pain, worsening shortness of breath, confusion, or loss of consciousness.

## 2025-01-29 NOTE — ED TRIAGE NOTES
Pt to ED with [facility] for SOB. Pt reports he could not breathe from [phone] and called EMS. Denies CP. Got 1 duoneb en route, reports it helped but still sob. Wears 4L NC at home baseline

## 2025-01-29 NOTE — PROGRESS NOTES
Medication Education     Medication education for Darron Resendez was provided to the patient  for the following medication(s):  Prednisone       Medication education provided by a Pharmacist:  Proper dose, indication, possible ADRs    Identified potential barriers to education:  None    Method(s) of Education:  Verbal    An opportunity to ask questions and receive answers was provided.     Assessment of understanding the patient :  2= meets goals/outcomes    Additional Notes (if applicable):     Poppy Watts, PharmD

## 2025-01-29 NOTE — ED PROVIDER NOTES
HPI   Chief Complaint   Patient presents with    Shortness of Breath       HPI  Patient is a 64-year-old male presents emergency department for shortness of breath.  He has a past medical history of CAD status post stent, COPD on 4 L nasal cannula.  This morning around 6 AM, patient had worsening cough and shortness of breath.  He did use his home inhalers with minimal relief of his symptoms.  Are not brought to the hospital, did receive 1 DuoNeb with some improvement but continues to endorse shortness of breath.  Endorses increased cough and sputum production.  Continues to smoke cigarettes, however has not smoked in 1 day.  Denies fevers, chills, nausea vomiting, chest pain.      Patient History   Past Medical History:   Diagnosis Date    Chronic systolic (congestive) heart failure 09/22/2020    Chronic systolic heart failure    COPD (chronic obstructive pulmonary disease) (Multi)     Myocardial infarction (Multi) 07/01/2024    Old myocardial infarction     Old non-ST elevation myocardial infarction (NSTEMI)    Old myocardial infarction 09/22/2020    History of ST elevation myocardial infarction (STEMI)    Old myocardial infarction     History of ST elevation myocardial infarction (STEMI)    Personal history of other diseases of the circulatory system     History of heart disease    Personal history of other endocrine, nutritional and metabolic disease     History of hyperlipidemia    Presence of stent in anterior descending branch of left coronary artery 07/01/2024     Past Surgical History:   Procedure Laterality Date    MANDIBLE SURGERY  06/01/2016    Jaw Surgery    OTHER SURGICAL HISTORY  06/29/2020    Coronary artery stent placement    TONSILLECTOMY  06/01/2016    Tonsillectomy With Adenoidectomy    VASECTOMY  06/01/2016    Surgery Vas Deferens Vasectomy     No family history on file.  Social History     Tobacco Use    Smoking status: Every Day     Types: Cigarettes    Smokeless tobacco: Never   Vaping Use     Vaping status: Never Used   Substance Use Topics    Alcohol use: Not Currently    Drug use: Never       Physical Exam   ED Triage Vitals [01/29/25 0933]   Temperature Heart Rate Respirations BP   36.1 °C (97 °F) 77 (!) 26 130/73      Pulse Ox Temp Source Heart Rate Source Patient Position   (!) 93 % Temporal -- --      BP Location FiO2 (%)     -- --       Physical Exam  General: Resting comfortably in bed.  Not ill-appearing.  Nontoxic.  Head: Atraumatic, normocephalic.   Eyes: Sclera anicteric.  Heart: Regular rate and rhythm.   Lungs: Decreased breath sounds bilaterally with poor air movement.  Expiratory wheezing in all lung fields.  Neurologic: Awake and alert, normal speech and mental status. Moves all extremities equally well. No focal deficits or lateralizing signs.   Skin: Warm and dry  Musculoskeletal: No peripheral edema.     ED Course & MDM   ED Course as of 02/02/25 1131   Wed Jan 29, 2025   0950 ECG 12 lead  EKG is interpreted by me sinus rhythm with normal axis and no ST elevations or abnormal T wave inversions [NORIS]   1045 CBC and Auto Differential(!)  No leukocytosis or anemia [NORIS]   1045 Basic metabolic panel(!)  No electrolyte abnormalities or LARRY [NORIS]   1046 XR chest 2 views  Chest x-ray as interpreted by me with no consolidations concerning for pneumonia, pleural effusions, or pneumothorax.  Radiology read with no acute processes [NORIS]   1210 On reevaluation, patient with improvement of his shortness of breath.  Lungs with improved aeration bilaterally with faint wheezing still present.  2 additional DuoNebs ordered. [NORIS]      ED Course User Index  [NORIS] Glory Laird MD         Diagnoses as of 02/02/25 1131   COPD exacerbation (Multi)                 No data recorded     [place] Coma Scale Score: 15 (01/29/25 0935 : Annie Anderson RN)                           Medical Decision Making  Patient is a 64 y.o. male who presents to the emergency department with chief complaint of shortness of  breath. History of present illness as above. Chronic conditions impacting care include CAD status post stent, COPD on 4 L nasal cannula. Patient remained afebrile and hemodynamically stable while in the emergency department. They saturated well on home 4 L nasal cannula the differential diagnosis associated with this patient's presentation includes, but are not limited too, COPD exacerbation, pneumonia, viral illness, anemia, arrhythmia.  Suspicion for ACS as patient with no chest pain and patient does have poor air movement bilaterally with wheezing more consistent with COPD exacerbation.  Our workup consisted of ordering/reviewing CBC, BMP, viral testing, EKG, chest x-ray.     External records reviewed:  Care everywhere reviewed for current medications and medical history.     Diagnostics interpreted by me include:  See ED course      ED Course as of 02/02/25 1131   Wed Jan 29, 2025   0950 ECG 12 lead  EKG is interpreted by me sinus rhythm with normal axis and no ST elevations or abnormal T wave inversions [NORIS]   1045 CBC and Auto Differential(!)  No leukocytosis or anemia [NORIS]   1045 Basic metabolic panel(!)  No electrolyte abnormalities or LARRY [NORIS]   1046 XR chest 2 views  Chest x-ray as interpreted by me with no consolidations concerning for pneumonia, pleural effusions, or pneumothorax.  Radiology read with no acute processes [NORIS]   1210 On reevaluation, patient with improvement of his shortness of breath.  Lungs with improved aeration bilaterally with faint wheezing still present.  2 additional DuoNebs ordered. [NORIS]      ED Course User Index  [NORIS] Glory Laidr MD         Diagnoses as of 02/02/25 1131   COPD exacerbation (Multi)       Reevaluation, patient continued to have improvement of symptoms after more DuoNebs.  Patient's symptoms improved and would like to go home.  He is otherwise well-appearing and appropriate for discharge home with steroids.  Patient does have inhalers at home that he can  use.  He is to follow-up with his pulmonologist.  Strict return precautions discussed.  Discharged in stable condition.    Social determinants of health affecting care:  none    ED Medications managed:  Medications   ipratropium-albuteroL (Duo-Neb) 0.5-2.5 mg/3 mL nebulizer solution 3 mL (3 mL nebulization Given 1/29/25 1034)   methylPREDNISolone sod succinate (SOLU-Medrol) injection 125 mg (125 mg intravenous Given 1/29/25 1032)   ipratropium-albuteroL (Duo-Neb) 0.5-2.5 mg/3 mL nebulizer solution 3 mL (3 mL nebulization Given 1/29/25 1252)         Procedure  Procedures     Glory Laird MD  02/02/25 4747

## 2025-02-08 ENCOUNTER — PHARMACY VISIT (OUTPATIENT)
Dept: PHARMACY | Facility: CLINIC | Age: 65
End: 2025-02-08
Payer: COMMERCIAL

## 2025-02-08 ENCOUNTER — HOSPITAL ENCOUNTER (EMERGENCY)
Facility: HOSPITAL | Age: 65
Discharge: HOME | End: 2025-02-08
Attending: EMERGENCY MEDICINE
Payer: MEDICARE

## 2025-02-08 ENCOUNTER — APPOINTMENT (OUTPATIENT)
Dept: RADIOLOGY | Facility: HOSPITAL | Age: 65
End: 2025-02-08
Payer: MEDICARE

## 2025-02-08 VITALS
RESPIRATION RATE: 19 BRPM | SYSTOLIC BLOOD PRESSURE: 109 MMHG | WEIGHT: 210 LBS | TEMPERATURE: 98.2 F | DIASTOLIC BLOOD PRESSURE: 72 MMHG | HEIGHT: 67 IN | OXYGEN SATURATION: 97 % | HEART RATE: 64 BPM | BODY MASS INDEX: 32.96 KG/M2

## 2025-02-08 DIAGNOSIS — J44.1 COPD EXACERBATION (MULTI): Primary | ICD-10-CM

## 2025-02-08 LAB
ALBUMIN SERPL BCP-MCNC: 3.9 G/DL (ref 3.4–5)
ALP SERPL-CCNC: 49 U/L (ref 33–136)
ALT SERPL W P-5'-P-CCNC: 14 U/L (ref 10–52)
ANION GAP SERPL CALC-SCNC: 11 MMOL/L (ref 10–20)
AST SERPL W P-5'-P-CCNC: 12 U/L (ref 9–39)
BASE EXCESS BLDV CALC-SCNC: 3.2 MMOL/L (ref -2–3)
BASOPHILS # BLD AUTO: 0.06 X10*3/UL (ref 0–0.1)
BASOPHILS NFR BLD AUTO: 0.6 %
BILIRUB SERPL-MCNC: 0.4 MG/DL (ref 0–1.2)
BODY TEMPERATURE: 37 DEGREES CELSIUS
BUN SERPL-MCNC: 10 MG/DL (ref 6–23)
CALCIUM SERPL-MCNC: 9 MG/DL (ref 8.6–10.3)
CARDIAC TROPONIN I PNL SERPL HS: 5 NG/L (ref 0–20)
CHLORIDE SERPL-SCNC: 101 MMOL/L (ref 98–107)
CO2 SERPL-SCNC: 29 MMOL/L (ref 21–32)
CREAT SERPL-MCNC: 1.17 MG/DL (ref 0.5–1.3)
EGFRCR SERPLBLD CKD-EPI 2021: 69 ML/MIN/1.73M*2
EOSINOPHIL # BLD AUTO: 0.68 X10*3/UL (ref 0–0.7)
EOSINOPHIL NFR BLD AUTO: 6.5 %
ERYTHROCYTE [DISTWIDTH] IN BLOOD BY AUTOMATED COUNT: 13.2 % (ref 11.5–14.5)
FLUAV RNA RESP QL NAA+PROBE: NOT DETECTED
FLUBV RNA RESP QL NAA+PROBE: NOT DETECTED
GLUCOSE SERPL-MCNC: 143 MG/DL (ref 74–99)
HCO3 BLDV-SCNC: 31.2 MMOL/L (ref 22–26)
HCT VFR BLD AUTO: 42.5 % (ref 41–52)
HGB BLD-MCNC: 13.4 G/DL (ref 13.5–17.5)
IMM GRANULOCYTES # BLD AUTO: 0.11 X10*3/UL (ref 0–0.7)
IMM GRANULOCYTES NFR BLD AUTO: 1 % (ref 0–0.9)
INHALED O2 CONCENTRATION: 28 %
LYMPHOCYTES # BLD AUTO: 2.72 X10*3/UL (ref 1.2–4.8)
LYMPHOCYTES NFR BLD AUTO: 25.9 %
MCH RBC QN AUTO: 30.6 PG (ref 26–34)
MCHC RBC AUTO-ENTMCNC: 31.5 G/DL (ref 32–36)
MCV RBC AUTO: 97 FL (ref 80–100)
MONOCYTES # BLD AUTO: 0.63 X10*3/UL (ref 0.1–1)
MONOCYTES NFR BLD AUTO: 6 %
NEUTROPHILS # BLD AUTO: 6.29 X10*3/UL (ref 1.2–7.7)
NEUTROPHILS NFR BLD AUTO: 60 %
NRBC BLD-RTO: 0 /100 WBCS (ref 0–0)
OXYHGB MFR BLDV: 74.7 % (ref 45–75)
PCO2 BLDV: 62 MM HG (ref 41–51)
PH BLDV: 7.31 PH (ref 7.33–7.43)
PLATELET # BLD AUTO: 278 X10*3/UL (ref 150–450)
PO2 BLDV: 48 MM HG (ref 35–45)
POTASSIUM SERPL-SCNC: 4.7 MMOL/L (ref 3.5–5.3)
PROT SERPL-MCNC: 6.3 G/DL (ref 6.4–8.2)
RBC # BLD AUTO: 4.38 X10*6/UL (ref 4.5–5.9)
RSV RNA RESP QL NAA+PROBE: NOT DETECTED
SAO2 % BLDV: 76 % (ref 45–75)
SARS-COV-2 RNA RESP QL NAA+PROBE: NOT DETECTED
SODIUM SERPL-SCNC: 136 MMOL/L (ref 136–145)
WBC # BLD AUTO: 10.5 X10*3/UL (ref 4.4–11.3)

## 2025-02-08 PROCEDURE — 2500000002 HC RX 250 W HCPCS SELF ADMINISTERED DRUGS (ALT 637 FOR MEDICARE OP, ALT 636 FOR OP/ED): Performed by: EMERGENCY MEDICINE

## 2025-02-08 PROCEDURE — 71045 X-RAY EXAM CHEST 1 VIEW: CPT

## 2025-02-08 PROCEDURE — RXMED WILLOW AMBULATORY MEDICATION CHARGE

## 2025-02-08 PROCEDURE — 87637 SARSCOV2&INF A&B&RSV AMP PRB: CPT | Performed by: EMERGENCY MEDICINE

## 2025-02-08 PROCEDURE — 71045 X-RAY EXAM CHEST 1 VIEW: CPT | Mod: FOREIGN READ | Performed by: RADIOLOGY

## 2025-02-08 PROCEDURE — 85025 COMPLETE CBC W/AUTO DIFF WBC: CPT | Performed by: EMERGENCY MEDICINE

## 2025-02-08 PROCEDURE — 84484 ASSAY OF TROPONIN QUANT: CPT | Performed by: EMERGENCY MEDICINE

## 2025-02-08 PROCEDURE — 94640 AIRWAY INHALATION TREATMENT: CPT | Mod: 59

## 2025-02-08 PROCEDURE — 82810 BLOOD GASES O2 SAT ONLY: CPT | Performed by: EMERGENCY MEDICINE

## 2025-02-08 PROCEDURE — 82805 BLOOD GASES W/O2 SATURATION: CPT | Performed by: EMERGENCY MEDICINE

## 2025-02-08 PROCEDURE — 80053 COMPREHEN METABOLIC PANEL: CPT | Performed by: EMERGENCY MEDICINE

## 2025-02-08 PROCEDURE — 99284 EMERGENCY DEPT VISIT MOD MDM: CPT | Performed by: EMERGENCY MEDICINE

## 2025-02-08 PROCEDURE — 36415 COLL VENOUS BLD VENIPUNCTURE: CPT | Performed by: EMERGENCY MEDICINE

## 2025-02-08 RX ORDER — PREDNISONE 10 MG/1
TABLET ORAL
Qty: 55 TABLET | Refills: 0 | Status: SHIPPED | OUTPATIENT
Start: 2025-02-08 | End: 2025-02-23

## 2025-02-08 RX ORDER — DOXYCYCLINE 100 MG/1
100 CAPSULE ORAL 2 TIMES DAILY
Qty: 20 CAPSULE | Refills: 0 | Status: SHIPPED | OUTPATIENT
Start: 2025-02-08 | End: 2025-02-18

## 2025-02-08 RX ORDER — IPRATROPIUM BROMIDE AND ALBUTEROL SULFATE 2.5; .5 MG/3ML; MG/3ML
3 SOLUTION RESPIRATORY (INHALATION) ONCE
Status: COMPLETED | OUTPATIENT
Start: 2025-02-08 | End: 2025-02-08

## 2025-02-08 RX ADMIN — IPRATROPIUM BROMIDE AND ALBUTEROL SULFATE 3 ML: 2.5; .5 SOLUTION RESPIRATORY (INHALATION) at 10:40

## 2025-02-08 ASSESSMENT — PAIN - FUNCTIONAL ASSESSMENT: PAIN_FUNCTIONAL_ASSESSMENT: 0-10

## 2025-02-08 ASSESSMENT — PAIN SCALES - GENERAL: PAINLEVEL_OUTOF10: 0 - NO PAIN

## 2025-02-08 NOTE — ED PROVIDER NOTES
HPI   Chief Complaint   Patient presents with    Shortness of Breath     Here on 1/29 for copd exacerbation, finished prednisone, s/s have returned since       Patient presents emergency department for shortness of breath.  Patient recently seen here in this emergency department about 10 days ago.  At that point he was diagnosed with COPD exacerbation and started on a burst of prednisone.  Patient finishes present a few days ago.  States that this episode of shortness of breath has been going on for last 2 days.  States that he often requires multiple courses of steroids during his COPD exacerbations.  Patient denies fevers any sick contacts.  Patient been wearing his 4 L of O2 at home and taking his breathing treatments as prescribed.  Patient did receive Solu-Medrol and DuoNebs en route with improvement in his symptoms              Patient History   Past Medical History:   Diagnosis Date    Chronic systolic (congestive) heart failure 09/22/2020    Chronic systolic heart failure    COPD (chronic obstructive pulmonary disease) (Multi)     Myocardial infarction (Multi) 07/01/2024    Old myocardial infarction     Old non-ST elevation myocardial infarction (NSTEMI)    Old myocardial infarction 09/22/2020    History of ST elevation myocardial infarction (STEMI)    Old myocardial infarction     History of ST elevation myocardial infarction (STEMI)    Personal history of other diseases of the circulatory system     History of heart disease    Personal history of other endocrine, nutritional and metabolic disease     History of hyperlipidemia    Presence of stent in anterior descending branch of left coronary artery 07/01/2024     Past Surgical History:   Procedure Laterality Date    MANDIBLE SURGERY  06/01/2016    Jaw Surgery    OTHER SURGICAL HISTORY  06/29/2020    Coronary artery stent placement    TONSILLECTOMY  06/01/2016    Tonsillectomy With Adenoidectomy    VASECTOMY  06/01/2016    Surgery Vas Deferens Vasectomy      No family history on file.  Social History     Tobacco Use    Smoking status: Every Day     Types: Cigarettes    Smokeless tobacco: Never   Vaping Use    Vaping status: Never Used   Substance Use Topics    Alcohol use: Not Currently    Drug use: Never       Physical Exam   ED Triage Vitals   Temperature Heart Rate Respirations BP   02/08/25 1014 02/08/25 1014 02/08/25 1014 02/08/25 1014   36.8 °C (98.2 °F) 79 (!) 24 126/84      Pulse Ox Temp src Heart Rate Source Patient Position   02/08/25 1013 -- -- --   (!) 80 %         BP Location FiO2 (%)     -- --             Physical Exam  Vitals and nursing note reviewed.   Constitutional:       General: He is not in acute distress.     Appearance: He is well-developed.   HENT:      Head: Normocephalic and atraumatic.      Right Ear: External ear normal.      Left Ear: External ear normal.      Mouth/Throat:      Mouth: Mucous membranes are moist.      Pharynx: Oropharynx is clear.   Eyes:      Extraocular Movements: Extraocular movements intact.      Conjunctiva/sclera: Conjunctivae normal.   Neck:      Trachea: Trachea normal.   Cardiovascular:      Rate and Rhythm: Normal rate.   Pulmonary:      Effort: Pulmonary effort is normal. No respiratory distress.      Breath sounds: Decreased breath sounds and wheezing present.   Abdominal:      General: Bowel sounds are normal.      Palpations: Abdomen is soft.      Tenderness: There is no abdominal tenderness.   Musculoskeletal:         General: No swelling or tenderness.      Cervical back: No tenderness.   Skin:     General: Skin is warm and dry.      Capillary Refill: Capillary refill takes less than 2 seconds.   Neurological:      General: No focal deficit present.      Mental Status: He is alert and oriented to person, place, and time.   Psychiatric:         Mood and Affect: Mood normal.         Thought Content: Thought content does not include homicidal or suicidal ideation.           ED Course & MDM   ED Course as of  02/08/25 1210   Sat Feb 08, 2025   1016 EKG obtained at 1008 inter myself shows no sinus rhythm, rate 70, , QTc is 374, no ST segment changes, T wave changes []   1024 Patient presents with shortness of breath worsening over last 2 days. Available chart reviewed. On initial assessment the patient was found non-toxic, no acute distress, vitals hemodynamically stable and afebrile. Initial concern for pneumonia, pneumothorax, ACS, pulmonary embolism, COPD exacerbation, viral infection.  Viral testing was -10 days ago but will repeat here. []   1024 Patient received Solu-Medrol en route.  Will order DuoNeb here in the emergency department. []   1110 Blood Gas Venous(!)  Venous blood gas shows []   1113 Troponin I, High Sensitivity: 5 []   1113 CBC and Auto Differential(!)  CBC shows no leukocytosis, baseline anemia hemoglobin 13.4, no thrombocytopenia [[facility]]   1113 Comprehensive metabolic panel(!)  Metabolic panel shows no electrolyte abnormalities, upper normal of creatinine, normal liver function []   1116 RSV PCR: Not Detected [[facility]]   1116 Sars-CoV-2 and Influenza A/B PCR  Flu and COVID not detected [[facility]]   1116 Chest x-ray personally reviewed without pneumothorax or large infiltrate [[facility]]   1210 XR chest 1 view  Chest x-ray shows concern for possible multifocal disease.  Given his worsening symptoms will cover with doxycycline as well as a long taper of steroids.  Meds to beds.    No indication for admission.  Discussed findings and diagnosis with the patient, follow-up and return to ED precautions given, patient voiced understanding, agrees with plan, questions answered, patient was discharged in stable condition. []      ED Course User Index  [] Eliseo Joshua MD         Diagnoses as of 02/08/25 1210   COPD exacerbation (Multi)                 No data recorded                                 Medical Decision Making      Procedure  Procedures     Eliseo Joshua MD  02/08/25 1211

## 2025-02-21 DIAGNOSIS — J44.1 COPD EXACERBATION (MULTI): ICD-10-CM

## 2025-02-21 RX ORDER — PREDNISONE 10 MG/1
TABLET ORAL
Qty: 55 TABLET | Refills: 0 | Status: CANCELLED | OUTPATIENT
Start: 2025-02-21 | End: 2025-03-08

## 2025-02-23 DIAGNOSIS — J44.1 COPD EXACERBATION (MULTI): ICD-10-CM

## 2025-02-23 RX ORDER — PREDNISONE 10 MG/1
TABLET ORAL
Qty: 55 TABLET | Refills: 0 | OUTPATIENT
Start: 2025-02-23 | End: 2025-03-10

## 2025-02-25 ENCOUNTER — OFFICE VISIT (OUTPATIENT)
Dept: PULMONOLOGY | Facility: HOSPITAL | Age: 65
End: 2025-02-25
Payer: MEDICARE

## 2025-02-25 ENCOUNTER — TELEPHONE (OUTPATIENT)
Dept: CARDIOLOGY | Facility: HOSPITAL | Age: 65
End: 2025-02-25

## 2025-02-25 VITALS
BODY MASS INDEX: 32.96 KG/M2 | DIASTOLIC BLOOD PRESSURE: 73 MMHG | OXYGEN SATURATION: 94 % | SYSTOLIC BLOOD PRESSURE: 108 MMHG | WEIGHT: 210 LBS | HEART RATE: 89 BPM | RESPIRATION RATE: 16 BRPM | HEIGHT: 67 IN

## 2025-02-25 DIAGNOSIS — F17.210 CIGARETTE NICOTINE DEPENDENCE WITHOUT COMPLICATION: ICD-10-CM

## 2025-02-25 DIAGNOSIS — G47.30 HYPERSOMNIA WITH SLEEP APNEA: ICD-10-CM

## 2025-02-25 DIAGNOSIS — I10 PRIMARY HYPERTENSION: ICD-10-CM

## 2025-02-25 DIAGNOSIS — J44.9 STAGE 4 VERY SEVERE COPD BY GOLD CLASSIFICATION (MULTI): Primary | ICD-10-CM

## 2025-02-25 DIAGNOSIS — G47.10 HYPERSOMNIA WITH SLEEP APNEA: ICD-10-CM

## 2025-02-25 DIAGNOSIS — J96.11 CHRONIC HYPOXIC RESPIRATORY FAILURE (MULTI): ICD-10-CM

## 2025-02-25 LAB
ATRIAL RATE: 76 BPM
P AXIS: 65 DEGREES
PR INTERVAL: 154 MS
Q ONSET: 253 MS
QRS COUNT: 13 BEATS
QRS DURATION: 86 MS
QT INTERVAL: 348 MS
QTC CALCULATION(BAZETT): 392 MS
QTC FREDERICIA: 376 MS
R AXIS: 72 DEGREES
T AXIS: 78 DEGREES
T OFFSET: 427 MS
VENTRICULAR RATE: 76 BPM

## 2025-02-25 PROCEDURE — 1159F MED LIST DOCD IN RCRD: CPT | Performed by: INTERNAL MEDICINE

## 2025-02-25 PROCEDURE — 3074F SYST BP LT 130 MM HG: CPT | Performed by: INTERNAL MEDICINE

## 2025-02-25 PROCEDURE — 99214 OFFICE O/P EST MOD 30 MIN: CPT | Performed by: INTERNAL MEDICINE

## 2025-02-25 PROCEDURE — 3078F DIAST BP <80 MM HG: CPT | Performed by: INTERNAL MEDICINE

## 2025-02-25 PROCEDURE — 1157F ADVNC CARE PLAN IN RCRD: CPT | Performed by: INTERNAL MEDICINE

## 2025-02-25 PROCEDURE — 3008F BODY MASS INDEX DOCD: CPT | Performed by: INTERNAL MEDICINE

## 2025-02-25 PROCEDURE — 4004F PT TOBACCO SCREEN RCVD TLK: CPT | Performed by: INTERNAL MEDICINE

## 2025-02-25 RX ORDER — THEOPHYLLINE 400 MG/1
400 TABLET, EXTENDED RELEASE ORAL DAILY
Qty: 30 TABLET | Refills: 3 | Status: SHIPPED | OUTPATIENT
Start: 2025-02-25 | End: 2026-02-25

## 2025-02-25 RX ORDER — BUPROPION HYDROCHLORIDE 100 MG/1
100 TABLET, EXTENDED RELEASE ORAL 2 TIMES DAILY
Qty: 60 TABLET | Refills: 11 | Status: SHIPPED | OUTPATIENT
Start: 2025-02-25 | End: 2026-02-25

## 2025-02-25 RX ORDER — BUDESONIDE 0.5 MG/2ML
0.5 INHALANT ORAL
Qty: 1 ML | Refills: 11 | Status: SHIPPED | OUTPATIENT
Start: 2025-02-25 | End: 2025-02-25 | Stop reason: SDUPTHER

## 2025-02-25 RX ORDER — BUDESONIDE 0.5 MG/2ML
0.5 INHALANT ORAL
Qty: 1 ML | Refills: 11 | Status: SHIPPED | OUTPATIENT
Start: 2025-02-25

## 2025-02-25 ASSESSMENT — ENCOUNTER SYMPTOMS
RHINORRHEA: 0
UNEXPECTED WEIGHT CHANGE: 0
CHILLS: 0
FATIGUE: 0
SHORTNESS OF BREATH: 0
COUGH: 0
WHEEZING: 0
FEVER: 0

## 2025-02-25 NOTE — PATIENT INSTRUCTIONS
Please Duonebs (Ipratropium/albuterol) four times per day, everyday.   Please take Budesonide nebulizer twice a day, everyday rinse your mouth out after use.   Continue on 4L with exertion.   Call with any questions or concerns.  PLEASE STOP SMOKING ASAP. Take Bupropion 100 mg 1 tablet twice daily. Please notify if any intolerance.  Please attend Pulmonary rehab.   Please complete Sleep study.  Follow up with Dr. Valdes on March 21, 25.

## 2025-02-25 NOTE — PROGRESS NOTES
Subjective   Patient ID: Darron Resendez is a 64 y.o. male who presents for follow up COPD.     HPI: Patient has  (current smoker, ~ pack year history) with a PMHx of ?COPD (no PFT on file), CAD (STEMI) s/p stent, DM, HTN, HLD, HFrEF (EF 35% 6/2020, 65% 7/2020). Here for pulmonary follow-up for COPD. He was hospitalized twice since last office visit in October. He is not taking any medications for COPD due to cost and he states he does not feel like he needs them. He is wearing 4L oxygen prn. He states that he has not smoked in 17 days. He is short of breath on any activity and has a daily productive cough and wheezing. He still does not have prescription insurance and is unsure if he will get any.     He is here for follow up today. He was in the ED on 2/8/25 and given prednisone taper. He states he takes Duoneb 2 to 3 times a day but is not taking Budesonide. He states he is trying to cut down smoking. His girl friends states that he has gone down from 2 ppd to 1 ppw sometimes only. He denies any worsening since the last visit but has TODD on mild activity and intermittent daily cough with scant expectoration, intermittent wheezing. He does note fatigue and EDS. He wakes up unrefreshed. His girl friends endorses snoring.     Review of Systems   Constitutional:  Negative for chills, fatigue, fever and unexpected weight change.   HENT:  Negative for congestion, postnasal drip and rhinorrhea.    Respiratory:  Negative for cough (denies hemoptysis.), shortness of breath and wheezing.    Cardiovascular:  Negative for chest pain and leg swelling.   All other systems reviewed and are negative.      Objective   Physical Exam  Vitals reviewed.   Constitutional:       Appearance: Normal appearance.   HENT:      Head: Normocephalic.   Cardiovascular:      Rate and Rhythm: Normal rate and regular rhythm.   Pulmonary:      Effort: Pulmonary effort is normal.      Breath sounds: Normal breath sounds.   Skin:     General:  Skin is warm and dry.   Neurological:      Mental Status: He is alert.       Assessment/Plan   Assessment and Plan / Recommendations:  I counseled patient for his very severe COPD and ongoing smoking. Discussed prognosis. Educated and stressed importance of adherence to Rx and continuity of care.      1) Very severe COPD (GOLD 4) - PFT 9/3/24 with FEV1 27%; was given Breztri at discharge but he lost it and was very expensive; has not been using any inhalers, he has albuterol but not using; he reports breathing is terrible, has significant TODD with minimal activity, cough and wheezing; faint wheezing and tight on exam  - He is not taking any inhalers or meds for COPD due to not having insurance coverage our pharmacy program could not help. I explained to him at length that if he does not take the medications for his COPD that he will continue to be hospitalized frequently as his COPD is very severe and currently uncontrolled.   - Advised to take regular Duonebs QID and Budesonide BID, educated on importance of compliance   - would benefit from pulmonary rehab but will get his acute issues resolved currently    I have ordered Pulmonary Rehab for him. I have advised him to take Duonebs QID and Budesonide BID. The nebs may be processed through his Medicare Part B since he does not have D coverage. Add Theophylline 400 mg daily. Avoid prednisone if possible.     2) Chronic hypoxic respiratory failure - 6MW 9/3/24, ambulated 38 m (stopped d/t back pain), 90% at rest on RA, 86% with exertion on RA, needed 3 L NC for pulse ox 89% with exertion  - continue supplemental O2 (RA at rest, 3 L with exertion and as needed)   - patient requesting Inogen concentrator because he feels it is smaller than the one he received from [facility];       3) Nicotine cigarette depedence - current smoker, 1 PPD   - He states that he has not smoked in 17 days.   -advised to start Bupropion 100 mg BID and report any intolerances. Will increase dosage  if able to get & tolerate bupropion.    4) Hypersomnia with sleep apnea  -EDS, Fatigue, snoring, upper airway narrowing Mallampati IV, large neck circumference, BMP 32.8. STOP BANG 5. Recommend a Split night study asap.     Overall his COPD remains uncontrolled due to noncompliance with recommended medications. I explained that without taking his medications that he will continue to be hospitalized frequently. Patient has been hospitalized twice for COPD exacerbation since October 2024. If patient is readmitted and continues without medications for COPD will need to consider palliative care. I explained to patient and his daughter at length. I ordered Duonebs QID and Budesonide BID and educated on importance of compliance. I also instructed him the importance of getting prescription coverage for him.     I will bring him back in 4 weeks.

## 2025-02-25 NOTE — TELEPHONE ENCOUNTER
[place] called in at this time stating that the patient has just been prescribed these two medications and that they had concerns about any possible reactions with his cardiology medications.    theophylline ER (Uniphyl) 400 mg 24 hr tablet     buPROPion SR (Wellbutrin SR) 100 mg 12 hr tablet     Routing to the RN pool for support.    Thank you!  David CHAN

## 2025-02-28 ENCOUNTER — TELEPHONE (OUTPATIENT)
Dept: PULMONOLOGY | Facility: HOSPITAL | Age: 65
End: 2025-02-28
Payer: MEDICARE

## 2025-02-28 NOTE — TELEPHONE ENCOUNTER
Called patient and his wife. Patient acknowledged understanding. All questions answered at this time.       Also from Dr Valdes: I think he’s asking about Bupropion not hydroxyzine per se. Please advise patient that if he’s not comfortable with the new medications I prescribed, he doesn’t have to take them but needs to work on stopping smoking. He’s supposed to see me in 4 weeks         ----- Message from Cedrick Valdes sent at 2/27/2025  4:32 PM EST -----  Regarding: RE:  Please advise him that I am aware of that. Theophylline is ok to take with history of stents. He doesn’t have to take hydroxyzine if he’s concerned.  ----- Message -----  From: Britney Nam RN  Sent: 2/27/2025   4:29 PM EST  To: Cedrick Valdes MD    Patient called in wanting to make sure you knew he was a heart patient. They wanted to clarify that he was okay to take theophylline because he had stents. He also is making sure it is okay to take with hydroxizine.

## 2025-03-05 ENCOUNTER — HOSPITAL ENCOUNTER (EMERGENCY)
Facility: HOSPITAL | Age: 65
Discharge: HOME | End: 2025-03-05
Attending: EMERGENCY MEDICINE
Payer: MEDICARE

## 2025-03-05 ENCOUNTER — APPOINTMENT (OUTPATIENT)
Dept: RADIOLOGY | Facility: HOSPITAL | Age: 65
End: 2025-03-05
Payer: MEDICARE

## 2025-03-05 ENCOUNTER — APPOINTMENT (OUTPATIENT)
Dept: CARDIOLOGY | Facility: HOSPITAL | Age: 65
End: 2025-03-05
Payer: MEDICARE

## 2025-03-05 VITALS
WEIGHT: 210 LBS | RESPIRATION RATE: 18 BRPM | OXYGEN SATURATION: 97 % | HEART RATE: 75 BPM | SYSTOLIC BLOOD PRESSURE: 155 MMHG | BODY MASS INDEX: 32.96 KG/M2 | DIASTOLIC BLOOD PRESSURE: 88 MMHG | TEMPERATURE: 97.3 F | HEIGHT: 67 IN

## 2025-03-05 DIAGNOSIS — J44.1 ACUTE EXACERBATION OF COPD WITH ASTHMA: Primary | ICD-10-CM

## 2025-03-05 LAB
ALBUMIN SERPL BCP-MCNC: 4.2 G/DL (ref 3.4–5)
ALP SERPL-CCNC: 60 U/L (ref 33–136)
ALT SERPL W P-5'-P-CCNC: 19 U/L (ref 10–52)
ANION GAP SERPL CALC-SCNC: 12 MMOL/L (ref 10–20)
AST SERPL W P-5'-P-CCNC: 13 U/L (ref 9–39)
ATRIAL RATE: 75 BPM
BASE EXCESS BLDV CALC-SCNC: 3.2 MMOL/L (ref -2–3)
BASOPHILS # BLD AUTO: 0.05 X10*3/UL (ref 0–0.1)
BASOPHILS NFR BLD AUTO: 0.5 %
BILIRUB SERPL-MCNC: 0.4 MG/DL (ref 0–1.2)
BNP SERPL-MCNC: 24 PG/ML (ref 0–99)
BODY TEMPERATURE: 37 DEGREES CELSIUS
BUN SERPL-MCNC: 11 MG/DL (ref 6–23)
CALCIUM SERPL-MCNC: 9.4 MG/DL (ref 8.6–10.3)
CARDIAC TROPONIN I PNL SERPL HS: 6 NG/L (ref 0–20)
CARDIAC TROPONIN I PNL SERPL HS: 6 NG/L (ref 0–20)
CHLORIDE SERPL-SCNC: 103 MMOL/L (ref 98–107)
CO2 SERPL-SCNC: 28 MMOL/L (ref 21–32)
CREAT SERPL-MCNC: 1.21 MG/DL (ref 0.5–1.3)
EGFRCR SERPLBLD CKD-EPI 2021: 66 ML/MIN/1.73M*2
EOSINOPHIL # BLD AUTO: 0.5 X10*3/UL (ref 0–0.7)
EOSINOPHIL NFR BLD AUTO: 5.2 %
ERYTHROCYTE [DISTWIDTH] IN BLOOD BY AUTOMATED COUNT: 13.1 % (ref 11.5–14.5)
FLUAV RNA RESP QL NAA+PROBE: NOT DETECTED
FLUBV RNA RESP QL NAA+PROBE: NOT DETECTED
GLUCOSE SERPL-MCNC: 154 MG/DL (ref 74–99)
HCO3 BLDV-SCNC: 31.5 MMOL/L (ref 22–26)
HCT VFR BLD AUTO: 42.9 % (ref 41–52)
HGB BLD-MCNC: 13.7 G/DL (ref 13.5–17.5)
IMM GRANULOCYTES # BLD AUTO: 0.06 X10*3/UL (ref 0–0.7)
IMM GRANULOCYTES NFR BLD AUTO: 0.6 % (ref 0–0.9)
INHALED O2 CONCENTRATION: 36 %
LACTATE SERPL-SCNC: 1.7 MMOL/L (ref 0.4–2)
LYMPHOCYTES # BLD AUTO: 1.6 X10*3/UL (ref 1.2–4.8)
LYMPHOCYTES NFR BLD AUTO: 16.8 %
MAGNESIUM SERPL-MCNC: 1.8 MG/DL (ref 1.6–2.4)
MCH RBC QN AUTO: 31.1 PG (ref 26–34)
MCHC RBC AUTO-ENTMCNC: 31.9 G/DL (ref 32–36)
MCV RBC AUTO: 97 FL (ref 80–100)
MONOCYTES # BLD AUTO: 0.2 X10*3/UL (ref 0.1–1)
MONOCYTES NFR BLD AUTO: 2.1 %
NEUTROPHILS # BLD AUTO: 7.12 X10*3/UL (ref 1.2–7.7)
NEUTROPHILS NFR BLD AUTO: 74.8 %
NRBC BLD-RTO: 0 /100 WBCS (ref 0–0)
OXYHGB MFR BLDV: 50.4 % (ref 45–75)
P AXIS: 78 DEGREES
PCO2 BLDV: 64 MM HG (ref 41–51)
PH BLDV: 7.3 PH (ref 7.33–7.43)
PLATELET # BLD AUTO: 270 X10*3/UL (ref 150–450)
PO2 BLDV: 34 MM HG (ref 35–45)
POTASSIUM SERPL-SCNC: 4.9 MMOL/L (ref 3.5–5.3)
PR INTERVAL: 155 MS
PROT SERPL-MCNC: 6.9 G/DL (ref 6.4–8.2)
Q ONSET: 251 MS
QRS COUNT: 12 BEATS
QRS DURATION: 87 MS
QT INTERVAL: 356 MS
QTC CALCULATION(BAZETT): 393 MS
QTC FREDERICIA: 380 MS
R AXIS: 69 DEGREES
RBC # BLD AUTO: 4.41 X10*6/UL (ref 4.5–5.9)
SAO2 % BLDV: 51 % (ref 45–75)
SARS-COV-2 RNA RESP QL NAA+PROBE: NOT DETECTED
SODIUM SERPL-SCNC: 138 MMOL/L (ref 136–145)
T AXIS: 68 DEGREES
T OFFSET: 429 MS
VENTRICULAR RATE: 73 BPM
WBC # BLD AUTO: 9.5 X10*3/UL (ref 4.4–11.3)

## 2025-03-05 PROCEDURE — 83880 ASSAY OF NATRIURETIC PEPTIDE: CPT | Performed by: EMERGENCY MEDICINE

## 2025-03-05 PROCEDURE — 2500000002 HC RX 250 W HCPCS SELF ADMINISTERED DRUGS (ALT 637 FOR MEDICARE OP, ALT 636 FOR OP/ED): Performed by: EMERGENCY MEDICINE

## 2025-03-05 PROCEDURE — 36415 COLL VENOUS BLD VENIPUNCTURE: CPT | Performed by: EMERGENCY MEDICINE

## 2025-03-05 PROCEDURE — 2500000004 HC RX 250 GENERAL PHARMACY W/ HCPCS (ALT 636 FOR OP/ED): Performed by: EMERGENCY MEDICINE

## 2025-03-05 PROCEDURE — 71045 X-RAY EXAM CHEST 1 VIEW: CPT | Performed by: INTERNAL MEDICINE

## 2025-03-05 PROCEDURE — 80053 COMPREHEN METABOLIC PANEL: CPT | Performed by: EMERGENCY MEDICINE

## 2025-03-05 PROCEDURE — 83735 ASSAY OF MAGNESIUM: CPT | Performed by: EMERGENCY MEDICINE

## 2025-03-05 PROCEDURE — 87636 SARSCOV2 & INF A&B AMP PRB: CPT | Performed by: EMERGENCY MEDICINE

## 2025-03-05 PROCEDURE — 71045 X-RAY EXAM CHEST 1 VIEW: CPT

## 2025-03-05 PROCEDURE — 85025 COMPLETE CBC W/AUTO DIFF WBC: CPT | Performed by: EMERGENCY MEDICINE

## 2025-03-05 PROCEDURE — 93005 ELECTROCARDIOGRAM TRACING: CPT

## 2025-03-05 PROCEDURE — 84484 ASSAY OF TROPONIN QUANT: CPT | Performed by: EMERGENCY MEDICINE

## 2025-03-05 PROCEDURE — 94640 AIRWAY INHALATION TREATMENT: CPT

## 2025-03-05 PROCEDURE — 99285 EMERGENCY DEPT VISIT HI MDM: CPT | Performed by: EMERGENCY MEDICINE

## 2025-03-05 PROCEDURE — 82805 BLOOD GASES W/O2 SATURATION: CPT | Performed by: EMERGENCY MEDICINE

## 2025-03-05 PROCEDURE — 83605 ASSAY OF LACTIC ACID: CPT | Performed by: EMERGENCY MEDICINE

## 2025-03-05 PROCEDURE — 82810 BLOOD GASES O2 SAT ONLY: CPT | Mod: CCI | Performed by: EMERGENCY MEDICINE

## 2025-03-05 PROCEDURE — 96365 THER/PROPH/DIAG IV INF INIT: CPT

## 2025-03-05 RX ORDER — PREDNISONE 20 MG/1
40 TABLET ORAL DAILY
Qty: 10 TABLET | Refills: 0 | Status: SHIPPED | OUTPATIENT
Start: 2025-03-05 | End: 2025-03-10

## 2025-03-05 RX ORDER — IPRATROPIUM BROMIDE AND ALBUTEROL SULFATE 2.5; .5 MG/3ML; MG/3ML
3 SOLUTION RESPIRATORY (INHALATION) EVERY 20 MIN
Status: DISPENSED | OUTPATIENT
Start: 2025-03-05 | End: 2025-03-05

## 2025-03-05 RX ORDER — MAGNESIUM SULFATE HEPTAHYDRATE 40 MG/ML
2 INJECTION, SOLUTION INTRAVENOUS ONCE
Status: COMPLETED | OUTPATIENT
Start: 2025-03-05 | End: 2025-03-05

## 2025-03-05 RX ADMIN — IPRATROPIUM BROMIDE AND ALBUTEROL SULFATE 3 ML: 2.5; .5 SOLUTION RESPIRATORY (INHALATION) at 12:59

## 2025-03-05 RX ADMIN — MAGNESIUM SULFATE HEPTAHYDRATE 2 G: 40 INJECTION, SOLUTION INTRAVENOUS at 12:59

## 2025-03-05 NOTE — DISCHARGE INSTRUCTIONS
Continue ALL your regular medications as prescribed  USE your inhalers and nebulizers as directed  Take Prednisone as directed:  next dose tomorrow  RETURN if acutely worse or new worrisome symptoms

## 2025-03-05 NOTE — ED PROVIDER NOTES
Department of Emergency Medicine   ED  Provider Note  Admit Date/RoomTime: 3/5/2025 12:04 PM  ED Room: 06/06                  History of Present Illness:   Darron Resendez is a 64 y.o. male presenting to the ED for shortness of breath, beginning 0600.  The complaint has been persistent, moderate in severity, and worsened by nothing.  Patient states that he feels like he has exacerbation of his COPD.  He wears 4 L nasal cannula at time.  He was given IV Solu-Medrol and a DuoNeb aerosol and route feels a little bit better here on arrival.  He has had minimal nonproductive cough.  No es fever or chills.  No significant associated chest pain.  No leg pain swelling or calf tenderness.  No recent travel.  He feels this is an exacerbation of COPD.      Review of Systems:   Pertinent positives and review of systems as noted above.  Remaining 10 review of systems is negative or noncontributory to today's episode of care.        --------------------------------------------- PAST HISTORY ---------------------------------------------  Past Medical History:  has a past medical history of Chronic systolic (congestive) heart failure (09/22/2020), COPD (chronic obstructive pulmonary disease) (Multi), Myocardial infarction (Multi) (07/01/2024), Old myocardial infarction, Old myocardial infarction (09/22/2020), Old myocardial infarction, Personal history of other diseases of the circulatory system, Personal history of other endocrine, nutritional and metabolic disease, and Presence of stent in anterior descending branch of left coronary artery (07/01/2024).    Past Surgical History:  has a past surgical history that includes Tonsillectomy (06/01/2016); Vasectomy (06/01/2016); Mandible surgery (06/01/2016); and Other surgical history (06/29/2020).    Social History:  reports that he has been smoking cigarettes. He has never used smokeless tobacco. He reports that he does not currently use alcohol. He reports that he does not use  drugs.    Family History: family history is not on file. Unless otherwise noted, family history is non contributory    Discharge Medication List as of 3/5/2025  3:50 PM        START taking these medications    Details   predniSONE (Deltasone) 20 mg tablet Take 2 tablets (40 mg) by mouth once daily for 5 days., Starting Wed 3/5/2025, Until Mon 3/10/2025, Normal           CONTINUE these medications which have NOT CHANGED    Details   albuterol 90 mcg/actuation inhaler Inhale 2 puffs every 6 hours if needed for wheezing., Starting Mon 11/18/2024, Normal      aspirin 81 mg EC tablet Take 1 tablet (81 mg) by mouth once daily., Starting Thu 6/25/2020, Historical Med      atorvastatin (Lipitor) 80 mg tablet Take 1 tablet (80 mg) by mouth once daily at bedtime., Starting Wed 3/20/2024, Normal      budesonide (Pulmicort) 0.5 mg/2 mL nebulizer solution Take 2 mL (0.5 mg) by nebulization 2 times a day. Rinse mouth with water after use to reduce aftertaste and incidence of candidiasis. Do not swallow., Starting Tue 2/25/2025, Print      buPROPion SR (Wellbutrin SR) 100 mg 12 hr tablet Take 1 tablet (100 mg) by mouth 2 times a day. Do not crush, chew, or split., Starting Tue 2/25/2025, Until Wed 2/25/2026, Normal      carvedilol (Coreg) 6.25 mg tablet Take 1 tablet (6.25 mg) by mouth 2 times a day with meals., Starting Wed 3/20/2024, Normal      cyanocobalamin (Vitamin B-12) 1,000 mcg tablet Take 1 tablet (1,000 mcg) by mouth once daily., Historical Med      fluticasone propion-salmeteroL (Wixela Inhub) 250-50 mcg/dose diskus inhaler Inhale 1 puff 2 times a day. Rinse mouth with water after use to reduce aftertaste and incidence of candidiasis. Do not swallow., Starting Thu 1/2/2025, Normal      hydrOXYzine HCL (Atarax) 25 mg tablet Take by mouth., Historical Med      ipratropium-albuteroL (Duo-Neb) 0.5-2.5 mg/3 mL nebulizer solution Take 3 mL by nebulization 4 times a day., Starting Thu 12/5/2024, Normal      lisinopril 5 mg  tablet Take 1 tablet (5 mg) by mouth once daily., Starting Thu 1/2/2025, Until Fri 1/2/2026, Normal      omega 3-dha-epa-fish oil (Fish OiL) 1,000 mg (120 mg-180 mg) capsule Take 1 capsule (1,000 mg) by mouth once daily., Historical Med      oxygen (O2) gas therapy Inhale 1 each every 12 hours., Starting Sun 11/24/2024, No Print      theophylline ER (Uniphyl) 400 mg 24 hr tablet Take 1 tablet (400 mg) by mouth once daily. Do not crush or chew., Starting Tue 2/25/2025, Until Wed 2/25/2026, Normal            The patient’s home medications have been reviewed.    Allergies: Penicillin g    -------------------------------------------------- RESULTS -------------------------------------------------  All laboratory and radiology results have been personally reviewed by myself   LABS:  Labs Reviewed   CBC WITH AUTO DIFFERENTIAL - Abnormal       Result Value    WBC 9.5      nRBC 0.0      RBC 4.41 (*)     Hemoglobin 13.7      Hematocrit 42.9      MCV 97      MCH 31.1      MCHC 31.9 (*)     RDW 13.1      Platelets 270      Neutrophils % 74.8      Immature Granulocytes %, Automated 0.6      Lymphocytes % 16.8      Monocytes % 2.1      Eosinophils % 5.2      Basophils % 0.5      Neutrophils Absolute 7.12      Immature Granulocytes Absolute, Automated 0.06      Lymphocytes Absolute 1.60      Monocytes Absolute 0.20      Eosinophils Absolute 0.50      Basophils Absolute 0.05     COMPREHENSIVE METABOLIC PANEL - Abnormal    Glucose 154 (*)     Sodium 138      Potassium 4.9      Chloride 103      Bicarbonate 28      Anion Gap 12      Urea Nitrogen 11      Creatinine 1.21      eGFR 66      Calcium 9.4      Albumin 4.2      Alkaline Phosphatase 60      Total Protein 6.9      AST 13      Bilirubin, Total 0.4      ALT 19     BLOOD GAS VENOUS - Abnormal    POCT pH, Venous 7.30 (*)     POCT pCO2, Venous 64 (*)     POCT pO2, Venous 34 (*)     POCT SO2, Venous 51      POCT Oxy Hemoglobin, Venous 50.4      POCT Base Excess, Venous 3.2 (*)      POCT HCO3 Calculated, Venous 31.5 (*)     Patient Temperature 37.0      FiO2 36     MAGNESIUM - Normal    Magnesium 1.80     LACTATE - Normal    Lactate 1.7      Narrative:     Venipuncture immediately after or during the administration of Metamizole may lead to falsely low results. Testing should be performed immediately prior to Metamizole dosing.   B-TYPE NATRIURETIC PEPTIDE - Normal    BNP 24      Narrative:        <100 pg/mL - Heart failure unlikely  100-299 pg/mL - Intermediate probability of acute heart                  failure exacerbation. Correlate with clinical                  context and patient history.    >=300 pg/mL - Heart Failure likely. Correlate with clinical                  context and patient history.    BNP testing is performed using different testing methodology at [facility] than at [facility]. Direct result comparisons should only be made within the same method.      SARS-COV-2 AND INFLUENZA A/B PCR - Normal    Flu A Result Not Detected      Flu B Result Not Detected      Coronavirus 2019, PCR Not Detected      Narrative:     This assay is an FDA-cleared, in vitro diagnostic nucleic acid amplification test for the qualitative detection and differentiation of SARS CoV-2/ Influenza A/B from nasopharyngeal specimens collected from individuals with signs and symptoms of respiratory tract infections, and has been validated for use at [facility]. Negative results do not preclude COVID-19/ Influenza A/B infections and should not be used as the sole basis for diagnosis, treatment, or other management decisions. Testing for SARS CoV-2 is recommended only for patients who meet current clinical and/or epidemiological criteria defined by federal, state, or local public health directives.   SERIAL TROPONIN-INITIAL - Normal    Troponin I, High Sensitivity 6      Narrative:     Less than 99th percentile of normal range cutoff-  Female and children under  18 years old <14 ng/L; Male <21 ng/L: Negative  Repeat testing should be performed if clinically indicated.     Female and children under 18 years old 14-50 ng/L; Male 21-50 ng/L:  Consistent with possible cardiac damage and possible increased clinical   risk. Serial measurements may help to assess extent of myocardial damage.     >50 ng/L: Consistent with cardiac damage, increased clinical risk and  myocardial infarction. Serial measurements may help assess extent of   myocardial damage.      NOTE: Children less than 1 year old may have higher baseline troponin   levels and results should be interpreted in conjunction with the overall   clinical context.     NOTE: Troponin I testing is performed using a different   testing methodology at [facility] than at other   [facility]. Direct result comparisons should only   be made within the same method.   SERIAL TROPONIN, 1 HOUR - Normal    Troponin I, High Sensitivity 6      Narrative:     Less than 99th percentile of normal range cutoff-  Female and children under 18 years old <14 ng/L; Male <21 ng/L: Negative  Repeat testing should be performed if clinically indicated.     Female and children under 18 years old 14-50 ng/L; Male 21-50 ng/L:  Consistent with possible cardiac damage and possible increased clinical   risk. Serial measurements may help to assess extent of myocardial damage.     >50 ng/L: Consistent with cardiac damage, increased clinical risk and  myocardial infarction. Serial measurements may help assess extent of   myocardial damage.      NOTE: Children less than 1 year old may have higher baseline troponin   levels and results should be interpreted in conjunction with the overall   clinical context.     NOTE: Troponin I testing is performed using a different   testing methodology at [facility] than at other   [facility]. Direct result comparisons should only   be made within the same method.   TROPONIN SERIES-  "(INITIAL, 1 HR)    Narrative:     The following orders were created for panel order Troponin I Series, High Sensitivity (0, 1 HR).  Procedure                               Abnormality         Status                     ---------                               -----------         ------                     Troponin I, High Sensiti...[[id number]]  Normal              Final result               Troponin, High Sensitivi...[[id number]]  Normal              Final result                 Please view results for these tests on the individual orders.         RADIOLOGY:  Interpreted by Radiologist.  XR chest 1 view   Final Result   No significant interval change with similar appearance of coarse   interstitial lung markings, likely secondary to chronic interstitial   lung disease. Superimposed atypical infection should be considered in   the appropriate clinical context.        Signed by: Asha Avendaño 3/5/2025 12:57 PM   Dictation workstation:   [id number]          Encounter Date: 03/05/25   ECG 12 lead   Result Value    Ventricular Rate 73    Atrial Rate 75    RI Interval 155    QRS Duration 87    QT Interval 356    QTC Calculation(Bazett) 393    P Axis 78    R Axis 69    T Axis 68    QRS Count 12    Q Onset 251    T Offset 429    QTC Fredericia 380    Narrative    Sinus rhythm    See ED provider note for full interpretation and clinical correlation  Confirmed by Daniel Ozuna ([id number]) on 3/5/2025 11:34:56 AM     ------------------------- NURSING NOTES AND VITALS REVIEWED ---------------------------   The nursing notes within the ED encounter and vital signs as below have been reviewed.   /88   Pulse 75   Temp 36.3 °C (97.3 °F) (Temporal)   Resp 18   Ht 1.702 m (5' 7\")   Wt 95.3 kg (210 lb)   SpO2 97%   BMI 32.89 kg/m²   Oxygen Saturation Interpretation: Normal      ---------------------------------------------------PHYSICAL EXAM--------------------------------------  Physical Exam  Vitals and nursing note " reviewed.   Constitutional:       General: He is not in acute distress.     Appearance: He is well-developed. He is not ill-appearing or toxic-appearing.      Interventions: He is not intubated.  HENT:      Head: Normocephalic and atraumatic.      Mouth/Throat:      Mouth: Mucous membranes are moist.      Pharynx: Oropharynx is clear. No pharyngeal swelling.   Eyes:      Conjunctiva/sclera: Conjunctivae normal.   Neck:      Thyroid: No thyromegaly.      Vascular: No hepatojugular reflux or JVD.      Trachea: No tracheal deviation.   Cardiovascular:      Rate and Rhythm: Normal rate and regular rhythm.      Pulses: Normal pulses.      Heart sounds: Normal heart sounds. No murmur heard.  Pulmonary:      Effort: Pulmonary effort is normal. Tachypnea present. No accessory muscle usage or respiratory distress. He is not intubated.      Breath sounds: No stridor. Examination of the right-middle field reveals decreased breath sounds. Examination of the left-middle field reveals decreased breath sounds. Examination of the right-lower field reveals decreased breath sounds and wheezing. Examination of the left-lower field reveals decreased breath sounds and wheezing. Decreased breath sounds and wheezing present. No rhonchi or rales.   Chest:      Chest wall: No mass, deformity, tenderness, crepitus or edema. There is no dullness to percussion.   Abdominal:      Palpations: Abdomen is soft. There is no hepatomegaly, splenomegaly or mass.      Tenderness: There is no abdominal tenderness. There is no guarding or rebound.   Musculoskeletal:         General: No swelling. Normal range of motion.      Cervical back: Normal range of motion and neck supple.      Right lower leg: No tenderness. No edema.      Left lower leg: No tenderness. No edema.   Lymphadenopathy:      Cervical: No cervical adenopathy.   Skin:     General: Skin is warm and dry.      Capillary Refill: Capillary refill takes less than 2 seconds.      Coloration:  Skin is not cyanotic.      Nails: There is no clubbing.   Neurological:      Mental Status: He is alert and oriented to person, place, and time.   Psychiatric:         Mood and Affect: Mood normal.            Procedures  None  ------------------------------ ED COURSE/MEDICAL DECISION MAKING----------------------    Medical Decision Making:   Patient was seen and examined by me.  An IV is started appropriate labs ordered.  Patient has diminished breath sounds in all fields.  Will give a couple of DuoNeb aerosols and some IV magnesium sulfate.  Will obtain viral testing and a chest x-ray as well as lab work.    On reevaluation after DuoNeb aerosols, and after IV magnesium, the patient is resting on his 4 L and feels back to baseline.  The patient would like to go home.  I do not appreciate any acute infectious process at this time.  The patient we discharged home in improved and stable condition.  Patient restarted on prednisone burst 40 mg once a day for 5 days.  I have explained the patient he should be taking all his regular medications as directed.  They should follow-up with primary care and/or his pulmonologist.  Patient is welcome to return if acutely worsening worrisome symptoms.    ED Course as of 03/06/25 0704   Wed Mar 05, 2025   1335 EKG at 1038 interpreted by me.  Normal sinus rhythm 73 bpm.  Normal axis.  Normal intervals.  No acute ST-T change.  No STEMI. [EC]   1335 CBC is unremarkable. [EC]   1336 Comprehensive metabolic panel is unremarkable. [EC]   1336 BNP is normal at 24  Troponin is normal at 6  Magnesium is normal at 1.80 [EC]   1336 Venous blood gas pH 7.30, pCO2 64, pO2 34, bicarb 31.5 this is at or near baseline compared to previous VBG [EC]   1337 Chest x-ray interpreted by me and contemporaneous interpretation by radiology:  IMPRESSION:  No significant interval change with similar appearance of coarse  interstitial lung markings, likely secondary to chronic interstitial  lung disease.  Superimposed atypical infection should be considered in  the appropriate clinical context.   [EC]   1451 First troponin 6, second troponin 60    Influenza A is not detected  Influenza B is not detected  Coronavirus is not detected [EC]      ED Course User Index  [EC] Kt Mendez DO         Diagnoses as of 03/06/25 0704   Acute exacerbation of COPD with asthma      Counseling:   The emergency provider has spoken with the patient and discussed today’s results, in addition to providing specific details for the plan of care and counseling regarding the diagnosis and prognosis.  Questions are answered at this time and they are agreeable with the plan.      --------------------------------- IMPRESSION AND DISPOSITION ---------------------------------        IMPRESSION  1. Acute exacerbation of COPD with asthma        DISPOSITION  Disposition: Discharge to home  Patient condition is fair      Billing Provider Critical Care Time: 0 minutes     Kt Mendez DO  03/06/25 0704

## 2025-03-05 NOTE — ED TRIAGE NOTES
Pt states that he started feeling SOB around 0600 this morning. He has severe COPD and states no relief with intervention at home. EMS administered 125mg solumedrol and a duoneb tx and he states he feels a little better. Pt on 4L NC in triage (baseline) and is 93-94%.

## 2025-03-20 ENCOUNTER — APPOINTMENT (OUTPATIENT)
Dept: CARDIOLOGY | Facility: CLINIC | Age: 65
End: 2025-03-20
Payer: MEDICARE

## 2025-03-20 VITALS
WEIGHT: 210 LBS | SYSTOLIC BLOOD PRESSURE: 120 MMHG | HEART RATE: 75 BPM | BODY MASS INDEX: 32.96 KG/M2 | DIASTOLIC BLOOD PRESSURE: 70 MMHG | HEIGHT: 67 IN | OXYGEN SATURATION: 90 %

## 2025-03-20 DIAGNOSIS — I42.8 OTHER CARDIOMYOPATHY: ICD-10-CM

## 2025-03-20 DIAGNOSIS — I25.10 ASHD (ARTERIOSCLEROTIC HEART DISEASE): Primary | ICD-10-CM

## 2025-03-20 DIAGNOSIS — Z95.5 PRESENCE OF STENT IN LAD CORONARY ARTERY: ICD-10-CM

## 2025-03-20 DIAGNOSIS — F17.218 CIGARETTE NICOTINE DEPENDENCE WITH OTHER NICOTINE-INDUCED DISORDER: ICD-10-CM

## 2025-03-20 DIAGNOSIS — R07.89 ATYPICAL CHEST PAIN: ICD-10-CM

## 2025-03-20 DIAGNOSIS — E78.5 HYPERLIPIDEMIA, UNSPECIFIED HYPERLIPIDEMIA TYPE: ICD-10-CM

## 2025-03-20 DIAGNOSIS — I10 PRIMARY HYPERTENSION: ICD-10-CM

## 2025-03-20 PROCEDURE — 4004F PT TOBACCO SCREEN RCVD TLK: CPT | Performed by: INTERNAL MEDICINE

## 2025-03-20 PROCEDURE — 1160F RVW MEDS BY RX/DR IN RCRD: CPT | Performed by: INTERNAL MEDICINE

## 2025-03-20 PROCEDURE — 1159F MED LIST DOCD IN RCRD: CPT | Performed by: INTERNAL MEDICINE

## 2025-03-20 PROCEDURE — 99215 OFFICE O/P EST HI 40 MIN: CPT | Performed by: INTERNAL MEDICINE

## 2025-03-20 PROCEDURE — 3008F BODY MASS INDEX DOCD: CPT | Performed by: INTERNAL MEDICINE

## 2025-03-20 PROCEDURE — 3074F SYST BP LT 130 MM HG: CPT | Performed by: INTERNAL MEDICINE

## 2025-03-20 PROCEDURE — 93010 ELECTROCARDIOGRAM REPORT: CPT | Performed by: INTERNAL MEDICINE

## 2025-03-20 PROCEDURE — 3078F DIAST BP <80 MM HG: CPT | Performed by: INTERNAL MEDICINE

## 2025-03-20 PROCEDURE — 99215 OFFICE O/P EST HI 40 MIN: CPT | Mod: 25 | Performed by: INTERNAL MEDICINE

## 2025-03-20 PROCEDURE — 93005 ELECTROCARDIOGRAM TRACING: CPT | Performed by: INTERNAL MEDICINE

## 2025-03-20 PROCEDURE — 1157F ADVNC CARE PLAN IN RCRD: CPT | Performed by: INTERNAL MEDICINE

## 2025-03-20 RX ORDER — DOBUTAMINE HYDROCHLORIDE 100 MG/100ML
5-40 INJECTION INTRAVENOUS CONTINUOUS
OUTPATIENT
Start: 2025-03-20

## 2025-03-20 RX ORDER — ATROPINE SULFATE 0.1 MG/ML
.25-5 INJECTION INTRAVENOUS ONCE AS NEEDED
OUTPATIENT
Start: 2025-03-20

## 2025-03-20 RX ORDER — ATORVASTATIN CALCIUM 80 MG/1
80 TABLET, FILM COATED ORAL NIGHTLY
Qty: 90 TABLET | Refills: 3 | Status: ON HOLD | OUTPATIENT
Start: 2025-03-20

## 2025-03-20 RX ORDER — CARVEDILOL 6.25 MG/1
6.25 TABLET ORAL
Qty: 180 TABLET | Refills: 3 | Status: ON HOLD | OUTPATIENT
Start: 2025-03-20

## 2025-03-20 ASSESSMENT — ENCOUNTER SYMPTOMS
OCCASIONAL FEELINGS OF UNSTEADINESS: 0
DEPRESSION: 0
LOSS OF SENSATION IN FEET: 0

## 2025-03-20 NOTE — PROGRESS NOTES
"Chief Complaint:   Follow-up (Annual, difficulty breathing, chest pains, no pcp)     History Of Present Illness:    Darron Resendez is a 64 y.o. male presenting for annual follow-up.    He has recently been diagnosed with very severe COPD and started on treatment that he is not too adherent to.  He states he cannot take Theophylline as it is causing chest pain.  He has had few emergency room trips.  He states he had an attack of COPD last night but subsided with the breathing treatment.  He states he is having occasional chest pain very sharp 3 out of 10 that last few seconds without any clear aggravating or relieving factors.     He has a previous history of PCI to LAD in 2011 and hyperlipidemia, LV dysfunction. He got lost to follow-up and stopped taking his medications and presented with STEMI in July 2020 and had urgent PCI to LAD on June 22, 2020. Post MI ejection fraction was 35% but more recently improved to 65% in July 2020.  He does not have palpitations, ankle swelling, lightheadedness or loss of consciousness.  He has chronic shortness of breath with exertion.  He was not interested in quitting smoking in the past but after development of this severe COPD he has cut tobacco use.  Here today accompanied by daughter.           EKG showed sinus rhythm with old anteroseptal MI. His last lipid profile from April 2023 was favorable.      Last Recorded Vitals:  Vitals:    03/20/25 1343   BP: 120/70   BP Location: Left arm   Pulse: 75   SpO2: 90%   Weight: 95.3 kg (210 lb)   Height: 1.702 m (5' 7\")       Past Medical History:  He has a past medical history of Chronic systolic (congestive) heart failure (09/22/2020), COPD (chronic obstructive pulmonary disease) (Multi), Myocardial infarction (Multi) (07/01/2024), Old myocardial infarction, Old myocardial infarction (09/22/2020), Old myocardial infarction, Personal history of other diseases of the circulatory system, Personal history of other endocrine, nutritional " and metabolic disease, and Presence of stent in anterior descending branch of left coronary artery (07/01/2024).    Past Surgical History:  He has a past surgical history that includes Tonsillectomy (06/01/2016); Vasectomy (06/01/2016); Mandible surgery (06/01/2016); and Other surgical history (06/29/2020).      Social History:  He reports that he has been smoking cigarettes. He has never used smokeless tobacco. He reports that he does not currently use alcohol. He reports that he does not use drugs.    Family History:  No family history on file.     Allergies:  Penicillin g    Outpatient Medications:  Current Outpatient Medications   Medication Instructions    albuterol 90 mcg/actuation inhaler 2 puffs, inhalation, Every 6 hours PRN    aspirin 81 mg, Daily    atorvastatin (LIPITOR) 80 mg, oral, Nightly    budesonide (PULMICORT) 0.5 mg, nebulization, 2 times daily RT, Rinse mouth with water after use to reduce aftertaste and incidence of candidiasis. Do not swallow.    buPROPion SR (WELLBUTRIN SR) 100 mg, oral, 2 times daily, Do not crush, chew, or split.    carvedilol (COREG) 6.25 mg, oral, 2 times daily (morning and late afternoon)    cyanocobalamin (VITAMIN B-12) 1,000 mcg, Daily    fluticasone propion-salmeteroL (Wixela Inhub) 250-50 mcg/dose diskus inhaler 1 puff, inhalation, 2 times daily RT, Rinse mouth with water after use to reduce aftertaste and incidence of candidiasis. Do not swallow.    hydrOXYzine HCL (Atarax) 25 mg tablet Take by mouth.    ipratropium-albuteroL (Duo-Neb) 0.5-2.5 mg/3 mL nebulizer solution 3 mL, nebulization, 4 times daily RT    lisinopril 5 mg, oral, Daily    omega 3-dha-epa-fish oil (Fish OiL) 1,000 mg (120 mg-180 mg) capsule 1 capsule, Daily    oxygen (O2) gas therapy 1 each, inhalation, Every 12 hours    theophylline ER (UNIPHYL) 400 mg, oral, Daily, Do not crush or chew.       Physical Exam:  Physical Exam  Vitals reviewed.   Constitutional:       Appearance: Normal appearance.  "  Neck:      Vascular: No carotid bruit or JVD.   Cardiovascular:      Rate and Rhythm: Normal rate and regular rhythm.      Heart sounds: Normal heart sounds, S1 normal and S2 normal. No murmur heard.  Pulmonary:      Effort: Pulmonary effort is normal.      Breath sounds: Decreased breath sounds and wheezing present.   Abdominal:      General: Abdomen is flat. Bowel sounds are normal.      Palpations: Abdomen is soft.   Musculoskeletal:      Right lower leg: No edema.      Left lower leg: No edema.   Skin:     General: Skin is warm.   Neurological:      Mental Status: He is alert. Mental status is at baseline.   Psychiatric:         Mood and Affect: Mood normal.         Behavior: Behavior normal.           Last Labs:  CBC -  Lab Results   Component Value Date    WBC 9.5 03/05/2025    HGB 13.7 03/05/2025    HCT 42.9 03/05/2025    MCV 97 03/05/2025     03/05/2025       CMP -  Lab Results   Component Value Date    CALCIUM 9.4 03/05/2025    PROT 6.9 03/05/2025    ALBUMIN 4.2 03/05/2025    AST 13 03/05/2025    ALT 19 03/05/2025    ALKPHOS 60 03/05/2025    BILITOT 0.4 03/05/2025       LIPID PANEL -   Lab Results   Component Value Date    CHOL 123 04/27/2023    TRIG 64 04/27/2023    HDL 41.1 04/27/2023    CHHDL 3.0 04/27/2023    LDLF 69 04/27/2023    VLDL 13 04/27/2023       RENAL FUNCTION PANEL -   Lab Results   Component Value Date    GLUCOSE 154 (H) 03/05/2025     03/05/2025    K 4.9 03/05/2025     03/05/2025    CO2 28 03/05/2025    ANIONGAP 12 03/05/2025    BUN 11 03/05/2025    CREATININE 1.21 03/05/2025    GFRMALE >90 04/27/2023    CALCIUM 9.4 03/05/2025    ALBUMIN 4.2 03/05/2025        Lab Results   Component Value Date    BNP 24 03/05/2025    HGBA1C 7.1 (H) 11/11/2024       Last Cardiology Tests:  ECG:  ECG 12 lead 03/05/2025      Echo:  No results found for this or any previous visit from the past 1095 days.      Ejection Fractions:  No results found for: \"EF\"    Cath:  No results found for " this or any previous visit from the past 1095 days.      Stress Test:  No results found for this or any previous visit from the past 1095 days.      Cardiac Imaging:  No results found for this or any previous visit from the past 1095 days.          Assessment/Plan   In summary Mr. Resendez is a 64-year-old gentleman with coronary artery disease, prior history of STEMI.     1-CAD-clinically doing well and is on appropriate medical therapy.   2-hyperlipidemia-lipid profile favorable.       3-cardiomyopathy/hypertension-LV function has improved post MI continue current medications blood pressure well-controlled.     4-tobacco use-not interested in quitting.    5-Severe shortness of breath-secondary to severe COPD.    6-atypical chest pain-unlikely to be cardiac.  Unsure he will be able to participate in a stress test with this degree of breathing difficulty.  Instead of ordering a regadenoson stress MPI ordered of dobutamine stress echo and hopefully the echo images would be diagnostic in quality.         Corrine Zelaya MD

## 2025-03-21 ENCOUNTER — OFFICE VISIT (OUTPATIENT)
Dept: PULMONOLOGY | Facility: HOSPITAL | Age: 65
End: 2025-03-21
Payer: MEDICARE

## 2025-03-21 VITALS
HEIGHT: 67 IN | HEART RATE: 83 BPM | WEIGHT: 210 LBS | DIASTOLIC BLOOD PRESSURE: 69 MMHG | RESPIRATION RATE: 16 BRPM | SYSTOLIC BLOOD PRESSURE: 113 MMHG | TEMPERATURE: 96.2 F | BODY MASS INDEX: 32.96 KG/M2 | OXYGEN SATURATION: 90 %

## 2025-03-21 DIAGNOSIS — J96.11 CHRONIC HYPOXIC RESPIRATORY FAILURE (MULTI): ICD-10-CM

## 2025-03-21 DIAGNOSIS — J44.9 STAGE 4 VERY SEVERE COPD BY GOLD CLASSIFICATION (MULTI): Primary | ICD-10-CM

## 2025-03-21 DIAGNOSIS — F17.210 CIGARETTE NICOTINE DEPENDENCE WITHOUT COMPLICATION: ICD-10-CM

## 2025-03-21 DIAGNOSIS — J44.9 CHRONIC OBSTRUCTIVE PULMONARY DISEASE, UNSPECIFIED COPD TYPE (MULTI): ICD-10-CM

## 2025-03-21 LAB
ATRIAL RATE: 75 BPM
P AXIS: 74 DEGREES
P OFFSET: 207 MS
P ONSET: 151 MS
PR INTERVAL: 140 MS
Q ONSET: 221 MS
QRS COUNT: 12 BEATS
QRS DURATION: 86 MS
QT INTERVAL: 366 MS
QTC CALCULATION(BAZETT): 408 MS
QTC FREDERICIA: 394 MS
R AXIS: 64 DEGREES
T AXIS: 87 DEGREES
T OFFSET: 404 MS
VENTRICULAR RATE: 75 BPM

## 2025-03-21 PROCEDURE — 99214 OFFICE O/P EST MOD 30 MIN: CPT | Performed by: INTERNAL MEDICINE

## 2025-03-21 PROCEDURE — 3078F DIAST BP <80 MM HG: CPT | Performed by: INTERNAL MEDICINE

## 2025-03-21 PROCEDURE — 3008F BODY MASS INDEX DOCD: CPT | Performed by: INTERNAL MEDICINE

## 2025-03-21 PROCEDURE — 4004F PT TOBACCO SCREEN RCVD TLK: CPT | Performed by: INTERNAL MEDICINE

## 2025-03-21 PROCEDURE — 3074F SYST BP LT 130 MM HG: CPT | Performed by: INTERNAL MEDICINE

## 2025-03-21 PROCEDURE — 1157F ADVNC CARE PLAN IN RCRD: CPT | Performed by: INTERNAL MEDICINE

## 2025-03-21 PROCEDURE — 1159F MED LIST DOCD IN RCRD: CPT | Performed by: INTERNAL MEDICINE

## 2025-03-21 RX ORDER — PREDNISONE 5 MG/1
5 TABLET ORAL DAILY
Qty: 30 TABLET | Refills: 0 | Status: ON HOLD | OUTPATIENT
Start: 2025-03-21

## 2025-03-21 RX ORDER — ENSIFENTRINE 3 MG/2.5ML
3 SUSPENSION RESPIRATORY (INHALATION) 2 TIMES DAILY
Qty: 1 ML | Refills: 11 | Status: ON HOLD | OUTPATIENT
Start: 2025-03-21 | End: 2025-04-20

## 2025-03-21 RX ORDER — IPRATROPIUM BROMIDE AND ALBUTEROL SULFATE 2.5; .5 MG/3ML; MG/3ML
3 SOLUTION RESPIRATORY (INHALATION) EVERY 4 HOURS
Qty: 450 ML | Refills: 11 | Status: ON HOLD | OUTPATIENT
Start: 2025-03-21

## 2025-03-21 ASSESSMENT — ENCOUNTER SYMPTOMS
FEVER: 0
CHILLS: 0
COUGH: 0
FATIGUE: 0
WHEEZING: 0
UNEXPECTED WEIGHT CHANGE: 0
SHORTNESS OF BREATH: 1
RHINORRHEA: 0

## 2025-03-21 NOTE — PATIENT INSTRUCTIONS
Please Duonebs (Ipratropium/albuterol) 4 to 5 times per day, everyday.   Please take Budesonide nebulizer twice a day, everyday rinse your mouth out after use.   Continue on 4L with exertion.   Call with any questions or concerns.  PLEASE STOP SMOKING ASAP.   Please attend Pulmonary rehab.   Please complete Sleep study asap.  Take prednisone 5 mg 1 tablet daily.   Start Ohtuvayre nebuilzer 1 inhalation in the morning and 1 inhalation in the evening.   Follow up with Dr. Valdes in 1 month.

## 2025-03-21 NOTE — PROGRESS NOTES
Subjective   Patient ID: Darron Resendez is a 64 y.o. male who presents for follow up COPD.     Shortness of Breath  Pertinent negatives include no chest pain, fever, leg swelling, rhinorrhea or wheezing.   : Patient has  (current smoker, ~ pack year history) with a PMHx of ?COPD (no PFT on file), CAD (STEMI) s/p stent, DM, HTN, HLD, HFrEF (EF 35% 6/2020, 65% 7/2020). Here for pulmonary follow-up for COPD. He was hospitalized twice since last office visit in October. He is not taking any medications for COPD due to cost and he states he does not feel like he needs them. He is wearing 4L oxygen prn. He states that he has not smoked in 17 days. He is short of breath on any activity and has a daily productive cough and wheezing. He still does not have prescription insurance and is unsure if he will get any.     He is here for follow up today since the last visit on Feb 25, 2025. He was in the ED on 2/8/25 and given prednisone taper. He states he takes Duoneb 2 to 3 times a day. He again went to ER on 3/5/25 and was given 40 mg daily x 5 days. He states he has started taking Budesonide. His girl friends states that he has gone down from 2 ppd to 1 ppw sometimes only. He has not cut down smoking further since last visit in Feb 2025 and remains on 1 PPW. He reports noticing TODD on mild activity and intermittent daily cough with scant expectoration, intermittent wheezing. He does note fatigue and EDS. He wakes up unrefreshed. His girl friends endorses snoring. He did not complete sleep study. He also did not take Theophylline and does not want to take it. He did not take Bupropion to help with smoking cessation. He has also not established with Pulmonary Rehab.       Review of Systems   Constitutional:  Negative for chills, fatigue, fever and unexpected weight change.   HENT:  Negative for congestion, postnasal drip and rhinorrhea.    Respiratory:  Positive for shortness of breath. Negative for cough (denies  hemoptysis.) and wheezing.    Cardiovascular:  Negative for chest pain and leg swelling.   All other systems reviewed and are negative.      Objective   Physical Exam  Vitals reviewed.   Constitutional:       Appearance: Normal appearance.   HENT:      Head: Normocephalic.   Cardiovascular:      Rate and Rhythm: Normal rate and regular rhythm.   Pulmonary:      Effort: Pulmonary effort is normal.      Breath sounds: Normal breath sounds.   Skin:     General: Skin is warm and dry.   Neurological:      Mental Status: He is alert.       Assessment/Plan   Assessment and Plan / Recommendations:  I counseled patient for his very severe COPD and ongoing smoking. Discussed prognosis. Educated and stressed importance of adherence to Rx and continuity of care.      1) Very severe COPD (GOLD 4) - PFT 9/3/24 with FEV1 27%; was given Breztri at discharge but he lost it and was very expensive; has not been using any inhalers, he has albuterol but not using; he reports breathing is terrible, has significant TODD with minimal activity, cough and wheezing; faint wheezing and tight on exam  - He is not taking any inhalers or meds for COPD due to not having insurance coverage our pharmacy program could not help. I explained to him at length that if he does not take the medications for his COPD that he will continue to be hospitalized frequently as his COPD is very severe and currently uncontrolled.   - Advised to take regular Duonebs QID and Budesonide BID, educated on importance of compliance   - would benefit from pulmonary rehab, reordered it and gave him the phone number.  -Add Ohtuvayre for further COPD management    I have ordered Pulmonary Rehab for him. I have advised him to take Duonebs QID and Budesonide BID. The nebs may be processed through his Medicare Part B since he does not have D coverage. Added Theophylline 400 mg daily but states that he got chest pain although his cardiologist reported it was likely non-cardiac but  overall does not have prescription coverage. Avoid prednisone if possible long term. He reports he will try to stop smoking and take low dose prednisone for short term until summer 2025.      2) Chronic hypoxic respiratory failure - 6MW 9/3/24, ambulated 38 m (stopped d/t back pain), 90% at rest on RA, 86% with exertion on RA, needed 3 L NC for pulse ox 89% with exertion  - continue supplemental O2 (RA at rest, 3 L with exertion and as needed)   - patient requesting Inogen concentrator because he feels it is smaller than the one he received from [facility];       3) Nicotine cigarette depedence - current smoker, 1 PPD   - He states that he has not smoked in 17 days.   -advised to start Bupropion 100 mg BID and report any intolerances. He does not want to take it and again will try to stop smoking on his own.    4) Hypersomnia with sleep apnea  -EDS, Fatigue, snoring, upper airway narrowing Mallampati IV, large neck circumference, BMP 32.8. STOP BANG 5. Recommend a Split night study asap.     Overall his COPD remains uncontrolled due to noncompliance with recommended medications and continued smoking. I explained that without taking his medications that he will continue to be hospitalized frequently. Patient has been hospitalized twice for COPD exacerbation since October 2024 and has had ER visits. If patient is readmitted and continues without medications for COPD will need to consider palliative care. I explained to patient.  also instructed him the importance of getting prescription coverage for him. Nebulizer medications are covered by his Part B since he does not have Part D.    I will bring him back in 1 month and will do short term Prednisone 5 mg daily. Recommend continued vaccinations. Ohtuvayre ordered and Pulmonary Rehab benefit discussed again.

## 2025-03-23 ENCOUNTER — APPOINTMENT (OUTPATIENT)
Dept: RADIOLOGY | Facility: HOSPITAL | Age: 65
DRG: 193 | End: 2025-03-23
Payer: MEDICARE

## 2025-03-23 ENCOUNTER — HOSPITAL ENCOUNTER (INPATIENT)
Facility: HOSPITAL | Age: 65
DRG: 193 | End: 2025-03-23
Attending: STUDENT IN AN ORGANIZED HEALTH CARE EDUCATION/TRAINING PROGRAM | Admitting: HOSPITALIST
Payer: MEDICARE

## 2025-03-23 ENCOUNTER — APPOINTMENT (OUTPATIENT)
Dept: CARDIOLOGY | Facility: HOSPITAL | Age: 65
DRG: 193 | End: 2025-03-23
Payer: MEDICARE

## 2025-03-23 VITALS
HEART RATE: 88 BPM | BODY MASS INDEX: 32.96 KG/M2 | TEMPERATURE: 97.9 F | HEIGHT: 67 IN | RESPIRATION RATE: 18 BRPM | SYSTOLIC BLOOD PRESSURE: 131 MMHG | WEIGHT: 210 LBS | OXYGEN SATURATION: 96 % | DIASTOLIC BLOOD PRESSURE: 77 MMHG

## 2025-03-23 DIAGNOSIS — J18.9 PNEUMONIA DUE TO INFECTIOUS ORGANISM, UNSPECIFIED LATERALITY, UNSPECIFIED PART OF LUNG: Primary | ICD-10-CM

## 2025-03-23 LAB
ALBUMIN SERPL BCP-MCNC: 3.8 G/DL (ref 3.4–5)
ALP SERPL-CCNC: 50 U/L (ref 33–136)
ALT SERPL W P-5'-P-CCNC: 10 U/L (ref 10–52)
ANION GAP SERPL CALC-SCNC: 8 MMOL/L (ref 10–20)
AST SERPL W P-5'-P-CCNC: 9 U/L (ref 9–39)
BASOPHILS # BLD AUTO: 0.06 X10*3/UL (ref 0–0.1)
BASOPHILS NFR BLD AUTO: 0.4 %
BILIRUB SERPL-MCNC: 0.4 MG/DL (ref 0–1.2)
BNP SERPL-MCNC: 44 PG/ML (ref 0–99)
BUN SERPL-MCNC: 13 MG/DL (ref 6–23)
CALCIUM SERPL-MCNC: 9.4 MG/DL (ref 8.6–10.3)
CARDIAC TROPONIN I PNL SERPL HS: 6 NG/L (ref 0–20)
CARDIAC TROPONIN I PNL SERPL HS: 6 NG/L (ref 0–20)
CHLORIDE SERPL-SCNC: 99 MMOL/L (ref 98–107)
CO2 SERPL-SCNC: 30 MMOL/L (ref 21–32)
CREAT SERPL-MCNC: 1.17 MG/DL (ref 0.5–1.3)
EGFRCR SERPLBLD CKD-EPI 2021: 69 ML/MIN/1.73M*2
EOSINOPHIL # BLD AUTO: 0.29 X10*3/UL (ref 0–0.7)
EOSINOPHIL NFR BLD AUTO: 1.8 %
ERYTHROCYTE [DISTWIDTH] IN BLOOD BY AUTOMATED COUNT: 13 % (ref 11.5–14.5)
GLUCOSE BLD MANUAL STRIP-MCNC: 418 MG/DL (ref 74–99)
GLUCOSE SERPL-MCNC: 118 MG/DL (ref 74–99)
HCT VFR BLD AUTO: 38.5 % (ref 41–52)
HGB BLD-MCNC: 12.8 G/DL (ref 13.5–17.5)
IMM GRANULOCYTES # BLD AUTO: 0.07 X10*3/UL (ref 0–0.7)
IMM GRANULOCYTES NFR BLD AUTO: 0.4 % (ref 0–0.9)
LYMPHOCYTES # BLD AUTO: 1.98 X10*3/UL (ref 1.2–4.8)
LYMPHOCYTES NFR BLD AUTO: 12.6 %
MAGNESIUM SERPL-MCNC: 1.7 MG/DL (ref 1.6–2.4)
MCH RBC QN AUTO: 31.8 PG (ref 26–34)
MCHC RBC AUTO-ENTMCNC: 33.2 G/DL (ref 32–36)
MCV RBC AUTO: 96 FL (ref 80–100)
MONOCYTES # BLD AUTO: 1.57 X10*3/UL (ref 0.1–1)
MONOCYTES NFR BLD AUTO: 10 %
NEUTROPHILS # BLD AUTO: 11.74 X10*3/UL (ref 1.2–7.7)
NEUTROPHILS NFR BLD AUTO: 74.8 %
NRBC BLD-RTO: 0 /100 WBCS (ref 0–0)
PLATELET # BLD AUTO: 263 X10*3/UL (ref 150–450)
POTASSIUM SERPL-SCNC: 4 MMOL/L (ref 3.5–5.3)
PROT SERPL-MCNC: 6.6 G/DL (ref 6.4–8.2)
RBC # BLD AUTO: 4.03 X10*6/UL (ref 4.5–5.9)
SODIUM SERPL-SCNC: 133 MMOL/L (ref 136–145)
WBC # BLD AUTO: 15.7 X10*3/UL (ref 4.4–11.3)

## 2025-03-23 PROCEDURE — 82947 ASSAY GLUCOSE BLOOD QUANT: CPT

## 2025-03-23 PROCEDURE — 2500000004 HC RX 250 GENERAL PHARMACY W/ HCPCS (ALT 636 FOR OP/ED): Performed by: STUDENT IN AN ORGANIZED HEALTH CARE EDUCATION/TRAINING PROGRAM

## 2025-03-23 PROCEDURE — 94640 AIRWAY INHALATION TREATMENT: CPT

## 2025-03-23 PROCEDURE — 93005 ELECTROCARDIOGRAM TRACING: CPT

## 2025-03-23 PROCEDURE — 2500000002 HC RX 250 W HCPCS SELF ADMINISTERED DRUGS (ALT 637 FOR MEDICARE OP, ALT 636 FOR OP/ED): Performed by: STUDENT IN AN ORGANIZED HEALTH CARE EDUCATION/TRAINING PROGRAM

## 2025-03-23 PROCEDURE — 1200000002 HC GENERAL ROOM WITH TELEMETRY DAILY

## 2025-03-23 PROCEDURE — 96365 THER/PROPH/DIAG IV INF INIT: CPT

## 2025-03-23 PROCEDURE — 2500000005 HC RX 250 GENERAL PHARMACY W/O HCPCS: Performed by: NURSE PRACTITIONER

## 2025-03-23 PROCEDURE — 2500000001 HC RX 250 WO HCPCS SELF ADMINISTERED DRUGS (ALT 637 FOR MEDICARE OP): Performed by: NURSE PRACTITIONER

## 2025-03-23 PROCEDURE — 80053 COMPREHEN METABOLIC PANEL: CPT | Performed by: STUDENT IN AN ORGANIZED HEALTH CARE EDUCATION/TRAINING PROGRAM

## 2025-03-23 PROCEDURE — 85025 COMPLETE CBC W/AUTO DIFF WBC: CPT | Performed by: STUDENT IN AN ORGANIZED HEALTH CARE EDUCATION/TRAINING PROGRAM

## 2025-03-23 PROCEDURE — 2500000002 HC RX 250 W HCPCS SELF ADMINISTERED DRUGS (ALT 637 FOR MEDICARE OP, ALT 636 FOR OP/ED): Performed by: NURSE PRACTITIONER

## 2025-03-23 PROCEDURE — 83735 ASSAY OF MAGNESIUM: CPT | Performed by: STUDENT IN AN ORGANIZED HEALTH CARE EDUCATION/TRAINING PROGRAM

## 2025-03-23 PROCEDURE — 36415 COLL VENOUS BLD VENIPUNCTURE: CPT | Performed by: STUDENT IN AN ORGANIZED HEALTH CARE EDUCATION/TRAINING PROGRAM

## 2025-03-23 PROCEDURE — 2500000004 HC RX 250 GENERAL PHARMACY W/ HCPCS (ALT 636 FOR OP/ED): Mod: JZ | Performed by: NURSE PRACTITIONER

## 2025-03-23 PROCEDURE — 84484 ASSAY OF TROPONIN QUANT: CPT | Performed by: STUDENT IN AN ORGANIZED HEALTH CARE EDUCATION/TRAINING PROGRAM

## 2025-03-23 PROCEDURE — 71046 X-RAY EXAM CHEST 2 VIEWS: CPT

## 2025-03-23 PROCEDURE — 99285 EMERGENCY DEPT VISIT HI MDM: CPT | Mod: 25 | Performed by: STUDENT IN AN ORGANIZED HEALTH CARE EDUCATION/TRAINING PROGRAM

## 2025-03-23 PROCEDURE — 96375 TX/PRO/DX INJ NEW DRUG ADDON: CPT

## 2025-03-23 PROCEDURE — 99223 1ST HOSP IP/OBS HIGH 75: CPT | Performed by: NURSE PRACTITIONER

## 2025-03-23 PROCEDURE — 96367 TX/PROPH/DG ADDL SEQ IV INF: CPT

## 2025-03-23 PROCEDURE — 83880 ASSAY OF NATRIURETIC PEPTIDE: CPT | Performed by: NURSE PRACTITIONER

## 2025-03-23 PROCEDURE — 71046 X-RAY EXAM CHEST 2 VIEWS: CPT | Performed by: RADIOLOGY

## 2025-03-23 RX ORDER — LANOLIN ALCOHOL/MO/W.PET/CERES
1000 CREAM (GRAM) TOPICAL DAILY
Status: DISCONTINUED | OUTPATIENT
Start: 2025-03-23 | End: 2025-03-24 | Stop reason: HOSPADM

## 2025-03-23 RX ORDER — CEFTRIAXONE 2 G/50ML
2 INJECTION, SOLUTION INTRAVENOUS EVERY 24 HOURS
Status: DISCONTINUED | OUTPATIENT
Start: 2025-03-24 | End: 2025-03-24 | Stop reason: HOSPADM

## 2025-03-23 RX ORDER — ENOXAPARIN SODIUM 100 MG/ML
40 INJECTION SUBCUTANEOUS EVERY 24 HOURS
Status: DISCONTINUED | OUTPATIENT
Start: 2025-03-23 | End: 2025-03-24 | Stop reason: HOSPADM

## 2025-03-23 RX ORDER — POLYETHYLENE GLYCOL 3350 17 G/17G
17 POWDER, FOR SOLUTION ORAL DAILY
Status: DISCONTINUED | OUTPATIENT
Start: 2025-03-23 | End: 2025-03-24 | Stop reason: HOSPADM

## 2025-03-23 RX ORDER — BISACODYL 5 MG
10 TABLET, DELAYED RELEASE (ENTERIC COATED) ORAL DAILY PRN
Status: DISCONTINUED | OUTPATIENT
Start: 2025-03-23 | End: 2025-03-24 | Stop reason: HOSPADM

## 2025-03-23 RX ORDER — CEFTRIAXONE 2 G/50ML
2 INJECTION, SOLUTION INTRAVENOUS ONCE
Status: COMPLETED | OUTPATIENT
Start: 2025-03-23 | End: 2025-03-23

## 2025-03-23 RX ORDER — IPRATROPIUM BROMIDE AND ALBUTEROL SULFATE 2.5; .5 MG/3ML; MG/3ML
3 SOLUTION RESPIRATORY (INHALATION) 4 TIMES DAILY PRN
Status: DISCONTINUED | OUTPATIENT
Start: 2025-03-23 | End: 2025-03-24 | Stop reason: HOSPADM

## 2025-03-23 RX ORDER — GUAIFENESIN 600 MG/1
600 TABLET, EXTENDED RELEASE ORAL EVERY 12 HOURS PRN
Status: DISCONTINUED | OUTPATIENT
Start: 2025-03-23 | End: 2025-03-24 | Stop reason: HOSPADM

## 2025-03-23 RX ORDER — ASPIRIN 81 MG/1
81 TABLET ORAL DAILY
Status: DISCONTINUED | OUTPATIENT
Start: 2025-03-23 | End: 2025-03-24 | Stop reason: HOSPADM

## 2025-03-23 RX ORDER — LISINOPRIL 5 MG/1
5 TABLET ORAL DAILY
Status: DISCONTINUED | OUTPATIENT
Start: 2025-03-23 | End: 2025-03-24 | Stop reason: HOSPADM

## 2025-03-23 RX ORDER — DEXTROSE 50 % IN WATER (D50W) INTRAVENOUS SYRINGE
12.5
Status: DISCONTINUED | OUTPATIENT
Start: 2025-03-23 | End: 2025-03-24 | Stop reason: HOSPADM

## 2025-03-23 RX ORDER — DEXTROSE 50 % IN WATER (D50W) INTRAVENOUS SYRINGE
25
Status: DISCONTINUED | OUTPATIENT
Start: 2025-03-23 | End: 2025-03-24 | Stop reason: HOSPADM

## 2025-03-23 RX ORDER — IPRATROPIUM BROMIDE AND ALBUTEROL SULFATE 2.5; .5 MG/3ML; MG/3ML
3 SOLUTION RESPIRATORY (INHALATION) 3 TIMES DAILY
Status: DISCONTINUED | OUTPATIENT
Start: 2025-03-23 | End: 2025-03-24 | Stop reason: HOSPADM

## 2025-03-23 RX ORDER — BUDESONIDE 0.5 MG/2ML
0.5 INHALANT ORAL
Status: DISCONTINUED | OUTPATIENT
Start: 2025-03-23 | End: 2025-03-24 | Stop reason: HOSPADM

## 2025-03-23 RX ORDER — TALC
3 POWDER (GRAM) TOPICAL NIGHTLY PRN
Status: DISCONTINUED | OUTPATIENT
Start: 2025-03-23 | End: 2025-03-24 | Stop reason: HOSPADM

## 2025-03-23 RX ORDER — BUPROPION HYDROCHLORIDE 100 MG/1
100 TABLET, EXTENDED RELEASE ORAL 2 TIMES DAILY
Status: DISCONTINUED | OUTPATIENT
Start: 2025-03-23 | End: 2025-03-24 | Stop reason: HOSPADM

## 2025-03-23 RX ORDER — IPRATROPIUM BROMIDE AND ALBUTEROL SULFATE 2.5; .5 MG/3ML; MG/3ML
3 SOLUTION RESPIRATORY (INHALATION) EVERY 4 HOURS
Status: DISCONTINUED | OUTPATIENT
Start: 2025-03-23 | End: 2025-03-23

## 2025-03-23 RX ORDER — CARVEDILOL 6.25 MG/1
6.25 TABLET ORAL
Status: DISCONTINUED | OUTPATIENT
Start: 2025-03-23 | End: 2025-03-24 | Stop reason: HOSPADM

## 2025-03-23 RX ORDER — INSULIN LISPRO 100 [IU]/ML
0-10 INJECTION, SOLUTION INTRAVENOUS; SUBCUTANEOUS
Status: DISCONTINUED | OUTPATIENT
Start: 2025-03-23 | End: 2025-03-24 | Stop reason: HOSPADM

## 2025-03-23 RX ORDER — IPRATROPIUM BROMIDE AND ALBUTEROL SULFATE 2.5; .5 MG/3ML; MG/3ML
3 SOLUTION RESPIRATORY (INHALATION)
Status: DISCONTINUED | OUTPATIENT
Start: 2025-03-23 | End: 2025-03-23

## 2025-03-23 RX ORDER — PANTOPRAZOLE SODIUM 40 MG/10ML
40 INJECTION, POWDER, LYOPHILIZED, FOR SOLUTION INTRAVENOUS
Status: DISCONTINUED | OUTPATIENT
Start: 2025-03-24 | End: 2025-03-24 | Stop reason: HOSPADM

## 2025-03-23 RX ORDER — PANTOPRAZOLE SODIUM 40 MG/1
40 TABLET, DELAYED RELEASE ORAL
Status: DISCONTINUED | OUTPATIENT
Start: 2025-03-24 | End: 2025-03-24 | Stop reason: HOSPADM

## 2025-03-23 RX ORDER — BISACODYL 10 MG/1
10 SUPPOSITORY RECTAL DAILY PRN
Status: DISCONTINUED | OUTPATIENT
Start: 2025-03-23 | End: 2025-03-24 | Stop reason: HOSPADM

## 2025-03-23 RX ORDER — THEOPHYLLINE 400 MG/1
400 TABLET, EXTENDED RELEASE ORAL DAILY
Status: CANCELLED | OUTPATIENT
Start: 2025-03-23

## 2025-03-23 RX ORDER — IPRATROPIUM BROMIDE AND ALBUTEROL SULFATE 2.5; .5 MG/3ML; MG/3ML
3 SOLUTION RESPIRATORY (INHALATION) ONCE
Status: COMPLETED | OUTPATIENT
Start: 2025-03-23 | End: 2025-03-23

## 2025-03-23 RX ORDER — ATORVASTATIN CALCIUM 40 MG/1
80 TABLET, FILM COATED ORAL NIGHTLY
Status: DISCONTINUED | OUTPATIENT
Start: 2025-03-23 | End: 2025-03-24 | Stop reason: HOSPADM

## 2025-03-23 RX ADMIN — AZITHROMYCIN MONOHYDRATE 500 MG: 500 INJECTION, POWDER, LYOPHILIZED, FOR SOLUTION INTRAVENOUS at 23:06

## 2025-03-23 RX ADMIN — METHYLPREDNISOLONE SODIUM SUCCINATE 40 MG: 40 INJECTION, POWDER, FOR SOLUTION INTRAMUSCULAR; INTRAVENOUS at 21:01

## 2025-03-23 RX ADMIN — METHYLPREDNISOLONE SODIUM SUCCINATE 125 MG: 125 INJECTION, POWDER, FOR SOLUTION INTRAMUSCULAR; INTRAVENOUS at 13:49

## 2025-03-23 RX ADMIN — BUPROPION HYDROCHLORIDE 100 MG: 100 TABLET, FILM COATED, EXTENDED RELEASE ORAL at 21:01

## 2025-03-23 RX ADMIN — ATORVASTATIN CALCIUM 80 MG: 40 TABLET, FILM COATED ORAL at 21:01

## 2025-03-23 RX ADMIN — DOXYCYCLINE 100 MG: 100 INJECTION, POWDER, LYOPHILIZED, FOR SOLUTION INTRAVENOUS at 16:33

## 2025-03-23 RX ADMIN — Medication 3 MG: at 22:04

## 2025-03-23 RX ADMIN — BUDESONIDE 0.5 MG: 0.5 INHALANT ORAL at 18:30

## 2025-03-23 RX ADMIN — IPRATROPIUM BROMIDE AND ALBUTEROL SULFATE 3 ML: 2.5; .5 SOLUTION RESPIRATORY (INHALATION) at 13:53

## 2025-03-23 RX ADMIN — CARVEDILOL 6.25 MG: 6.25 TABLET, FILM COATED ORAL at 21:14

## 2025-03-23 RX ADMIN — IPRATROPIUM BROMIDE AND ALBUTEROL SULFATE 3 ML: 2.5; .5 SOLUTION RESPIRATORY (INHALATION) at 18:30

## 2025-03-23 RX ADMIN — ENOXAPARIN SODIUM 40 MG: 40 INJECTION SUBCUTANEOUS at 21:02

## 2025-03-23 RX ADMIN — CEFTRIAXONE 2 G: 2 INJECTION, SOLUTION INTRAVENOUS at 15:58

## 2025-03-23 SDOH — SOCIAL STABILITY: SOCIAL INSECURITY: DO YOU FEEL ANYONE HAS EXPLOITED OR TAKEN ADVANTAGE OF YOU FINANCIALLY OR OF YOUR PERSONAL PROPERTY?: NO

## 2025-03-23 SDOH — ECONOMIC STABILITY: HOUSING INSECURITY: AT ANY TIME IN THE PAST 12 MONTHS, WERE YOU HOMELESS OR LIVING IN A SHELTER (INCLUDING NOW)?: NO

## 2025-03-23 SDOH — ECONOMIC STABILITY: FOOD INSECURITY: WITHIN THE PAST 12 MONTHS, YOU WORRIED THAT YOUR FOOD WOULD RUN OUT BEFORE YOU GOT THE MONEY TO BUY MORE.: NEVER TRUE

## 2025-03-23 SDOH — SOCIAL STABILITY: SOCIAL INSECURITY: WERE YOU ABLE TO COMPLETE ALL THE BEHAVIORAL HEALTH SCREENINGS?: YES

## 2025-03-23 SDOH — SOCIAL STABILITY: SOCIAL INSECURITY: ARE YOU OR HAVE YOU BEEN THREATENED OR ABUSED PHYSICALLY, EMOTIONALLY, OR SEXUALLY BY ANYONE?: NO

## 2025-03-23 SDOH — ECONOMIC STABILITY: INCOME INSECURITY: IN THE PAST 12 MONTHS HAS THE ELECTRIC, GAS, OIL, OR WATER COMPANY THREATENED TO SHUT OFF SERVICES IN YOUR HOME?: NO

## 2025-03-23 SDOH — ECONOMIC STABILITY: HOUSING INSECURITY: IN THE LAST 12 MONTHS, WAS THERE A TIME WHEN YOU WERE NOT ABLE TO PAY THE MORTGAGE OR RENT ON TIME?: NO

## 2025-03-23 SDOH — SOCIAL STABILITY: SOCIAL INSECURITY: WITHIN THE LAST YEAR, HAVE YOU BEEN HUMILIATED OR EMOTIONALLY ABUSED IN OTHER WAYS BY YOUR PARTNER OR EX-PARTNER?: NO

## 2025-03-23 SDOH — SOCIAL STABILITY: SOCIAL INSECURITY: HAVE YOU HAD ANY THOUGHTS OF HARMING ANYONE ELSE?: NO

## 2025-03-23 SDOH — SOCIAL STABILITY: SOCIAL INSECURITY: ABUSE: ADULT

## 2025-03-23 SDOH — SOCIAL STABILITY: SOCIAL INSECURITY: HAVE YOU HAD THOUGHTS OF HARMING ANYONE ELSE?: NO

## 2025-03-23 SDOH — ECONOMIC STABILITY: TRANSPORTATION INSECURITY: IN THE PAST 12 MONTHS, HAS LACK OF TRANSPORTATION KEPT YOU FROM MEDICAL APPOINTMENTS OR FROM GETTING MEDICATIONS?: NO

## 2025-03-23 SDOH — ECONOMIC STABILITY: FOOD INSECURITY: WITHIN THE PAST 12 MONTHS, THE FOOD YOU BOUGHT JUST DIDN'T LAST AND YOU DIDN'T HAVE MONEY TO GET MORE.: NEVER TRUE

## 2025-03-23 SDOH — SOCIAL STABILITY: SOCIAL INSECURITY: WITHIN THE LAST YEAR, HAVE YOU BEEN AFRAID OF YOUR PARTNER OR EX-PARTNER?: NO

## 2025-03-23 SDOH — ECONOMIC STABILITY: HOUSING INSECURITY: IN THE PAST 12 MONTHS, HOW MANY TIMES HAVE YOU MOVED WHERE YOU WERE LIVING?: 0

## 2025-03-23 SDOH — SOCIAL STABILITY: SOCIAL INSECURITY: ARE THERE ANY APPARENT SIGNS OF INJURIES/BEHAVIORS THAT COULD BE RELATED TO ABUSE/NEGLECT?: NO

## 2025-03-23 SDOH — ECONOMIC STABILITY: FOOD INSECURITY: HOW HARD IS IT FOR YOU TO PAY FOR THE VERY BASICS LIKE FOOD, HOUSING, MEDICAL CARE, AND HEATING?: NOT HARD AT ALL

## 2025-03-23 SDOH — SOCIAL STABILITY: SOCIAL INSECURITY: HAS ANYONE EVER THREATENED TO HURT YOUR FAMILY OR YOUR PETS?: NO

## 2025-03-23 SDOH — SOCIAL STABILITY: SOCIAL INSECURITY: DOES ANYONE TRY TO KEEP YOU FROM HAVING/CONTACTING OTHER FRIENDS OR DOING THINGS OUTSIDE YOUR HOME?: NO

## 2025-03-23 SDOH — SOCIAL STABILITY: SOCIAL INSECURITY: DO YOU FEEL UNSAFE GOING BACK TO THE PLACE WHERE YOU ARE LIVING?: NO

## 2025-03-23 ASSESSMENT — COGNITIVE AND FUNCTIONAL STATUS - GENERAL
CLIMB 3 TO 5 STEPS WITH RAILING: A LITTLE
MOBILITY SCORE: 22
MOBILITY SCORE: 22
DAILY ACTIVITIY SCORE: 23
DAILY ACTIVITIY SCORE: 24
WALKING IN HOSPITAL ROOM: A LITTLE
DAILY ACTIVITIY SCORE: 23
WALKING IN HOSPITAL ROOM: A LITTLE
TOILETING: A LITTLE
MOBILITY SCORE: 24
CLIMB 3 TO 5 STEPS WITH RAILING: A LITTLE
TOILETING: A LITTLE
PATIENT BASELINE BEDBOUND: NO

## 2025-03-23 ASSESSMENT — PATIENT HEALTH QUESTIONNAIRE - PHQ9
SUM OF ALL RESPONSES TO PHQ9 QUESTIONS 1 & 2: 0
2. FEELING DOWN, DEPRESSED OR HOPELESS: NOT AT ALL
1. LITTLE INTEREST OR PLEASURE IN DOING THINGS: NOT AT ALL
2. FEELING DOWN, DEPRESSED OR HOPELESS: NOT AT ALL
SUM OF ALL RESPONSES TO PHQ9 QUESTIONS 1 & 2: 0
1. LITTLE INTEREST OR PLEASURE IN DOING THINGS: NOT AT ALL

## 2025-03-23 ASSESSMENT — PAIN SCALES - GENERAL
PAINLEVEL_OUTOF10: 2
PAINLEVEL_OUTOF10: 0 - NO PAIN

## 2025-03-23 ASSESSMENT — LIFESTYLE VARIABLES
SUBSTANCE_ABUSE_PAST_12_MONTHS: NO
HOW MANY STANDARD DRINKS CONTAINING ALCOHOL DO YOU HAVE ON A TYPICAL DAY: PATIENT DOES NOT DRINK
EVER HAD A DRINK FIRST THING IN THE MORNING TO STEADY YOUR NERVES TO GET RID OF A HANGOVER: NO
SKIP TO QUESTIONS 9-10: 1
HAVE YOU EVER FELT YOU SHOULD CUT DOWN ON YOUR DRINKING: NO
TOTAL SCORE: 0
HOW OFTEN DO YOU HAVE A DRINK CONTAINING ALCOHOL: NEVER
HOW MANY STANDARD DRINKS CONTAINING ALCOHOL DO YOU HAVE ON A TYPICAL DAY: PATIENT DOES NOT DRINK
AUDIT-C TOTAL SCORE: 0
EVER FELT BAD OR GUILTY ABOUT YOUR DRINKING: NO
PRESCIPTION_ABUSE_PAST_12_MONTHS: NO
HAVE PEOPLE ANNOYED YOU BY CRITICIZING YOUR DRINKING: NO
SUBSTANCE_ABUSE_PAST_12_MONTHS: NO
HOW OFTEN DO YOU HAVE A DRINK CONTAINING ALCOHOL: NEVER
HOW OFTEN DO YOU HAVE 6 OR MORE DRINKS ON ONE OCCASION: NEVER
HOW OFTEN DO YOU HAVE 6 OR MORE DRINKS ON ONE OCCASION: NEVER
AUDIT-C TOTAL SCORE: 0
PRESCIPTION_ABUSE_PAST_12_MONTHS: NO
SKIP TO QUESTIONS 9-10: 1
AUDIT-C TOTAL SCORE: 0
AUDIT-C TOTAL SCORE: 0

## 2025-03-23 ASSESSMENT — ACTIVITIES OF DAILY LIVING (ADL)
WALKS IN HOME: INDEPENDENT
DRESSING YOURSELF: INDEPENDENT
BATHING: INDEPENDENT
PATIENT'S MEMORY ADEQUATE TO SAFELY COMPLETE DAILY ACTIVITIES?: YES
JUDGMENT_ADEQUATE_SAFELY_COMPLETE_DAILY_ACTIVITIES: YES
ADEQUATE_TO_COMPLETE_ADL: YES
HEARING - LEFT EAR: FUNCTIONAL
TOILETING: INDEPENDENT
FEEDING YOURSELF: INDEPENDENT
GROOMING: INDEPENDENT
LACK_OF_TRANSPORTATION: NO
LACK_OF_TRANSPORTATION: NO
HEARING - RIGHT EAR: FUNCTIONAL

## 2025-03-23 ASSESSMENT — PAIN - FUNCTIONAL ASSESSMENT: PAIN_FUNCTIONAL_ASSESSMENT: 0-10

## 2025-03-23 NOTE — ED NOTES
Pt arrives to ED via POV from home. 4L NC at baseline    Code Status:  Full Code    HPI     Chief Complaint   Patient presents with    Cough     S/s started yesterday, productive and yellow    Chest Pain     2/10, s/s started yesterday       /78   Pulse 75   Temp 36.2 °C (97.2 °F)   Resp 20   Wt 95.3 kg (210 lb)   SpO2 97%     Tawanna Coma Scale Score: 15      LDA:   Peripheral IV 03/23/25 20 G Right Antecubital (Active)   Placement Date/Time: 03/23/25 1304   Size (Gauge): 20 G  Orientation: Right  Location: Antecubital  Site Prep: Chlorhexidine    Number of days: 0        BACKGROUND  Past Medical History:   Diagnosis Date    Chronic systolic (congestive) heart failure 09/22/2020    Chronic systolic heart failure    COPD (chronic obstructive pulmonary disease) (Multi)     Myocardial infarction (Multi) 07/01/2024    Old myocardial infarction     Old non-ST elevation myocardial infarction (NSTEMI)    Old myocardial infarction 09/22/2020    History of ST elevation myocardial infarction (STEMI)    Old myocardial infarction     History of ST elevation myocardial infarction (STEMI)    Personal history of other diseases of the circulatory system     History of heart disease    Personal history of other endocrine, nutritional and metabolic disease     History of hyperlipidemia    Presence of stent in anterior descending branch of left coronary artery 07/01/2024     Past Surgical History:   Procedure Laterality Date    MANDIBLE SURGERY  06/01/2016    Jaw Surgery    OTHER SURGICAL HISTORY  06/29/2020    Coronary artery stent placement    TONSILLECTOMY  06/01/2016    Tonsillectomy With Adenoidectomy    VASECTOMY  06/01/2016    Surgery Vas Deferens Vasectomy     No current facility-administered medications on file prior to encounter.     Current Outpatient Medications on File Prior to Encounter   Medication Sig Dispense Refill    albuterol 90 mcg/actuation inhaler Inhale 2 puffs every 6 hours if needed for wheezing.  18 g 11    aspirin 81 mg EC tablet Take 1 tablet (81 mg) by mouth once daily.      atorvastatin (Lipitor) 80 mg tablet Take 1 tablet (80 mg) by mouth once daily at bedtime. 90 tablet 3    budesonide (Pulmicort) 0.5 mg/2 mL nebulizer solution Take 2 mL (0.5 mg) by nebulization 2 times a day. Rinse mouth with water after use to reduce aftertaste and incidence of candidiasis. Do not swallow. 1 mL 11    buPROPion SR (Wellbutrin SR) 100 mg 12 hr tablet Take 1 tablet (100 mg) by mouth 2 times a day. Do not crush, chew, or split. 60 tablet 11    carvedilol (Coreg) 6.25 mg tablet Take 1 tablet (6.25 mg) by mouth 2 times daily (morning and late afternoon). 180 tablet 3    cyanocobalamin (Vitamin B-12) 1,000 mcg tablet Take 1 tablet (1,000 mcg) by mouth once daily.      ensifentrine (Ohtuvayre) 3 mg/2.5 mL suspension for nebulization Inhale 3 mg 2 times a day. 1 mL 11    hydrOXYzine HCL (Atarax) 25 mg tablet Take by mouth.      ipratropium-albuteroL (Duo-Neb) 0.5-2.5 mg/3 mL nebulizer solution Take 3 mL by nebulization every 4 hours. 450 mL 11    lisinopril 5 mg tablet Take 1 tablet (5 mg) by mouth once daily. 30 tablet 11    omega 3-dha-epa-fish oil (Fish OiL) 1,000 mg (120 mg-180 mg) capsule Take 1 capsule (1,000 mg) by mouth once daily.      oxygen (O2) gas therapy Inhale 1 each every 12 hours.      predniSONE (Deltasone) 5 mg tablet Take 1 tablet (5 mg) by mouth once daily. 30 tablet 0    theophylline ER (Uniphyl) 400 mg 24 hr tablet Take 1 tablet (400 mg) by mouth once daily. Do not crush or chew. 30 tablet 3    [DISCONTINUED] atorvastatin (Lipitor) 80 mg tablet Take 1 tablet (80 mg) by mouth once daily at bedtime. 90 tablet 3    [DISCONTINUED] carvedilol (Coreg) 6.25 mg tablet Take 1 tablet (6.25 mg) by mouth 2 times a day with meals. 180 tablet 3    [DISCONTINUED] fluticasone propion-salmeteroL (Papaela Inhub) 250-50 mcg/dose diskus inhaler Inhale 1 puff 2 times a day. Rinse mouth with water after use to reduce  aftertaste and incidence of candidiasis. Do not swallow. (Patient not taking: Reported on 3/21/2025) 1 each 11    [DISCONTINUED] ipratropium-albuteroL (Duo-Neb) 0.5-2.5 mg/3 mL nebulizer solution Take 3 mL by nebulization 4 times a day. 180 mL 11        ASSESSMENT  ED Course as of 03/23/25 1655   Sun Mar 23, 2025   1325 EKG on my read shows sinus rhythm at a rate of 73 bpm no ST change elevation nonischemic EKG, normal intervals. [CF]   1516 IMPRESSION:  Bilateral patchy midlung densities, which most likely related to  infiltrates.      Cardiomegaly.       [CF]      ED Course User Index  [CF] Bijal Sewell MD         Diagnoses as of 03/23/25 1655   Pneumonia due to infectious organism, unspecified laterality, unspecified part of lung       Medications Currently Running:        Medications Given:  ED Medication Administration from 03/23/2025 1226 to 03/23/2025 1655         Date/Time Order Dose Route Action Action by     03/23/2025 1349 EDT methylPREDNISolone sod succinate (SOLU-Medrol) injection 125 mg 125 mg intravenous Given YASEMIN Springer     03/23/2025 1353 EDT ipratropium-albuteroL (Duo-Neb) 0.5-2.5 mg/3 mL nebulizer solution 3 mL 3 mL nebulization Given YASEMIN Springer     03/23/2025 1558 EDT cefTRIAXone (Rocephin) 2 g in dextrose (iso) IV 50 mL 2 g intravenous New Bag [place], A     03/23/2025 1633 EDT cefTRIAXone (Rocephin) 2 g in dextrose (iso) IV 50 mL 0 g intravenous Stopped Brianda, A     03/23/2025 1633 EDT doxycycline (Vibramycin) 100 mg in sodium chloride 0.9%  mL 100 mg intravenous New Bag Brianda, A                 RESULTS    Imaging:  XR chest 2 views   Final Result   Bilateral patchy midlung densities, which most likely related to   infiltrates.        Cardiomegaly.             MACRO:   None        Signed by: Paula Soria 3/23/2025 3:12 PM   Dictation workstation:   [id number]         }  Labs ::99  Abnormal Labs Reviewed   CBC WITH AUTO DIFFERENTIAL - Abnormal; Notable for the following  components:       Result Value    WBC 15.7 (*)     RBC 4.03 (*)     Hemoglobin 12.8 (*)     Hematocrit 38.5 (*)     Neutrophils Absolute 11.74 (*)     Monocytes Absolute 1.57 (*)     All other components within normal limits   COMPREHENSIVE METABOLIC PANEL - Abnormal; Notable for the following components:    Glucose 118 (*)     Sodium 133 (*)     Anion Gap 8 (*)     All other components within normal limits               Yesenia Lamar RN  03/23/25 3123

## 2025-03-23 NOTE — ED PROVIDER NOTES
HPI   Chief Complaint   Patient presents with    Cough     S/s started yesterday, productive and yellow    Chest Pain     2/10, s/s started yesterday       This is a 64-year-old male with past medical history of COPD on 4 L nasal cannula, coronary artery disease status post stents who presents emergency department for cough that started yesterday with some shortness of breath.  He states that he just did DuoNeb treatment before.  He has some chest tightness but is attributing this to his COPD.  He states is not similar to his prior stent placements                          Tawanna Coma Scale Score: 15                  Patient History   Past Medical History:   Diagnosis Date    Chronic systolic (congestive) heart failure 09/22/2020    Chronic systolic heart failure    COPD (chronic obstructive pulmonary disease) (Multi)     Myocardial infarction (Multi) 07/01/2024    Old myocardial infarction     Old non-ST elevation myocardial infarction (NSTEMI)    Old myocardial infarction 09/22/2020    History of ST elevation myocardial infarction (STEMI)    Old myocardial infarction     History of ST elevation myocardial infarction (STEMI)    Personal history of other diseases of the circulatory system     History of heart disease    Personal history of other endocrine, nutritional and metabolic disease     History of hyperlipidemia    Presence of stent in anterior descending branch of left coronary artery 07/01/2024     Past Surgical History:   Procedure Laterality Date    MANDIBLE SURGERY  06/01/2016    Jaw Surgery    OTHER SURGICAL HISTORY  06/29/2020    Coronary artery stent placement    TONSILLECTOMY  06/01/2016    Tonsillectomy With Adenoidectomy    VASECTOMY  06/01/2016    Surgery Vas Deferens Vasectomy     No family history on file.  Social History     Tobacco Use    Smoking status: Every Day     Current packs/day: 1.00     Average packs/day: 1 pack/day for 50.2 years (50.2 ttl pk-yrs)     Types: Cigarettes     Start  date: 1975    Smokeless tobacco: Never   Vaping Use    Vaping status: Never Used   Substance Use Topics    Alcohol use: Not Currently    Drug use: Never       Physical Exam   ED Triage Vitals [03/23/25 1238]   Temperature Heart Rate Respirations BP   36.2 °C (97.2 °F) 80 (!) 22 114/74      Pulse Ox Temp src Heart Rate Source Patient Position   94 % -- Monitor Sitting      BP Location FiO2 (%)     -- --       Physical Exam  Constitutional:       General: He is not in acute distress.  HENT:      Head: Normocephalic and atraumatic.      Right Ear: Tympanic membrane normal.      Left Ear: Tympanic membrane normal.      Mouth/Throat:      Mouth: Mucous membranes are moist.   Eyes:      Extraocular Movements: Extraocular movements intact.      Conjunctiva/sclera: Conjunctivae normal.      Pupils: Pupils are equal, round, and reactive to light.   Cardiovascular:      Rate and Rhythm: Normal rate and regular rhythm.      Heart sounds: No murmur heard.  Pulmonary:      Effort: Pulmonary effort is normal. No respiratory distress.      Breath sounds: No stridor. Wheezing present. No rales.   Abdominal:      General: Bowel sounds are normal. There is no distension.      Tenderness: There is no abdominal tenderness. There is no guarding or rebound.   Musculoskeletal:         General: No swelling, tenderness or deformity. Normal range of motion.   Skin:     General: Skin is warm and dry.      Coloration: Skin is not jaundiced.      Findings: No bruising or lesion.   Neurological:      General: No focal deficit present.      Mental Status: He is alert and oriented to person, place, and time. Mental status is at baseline.      Cranial Nerves: No cranial nerve deficit.      Motor: No weakness.   Psychiatric:         Mood and Affect: Mood normal.       Labs Reviewed - No data to display  No orders to display       ED Course & MDM   ED Course as of 03/23/25 1554   Sun Mar 23, 2025   1325 EKG on my read shows sinus rhythm at a rate of  73 bpm no ST change elevation nonischemic EKG, normal intervals. [CF]   1516 IMPRESSION:  Bilateral patchy midlung densities, which most likely related to  infiltrates.      Cardiomegaly.       [CF]      ED Course User Index  [CF] Bijal Sewell MD       Medical Decision Making  This is a 74-year-old male who presents emergency department for cough chest pain that started yesterday.  On exam he is wheezy throughout all of his fields is on his baseline 4 L.  He does have a leukocytosis but he is on prednisone.  He was given albuterol and Solu-Medrol while here.  His x-ray is concerning for infiltrates concerning for possible pneumonia.  Spoke with patient in regards to discharge or admission at this time he like to be admitted given that he is high risk will admit for pneumonia.        Procedure  Procedures     Bijal Sewell MD  03/23/25 5737

## 2025-03-23 NOTE — H&P
[facility] - GENERAL MEDICINE HISTORY AND PHYSICAL    HISTORY OF PRESENT ILLNESS     History Obtained From (Primary Source): The patient  Collateral History (Secondary Sources): D/w ED    History Of Present Illness (HPI):  Darron Resendez is a 64 y.o. male with PMHx s/f coronary artery disease history PCI and stent, COPD with hypoxic respiratory failure, diabetes, hypertension, systolic heart failure presenting with cough and shortness of breath.  The patient had recently come down with increased coughing about Wednesday or Thursday he has not had any fevers or chills.  He has noticed increased and progressively worsening shortness of breath.  He has increased sputum production no yellow mucus.  No chest pain no orthopnea no lower extremity edema no palpitations no near syncope.  The patient had recently seen his pulmonologist and advised him that he could not tolerate the theophylline due to chest pain was started on a low-dose of prednisone.  Patient continued to worsen over the weekend and a presented to emergency department for evaluation.  In the emergency department patient was found to be hypoxic and hypercapnic with relatively normal pH chest x-ray suggest infiltrates bilaterally patient was started on IV antibiotics given nebulizer treatment and referred for admission.  Case was discussed with the ED provider patient will be admitted length of stay is likely to exceed 2 midnights due to patient having pneumonia severe wheezing and bronchospasm and the patient's underlying COPD.    ED Course (Summary - please note all labs, imaging studies, and interventions noted below have been personally reviewed and/or interpreted on day of admission):   Vitals on presentation: T 36.2 °C (97.2 °F)  HR 80  /74  RR (!) 22  O2 94 % Supplemental oxygen  Labs:   CBC with WBC 15.7, Hgb 12.8, Plts 263.   CMP with glucose 118, Na 133, K 4.0, BUN 13, sCr 1.17, alk phos 50, ALT 10, AST 9, bilirubin 0.4.  Magnesium 1.70.   BNP 24. Trop 6.   Lactate 1.7  EKG: EKG shows sinus rhythm with no acute ST wave segment elevations  Imaging: Chest x-ray showing patchy midlung densities bilaterally  Interventions: Nebulizer treatment IV antibiotics referred for admission    12-point ROS reviewed and found to be negative aside from aforementioned positives in HPI and/or noted in dedicated ROS section below.     LABS AND IMAGING     ED Course (From ED Provider):  ED Course as of 03/23/25 1715   Sun Mar 23, 2025   1325 EKG on my read shows sinus rhythm at a rate of 73 bpm no ST change elevation nonischemic EKG, normal intervals. [CF]   1516 IMPRESSION:  Bilateral patchy midlung densities, which most likely related to  infiltrates.      Cardiomegaly.       [CF]      ED Course User Index  [CF] Bijal Sewell MD         Diagnoses as of 03/23/25 1715   Pneumonia due to infectious organism, unspecified laterality, unspecified part of lung     Relevant Results  Results for orders placed or performed during the hospital encounter of 03/23/25 (from the past 24 hours)   CBC and Auto Differential   Result Value Ref Range    WBC 15.7 (H) 4.4 - 11.3 x10*3/uL    nRBC 0.0 0.0 - 0.0 /100 WBCs    RBC 4.03 (L) 4.50 - 5.90 x10*6/uL    Hemoglobin 12.8 (L) 13.5 - 17.5 g/dL    Hematocrit 38.5 (L) 41.0 - 52.0 %    MCV 96 80 - 100 fL    MCH 31.8 26.0 - 34.0 pg    MCHC 33.2 32.0 - 36.0 g/dL    RDW 13.0 11.5 - 14.5 %    Platelets 263 150 - 450 x10*3/uL    Neutrophils % 74.8 40.0 - 80.0 %    Immature Granulocytes %, Automated 0.4 0.0 - 0.9 %    Lymphocytes % 12.6 13.0 - 44.0 %    Monocytes % 10.0 2.0 - 10.0 %    Eosinophils % 1.8 0.0 - 6.0 %    Basophils % 0.4 0.0 - 2.0 %    Neutrophils Absolute 11.74 (H) 1.20 - 7.70 x10*3/uL    Immature Granulocytes Absolute, Automated 0.07 0.00 - 0.70 x10*3/uL    Lymphocytes Absolute 1.98 1.20 - 4.80 x10*3/uL    Monocytes Absolute 1.57 (H) 0.10 - 1.00 x10*3/uL    Eosinophils Absolute 0.29 0.00 - 0.70 x10*3/uL     Basophils Absolute 0.06 0.00 - 0.10 x10*3/uL   Comprehensive metabolic panel   Result Value Ref Range    Glucose 118 (H) 74 - 99 mg/dL    Sodium 133 (L) 136 - 145 mmol/L    Potassium 4.0 3.5 - 5.3 mmol/L    Chloride 99 98 - 107 mmol/L    Bicarbonate 30 21 - 32 mmol/L    Anion Gap 8 (L) 10 - 20 mmol/L    Urea Nitrogen 13 6 - 23 mg/dL    Creatinine 1.17 0.50 - 1.30 mg/dL    eGFR 69 >60 mL/min/1.73m*2    Calcium 9.4 8.6 - 10.3 mg/dL    Albumin 3.8 3.4 - 5.0 g/dL    Alkaline Phosphatase 50 33 - 136 U/L    Total Protein 6.6 6.4 - 8.2 g/dL    AST 9 9 - 39 U/L    Bilirubin, Total 0.4 0.0 - 1.2 mg/dL    ALT 10 10 - 52 U/L   Magnesium   Result Value Ref Range    Magnesium 1.70 1.60 - 2.40 mg/dL   Troponin I, High Sensitivity, Initial   Result Value Ref Range    Troponin I, High Sensitivity 6 0 - 20 ng/L   Troponin, High Sensitivity, 1 Hour   Result Value Ref Range    Troponin I, High Sensitivity 6 0 - 20 ng/L      XR chest 2 views    Result Date: 3/23/2025  Interpreted By:  aPula Soria, STUDY: XR CHEST 2 VIEWS;  3/23/2025 2:23 pm   INDICATION: Signs/Symptoms:cough.   COMPARISON: 03/05/2025   ACCESSION NUMBER(S): [id number]   ORDERING CLINICIAN: NEO ROSALES   FINDINGS: Bilateral patchy densities are seen in the bilateral mid lungs. No pleural effusion or pneumothorax is seen. Hurt enlarged.       Bilateral patchy midlung densities, which most likely related to infiltrates.   Cardiomegaly.     MACRO: None   Signed by: Paula Soria 3/23/2025 3:12 PM Dictation workstation:   [id number]      PAST HISTORIES AND ALLERGIES     Past Medical History  He has a past medical history of Chronic systolic (congestive) heart failure (09/22/2020), COPD (chronic obstructive pulmonary disease) (Multi), Myocardial infarction (Multi) (07/01/2024), Old myocardial infarction, Old myocardial infarction (09/22/2020), Old myocardial infarction, Personal history of other diseases of the circulatory system, Personal history of other  endocrine, nutritional and metabolic disease, and Presence of stent in anterior descending branch of left coronary artery (07/01/2024).    Surgical History  He has a past surgical history that includes Tonsillectomy (06/01/2016); Vasectomy (06/01/2016); Mandible surgery (06/01/2016); and Other surgical history (06/29/2020).     Social History  He reports that he has been smoking cigarettes. He started smoking about 50 years ago. He has a 50.2 pack-year smoking history. He has never used smokeless tobacco. He reports that he does not currently use alcohol. He reports that he does not use drugs.    Family History  No family history on file.    Allergies  Penicillin g    MEDICATIONS     Scheduled Medications:  aspirin, 81 mg, oral, Daily  atorvastatin, 80 mg, oral, Nightly  [START ON 3/24/2025] azithromycin, 500 mg, intravenous, q24h  budesonide, 0.5 mg, nebulization, BID  buPROPion SR, 100 mg, oral, BID  carvedilol, 6.25 mg, oral, BID  [START ON 3/24/2025] cefTRIAXone, 2 g, intravenous, q24h  cyanocobalamin, 1,000 mcg, oral, Daily  doxycycline, 100 mg, intravenous, Once  enoxaparin, 40 mg, subcutaneous, q24h  insulin lispro, 0-10 Units, subcutaneous, TID AC  ipratropium-albuteroL, 3 mL, nebulization, q4h  ipratropium-albuteroL, 3 mL, nebulization, q6h  lisinopril, 5 mg, oral, Daily  methylPREDNISolone sodium succinate (PF), 40 mg, intravenous, q8h SHELLIE  [START ON 3/24/2025] pantoprazole, 40 mg, oral, Daily before breakfast   Or  [START ON 3/24/2025] pantoprazole, 40 mg, intravenous, Daily before breakfast  polyethylene glycol, 17 g, oral, Daily      Continuous Medications:     PRN Medications:  PRN medications: bisacodyl, bisacodyl, dextrose, dextrose, glucagon, glucagon, guaiFENesin, melatonin     REVIEW OF SYSTEMS     Review of Systems    OBJECTIVE     Last Recorded Vitals  /78   Pulse 84   Temp 36.2 °C (97.2 °F)   Resp 18   Wt 95.3 kg (210 lb)   SpO2 96%      Physical Exam:  Vital signs and nursing notes  reviewed.   Constitutional: Pleasant and cooperative. Laying in bed in no acute distress. Conversant.   Skin: Warm and dry; no obvious lesions, rashes, pallor, or jaundice.   Eyes: EOMI. Anicteric sclera.   ENT: Mucous membranes moist; no obvious injury or deformity appreciated.   Head and Neck: Normocephalic, atraumatic. ROM preserved. Trachea midline. No appreciable JVD.   Respiratory: Slightly increased respiratory effort on 4 L. Lungs have fairly tight excursion with wheezing throughout increased expiratory tracheal wheeze noted  Cardiovascular: RRR. No gross murmur, gallop, or rub. Extremities are warm and well-perfused with good capillary refill (< 3 seconds). No chest wall tenderness.   GI: Abdomen soft, nontender, nondistended. No obvious organomegaly appreciated. Bowel sounds are present.  : No CVA tenderness.   MSK: No gross abnormalities appreciated. No limitations to AROM/PROM appreciated.   Extremities: No cyanosis, edema, or clubbing evident. Neurovascularly intact.   Neuro: A&Ox3. CN 2-12 grossly intact. Able to respond to questions appropriately and clearly. No acute focal neurologic deficits appreciated.  Psych: Appropriate mood and behavior.    ASSESSMENT AND PLAN   Assessment/Plan     64 y.o. male with PMHx s/f oronary artery disease history PCI and stent, COPD with hypoxic respiratory failure, diabetes, hypertension, systolic heart failure presenting with cough and shortness of breath.     Acute hypoxic respiratory failure with hypercapnia bilateral pneumonia and COPD  Patient requiring supplemental oxygen in emergency department  We will continue on IV Rocephin and Zithromax  Will give patient Solu-Medrol around-the-clock  Start frequent nebulizer treatments and continue the patient's home medications  Will check procalcitonin level BN peptide urine for Legionella and strep sputum culture    Coronary artery disease history PCI  Continue the patient's routine medications including aspirin  statin and beta-blocker    Chronic systolic heart failure  Patient denies any orthopnea or edema appears to be compensated BNP is pending  We will reconcile patient's home medications    Type 2 diabetes  We will plan on consistent carbohydrate diet anticipate more difficulty than normal with his glucose given steroid use  Will cover with sliding scale insulin    Hypertension  We will continue the patient's normal home medications.         Tonny Hackett, APRN-CNP    Dragon dictation software was used to dictate this note and thus there may be minor errors in translation/transcription including garbled speech or misspellings. Please contact for clarification if needed.

## 2025-03-24 VITALS
TEMPERATURE: 97.4 F | OXYGEN SATURATION: 94 % | SYSTOLIC BLOOD PRESSURE: 113 MMHG | RESPIRATION RATE: 18 BRPM | BODY MASS INDEX: 32.96 KG/M2 | WEIGHT: 210 LBS | DIASTOLIC BLOOD PRESSURE: 70 MMHG | HEIGHT: 67 IN | HEART RATE: 78 BPM

## 2025-03-24 LAB
ANION GAP SERPL CALC-SCNC: 13 MMOL/L (ref 10–20)
ATRIAL RATE: 72 BPM
BUN SERPL-MCNC: 18 MG/DL (ref 6–23)
CALCIUM SERPL-MCNC: 8.7 MG/DL (ref 8.6–10.3)
CHLORIDE SERPL-SCNC: 98 MMOL/L (ref 98–107)
CO2 SERPL-SCNC: 26 MMOL/L (ref 21–32)
CREAT SERPL-MCNC: 1.28 MG/DL (ref 0.5–1.3)
EGFRCR SERPLBLD CKD-EPI 2021: 62 ML/MIN/1.73M*2
ERYTHROCYTE [DISTWIDTH] IN BLOOD BY AUTOMATED COUNT: 12.7 % (ref 11.5–14.5)
GLUCOSE BLD MANUAL STRIP-MCNC: 251 MG/DL (ref 74–99)
GLUCOSE BLD MANUAL STRIP-MCNC: 274 MG/DL (ref 74–99)
GLUCOSE SERPL-MCNC: 244 MG/DL (ref 74–99)
HCT VFR BLD AUTO: 36.6 % (ref 41–52)
HGB BLD-MCNC: 11.9 G/DL (ref 13.5–17.5)
LEGIONELLA AG UR QL: NEGATIVE
MCH RBC QN AUTO: 31 PG (ref 26–34)
MCHC RBC AUTO-ENTMCNC: 32.5 G/DL (ref 32–36)
MCV RBC AUTO: 95 FL (ref 80–100)
NRBC BLD-RTO: 0 /100 WBCS (ref 0–0)
P AXIS: 60 DEGREES
PLATELET # BLD AUTO: 294 X10*3/UL (ref 150–450)
POTASSIUM SERPL-SCNC: 4.5 MMOL/L (ref 3.5–5.3)
PR INTERVAL: 160 MS
PROCALCITONIN SERPL-MCNC: 0.02 NG/ML
Q ONSET: 254 MS
QRS COUNT: 11 BEATS
QRS DURATION: 86 MS
QT INTERVAL: 335 MS
QTC CALCULATION(BAZETT): 369 MS
QTC FREDERICIA: 357 MS
R AXIS: 67 DEGREES
RBC # BLD AUTO: 3.84 X10*6/UL (ref 4.5–5.9)
S PNEUM AG UR QL: NEGATIVE
SODIUM SERPL-SCNC: 132 MMOL/L (ref 136–145)
T AXIS: 53 DEGREES
T OFFSET: 422 MS
VENTRICULAR RATE: 73 BPM
WBC # BLD AUTO: 13.5 X10*3/UL (ref 4.4–11.3)

## 2025-03-24 PROCEDURE — 2500000002 HC RX 250 W HCPCS SELF ADMINISTERED DRUGS (ALT 637 FOR MEDICARE OP, ALT 636 FOR OP/ED): Performed by: NURSE PRACTITIONER

## 2025-03-24 PROCEDURE — 99239 HOSP IP/OBS DSCHRG MGMT >30: CPT | Performed by: NURSE PRACTITIONER

## 2025-03-24 PROCEDURE — 82947 ASSAY GLUCOSE BLOOD QUANT: CPT

## 2025-03-24 PROCEDURE — 84145 PROCALCITONIN (PCT): CPT | Mod: PORLAB | Performed by: NURSE PRACTITIONER

## 2025-03-24 PROCEDURE — 87205 SMEAR GRAM STAIN: CPT | Mod: PORLAB | Performed by: NURSE PRACTITIONER

## 2025-03-24 PROCEDURE — 36415 COLL VENOUS BLD VENIPUNCTURE: CPT | Performed by: NURSE PRACTITIONER

## 2025-03-24 PROCEDURE — 94640 AIRWAY INHALATION TREATMENT: CPT

## 2025-03-24 PROCEDURE — 85027 COMPLETE CBC AUTOMATED: CPT | Performed by: NURSE PRACTITIONER

## 2025-03-24 PROCEDURE — 87449 NOS EACH ORGANISM AG IA: CPT | Mod: PORLAB | Performed by: NURSE PRACTITIONER

## 2025-03-24 PROCEDURE — 2500000004 HC RX 250 GENERAL PHARMACY W/ HCPCS (ALT 636 FOR OP/ED): Performed by: NURSE PRACTITIONER

## 2025-03-24 PROCEDURE — 2500000002 HC RX 250 W HCPCS SELF ADMINISTERED DRUGS (ALT 637 FOR MEDICARE OP, ALT 636 FOR OP/ED): Performed by: HOSPITALIST

## 2025-03-24 PROCEDURE — 2500000001 HC RX 250 WO HCPCS SELF ADMINISTERED DRUGS (ALT 637 FOR MEDICARE OP): Performed by: NURSE PRACTITIONER

## 2025-03-24 PROCEDURE — 87899 AGENT NOS ASSAY W/OPTIC: CPT | Mod: PORLAB | Performed by: NURSE PRACTITIONER

## 2025-03-24 PROCEDURE — 80048 BASIC METABOLIC PNL TOTAL CA: CPT | Performed by: NURSE PRACTITIONER

## 2025-03-24 RX ORDER — PANTOPRAZOLE SODIUM 40 MG/1
40 TABLET, DELAYED RELEASE ORAL
Qty: 7 TABLET | Refills: 0 | Status: SHIPPED | OUTPATIENT
Start: 2025-03-25 | End: 2025-04-01

## 2025-03-24 RX ORDER — GUAIFENESIN 600 MG/1
600 TABLET, EXTENDED RELEASE ORAL EVERY 12 HOURS PRN
Qty: 20 TABLET | Refills: 0 | Status: SHIPPED | OUTPATIENT
Start: 2025-03-24 | End: 2025-04-03

## 2025-03-24 RX ORDER — PREDNISONE 20 MG/1
40 TABLET ORAL DAILY
Qty: 10 TABLET | Refills: 0 | Status: SHIPPED | OUTPATIENT
Start: 2025-03-24 | End: 2025-03-29

## 2025-03-24 RX ORDER — AZITHROMYCIN 500 MG/1
500 TABLET, FILM COATED ORAL DAILY
Qty: 3 TABLET | Refills: 0 | Status: SHIPPED | OUTPATIENT
Start: 2025-03-24 | End: 2025-03-27

## 2025-03-24 RX ADMIN — METHYLPREDNISOLONE SODIUM SUCCINATE 40 MG: 40 INJECTION, POWDER, FOR SOLUTION INTRAMUSCULAR; INTRAVENOUS at 05:18

## 2025-03-24 RX ADMIN — INSULIN LISPRO 6 UNITS: 100 INJECTION, SOLUTION INTRAVENOUS; SUBCUTANEOUS at 07:48

## 2025-03-24 RX ADMIN — Medication 1000 MCG: at 07:47

## 2025-03-24 RX ADMIN — PANTOPRAZOLE SODIUM 40 MG: 40 TABLET, DELAYED RELEASE ORAL at 06:17

## 2025-03-24 RX ADMIN — BUDESONIDE 0.5 MG: 0.5 INHALANT ORAL at 08:01

## 2025-03-24 RX ADMIN — ASPIRIN 81 MG: 81 TABLET, COATED ORAL at 07:47

## 2025-03-24 RX ADMIN — CARVEDILOL 6.25 MG: 6.25 TABLET, FILM COATED ORAL at 07:48

## 2025-03-24 RX ADMIN — LISINOPRIL 5 MG: 5 TABLET ORAL at 07:47

## 2025-03-24 RX ADMIN — IPRATROPIUM BROMIDE AND ALBUTEROL SULFATE 3 ML: 2.5; .5 SOLUTION RESPIRATORY (INHALATION) at 08:05

## 2025-03-24 RX ADMIN — IPRATROPIUM BROMIDE AND ALBUTEROL SULFATE 3 ML: 2.5; .5 SOLUTION RESPIRATORY (INHALATION) at 11:31

## 2025-03-24 RX ADMIN — INSULIN LISPRO 6 UNITS: 100 INJECTION, SOLUTION INTRAVENOUS; SUBCUTANEOUS at 11:31

## 2025-03-24 RX ADMIN — BUPROPION HYDROCHLORIDE 100 MG: 100 TABLET, FILM COATED, EXTENDED RELEASE ORAL at 07:47

## 2025-03-24 ASSESSMENT — COGNITIVE AND FUNCTIONAL STATUS - GENERAL
DAILY ACTIVITIY SCORE: 24
MOBILITY SCORE: 24

## 2025-03-24 ASSESSMENT — PAIN - FUNCTIONAL ASSESSMENT: PAIN_FUNCTIONAL_ASSESSMENT: 0-10

## 2025-03-24 ASSESSMENT — PAIN SCALES - GENERAL: PAINLEVEL_OUTOF10: 0 - NO PAIN

## 2025-03-24 NOTE — DISCHARGE INSTRUCTIONS
Take Pantoprazole daily for the next 7 days while on steroids to protect your stomach  Follow up with PCP

## 2025-03-24 NOTE — PROGRESS NOTES
03/24/25 1227   Discharge Planning   Living Arrangements Spouse/significant other   Support Systems Spouse/significant other   Assistance Needed none   Type of Residence Private residence   Home or Post Acute Services None   Expected Discharge Disposition Home   Does the patient need discharge transport arranged? No   Stroke Family Assessment   Stroke Family Assessment Needed No   Intensity of Service   Intensity of Service 0-30 min     Discharge planning assessment completed with patient. He lives with his spouse, states he is independent with mobility and self care needs. Patient follows with pulmonology and cardiology here. He does not have a PCP and states he is not interested in having one at this time. Patient is on 3-4L O2 at baseline and has his own [employer] equipment. Patient plans to return home at discharge and denies needs at this time.

## 2025-03-24 NOTE — DISCHARGE SUMMARY
Discharge Diagnosis  Pneumonia due to infectious organism, unspecified laterality, unspecified part of lung    Issues Requiring Follow-Up  Follow up PCP    Discharge Meds     Medication List      START taking these medications     azithromycin 500 mg tablet; Commonly known as: Zithromax; Take 1 tablet   (500 mg) by mouth once daily for 3 days.   guaiFENesin 600 mg 12 hr tablet; Commonly known as: Mucinex; Take 1   tablet (600 mg) by mouth every 12 hours if needed for congestion for up to   10 days. Do not crush, chew, or split.   pantoprazole 40 mg EC tablet; Commonly known as: ProtoNix; Take 1 tablet   (40 mg) by mouth once daily in the morning. Take before meals for 7 days.   Do not crush, chew, or split.; Start taking on: March 25, 2025     CHANGE how you take these medications     * predniSONE 5 mg tablet; Commonly known as: Deltasone; Take 1 tablet (5   mg) by mouth once daily.; What changed: Another medication with the same   name was added. Make sure you understand how and when to take each.   * predniSONE 20 mg tablet; Commonly known as: Deltasone; Take 2 tablets   (40 mg) by mouth once daily for 5 days.; What changed: You were already   taking a medication with the same name, and this prescription was added.   Make sure you understand how and when to take each.  * This list has 2 medication(s) that are the same as other medications   prescribed for you. Read the directions carefully, and ask your doctor or   other care provider to review them with you.     CONTINUE taking these medications     albuterol 90 mcg/actuation inhaler; Inhale 2 puffs every 6 hours if   needed for wheezing.   aspirin 81 mg EC tablet   atorvastatin 80 mg tablet; Commonly known as: Lipitor; Take 1 tablet (80   mg) by mouth once daily at bedtime.   budesonide 0.5 mg/2 mL nebulizer solution; Commonly known as: Pulmicort;   Take 2 mL (0.5 mg) by nebulization 2 times a day. Rinse mouth with water   after use to reduce aftertaste and  incidence of candidiasis. Do not   swallow.   buPROPion  mg 12 hr tablet; Commonly known as: Wellbutrin SR; Take   1 tablet (100 mg) by mouth 2 times a day. Do not crush, chew, or split.   carvedilol 6.25 mg tablet; Commonly known as: Coreg; Take 1 tablet (6.25   mg) by mouth 2 times daily (morning and late afternoon).   cyanocobalamin 1,000 mcg tablet; Commonly known as: Vitamin B-12   Fish OiL 1,000 (120-180) mg capsule; Generic drug: omega 3-dha-epa-fish   oil   hydrOXYzine HCL 25 mg tablet; Commonly known as: Atarax   ipratropium-albuteroL 0.5-2.5 mg/3 mL nebulizer solution; Commonly known   as: Duo-Neb; Take 3 mL by nebulization every 4 hours.   lisinopril 5 mg tablet; Take 1 tablet (5 mg) by mouth once daily.   Ohtuvayre 3 mg/2.5 mL suspension for nebulization; Generic drug:   ensifentrine; Inhale 3 mg 2 times a day.   oxygen gas therapy; Commonly known as: O2; Inhale 1 each every 12 hours.   theophylline  mg 24 hr tablet; Commonly known as: Uniphyl; Take 1   tablet (400 mg) by mouth once daily. Do not crush or chew.       Test Results Pending At Discharge  Pending Labs       Order Current Status    Legionella Antigen, Urine In process    Respiratory Culture/Smear In process    Streptococcus pneumoniae Antigen, Urine In process            Hospital Course   64 y.o. male with PMHx s/f oronary artery disease history PCI and stent, COPD with hypoxic respiratory failure, diabetes, hypertension, systolic heart failure presenting with cough and shortness of breath.      Acute hypoxic respiratory failure with hypercapnia bilateral pneumonia and COPD  Patient requiring supplemental oxygen in emergency department  We will continue on IV Rocephin and Zithromax  Will give patient Solu-Medrol around-the-clock  Start frequent nebulizer treatments and continue the patient's home medications  Will check procalcitonin level BN peptide urine for Legionella and strep sputum culture     Coronary artery disease history  PCI  Continue the patient's routine medications including aspirin statin and beta-blocker     Chronic systolic heart failure  Patient denies any orthopnea or edema appears to be compensated BNP is pending  We will reconcile patient's home medications     Type 2 diabetes  We will plan on consistent carbohydrate diet anticipate more difficulty than normal with his glucose given steroid use  Will cover with sliding scale insulin     Hypertension  We will continue the patient's normal home medications.      Patient came in for hypoxic resp failuyre and COPD/PNA on exam he was feeling much better after IV steroids and antibiotics were started.  His vitals were stable.  Patient discharged home to continue antibiotics and steroids in stable conditon    Pertinent Physical Exam At Time of Discharge  Physical Exam  Vitals reviewed.   Constitutional:       Appearance: Normal appearance.   HENT:      Head: Normocephalic.      Mouth/Throat:      Mouth: Mucous membranes are dry.   Eyes:      Extraocular Movements: Extraocular movements intact.      Conjunctiva/sclera: Conjunctivae normal.      Pupils: Pupils are equal, round, and reactive to light.   Cardiovascular:      Rate and Rhythm: Normal rate and regular rhythm.      Pulses: Normal pulses.      Heart sounds: Normal heart sounds, S1 normal and S2 normal.   Pulmonary:      Effort: Pulmonary effort is normal.      Breath sounds: Normal breath sounds and air entry.   Abdominal:      General: Abdomen is flat. Bowel sounds are normal.      Palpations: Abdomen is soft.   Musculoskeletal:         General: Normal range of motion.      Cervical back: Full passive range of motion without pain and normal range of motion.   Skin:     General: Skin is warm and dry.   Neurological:      General: No focal deficit present.      Mental Status: He is alert and oriented to person, place, and time. Mental status is at baseline.   Psychiatric:         Attention and Perception: Attention normal.          Mood and Affect: Mood normal.         Speech: Speech normal.         Outpatient Follow-Up  Future Appointments   Date Time Provider Department Center   4/23/2025  1:20 PM Cedrick Valdes MD [id number] [facility]   5/29/2025  3:00 PM SHIKHA Pitt [id number] [facility]   3/19/2026 12:20 PM Corrine Zelaya MD [id number] [facility]         SHIKHA Sawyer    Discharge time over 35 minutes

## 2025-03-24 NOTE — NURSING NOTE
Pt being discharged home. Discharge instructions reviewed with the patient reguarding new/continued medications and follow up appointments. Medications sent to [facility], pts preferred pharmacy. All questions answered at this time. All belongings to go with the patient. IV removed with tip of catheter intact.

## 2025-03-24 NOTE — CARE PLAN
The patient's goals for the shift include      The clinical goals for the shift include Safety and POX 92 or above      Problem: Safety - Adult  Goal: Free from fall injury  Outcome: Progressing     Problem: Discharge Planning  Goal: Discharge to home or other facility with appropriate resources  Outcome: Progressing   Respiratory management

## 2025-03-24 NOTE — CARE PLAN
The patient's goals for the shift include        Problem: Pain - Adult  Goal: Verbalizes/displays adequate comfort level or baseline comfort level  Outcome: Progressing     Problem: Safety - Adult  Goal: Free from fall injury  Outcome: Progressing     Problem: Discharge Planning  Goal: Discharge to home or other facility with appropriate resources  Outcome: Progressing     Problem: Chronic Conditions and Co-morbidities  Goal: Patient's chronic conditions and co-morbidity symptoms are monitored and maintained or improved  Outcome: Progressing     Problem: Nutrition  Goal: Nutrient intake appropriate for maintaining nutritional needs  Outcome: Progressing     Problem: Skin  Goal: Promote/optimize nutrition  Outcome: Progressing  Flowsheets (Taken 3/24/2025 0704)  Promote/optimize nutrition: Monitor/record intake including meals  Goal: Promote skin healing  Outcome: Progressing  Flowsheets (Taken 3/24/2025 0704)  Promote skin healing:   Rotate device position/do not position patient on device   Assess skin/pad under line(s)/device(s)     Problem: Fall/Injury  Goal: Not fall by end of shift  Outcome: Progressing  Goal: Be free from injury by end of the shift  Outcome: Progressing     Problem: Respiratory  Goal: Clear secretions with interventions this shift  Outcome: Progressing  Goal: Minimize anxiety/maximize coping throughout shift  Outcome: Progressing  Goal: Minimal/no exertional discomfort or dyspnea this shift  Outcome: Progressing  Goal: No signs of respiratory distress (eg. Use of accessory muscles. Peds grunting)  Outcome: Progressing  Goal: Patent airway maintained this shift  Outcome: Progressing  Goal: Tolerate mechanical ventilation evidenced by VS/agitation level this shift  Outcome: Progressing  Goal: Tolerate pulmonary toileting this shift  Outcome: Progressing  Goal: Verbalize decreased shortness of breath this shift  Outcome: Progressing  Goal: Wean oxygen to maintain O2 saturation per order/standard  this shift  Outcome: Progressing  Goal: Increase self care and/or family involvement in next 24 hours  Outcome: Progressing

## 2025-03-26 LAB
BACTERIA SPEC RESP CULT: NORMAL
GRAM STN SPEC: NORMAL
GRAM STN SPEC: NORMAL

## 2025-03-30 ENCOUNTER — APPOINTMENT (OUTPATIENT)
Dept: SLEEP MEDICINE | Facility: CLINIC | Age: 65
End: 2025-03-30
Payer: MEDICARE

## 2025-04-21 ENCOUNTER — TELEPHONE (OUTPATIENT)
Dept: PULMONOLOGY | Facility: HOSPITAL | Age: 65
End: 2025-04-21
Payer: MEDICARE

## 2025-04-21 NOTE — TELEPHONE ENCOUNTER
Called patient to discuss Ohtuvayre. Patient was needing financial assistance with Ohtuvayre. Patient given the number on 4/2 and 4/8 to call and set up patient assistance. Patient is still working on this. Will call back and check in a few days.

## 2025-04-23 ENCOUNTER — APPOINTMENT (OUTPATIENT)
Dept: PULMONOLOGY | Facility: HOSPITAL | Age: 65
End: 2025-04-23
Payer: MEDICARE

## 2025-04-30 ENCOUNTER — APPOINTMENT (OUTPATIENT)
Dept: RADIOLOGY | Facility: HOSPITAL | Age: 65
End: 2025-04-30
Payer: MEDICARE

## 2025-04-30 ENCOUNTER — APPOINTMENT (OUTPATIENT)
Dept: CARDIOLOGY | Facility: HOSPITAL | Age: 65
End: 2025-04-30
Payer: MEDICARE

## 2025-04-30 ENCOUNTER — HOSPITAL ENCOUNTER (INPATIENT)
Facility: HOSPITAL | Age: 65
LOS: 1 days | Discharge: HOME | End: 2025-05-02
Attending: STUDENT IN AN ORGANIZED HEALTH CARE EDUCATION/TRAINING PROGRAM | Admitting: INTERNAL MEDICINE
Payer: MEDICARE

## 2025-04-30 DIAGNOSIS — J44.1 COPD EXACERBATION (MULTI): Primary | ICD-10-CM

## 2025-04-30 DIAGNOSIS — F41.9 ANXIETY: ICD-10-CM

## 2025-04-30 LAB
ANION GAP SERPL CALC-SCNC: 8 MMOL/L (ref 10–20)
BASOPHILS # BLD AUTO: 0.1 X10*3/UL (ref 0–0.1)
BASOPHILS NFR BLD AUTO: 0.7 %
BNP SERPL-MCNC: 12 PG/ML (ref 0–99)
BUN SERPL-MCNC: 12 MG/DL (ref 6–23)
CALCIUM SERPL-MCNC: 8.9 MG/DL (ref 8.6–10.3)
CARDIAC TROPONIN I PNL SERPL HS: 8 NG/L (ref 0–20)
CHLORIDE SERPL-SCNC: 102 MMOL/L (ref 98–107)
CO2 SERPL-SCNC: 29 MMOL/L (ref 21–32)
CREAT SERPL-MCNC: 1.15 MG/DL (ref 0.5–1.3)
CRP SERPL-MCNC: 0.39 MG/DL
EGFRCR SERPLBLD CKD-EPI 2021: 70 ML/MIN/1.73M*2
EOSINOPHIL # BLD AUTO: 0.86 X10*3/UL (ref 0–0.7)
EOSINOPHIL NFR BLD AUTO: 6.4 %
ERYTHROCYTE [DISTWIDTH] IN BLOOD BY AUTOMATED COUNT: 12.9 % (ref 11.5–14.5)
GLUCOSE BLD MANUAL STRIP-MCNC: 168 MG/DL (ref 74–99)
GLUCOSE BLD MANUAL STRIP-MCNC: 247 MG/DL (ref 74–99)
GLUCOSE SERPL-MCNC: 150 MG/DL (ref 74–99)
HCT VFR BLD AUTO: 40.8 % (ref 41–52)
HGB BLD-MCNC: 13.1 G/DL (ref 13.5–17.5)
IMM GRANULOCYTES # BLD AUTO: 0.09 X10*3/UL (ref 0–0.7)
IMM GRANULOCYTES NFR BLD AUTO: 0.7 % (ref 0–0.9)
LYMPHOCYTES # BLD AUTO: 3.53 X10*3/UL (ref 1.2–4.8)
LYMPHOCYTES NFR BLD AUTO: 26.4 %
MCH RBC QN AUTO: 30.6 PG (ref 26–34)
MCHC RBC AUTO-ENTMCNC: 32.1 G/DL (ref 32–36)
MCV RBC AUTO: 95 FL (ref 80–100)
MONOCYTES # BLD AUTO: 0.84 X10*3/UL (ref 0.1–1)
MONOCYTES NFR BLD AUTO: 6.3 %
NEUTROPHILS # BLD AUTO: 7.94 X10*3/UL (ref 1.2–7.7)
NEUTROPHILS NFR BLD AUTO: 59.5 %
NRBC BLD-RTO: 0 /100 WBCS (ref 0–0)
PLATELET # BLD AUTO: 238 X10*3/UL (ref 150–450)
POTASSIUM SERPL-SCNC: 4.2 MMOL/L (ref 3.5–5.3)
RBC # BLD AUTO: 4.28 X10*6/UL (ref 4.5–5.9)
SODIUM SERPL-SCNC: 135 MMOL/L (ref 136–145)
WBC # BLD AUTO: 13.4 X10*3/UL (ref 4.4–11.3)

## 2025-04-30 PROCEDURE — 94640 AIRWAY INHALATION TREATMENT: CPT

## 2025-04-30 PROCEDURE — 99285 EMERGENCY DEPT VISIT HI MDM: CPT | Mod: 25 | Performed by: STUDENT IN AN ORGANIZED HEALTH CARE EDUCATION/TRAINING PROGRAM

## 2025-04-30 PROCEDURE — 84484 ASSAY OF TROPONIN QUANT: CPT | Performed by: STUDENT IN AN ORGANIZED HEALTH CARE EDUCATION/TRAINING PROGRAM

## 2025-04-30 PROCEDURE — 2500000004 HC RX 250 GENERAL PHARMACY W/ HCPCS (ALT 636 FOR OP/ED): Mod: JZ | Performed by: NURSE PRACTITIONER

## 2025-04-30 PROCEDURE — 96376 TX/PRO/DX INJ SAME DRUG ADON: CPT

## 2025-04-30 PROCEDURE — 96372 THER/PROPH/DIAG INJ SC/IM: CPT | Performed by: NURSE PRACTITIONER

## 2025-04-30 PROCEDURE — 2500000004 HC RX 250 GENERAL PHARMACY W/ HCPCS (ALT 636 FOR OP/ED): Mod: JZ | Performed by: STUDENT IN AN ORGANIZED HEALTH CARE EDUCATION/TRAINING PROGRAM

## 2025-04-30 PROCEDURE — 71046 X-RAY EXAM CHEST 2 VIEWS: CPT | Mod: FOREIGN READ | Performed by: RADIOLOGY

## 2025-04-30 PROCEDURE — 2500000002 HC RX 250 W HCPCS SELF ADMINISTERED DRUGS (ALT 637 FOR MEDICARE OP, ALT 636 FOR OP/ED): Performed by: STUDENT IN AN ORGANIZED HEALTH CARE EDUCATION/TRAINING PROGRAM

## 2025-04-30 PROCEDURE — 2500000002 HC RX 250 W HCPCS SELF ADMINISTERED DRUGS (ALT 637 FOR MEDICARE OP, ALT 636 FOR OP/ED): Performed by: NURSE PRACTITIONER

## 2025-04-30 PROCEDURE — 80048 BASIC METABOLIC PNL TOTAL CA: CPT | Performed by: STUDENT IN AN ORGANIZED HEALTH CARE EDUCATION/TRAINING PROGRAM

## 2025-04-30 PROCEDURE — 99222 1ST HOSP IP/OBS MODERATE 55: CPT | Performed by: NURSE PRACTITIONER

## 2025-04-30 PROCEDURE — 2500000001 HC RX 250 WO HCPCS SELF ADMINISTERED DRUGS (ALT 637 FOR MEDICARE OP): Performed by: NURSE PRACTITIONER

## 2025-04-30 PROCEDURE — 82947 ASSAY GLUCOSE BLOOD QUANT: CPT

## 2025-04-30 PROCEDURE — 36415 COLL VENOUS BLD VENIPUNCTURE: CPT | Performed by: STUDENT IN AN ORGANIZED HEALTH CARE EDUCATION/TRAINING PROGRAM

## 2025-04-30 PROCEDURE — 86140 C-REACTIVE PROTEIN: CPT | Performed by: STUDENT IN AN ORGANIZED HEALTH CARE EDUCATION/TRAINING PROGRAM

## 2025-04-30 PROCEDURE — 96375 TX/PRO/DX INJ NEW DRUG ADDON: CPT

## 2025-04-30 PROCEDURE — 83880 ASSAY OF NATRIURETIC PEPTIDE: CPT | Performed by: STUDENT IN AN ORGANIZED HEALTH CARE EDUCATION/TRAINING PROGRAM

## 2025-04-30 PROCEDURE — 71046 X-RAY EXAM CHEST 2 VIEWS: CPT

## 2025-04-30 PROCEDURE — 85025 COMPLETE CBC W/AUTO DIFF WBC: CPT | Performed by: STUDENT IN AN ORGANIZED HEALTH CARE EDUCATION/TRAINING PROGRAM

## 2025-04-30 PROCEDURE — 96365 THER/PROPH/DIAG IV INF INIT: CPT | Mod: 59

## 2025-04-30 PROCEDURE — 93005 ELECTROCARDIOGRAM TRACING: CPT

## 2025-04-30 RX ORDER — DEXTROSE 50 % IN WATER (D50W) INTRAVENOUS SYRINGE
12.5
Status: DISCONTINUED | OUTPATIENT
Start: 2025-04-30 | End: 2025-05-02 | Stop reason: HOSPADM

## 2025-04-30 RX ORDER — ENOXAPARIN SODIUM 100 MG/ML
40 INJECTION SUBCUTANEOUS EVERY 24 HOURS
Status: DISCONTINUED | OUTPATIENT
Start: 2025-04-30 | End: 2025-05-02 | Stop reason: HOSPADM

## 2025-04-30 RX ORDER — INSULIN LISPRO 100 [IU]/ML
0-10 INJECTION, SOLUTION INTRAVENOUS; SUBCUTANEOUS
Status: DISCONTINUED | OUTPATIENT
Start: 2025-04-30 | End: 2025-05-02 | Stop reason: HOSPADM

## 2025-04-30 RX ORDER — DOXYCYCLINE 100 MG/1
100 CAPSULE ORAL 2 TIMES DAILY
Status: DISCONTINUED | OUTPATIENT
Start: 2025-04-30 | End: 2025-05-02 | Stop reason: HOSPADM

## 2025-04-30 RX ORDER — IPRATROPIUM BROMIDE AND ALBUTEROL SULFATE 2.5; .5 MG/3ML; MG/3ML
3 SOLUTION RESPIRATORY (INHALATION) EVERY 20 MIN
Status: COMPLETED | OUTPATIENT
Start: 2025-04-30 | End: 2025-04-30

## 2025-04-30 RX ORDER — ACETAMINOPHEN 160 MG/5ML
650 SUSPENSION ORAL EVERY 4 HOURS PRN
Status: DISCONTINUED | OUTPATIENT
Start: 2025-04-30 | End: 2025-05-02 | Stop reason: HOSPADM

## 2025-04-30 RX ORDER — ACETAMINOPHEN 650 MG/1
650 SUPPOSITORY RECTAL EVERY 4 HOURS PRN
Status: DISCONTINUED | OUTPATIENT
Start: 2025-04-30 | End: 2025-05-02 | Stop reason: HOSPADM

## 2025-04-30 RX ORDER — CARVEDILOL 6.25 MG/1
6.25 TABLET ORAL
Status: DISCONTINUED | OUTPATIENT
Start: 2025-04-30 | End: 2025-05-02 | Stop reason: HOSPADM

## 2025-04-30 RX ORDER — LISINOPRIL 5 MG/1
5 TABLET ORAL DAILY
Status: DISCONTINUED | OUTPATIENT
Start: 2025-04-30 | End: 2025-05-02 | Stop reason: HOSPADM

## 2025-04-30 RX ORDER — ONDANSETRON HYDROCHLORIDE 2 MG/ML
4 INJECTION, SOLUTION INTRAVENOUS EVERY 8 HOURS PRN
Status: DISCONTINUED | OUTPATIENT
Start: 2025-04-30 | End: 2025-05-02 | Stop reason: HOSPADM

## 2025-04-30 RX ORDER — ASPIRIN 81 MG/1
81 TABLET ORAL DAILY
Status: DISCONTINUED | OUTPATIENT
Start: 2025-04-30 | End: 2025-05-02 | Stop reason: HOSPADM

## 2025-04-30 RX ORDER — BUDESONIDE 0.5 MG/2ML
0.5 INHALANT ORAL
Status: DISCONTINUED | OUTPATIENT
Start: 2025-04-30 | End: 2025-05-02 | Stop reason: HOSPADM

## 2025-04-30 RX ORDER — TALC
3 POWDER (GRAM) TOPICAL NIGHTLY PRN
Status: DISCONTINUED | OUTPATIENT
Start: 2025-04-30 | End: 2025-05-02 | Stop reason: HOSPADM

## 2025-04-30 RX ORDER — BISACODYL 10 MG/1
10 SUPPOSITORY RECTAL DAILY PRN
Status: DISCONTINUED | OUTPATIENT
Start: 2025-04-30 | End: 2025-05-02 | Stop reason: HOSPADM

## 2025-04-30 RX ORDER — ATORVASTATIN CALCIUM 40 MG/1
80 TABLET, FILM COATED ORAL NIGHTLY
Status: DISCONTINUED | OUTPATIENT
Start: 2025-04-30 | End: 2025-05-02 | Stop reason: HOSPADM

## 2025-04-30 RX ORDER — ACETAMINOPHEN 325 MG/1
650 TABLET ORAL EVERY 4 HOURS PRN
Status: DISCONTINUED | OUTPATIENT
Start: 2025-04-30 | End: 2025-05-02 | Stop reason: HOSPADM

## 2025-04-30 RX ORDER — BISACODYL 5 MG
10 TABLET, DELAYED RELEASE (ENTERIC COATED) ORAL DAILY PRN
Status: DISCONTINUED | OUTPATIENT
Start: 2025-04-30 | End: 2025-05-02 | Stop reason: HOSPADM

## 2025-04-30 RX ORDER — GUAIFENESIN 600 MG/1
600 TABLET, EXTENDED RELEASE ORAL EVERY 12 HOURS PRN
Status: DISCONTINUED | OUTPATIENT
Start: 2025-04-30 | End: 2025-05-02 | Stop reason: HOSPADM

## 2025-04-30 RX ORDER — POLYETHYLENE GLYCOL 3350 17 G/17G
17 POWDER, FOR SOLUTION ORAL DAILY
Status: DISCONTINUED | OUTPATIENT
Start: 2025-04-30 | End: 2025-05-02 | Stop reason: HOSPADM

## 2025-04-30 RX ORDER — DEXTROSE 50 % IN WATER (D50W) INTRAVENOUS SYRINGE
25
Status: DISCONTINUED | OUTPATIENT
Start: 2025-04-30 | End: 2025-05-02 | Stop reason: HOSPADM

## 2025-04-30 RX ORDER — PANTOPRAZOLE SODIUM 40 MG/10ML
40 INJECTION, POWDER, LYOPHILIZED, FOR SOLUTION INTRAVENOUS
Status: DISCONTINUED | OUTPATIENT
Start: 2025-05-01 | End: 2025-05-02 | Stop reason: HOSPADM

## 2025-04-30 RX ORDER — PANTOPRAZOLE SODIUM 40 MG/1
40 TABLET, DELAYED RELEASE ORAL
Status: DISCONTINUED | OUTPATIENT
Start: 2025-05-01 | End: 2025-05-02 | Stop reason: HOSPADM

## 2025-04-30 RX ORDER — IPRATROPIUM BROMIDE AND ALBUTEROL SULFATE 2.5; .5 MG/3ML; MG/3ML
3 SOLUTION RESPIRATORY (INHALATION) EVERY 4 HOURS
Status: DISCONTINUED | OUTPATIENT
Start: 2025-04-30 | End: 2025-05-01

## 2025-04-30 RX ORDER — ONDANSETRON 4 MG/1
4 TABLET, FILM COATED ORAL EVERY 8 HOURS PRN
Status: DISCONTINUED | OUTPATIENT
Start: 2025-04-30 | End: 2025-05-02 | Stop reason: HOSPADM

## 2025-04-30 RX ORDER — HYDROXYZINE HYDROCHLORIDE 25 MG/1
25 TABLET, FILM COATED ORAL EVERY 6 HOURS PRN
Status: DISCONTINUED | OUTPATIENT
Start: 2025-04-30 | End: 2025-05-02 | Stop reason: HOSPADM

## 2025-04-30 RX ADMIN — IPRATROPIUM BROMIDE AND ALBUTEROL SULFATE 3 ML: 2.5; .5 SOLUTION RESPIRATORY (INHALATION) at 20:27

## 2025-04-30 RX ADMIN — DOXYCYCLINE 100 MG: 100 INJECTION, POWDER, LYOPHILIZED, FOR SOLUTION INTRAVENOUS at 11:08

## 2025-04-30 RX ADMIN — BUDESONIDE 0.5 MG: 0.5 INHALANT ORAL at 12:39

## 2025-04-30 RX ADMIN — ENOXAPARIN SODIUM 40 MG: 40 INJECTION SUBCUTANEOUS at 20:27

## 2025-04-30 RX ADMIN — IPRATROPIUM BROMIDE AND ALBUTEROL SULFATE 3 ML: 2.5; .5 SOLUTION RESPIRATORY (INHALATION) at 08:47

## 2025-04-30 RX ADMIN — ASPIRIN 81 MG: 81 TABLET, COATED ORAL at 11:49

## 2025-04-30 RX ADMIN — METHYLPREDNISOLONE SODIUM SUCCINATE 40 MG: 40 INJECTION, POWDER, FOR SOLUTION INTRAMUSCULAR; INTRAVENOUS at 13:16

## 2025-04-30 RX ADMIN — BUDESONIDE 0.5 MG: 0.5 INHALANT ORAL at 21:35

## 2025-04-30 RX ADMIN — IPRATROPIUM BROMIDE AND ALBUTEROL SULFATE 3 ML: 2.5; .5 SOLUTION RESPIRATORY (INHALATION) at 12:12

## 2025-04-30 RX ADMIN — CARVEDILOL 6.25 MG: 6.25 TABLET, FILM COATED ORAL at 21:35

## 2025-04-30 RX ADMIN — METHYLPREDNISOLONE SODIUM SUCCINATE 40 MG: 40 INJECTION, POWDER, FOR SOLUTION INTRAMUSCULAR; INTRAVENOUS at 21:35

## 2025-04-30 RX ADMIN — DOXYCYCLINE 100 MG: 100 CAPSULE ORAL at 20:26

## 2025-04-30 RX ADMIN — ATORVASTATIN CALCIUM 80 MG: 40 TABLET, FILM COATED ORAL at 20:26

## 2025-04-30 RX ADMIN — INSULIN LISPRO 4 UNITS: 100 INJECTION, SOLUTION INTRAVENOUS; SUBCUTANEOUS at 18:49

## 2025-04-30 RX ADMIN — IPRATROPIUM BROMIDE AND ALBUTEROL SULFATE 3 ML: 2.5; .5 SOLUTION RESPIRATORY (INHALATION) at 08:37

## 2025-04-30 SDOH — SOCIAL STABILITY: SOCIAL INSECURITY: HAVE YOU HAD THOUGHTS OF HARMING ANYONE ELSE?: NO

## 2025-04-30 SDOH — SOCIAL STABILITY: SOCIAL INSECURITY: WERE YOU ABLE TO COMPLETE ALL THE BEHAVIORAL HEALTH SCREENINGS?: YES

## 2025-04-30 ASSESSMENT — COGNITIVE AND FUNCTIONAL STATUS - GENERAL
DAILY ACTIVITIY SCORE: 24
MOBILITY SCORE: 24
PATIENT BASELINE BEDBOUND: NO

## 2025-04-30 ASSESSMENT — LIFESTYLE VARIABLES
TOTAL SCORE: 0
EVER FELT BAD OR GUILTY ABOUT YOUR DRINKING: NO
HOW OFTEN DO YOU HAVE 6 OR MORE DRINKS ON ONE OCCASION: NEVER
HAVE PEOPLE ANNOYED YOU BY CRITICIZING YOUR DRINKING: NO
HAVE YOU EVER FELT YOU SHOULD CUT DOWN ON YOUR DRINKING: NO
EVER HAD A DRINK FIRST THING IN THE MORNING TO STEADY YOUR NERVES TO GET RID OF A HANGOVER: NO
SUBSTANCE_ABUSE_PAST_12_MONTHS: NO
PRESCIPTION_ABUSE_PAST_12_MONTHS: NO
HOW MANY STANDARD DRINKS CONTAINING ALCOHOL DO YOU HAVE ON A TYPICAL DAY: PATIENT DOES NOT DRINK
SKIP TO QUESTIONS 9-10: 1
AUDIT-C TOTAL SCORE: 0
HOW OFTEN DO YOU HAVE A DRINK CONTAINING ALCOHOL: NEVER
AUDIT-C TOTAL SCORE: 0

## 2025-04-30 ASSESSMENT — PAIN - FUNCTIONAL ASSESSMENT
PAIN_FUNCTIONAL_ASSESSMENT: 0-10
PAIN_FUNCTIONAL_ASSESSMENT: 0-10

## 2025-04-30 ASSESSMENT — ACTIVITIES OF DAILY LIVING (ADL)
JUDGMENT_ADEQUATE_SAFELY_COMPLETE_DAILY_ACTIVITIES: YES
BATHING: INDEPENDENT
GROOMING: INDEPENDENT
WALKS IN HOME: INDEPENDENT
LACK_OF_TRANSPORTATION: NO
ADEQUATE_TO_COMPLETE_ADL: YES
FEEDING YOURSELF: INDEPENDENT
TOILETING: INDEPENDENT
DRESSING YOURSELF: INDEPENDENT
PATIENT'S MEMORY ADEQUATE TO SAFELY COMPLETE DAILY ACTIVITIES?: YES
ASSISTIVE_DEVICE: OXYGEN
HEARING - LEFT EAR: FUNCTIONAL
HEARING - RIGHT EAR: FUNCTIONAL

## 2025-04-30 ASSESSMENT — ENCOUNTER SYMPTOMS
SHORTNESS OF BREATH: 1
COUGH: 1
WEAKNESS: 1

## 2025-04-30 ASSESSMENT — PAIN SCALES - GENERAL
PAINLEVEL_OUTOF10: 2
PAINLEVEL_OUTOF10: 2

## 2025-04-30 ASSESSMENT — PAIN DESCRIPTION - LOCATION: LOCATION: CHEST

## 2025-04-30 ASSESSMENT — PAIN DESCRIPTION - PAIN TYPE: TYPE: ACUTE PAIN

## 2025-04-30 NOTE — PROGRESS NOTES
Darron Resendez is a 64 y.o. male admitted for COPD exacerbation (Multi). Pharmacy reviewed the patient's [id number] medications and allergies for accuracy.    The list below reflects the PTA list prior to pharmacy medication history. A summary a changes to the PTA medication list has been listed below. Please review each medication in order reconciliation for additional clarification and justification.    Source of information:  [facility]  [facility]  [facility]    Medications added:    Medications modified:    Medications to be removed:  THEOPHYLLINE ER 400MG  HYDROXYZINE 25MG  PANTOPRAZOLE 40MG    Medications of concern:  BUPROPION SR 100MG 1 BID LAST FILL 2/25 30DS NO REFILLS  PREDNISONE 5MG 1 every day (NEEDS REFILLS)      Prior to Admission Medications   Prescriptions Last Dose Informant Patient Reported? Taking?   albuterol 90 mcg/actuation inhaler   No No   Sig: Inhale 2 puffs every 6 hours if needed for wheezing.   aspirin 81 mg EC tablet   Yes No   Sig: Take 1 tablet (81 mg) by mouth once daily.   atorvastatin (Lipitor) 80 mg tablet   No No   Sig: Take 1 tablet (80 mg) by mouth once daily at bedtime.   buPROPion SR (Wellbutrin SR) 100 mg 12 hr tablet   No No   Sig: Take 1 tablet (100 mg) by mouth 2 times a day. Do not crush, chew, or split.   budesonide (Pulmicort) 0.5 mg/2 mL nebulizer solution   No No   Sig: Take 2 mL (0.5 mg) by nebulization 2 times a day. Rinse mouth with water after use to reduce aftertaste and incidence of candidiasis. Do not swallow.   carvedilol (Coreg) 6.25 mg tablet   No No   Sig: Take 1 tablet (6.25 mg) by mouth 2 times daily (morning and late afternoon).   cyanocobalamin (Vitamin B-12) 1,000 mcg tablet   Yes No   Sig: Take 1 tablet (1,000 mcg) by mouth once daily.   hydrOXYzine HCL (Atarax) 25 mg tablet   Yes No   Sig: Take by mouth.   ipratropium-albuteroL (Duo-Neb) 0.5-2.5 mg/3 mL nebulizer solution   No No   Sig: Take 3 mL by nebulization every 4 hours.   lisinopril 5 mg tablet    No No   Sig: Take 1 tablet (5 mg) by mouth once daily.   omega 3-dha-epa-fish oil (Fish OiL) 1,000 mg (120 mg-180 mg) capsule   Yes No   Sig: Take 1 capsule (1,000 mg) by mouth once daily.   oxygen (O2) gas therapy   No No   Sig: Inhale 1 each every 12 hours.   pantoprazole (ProtoNix) 40 mg EC tablet   No No   Sig: Take 1 tablet (40 mg) by mouth once daily in the morning. Take before meals for 7 days. Do not crush, chew, or split.   predniSONE (Deltasone) 5 mg tablet   No No   Sig: Take 1 tablet (5 mg) by mouth once daily.   theophylline ER (Uniphyl) 400 mg 24 hr tablet   No No   Sig: Take 1 tablet (400 mg) by mouth once daily. Do not crush or chew.      Facility-Administered Medications: None       Audra Phillips

## 2025-04-30 NOTE — H&P
[facility] - GENERAL MEDICINE HISTORY AND PHYSICAL    HISTORY OF PRESENT ILLNESS     History Obtained From (Primary Source): The patient  Collateral History (Secondary Sources): D/w ED    History Of Present Illness (HPI):  Darron Resendez is a 64 y.o. male with PMHx s/f COPD with chronic oxygen dependence on 4 L, coronary artery disease history PCI and stenting, systolic heart failure, elevated blood glucose, hypertension  presenting with complaint of weakness shortness of breath over the last week.  Patient has had increased coughing with thin clear mucus.  No fevers chills no chest pain.  Patient denies orthopnea lower extremity edema.  In emergency department patient's chest x-ray was negative for evidence of pneumonia patient was given IV Solu-Medrol and nebulizer treatment doxycycline.  Case was discussed with the ED provider plan is to admit patient for exacerbation of COPD length of stay is not expected to exceed 2 midnights.    ED Course:   Vitals on presentation: T 36.5 °C (97.7 °F)  HR 80  BP (!) 153/95  RR (!) 26  O2 94 % None (Room air)  Labs:   CBC with WBC 13.4, Hgb 13.1, Plts 238.   CMP with glucose 150, Na 135, K 4.2, BUN 12, sCr 1.15, alk phos 50, ALT 10, AST 9, bilirubin 0.4. Magnesium 1.70.   BNP 12. Trop 8.   Lactate 1.7  EKG: EKG shows sinus rhythm with no ST wave segment elevation suggestive of acute ischemia there are chronic borderline T wave changes  Imaging - agree with radiology interpretation(s):   Interventions: IV Solu-Medrol DuoNeb and doxycycline    12-point ROS reviewed and found to be negative aside from aforementioned positives in HPI and/or noted in dedicated ROS section below.     Decision made to admit the patient to the hospitalist service after evaluation of the patient, review of the above, and discussion with ED provider.     LABS AND IMAGING     I have personally reviewed the following labs from 04/30/25: CBC and CMP  I have personally reviewed any obtained  EKGs on 04/30/25, with my interpretation as listed above in the ED summary course.   I have personally reviewed the patient's vitals on presentation to the ED and any/all changes through to time of admission (on 04/30/25).     ED Course (From ED Provider):  ED Course as of 04/30/25 1129   Wed Apr 30, 2025   0840 EKG shows sinus rhythm.  No STEMI.  Normal intervals and axis. [RS]   1100 IMS paged [RS]      ED Course User Index  [RS] Maxi Reyes DO         Diagnoses as of 04/30/25 1129   COPD exacerbation (Multi)     Relevant Results  Results for orders placed or performed during the hospital encounter of 04/30/25 (from the past 24 hours)   CBC and Auto Differential   Result Value Ref Range    WBC 13.4 (H) 4.4 - 11.3 x10*3/uL    nRBC 0.0 0.0 - 0.0 /100 WBCs    RBC 4.28 (L) 4.50 - 5.90 x10*6/uL    Hemoglobin 13.1 (L) 13.5 - 17.5 g/dL    Hematocrit 40.8 (L) 41.0 - 52.0 %    MCV 95 80 - 100 fL    MCH 30.6 26.0 - 34.0 pg    MCHC 32.1 32.0 - 36.0 g/dL    RDW 12.9 11.5 - 14.5 %    Platelets 238 150 - 450 x10*3/uL    Neutrophils % 59.5 40.0 - 80.0 %    Immature Granulocytes %, Automated 0.7 0.0 - 0.9 %    Lymphocytes % 26.4 13.0 - 44.0 %    Monocytes % 6.3 2.0 - 10.0 %    Eosinophils % 6.4 0.0 - 6.0 %    Basophils % 0.7 0.0 - 2.0 %    Neutrophils Absolute 7.94 (H) 1.20 - 7.70 x10*3/uL    Immature Granulocytes Absolute, Automated 0.09 0.00 - 0.70 x10*3/uL    Lymphocytes Absolute 3.53 1.20 - 4.80 x10*3/uL    Monocytes Absolute 0.84 0.10 - 1.00 x10*3/uL    Eosinophils Absolute 0.86 (H) 0.00 - 0.70 x10*3/uL    Basophils Absolute 0.10 0.00 - 0.10 x10*3/uL   Basic metabolic panel   Result Value Ref Range    Glucose 150 (H) 74 - 99 mg/dL    Sodium 135 (L) 136 - 145 mmol/L    Potassium 4.2 3.5 - 5.3 mmol/L    Chloride 102 98 - 107 mmol/L    Bicarbonate 29 21 - 32 mmol/L    Anion Gap 8 (L) 10 - 20 mmol/L    Urea Nitrogen 12 6 - 23 mg/dL    Creatinine 1.15 0.50 - 1.30 mg/dL    eGFR 70 >60 mL/min/1.73m*2    Calcium 8.9 8.6 - 10.3  mg/dL   Troponin I, High Sensitivity   Result Value Ref Range    Troponin I, High Sensitivity 8 0 - 20 ng/L   B-Type Natriuretic Peptide   Result Value Ref Range    BNP 12 0 - 99 pg/mL   C-Reactive Protein   Result Value Ref Range    C-Reactive Protein 0.39 <1.00 mg/dL      Imaging  XR chest 2 views  Result Date: 4/30/2025  No acute process. Signed by Wali Chowdary MD      Cardiology, Vascular, and Other Imaging  No other imaging results found for the past 2 days       PAST HISTORIES AND ALLERGIES     Past Medical History  He has a past medical history of Chronic systolic (congestive) heart failure (09/22/2020), COPD (chronic obstructive pulmonary disease) (Multi), Myocardial infarction (Multi) (07/01/2024), Old myocardial infarction, Old myocardial infarction (09/22/2020), Old myocardial infarction, Personal history of other diseases of the circulatory system, Personal history of other endocrine, nutritional and metabolic disease, and Presence of stent in anterior descending branch of left coronary artery (07/01/2024).    Surgical History  He has a past surgical history that includes Tonsillectomy (06/01/2016); Vasectomy (06/01/2016); Mandible surgery (06/01/2016); and Other surgical history (06/29/2020).     Social History  He reports that he has been smoking cigarettes. He started smoking about 50 years ago. He has a 50.3 pack-year smoking history. He has never used smokeless tobacco. He reports that he does not currently use alcohol. He reports that he does not use drugs.    Family History  Family History[1]    Allergies  Penicillin g    MEDICATIONS     Scheduled Medications:  Scheduled Medications[2]  Continuous Medications:  Continuous Medications[3]  PRN Medications:  PRN Medications[4]     REVIEW OF SYSTEMS     Review of Systems   Respiratory:  Positive for cough and shortness of breath.    Neurological:  Positive for weakness.       OBJECTIVE     Last Recorded Vitals  /71   Pulse 77   Temp 36.5 °C  (97.7 °F) (Temporal)   Resp 20   Wt 109 kg (239 lb 8 oz)   SpO2 98%      Physical Exam:  Vital signs and nursing notes reviewed.   Constitutional: Pleasant and cooperative. Laying in bed in no acute distress. Conversant.   Skin: Warm and dry; no obvious lesions, rashes, pallor, or jaundice.   Eyes: EOMI. Anicteric sclera.   ENT: Mucous membranes moist; no obvious injury or deformity appreciated.   Head and Neck: Normocephalic, atraumatic. ROM preserved. Trachea midline. No appreciable JVD.   Respiratory: Nonlabored on liters.  Diffuse wheezing upon auscultation bilaterally  Chest rise is equal.  Cardiovascular: RRR. No gross murmur, gallop, or rub. Extremities are warm and well-perfused with good capillary refill (< 3 seconds). No chest wall tenderness.   GI: Abdomen soft, nontender, nondistended. No obvious organomegaly appreciated. Bowel sounds are present.  : No CVA tenderness.   MSK: No gross abnormalities appreciated. No limitations to AROM/PROM appreciated.   Extremities: No cyanosis, edema, or clubbing evident. Neurovascularly intact.   Neuro: A&Ox3. CN 2-12 grossly intact. Able to respond to questions appropriately and clearly. No acute focal neurologic deficits appreciated.  Psych: Appropriate mood and behavior.    ASSESSMENT AND PLAN   Assessment/Plan     64 y.o. male with PMHx s/f  COPD with chronic oxygen dependence on 4 L, coronary artery disease history PCI and stenting, systolic heart failure, elevated blood glucose, hypertension  presenting with complaint of weakness shortness of breath over the last week.       COPD with exacerbation  Patient with chronic hypoxia dependent on 4 L  We will continue supplemental oxygen  Continue patient on frequent DuoNeb treatments every 4 hours, Pulmicort treatments twice a day  IV Solu-Medrol 40 mg every 8 hours  Doxycycline 100 mg p.o. twice a day  Patient has been nonadherent to inhaled steroid and long-acting bronchodilator treatments    Coronary artery  disease history PCI  Continue the patient's routine medications including aspirin statin and beta-blocker     Chronic systolic heart failure  Patient denies any orthopnea or edema appears to be compensated , BNP is normal  We will reconcile patient's home medications        Hypertension  We will continue the patient's normal home medications.           Tonny Hackett, APRN-CNP    Dragon dictation software was used to dictate this note and thus there may be minor errors in translation/transcription including garbled speech or misspellings. Please contact for clarification if needed.       [1] No family history on file.  [2] aspirin, 81 mg, oral, Daily  atorvastatin, 80 mg, oral, Nightly  budesonide, 0.5 mg, nebulization, BID  carvedilol, 6.25 mg, oral, BID  doxycycline, 100 mg, intravenous, Once  doxycylcine, 100 mg, oral, BID  enoxaparin, 40 mg, subcutaneous, q24h  insulin lispro, 0-10 Units, subcutaneous, TID AC  ipratropium-albuteroL, 3 mL, nebulization, q4h  lisinopril, 5 mg, oral, Daily  methylPREDNISolone sodium succinate (PF), 40 mg, intravenous, q8h SHELLIE  [START ON 5/1/2025] pantoprazole, 40 mg, oral, Daily before breakfast   Or  [START ON 5/1/2025] pantoprazole, 40 mg, intravenous, Daily before breakfast  polyethylene glycol, 17 g, oral, Daily    [3]    [4] PRN medications: acetaminophen **OR** acetaminophen **OR** acetaminophen, bisacodyl, bisacodyl, dextrose, dextrose, glucagon, glucagon, guaiFENesin, hydrOXYzine HCL, melatonin, ondansetron **OR** ondansetron

## 2025-04-30 NOTE — ED TRIAGE NOTES
Wheezing, SOB x 1 wk, PRN O2, was using up to 4 L NC at home. Squad gave him 1 solu-medrol and one duo neb in route. Pt reports intermittent chest pain.

## 2025-04-30 NOTE — ED PROVIDER NOTES
HPI   Chief Complaint   Patient presents with    Wheezing    Shortness of Breath    Chest Pain       64-year-old male with possible history of COPD, chronic hypoxia on as needed oxygen at home presents to ED with concerns for shortness of breath.  Patient says for the last 4 to 5 days been getting worsening shortness of breath.  Has a cough with clear sputum production.  No fevers or chills.  Has had intermittent chest pain.  No calf pain or lower extremity swelling.  No prior VTE.              Patient History   Medical History[1]  Surgical History[2]  Family History[3]  Social History[4]    Physical Exam   ED Triage Vitals [04/30/25 0822]   Temperature Heart Rate Respirations BP   36.5 °C (97.7 °F) 80 (!) 26 (!) 153/95      Pulse Ox Temp Source Heart Rate Source Patient Position   94 % Temporal -- Sitting      BP Location FiO2 (%)     Right arm --       Physical Exam  Vitals and nursing note reviewed.   Constitutional:       General: He is not in acute distress.     Appearance: He is well-developed.   HENT:      Head: Normocephalic and atraumatic.   Eyes:      Conjunctiva/sclera: Conjunctivae normal.   Cardiovascular:      Rate and Rhythm: Normal rate and regular rhythm.      Heart sounds: No murmur heard.  Pulmonary:      Effort: Pulmonary effort is normal. No respiratory distress.      Breath sounds: Examination of the right-upper field reveals wheezing. Examination of the left-upper field reveals wheezing. Examination of the right-middle field reveals wheezing. Examination of the left-middle field reveals wheezing. Examination of the right-lower field reveals wheezing. Examination of the left-lower field reveals wheezing. Wheezing present.   Abdominal:      Palpations: Abdomen is soft.      Tenderness: There is no abdominal tenderness.   Musculoskeletal:         General: No swelling.      Cervical back: Neck supple.      Right lower leg: No tenderness. Edema present.      Left lower leg: No tenderness. Edema  present.   Skin:     General: Skin is warm and dry.      Capillary Refill: Capillary refill takes less than 2 seconds.   Neurological:      Mental Status: He is alert.   Psychiatric:         Mood and Affect: Mood normal.           ED Course & MDM   ED Course as of 04/30/25 1125   Wed Apr 30, 2025   0840 EKG shows sinus rhythm.  No STEMI.  Normal intervals and axis. [RS]   1100 IMS paged [RS]      ED Course User Index  [RS] Maxi Reyes DO         Diagnoses as of 04/30/25 1125   COPD exacerbation (Multi)                 No data recorded     [place] Coma Scale Score: 15 (04/30/25 0828 : Gogo Kate, QUINN)                           Medical Decision Making  HISTORIAN:  Patient    CHART REVIEW:  No pertinent findings    PT SUMMARY:  64-year-old male presents to ED with shortness of breath.  Upon arrival vital signs stable.    DDX:  ACS, PE, PNA, COPD, CHF, Anema, Pericardial effusion, Asthma      PLAN:  Will obtain CBC, BMP, EKG, troponin, BNP, CRP, chest x-ray.  Will give patient DuoNebs.  Patient received Solu-Medrol prior to arrival.    DISPO/RE-EVAL:  Close with leukocytosis of 13,000.  Labs otherwise show no acute findings.  Chest x-ray negative for acute pathology.  On reevaluation patient still feeling somewhat short of breath.  Therefore, limit patient for further management of COPD exacerbation.  Was also given IV doxycycline.          Procedure  Procedures       [1]   Past Medical History:  Diagnosis Date    Chronic systolic (congestive) heart failure 09/22/2020    Chronic systolic heart failure    COPD (chronic obstructive pulmonary disease) (Multi)     Myocardial infarction (Multi) 07/01/2024    Old myocardial infarction     Old non-ST elevation myocardial infarction (NSTEMI)    Old myocardial infarction 09/22/2020    History of ST elevation myocardial infarction (STEMI)    Old myocardial infarction     History of ST elevation myocardial infarction (STEMI)    Personal history of other diseases of the  circulatory system     History of heart disease    Personal history of other endocrine, nutritional and metabolic disease     History of hyperlipidemia    Presence of stent in anterior descending branch of left coronary artery 07/01/2024   [2]   Past Surgical History:  Procedure Laterality Date    MANDIBLE SURGERY  06/01/2016    Jaw Surgery    OTHER SURGICAL HISTORY  06/29/2020    Coronary artery stent placement    TONSILLECTOMY  06/01/2016    Tonsillectomy With Adenoidectomy    VASECTOMY  06/01/2016    Surgery Vas Deferens Vasectomy   [3] No family history on file.  [4]   Social History  Tobacco Use    Smoking status: Every Day     Current packs/day: 1.00     Average packs/day: 1 pack/day for 50.3 years (50.3 ttl pk-yrs)     Types: Cigarettes     Start date: 1975    Smokeless tobacco: Never   Vaping Use    Vaping status: Never Used   Substance Use Topics    Alcohol use: Not Currently    Drug use: Never        Maxi Reyes DO  04/30/25 1126

## 2025-05-01 LAB
ANION GAP SERPL CALC-SCNC: 8 MMOL/L (ref 10–20)
BUN SERPL-MCNC: 22 MG/DL (ref 6–23)
CALCIUM SERPL-MCNC: 8.9 MG/DL (ref 8.6–10.3)
CHLORIDE SERPL-SCNC: 98 MMOL/L (ref 98–107)
CO2 SERPL-SCNC: 27 MMOL/L (ref 21–32)
CREAT SERPL-MCNC: 1.31 MG/DL (ref 0.5–1.3)
EGFRCR SERPLBLD CKD-EPI 2021: 60 ML/MIN/1.73M*2
GLUCOSE BLD MANUAL STRIP-MCNC: 215 MG/DL (ref 74–99)
GLUCOSE BLD MANUAL STRIP-MCNC: 218 MG/DL (ref 74–99)
GLUCOSE BLD MANUAL STRIP-MCNC: 229 MG/DL (ref 74–99)
GLUCOSE BLD MANUAL STRIP-MCNC: 300 MG/DL (ref 74–99)
GLUCOSE SERPL-MCNC: 263 MG/DL (ref 74–99)
MAGNESIUM SERPL-MCNC: 1.69 MG/DL (ref 1.6–2.4)
POTASSIUM SERPL-SCNC: 4.4 MMOL/L (ref 3.5–5.3)
SODIUM SERPL-SCNC: 129 MMOL/L (ref 136–145)

## 2025-05-01 PROCEDURE — 94640 AIRWAY INHALATION TREATMENT: CPT

## 2025-05-01 PROCEDURE — 96376 TX/PRO/DX INJ SAME DRUG ADON: CPT

## 2025-05-01 PROCEDURE — 36415 COLL VENOUS BLD VENIPUNCTURE: CPT | Performed by: NURSE PRACTITIONER

## 2025-05-01 PROCEDURE — 2500000002 HC RX 250 W HCPCS SELF ADMINISTERED DRUGS (ALT 637 FOR MEDICARE OP, ALT 636 FOR OP/ED): Performed by: NURSE PRACTITIONER

## 2025-05-01 PROCEDURE — 2500000004 HC RX 250 GENERAL PHARMACY W/ HCPCS (ALT 636 FOR OP/ED): Mod: JZ | Performed by: NURSE PRACTITIONER

## 2025-05-01 PROCEDURE — 82947 ASSAY GLUCOSE BLOOD QUANT: CPT

## 2025-05-01 PROCEDURE — 2500000001 HC RX 250 WO HCPCS SELF ADMINISTERED DRUGS (ALT 637 FOR MEDICARE OP): Performed by: NURSE PRACTITIONER

## 2025-05-01 PROCEDURE — 2500000002 HC RX 250 W HCPCS SELF ADMINISTERED DRUGS (ALT 637 FOR MEDICARE OP, ALT 636 FOR OP/ED): Performed by: INTERNAL MEDICINE

## 2025-05-01 PROCEDURE — 94664 DEMO&/EVAL PT USE INHALER: CPT

## 2025-05-01 PROCEDURE — 80048 BASIC METABOLIC PNL TOTAL CA: CPT | Performed by: NURSE PRACTITIONER

## 2025-05-01 PROCEDURE — 83735 ASSAY OF MAGNESIUM: CPT | Performed by: NURSE PRACTITIONER

## 2025-05-01 PROCEDURE — 1100000001 HC PRIVATE ROOM DAILY

## 2025-05-01 PROCEDURE — 2500000005 HC RX 250 GENERAL PHARMACY W/O HCPCS: Performed by: NURSE PRACTITIONER

## 2025-05-01 PROCEDURE — 99233 SBSQ HOSP IP/OBS HIGH 50: CPT | Performed by: INTERNAL MEDICINE

## 2025-05-01 RX ORDER — IPRATROPIUM BROMIDE AND ALBUTEROL SULFATE 2.5; .5 MG/3ML; MG/3ML
3 SOLUTION RESPIRATORY (INHALATION) 4 TIMES DAILY
Status: DISCONTINUED | OUTPATIENT
Start: 2025-05-01 | End: 2025-05-02 | Stop reason: HOSPADM

## 2025-05-01 RX ORDER — IPRATROPIUM BROMIDE AND ALBUTEROL SULFATE 2.5; .5 MG/3ML; MG/3ML
3 SOLUTION RESPIRATORY (INHALATION) EVERY 2 HOUR PRN
Status: DISCONTINUED | OUTPATIENT
Start: 2025-05-01 | End: 2025-05-02 | Stop reason: HOSPADM

## 2025-05-01 RX ORDER — LANOLIN ALCOHOL/MO/W.PET/CERES
1000 CREAM (GRAM) TOPICAL DAILY
Status: DISCONTINUED | OUTPATIENT
Start: 2025-05-01 | End: 2025-05-02 | Stop reason: HOSPADM

## 2025-05-01 RX ORDER — BUPROPION HYDROCHLORIDE 100 MG/1
100 TABLET, EXTENDED RELEASE ORAL 2 TIMES DAILY
Status: DISCONTINUED | OUTPATIENT
Start: 2025-05-01 | End: 2025-05-02 | Stop reason: HOSPADM

## 2025-05-01 RX ADMIN — METHYLPREDNISOLONE SODIUM SUCCINATE 40 MG: 40 INJECTION, POWDER, FOR SOLUTION INTRAMUSCULAR; INTRAVENOUS at 06:12

## 2025-05-01 RX ADMIN — ASPIRIN 81 MG: 81 TABLET, COATED ORAL at 09:34

## 2025-05-01 RX ADMIN — IPRATROPIUM BROMIDE AND ALBUTEROL SULFATE 3 ML: 2.5; .5 SOLUTION RESPIRATORY (INHALATION) at 21:02

## 2025-05-01 RX ADMIN — Medication 1000 MCG: at 09:34

## 2025-05-01 RX ADMIN — ENOXAPARIN SODIUM 40 MG: 40 INJECTION SUBCUTANEOUS at 20:58

## 2025-05-01 RX ADMIN — INSULIN LISPRO 4 UNITS: 100 INJECTION, SOLUTION INTRAVENOUS; SUBCUTANEOUS at 16:51

## 2025-05-01 RX ADMIN — Medication 3 MG: at 21:05

## 2025-05-01 RX ADMIN — IPRATROPIUM BROMIDE AND ALBUTEROL SULFATE 3 ML: 2.5; .5 SOLUTION RESPIRATORY (INHALATION) at 13:21

## 2025-05-01 RX ADMIN — CARVEDILOL 6.25 MG: 6.25 TABLET, FILM COATED ORAL at 16:51

## 2025-05-01 RX ADMIN — INSULIN LISPRO 4 UNITS: 100 INJECTION, SOLUTION INTRAVENOUS; SUBCUTANEOUS at 11:44

## 2025-05-01 RX ADMIN — DOXYCYCLINE 100 MG: 100 CAPSULE ORAL at 09:34

## 2025-05-01 RX ADMIN — HYDROXYZINE HYDROCHLORIDE 25 MG: 25 TABLET, FILM COATED ORAL at 21:06

## 2025-05-01 RX ADMIN — CARVEDILOL 6.25 MG: 6.25 TABLET, FILM COATED ORAL at 09:40

## 2025-05-01 RX ADMIN — METHYLPREDNISOLONE SODIUM SUCCINATE 40 MG: 40 INJECTION, POWDER, FOR SOLUTION INTRAMUSCULAR; INTRAVENOUS at 21:47

## 2025-05-01 RX ADMIN — BUPROPION HYDROCHLORIDE 100 MG: 100 TABLET, FILM COATED, EXTENDED RELEASE ORAL at 09:34

## 2025-05-01 RX ADMIN — BUDESONIDE 0.5 MG: 0.5 INHALANT ORAL at 13:25

## 2025-05-01 RX ADMIN — ATORVASTATIN CALCIUM 80 MG: 40 TABLET, FILM COATED ORAL at 20:57

## 2025-05-01 RX ADMIN — DOXYCYCLINE 100 MG: 100 CAPSULE ORAL at 20:57

## 2025-05-01 RX ADMIN — PANTOPRAZOLE SODIUM 40 MG: 40 TABLET, DELAYED RELEASE ORAL at 06:12

## 2025-05-01 RX ADMIN — IPRATROPIUM BROMIDE AND ALBUTEROL SULFATE 3 ML: 2.5; .5 SOLUTION RESPIRATORY (INHALATION) at 23:52

## 2025-05-01 RX ADMIN — INSULIN LISPRO 4 UNITS: 100 INJECTION, SOLUTION INTRAVENOUS; SUBCUTANEOUS at 09:35

## 2025-05-01 RX ADMIN — BUPROPION HYDROCHLORIDE 100 MG: 100 TABLET, FILM COATED, EXTENDED RELEASE ORAL at 20:57

## 2025-05-01 RX ADMIN — METHYLPREDNISOLONE SODIUM SUCCINATE 40 MG: 40 INJECTION, POWDER, FOR SOLUTION INTRAMUSCULAR; INTRAVENOUS at 15:04

## 2025-05-01 RX ADMIN — LISINOPRIL 5 MG: 5 TABLET ORAL at 09:34

## 2025-05-01 RX ADMIN — IPRATROPIUM BROMIDE AND ALBUTEROL SULFATE 3 ML: 2.5; .5 SOLUTION RESPIRATORY (INHALATION) at 15:50

## 2025-05-01 RX ADMIN — BUDESONIDE 0.5 MG: 0.5 INHALANT ORAL at 21:03

## 2025-05-01 RX ADMIN — IPRATROPIUM BROMIDE AND ALBUTEROL SULFATE 3 ML: 2.5; .5 SOLUTION RESPIRATORY (INHALATION) at 00:17

## 2025-05-01 SDOH — ECONOMIC STABILITY: FOOD INSECURITY: WITHIN THE PAST 12 MONTHS, YOU WORRIED THAT YOUR FOOD WOULD RUN OUT BEFORE YOU GOT THE MONEY TO BUY MORE.: NEVER TRUE

## 2025-05-01 SDOH — ECONOMIC STABILITY: HOUSING INSECURITY: IN THE LAST 12 MONTHS, WAS THERE A TIME WHEN YOU WERE NOT ABLE TO PAY THE MORTGAGE OR RENT ON TIME?: NO

## 2025-05-01 SDOH — SOCIAL STABILITY: SOCIAL INSECURITY: WITHIN THE LAST YEAR, HAVE YOU BEEN AFRAID OF YOUR PARTNER OR EX-PARTNER?: NO

## 2025-05-01 SDOH — ECONOMIC STABILITY: FOOD INSECURITY: WITHIN THE PAST 12 MONTHS, THE FOOD YOU BOUGHT JUST DIDN'T LAST AND YOU DIDN'T HAVE MONEY TO GET MORE.: NEVER TRUE

## 2025-05-01 SDOH — ECONOMIC STABILITY: INCOME INSECURITY: IN THE PAST 12 MONTHS HAS THE ELECTRIC, GAS, OIL, OR WATER COMPANY THREATENED TO SHUT OFF SERVICES IN YOUR HOME?: NO

## 2025-05-01 SDOH — ECONOMIC STABILITY: HOUSING INSECURITY: IN THE PAST 12 MONTHS, HOW MANY TIMES HAVE YOU MOVED WHERE YOU WERE LIVING?: 0

## 2025-05-01 SDOH — SOCIAL STABILITY: SOCIAL INSECURITY: WITHIN THE LAST YEAR, HAVE YOU BEEN HUMILIATED OR EMOTIONALLY ABUSED IN OTHER WAYS BY YOUR PARTNER OR EX-PARTNER?: NO

## 2025-05-01 SDOH — ECONOMIC STABILITY: FOOD INSECURITY: HOW HARD IS IT FOR YOU TO PAY FOR THE VERY BASICS LIKE FOOD, HOUSING, MEDICAL CARE, AND HEATING?: NOT HARD AT ALL

## 2025-05-01 SDOH — ECONOMIC STABILITY: HOUSING INSECURITY: AT ANY TIME IN THE PAST 12 MONTHS, WERE YOU HOMELESS OR LIVING IN A SHELTER (INCLUDING NOW)?: NO

## 2025-05-01 SDOH — ECONOMIC STABILITY: TRANSPORTATION INSECURITY: IN THE PAST 12 MONTHS, HAS LACK OF TRANSPORTATION KEPT YOU FROM MEDICAL APPOINTMENTS OR FROM GETTING MEDICATIONS?: NO

## 2025-05-01 ASSESSMENT — COGNITIVE AND FUNCTIONAL STATUS - GENERAL
MOBILITY SCORE: 24
DAILY ACTIVITIY SCORE: 24

## 2025-05-01 ASSESSMENT — PAIN SCALES - GENERAL
PAINLEVEL_OUTOF10: 0 - NO PAIN
PAINLEVEL_OUTOF10: 0 - NO PAIN

## 2025-05-01 ASSESSMENT — ACTIVITIES OF DAILY LIVING (ADL)
LACK_OF_TRANSPORTATION: NO

## 2025-05-01 NOTE — CARE PLAN
The patient's goals for the shift include      The clinical goals for the shift include Pt will be HDS throughtout shift      Problem: Pain - Adult  Goal: Verbalizes/displays adequate comfort level or baseline comfort level  Outcome: Progressing     Problem: Safety - Adult  Goal: Free from fall injury  Outcome: Progressing     Problem: Discharge Planning  Goal: Discharge to home or other facility with appropriate resources  Outcome: Progressing     Problem: Chronic Conditions and Co-morbidities  Goal: Patient's chronic conditions and co-morbidity symptoms are monitored and maintained or improved  Outcome: Progressing     Problem: Nutrition  Goal: Nutrient intake appropriate for maintaining nutritional needs  Outcome: Progressing     Problem: Fall/Injury  Goal: Not fall by end of shift  Outcome: Progressing  Goal: Be free from injury by end of the shift  Outcome: Progressing  Goal: Verbalize understanding of personal risk factors for fall in the hospital  Outcome: Progressing  Goal: Verbalize understanding of risk factor reduction measures to prevent injury from fall in the home  Outcome: Progressing  Goal: Use assistive devices by end of the shift  Outcome: Progressing  Goal: Pace activities to prevent fatigue by end of the shift  Outcome: Progressing     Problem: Pain  Goal: Takes deep breaths with improved pain control throughout the shift  Outcome: Progressing  Goal: Turns in bed with improved pain control throughout the shift  Outcome: Progressing  Goal: Walks with improved pain control throughout the shift  Outcome: Progressing  Goal: Performs ADL's with improved pain control throughout shift  Outcome: Progressing  Goal: Participates in PT with improved pain control throughout the shift  Outcome: Progressing  Goal: Free from opioid side effects throughout the shift  Outcome: Progressing  Goal: Free from acute confusion related to pain meds throughout the shift  Outcome: Progressing     Problem:  Respiratory  Goal: Clear secretions with interventions this shift  Outcome: Progressing  Goal: Minimize anxiety/maximize coping throughout shift  Outcome: Progressing  Goal: Minimal/no exertional discomfort or dyspnea this shift  Outcome: Progressing  Goal: No signs of respiratory distress (eg. Use of accessory muscles. Peds grunting)  Outcome: Progressing  Goal: Patent airway maintained this shift  Outcome: Progressing  Goal: Tolerate mechanical ventilation evidenced by VS/agitation level this shift  Outcome: Progressing  Goal: Tolerate pulmonary toileting this shift  Outcome: Progressing  Goal: Verbalize decreased shortness of breath this shift  Outcome: Progressing  Goal: Wean oxygen to maintain O2 saturation per order/standard this shift  Outcome: Progressing  Goal: Increase self care and/or family involvement in next 24 hours  Outcome: Progressing

## 2025-05-01 NOTE — CARE PLAN
Problem: Pain - Adult  Goal: Verbalizes/displays adequate comfort level or baseline comfort level  Outcome: Progressing     Problem: Safety - Adult  Goal: Free from fall injury  Outcome: Progressing     Problem: Discharge Planning  Goal: Discharge to home or other facility with appropriate resources  Outcome: Progressing     Problem: Chronic Conditions and Co-morbidities  Goal: Patient's chronic conditions and co-morbidity symptoms are monitored and maintained or improved  Outcome: Progressing     Problem: Nutrition  Goal: Nutrient intake appropriate for maintaining nutritional needs  Outcome: Progressing     Problem: Fall/Injury  Goal: Not fall by end of shift  Outcome: Progressing  Goal: Be free from injury by end of the shift  Outcome: Progressing  Goal: Verbalize understanding of personal risk factors for fall in the hospital  Outcome: Progressing  Goal: Verbalize understanding of risk factor reduction measures to prevent injury from fall in the home  Outcome: Progressing  Goal: Use assistive devices by end of the shift  Outcome: Progressing  Goal: Pace activities to prevent fatigue by end of the shift  Outcome: Progressing     Problem: Pain  Goal: Takes deep breaths with improved pain control throughout the shift  Outcome: Progressing  Goal: Turns in bed with improved pain control throughout the shift  Outcome: Progressing  Goal: Walks with improved pain control throughout the shift  Outcome: Progressing  Goal: Performs ADL's with improved pain control throughout shift  Outcome: Progressing  Goal: Participates in PT with improved pain control throughout the shift  Outcome: Progressing  Goal: Free from opioid side effects throughout the shift  Outcome: Progressing  Goal: Free from acute confusion related to pain meds throughout the shift  Outcome: Progressing     Problem: Respiratory  Goal: Clear secretions with interventions this shift  Outcome: Progressing  Goal: Minimize anxiety/maximize coping throughout  shift  Outcome: Progressing  Goal: Minimal/no exertional discomfort or dyspnea this shift  Outcome: Progressing  Goal: No signs of respiratory distress (eg. Use of accessory muscles. Peds grunting)  Outcome: Progressing  Goal: Patent airway maintained this shift  Outcome: Progressing  Goal: Tolerate mechanical ventilation evidenced by VS/agitation level this shift  Outcome: Progressing  Goal: Tolerate pulmonary toileting this shift  Outcome: Progressing  Goal: Verbalize decreased shortness of breath this shift  Outcome: Progressing  Goal: Wean oxygen to maintain O2 saturation per order/standard this shift  Outcome: Progressing  Goal: Increase self care and/or family involvement in next 24 hours  Outcome: Progressing   The patient's goals for the shift include      The clinical goals for the shift include Pt will remain safe and not fall during shift    Over the shift, the patient did not make progress toward the following goals. Barriers to progression include . Recommendations to address these barriers include .

## 2025-05-01 NOTE — PROGRESS NOTES
05/01/25 0848   Discharge Planning   Living Arrangements Spouse/significant other   Support Systems Spouse/significant other   Assistance Needed none   Type of Residence Private residence   Number of Stairs to Enter Residence 4   Number of Stairs Within Residence 0   Do you have animals or pets at home? Yes   Type of Animals or Pets cat and birds   Who is requesting discharge planning? Provider   Home or Post Acute Services None   Expected Discharge Disposition Home   Does the patient need discharge transport arranged? No   Financial Resource Strain   How hard is it for you to pay for the very basics like food, housing, medical care, and heating? Not hard   Housing Stability   In the last 12 months, was there a time when you were not able to pay the mortgage or rent on time? N   In the past 12 months, how many times have you moved where you were living? 0   At any time in the past 12 months, were you homeless or living in a shelter (including now)? N   Transportation Needs   In the past 12 months, has lack of transportation kept you from medical appointments or from getting medications? no   In the past 12 months, has lack of transportation kept you from meetings, work, or from getting things needed for daily living? No   Stroke Family Assessment   Stroke Family Assessment Needed No   Intensity of Service   Intensity of Service 0-30 min     Spoke with pt explained TCC role in Care Transitions and verified address, phone number and emergency contact information. No PCP his left still follows his lung and heart specialist and preferred pharmacy is [facility] in [facility]. He denies issues obtaining or affording medications and takes as ordered.  Pt wear oxygen at 4L via [employer] and has a nebulizer from [employer]. Pt uses a shower chair and grab bars otherwise no other DME. Pt is independent, lives at home with significant other and feels safe. Pt plans to return home with significant other and declines all formal care at  this time. [facility] 24/24 per nursing. ADOD is 5/2. CT to follow. Dorota Pierce BSN/RN-TCC

## 2025-05-01 NOTE — ASSESSMENT & PLAN NOTE
Still wheezing. Coughing up clear sputum.   Continue breathing treatmen  Continue duoneb and Pulmicort  Continue steroid as ordered  Patient has had multiple encounters with pulmonology team and their recommendation is noted. Will get pulm consult while in patient.    GERD: on PPI    Hypertension Essential:  continue lisinopril and coreg    Hyperlipidemia:  on statin

## 2025-05-01 NOTE — ED NOTES
Pt arrives to ED     Code Status:  Full Code    HPI     Chief Complaint   Patient presents with    Wheezing    Shortness of Breath    Chest Pain       /84   Pulse 92   Temp 36.5 °C (97.7 °F) (Temporal)   Resp 20   Wt 109 kg (239 lb 8 oz)   SpO2 96%     Lester Coma Scale Score: 15      LDA:   Peripheral IV 04/30/25 18 G Anterior;Right;Upper Arm (Active)   Placement Date/Time: 04/30/25 0828   Placed by External Staff?: EMS  Size (Gauge): 18 G  Orientation: Anterior;Right;Upper  Location: Arm   Number of days: 0        BACKGROUND  Medical History[1]  Surgical History[2]  Medications Ordered Prior to Encounter[3]     ASSESSMENT  ED Course as of 05/01/25 0148 Wed Apr 30, 2025   0840 EKG shows sinus rhythm.  No STEMI.  Normal intervals and axis. [RS]   1100 [employer] paged [RS]      ED Course User Index  [RS] Maxi Reyes DO         Diagnoses as of 05/01/25 0148   COPD exacerbation (Multi)       Medications Currently Running:  Continuous Medications[4]     Medications Given:  ED Medication Administration from 04/30/2025 0819 to 05/01/2025 0148         Date/Time Order Dose Route Action Action by     04/30/2025 0837 EDT ipratropium-albuteroL (Duo-Neb) 0.5-2.5 mg/3 mL nebulizer solution 3 mL 3 mL nebulization Given Lavinia      04/30/2025 0847 EDT ipratropium-albuteroL (Duo-Neb) 0.5-2.5 mg/3 mL nebulizer solution 3 mL 3 mL nebulization Given VANI Bach     04/30/2025 1108 EDT doxycycline (Vibramycin) 100 mg in sodium chloride 0.9%  mL 100 mg intravenous New Bag Lavinia,      04/30/2025 1146 EDT carvedilol (Coreg) tablet 6.25 mg 6.25 mg oral Not Given Lavinia, M     04/30/2025 1146 EDT lisinopril tablet 5 mg 5 mg oral Not Given Lavinia,      04/30/2025 1147 EDT polyethylene glycol (Glycolax, Miralax) packet 17 g 17 g oral Not Given LONG Kate     04/30/2025 1149 EDT aspirin EC tablet 81 mg 81 mg oral Given LONG Kate     04/30/2025 1211 EDT doxycycline (Vibramycin) 100 mg in sodium chloride  0.9%  mL 0 mg intravenous Stopped Meza, A     04/30/2025 1212 EDT ipratropium-albuteroL (Duo-Neb) 0.5-2.5 mg/3 mL nebulizer solution 3 mL 3 mL nebulization Given Meza, A     04/30/2025 1215 EDT insulin lispro injection 0-10 Units -- subcutaneous Not Given Meza, A     04/30/2025 1239 EDT budesonide (Pulmicort) 0.5 mg/2 mL nebulizer solution 0.5 mg 0.5 mg nebulization Given Meza, A     04/30/2025 1316 EDT methylPREDNISolone sod succinate (SOLU-Medrol) 40 mg/mL injection 40 mg 40 mg intravenous Given Meza, A     04/30/2025 1847 EDT ipratropium-albuteroL (Duo-Neb) 0.5-2.5 mg/3 mL nebulizer solution 3 mL 3 mL nebulization Not Given [place], K     04/30/2025 1849 EDT insulin lispro injection 0-10 Units 4 Units subcutaneous Given [place], K     04/30/2025 2026 EDT atorvastatin (Lipitor) tablet 80 mg 80 mg oral Given Slaughter, B     04/30/2025 2026 EDT doxycycline (Vibramycin) capsule 100 mg 100 mg oral Given Slaughter, B     04/30/2025 2027 EDT enoxaparin (Lovenox) syringe 40 mg 40 mg subcutaneous Given Slaughter, B     04/30/2025 2027 EDT ipratropium-albuteroL (Duo-Neb) 0.5-2.5 mg/3 mL nebulizer solution 3 mL 3 mL nebulization Given Slaughter, B     04/30/2025 2135 EDT budesonide (Pulmicort) 0.5 mg/2 mL nebulizer solution 0.5 mg 0.5 mg nebulization Given Slaughter, B     04/30/2025 2135 EDT carvedilol (Coreg) tablet 6.25 mg 6.25 mg oral Given Slaughter, B     04/30/2025 2135 EDT methylPREDNISolone sod succinate (SOLU-Medrol) 40 mg/mL injection 40 mg 40 mg intravenous Given Slaughter, B     05/01/2025 0017 EDT ipratropium-albuteroL (Duo-Neb) 0.5-2.5 mg/3 mL nebulizer solution 3 mL 3 mL nebulization Given VANI Mcdaniel     05/01/2025 0024 EDT melatonin tablet 3 mg 3 mg oral Not Given VANI Mcdaniel                 RESULTS    Imaging:  XR chest 2 views   Final Result   No acute process.   Signed by Wali Chowdary MD         }  Labs ::99  Abnormal Labs Reviewed   CBC WITH AUTO DIFFERENTIAL - Abnormal; Notable for  the following components:       Result Value    WBC 13.4 (*)     RBC 4.28 (*)     Hemoglobin 13.1 (*)     Hematocrit 40.8 (*)     Neutrophils Absolute 7.94 (*)     Eosinophils Absolute 0.86 (*)     All other components within normal limits   BASIC METABOLIC PANEL - Abnormal; Notable for the following components:    Glucose 150 (*)     Sodium 135 (*)     Anion Gap 8 (*)     All other components within normal limits   POCT GLUCOSE - Abnormal; Notable for the following components:    POCT Glucose 168 (*)     All other components within normal limits   POCT GLUCOSE - Abnormal; Notable for the following components:    POCT Glucose 247 (*)     All other components within normal limits                  [1]   Past Medical History:  Diagnosis Date    Chronic systolic (congestive) heart failure 09/22/2020    Chronic systolic heart failure    COPD (chronic obstructive pulmonary disease) (Multi)     Myocardial infarction (Multi) 07/01/2024    Old myocardial infarction     Old non-ST elevation myocardial infarction (NSTEMI)    Old myocardial infarction 09/22/2020    History of ST elevation myocardial infarction (STEMI)    Old myocardial infarction     History of ST elevation myocardial infarction (STEMI)    Personal history of other diseases of the circulatory system     History of heart disease    Personal history of other endocrine, nutritional and metabolic disease     History of hyperlipidemia    Presence of stent in anterior descending branch of left coronary artery 07/01/2024   [2]   Past Surgical History:  Procedure Laterality Date    MANDIBLE SURGERY  06/01/2016    Jaw Surgery    OTHER SURGICAL HISTORY  06/29/2020    Coronary artery stent placement    TONSILLECTOMY  06/01/2016    Tonsillectomy With Adenoidectomy    VASECTOMY  06/01/2016    Surgery Vas Deferens Vasectomy   [3]   No current facility-administered medications on file prior to encounter.     Current Outpatient Medications on File Prior to Encounter    Medication Sig Dispense Refill    albuterol 90 mcg/actuation inhaler Inhale 2 puffs every 6 hours if needed for wheezing. 18 g 11    aspirin 81 mg EC tablet Take 1 tablet (81 mg) by mouth once daily.      atorvastatin (Lipitor) 80 mg tablet Take 1 tablet (80 mg) by mouth once daily at bedtime. 90 tablet 3    budesonide (Pulmicort) 0.5 mg/2 mL nebulizer solution Take 2 mL (0.5 mg) by nebulization 2 times a day. Rinse mouth with water after use to reduce aftertaste and incidence of candidiasis. Do not swallow. 1 mL 11    buPROPion SR (Wellbutrin SR) 100 mg 12 hr tablet Take 1 tablet (100 mg) by mouth 2 times a day. Do not crush, chew, or split. 60 tablet 11    carvedilol (Coreg) 6.25 mg tablet Take 1 tablet (6.25 mg) by mouth 2 times daily (morning and late afternoon). 180 tablet 3    cyanocobalamin (Vitamin B-12) 1,000 mcg tablet Take 1 tablet (1,000 mcg) by mouth once daily.      ipratropium-albuteroL (Duo-Neb) 0.5-2.5 mg/3 mL nebulizer solution Take 3 mL by nebulization every 4 hours. 450 mL 11    lisinopril 5 mg tablet Take 1 tablet (5 mg) by mouth once daily. 30 tablet 11    omega 3-dha-epa-fish oil (Fish OiL) 1,000 mg (120 mg-180 mg) capsule Take 1 capsule (1,000 mg) by mouth once daily.      oxygen (O2) gas therapy Inhale 1 each every 12 hours.      pantoprazole (ProtoNix) 40 mg EC tablet Take 1 tablet (40 mg) by mouth once daily in the morning. Take before meals for 7 days. Do not crush, chew, or split. (Patient not taking: Reported on 4/30/2025) 7 tablet 0    predniSONE (Deltasone) 5 mg tablet Take 1 tablet (5 mg) by mouth once daily. 30 tablet 0    theophylline ER (Uniphyl) 400 mg 24 hr tablet Take 1 tablet (400 mg) by mouth once daily. Do not crush or chew. (Patient not taking: Reported on 4/30/2025) 30 tablet 3    [DISCONTINUED] hydrOXYzine HCL (Atarax) 25 mg tablet Take by mouth.     [4]         Cathy Mcdaniel, QUINN  05/01/25 0148

## 2025-05-02 VITALS
OXYGEN SATURATION: 96 % | SYSTOLIC BLOOD PRESSURE: 129 MMHG | DIASTOLIC BLOOD PRESSURE: 76 MMHG | HEART RATE: 94 BPM | TEMPERATURE: 97.5 F | HEIGHT: 67 IN | WEIGHT: 239.5 LBS | BODY MASS INDEX: 37.59 KG/M2 | RESPIRATION RATE: 18 BRPM

## 2025-05-02 LAB
ANION GAP SERPL CALC-SCNC: 16 MMOL/L (ref 10–20)
ATRIAL RATE: 80 BPM
BUN SERPL-MCNC: 25 MG/DL (ref 6–23)
CALCIUM SERPL-MCNC: 9 MG/DL (ref 8.6–10.3)
CHLORIDE SERPL-SCNC: 96 MMOL/L (ref 98–107)
CO2 SERPL-SCNC: 26 MMOL/L (ref 21–32)
CREAT SERPL-MCNC: 1.17 MG/DL (ref 0.5–1.3)
EGFRCR SERPLBLD CKD-EPI 2021: 69 ML/MIN/1.73M*2
ERYTHROCYTE [DISTWIDTH] IN BLOOD BY AUTOMATED COUNT: 12.8 % (ref 11.5–14.5)
GLUCOSE BLD MANUAL STRIP-MCNC: 204 MG/DL (ref 74–99)
GLUCOSE BLD MANUAL STRIP-MCNC: 246 MG/DL (ref 74–99)
GLUCOSE SERPL-MCNC: 230 MG/DL (ref 74–99)
HCT VFR BLD AUTO: 38.3 % (ref 41–52)
HGB BLD-MCNC: 12.5 G/DL (ref 13.5–17.5)
MCH RBC QN AUTO: 30.5 PG (ref 26–34)
MCHC RBC AUTO-ENTMCNC: 32.6 G/DL (ref 32–36)
MCV RBC AUTO: 93 FL (ref 80–100)
NRBC BLD-RTO: 0 /100 WBCS (ref 0–0)
P AXIS: 56 DEGREES
PLATELET # BLD AUTO: 248 X10*3/UL (ref 150–450)
POTASSIUM SERPL-SCNC: 4.5 MMOL/L (ref 3.5–5.3)
PR INTERVAL: 156 MS
Q ONSET: 253 MS
QRS COUNT: 13 BEATS
QRS DURATION: 86 MS
QT INTERVAL: 351 MS
QTC CALCULATION(BAZETT): 403 MS
QTC FREDERICIA: 384 MS
R AXIS: 72 DEGREES
RBC # BLD AUTO: 4.1 X10*6/UL (ref 4.5–5.9)
SODIUM SERPL-SCNC: 133 MMOL/L (ref 136–145)
T AXIS: 59 DEGREES
T OFFSET: 429 MS
TSH SERPL-ACNC: 0.4 MIU/L (ref 0.44–3.98)
VENTRICULAR RATE: 79 BPM
WBC # BLD AUTO: 23.1 X10*3/UL (ref 4.4–11.3)

## 2025-05-02 PROCEDURE — 99239 HOSP IP/OBS DSCHRG MGMT >30: CPT | Performed by: INTERNAL MEDICINE

## 2025-05-02 PROCEDURE — 2500000002 HC RX 250 W HCPCS SELF ADMINISTERED DRUGS (ALT 637 FOR MEDICARE OP, ALT 636 FOR OP/ED): Performed by: INTERNAL MEDICINE

## 2025-05-02 PROCEDURE — 2500000002 HC RX 250 W HCPCS SELF ADMINISTERED DRUGS (ALT 637 FOR MEDICARE OP, ALT 636 FOR OP/ED): Performed by: NURSE PRACTITIONER

## 2025-05-02 PROCEDURE — 80048 BASIC METABOLIC PNL TOTAL CA: CPT | Performed by: INTERNAL MEDICINE

## 2025-05-02 PROCEDURE — 84443 ASSAY THYROID STIM HORMONE: CPT | Performed by: INTERNAL MEDICINE

## 2025-05-02 PROCEDURE — 2500000004 HC RX 250 GENERAL PHARMACY W/ HCPCS (ALT 636 FOR OP/ED): Mod: JZ | Performed by: NURSE PRACTITIONER

## 2025-05-02 PROCEDURE — 82947 ASSAY GLUCOSE BLOOD QUANT: CPT

## 2025-05-02 PROCEDURE — 2500000001 HC RX 250 WO HCPCS SELF ADMINISTERED DRUGS (ALT 637 FOR MEDICARE OP): Performed by: NURSE PRACTITIONER

## 2025-05-02 PROCEDURE — 85027 COMPLETE CBC AUTOMATED: CPT | Performed by: INTERNAL MEDICINE

## 2025-05-02 PROCEDURE — 36415 COLL VENOUS BLD VENIPUNCTURE: CPT | Performed by: INTERNAL MEDICINE

## 2025-05-02 PROCEDURE — 94640 AIRWAY INHALATION TREATMENT: CPT

## 2025-05-02 RX ORDER — PREDNISONE 20 MG/1
TABLET ORAL
Qty: 15 TABLET | Refills: 0 | Status: SHIPPED | OUTPATIENT
Start: 2025-05-02 | End: 2025-05-14

## 2025-05-02 RX ORDER — HYDROXYZINE HYDROCHLORIDE 25 MG/1
25 TABLET, FILM COATED ORAL EVERY 8 HOURS PRN
Start: 2025-05-02

## 2025-05-02 RX ORDER — DOXYCYCLINE 100 MG/1
100 CAPSULE ORAL 2 TIMES DAILY
Qty: 6 CAPSULE | Refills: 0 | Status: SHIPPED | OUTPATIENT
Start: 2025-05-02 | End: 2025-05-05

## 2025-05-02 RX ADMIN — IPRATROPIUM BROMIDE AND ALBUTEROL SULFATE 3 ML: 2.5; .5 SOLUTION RESPIRATORY (INHALATION) at 08:26

## 2025-05-02 RX ADMIN — INSULIN LISPRO 4 UNITS: 100 INJECTION, SOLUTION INTRAVENOUS; SUBCUTANEOUS at 11:57

## 2025-05-02 RX ADMIN — Medication 1000 MCG: at 08:46

## 2025-05-02 RX ADMIN — METHYLPREDNISOLONE SODIUM SUCCINATE 40 MG: 40 INJECTION, POWDER, FOR SOLUTION INTRAMUSCULAR; INTRAVENOUS at 06:38

## 2025-05-02 RX ADMIN — LISINOPRIL 5 MG: 5 TABLET ORAL at 08:47

## 2025-05-02 RX ADMIN — IPRATROPIUM BROMIDE AND ALBUTEROL SULFATE 3 ML: 2.5; .5 SOLUTION RESPIRATORY (INHALATION) at 12:10

## 2025-05-02 RX ADMIN — PANTOPRAZOLE SODIUM 40 MG: 40 TABLET, DELAYED RELEASE ORAL at 06:38

## 2025-05-02 RX ADMIN — DOXYCYCLINE 100 MG: 100 CAPSULE ORAL at 08:47

## 2025-05-02 RX ADMIN — CARVEDILOL 6.25 MG: 6.25 TABLET, FILM COATED ORAL at 08:47

## 2025-05-02 RX ADMIN — IPRATROPIUM BROMIDE AND ALBUTEROL SULFATE 3 ML: 2.5; .5 SOLUTION RESPIRATORY (INHALATION) at 04:16

## 2025-05-02 RX ADMIN — BUDESONIDE 0.5 MG: 0.5 INHALANT ORAL at 08:27

## 2025-05-02 RX ADMIN — ASPIRIN 81 MG: 81 TABLET, COATED ORAL at 08:47

## 2025-05-02 RX ADMIN — INSULIN LISPRO 4 UNITS: 100 INJECTION, SOLUTION INTRAVENOUS; SUBCUTANEOUS at 08:47

## 2025-05-02 RX ADMIN — BUPROPION HYDROCHLORIDE 100 MG: 100 TABLET, FILM COATED, EXTENDED RELEASE ORAL at 08:47

## 2025-05-02 ASSESSMENT — COGNITIVE AND FUNCTIONAL STATUS - GENERAL
DAILY ACTIVITIY SCORE: 24
MOBILITY SCORE: 24

## 2025-05-02 ASSESSMENT — PAIN SCALES - GENERAL
PAINLEVEL_OUTOF10: 0 - NO PAIN
PAINLEVEL_OUTOF10: 0 - NO PAIN

## 2025-05-02 NOTE — PROGRESS NOTES
05/02/25 1149   Discharge Planning   Expected Discharge Disposition Home   Financial Resource Strain   How hard is it for you to pay for the very basics like food, housing, medical care, and heating? Somewhat     Spoke with pt who confirms his plan is to return home without CT needs and services. Pt reports that he is supposed to take a med for COPD that is the best on the market but the cost after insurance pays 80% is 670.70. He would like to look into assist in getting the cost of this med lower, feels that he can manage 200 a month easier. Notified pharmacy of same. CT will follow.

## 2025-05-02 NOTE — DOCUMENTATION CLARIFICATION NOTE
"    PATIENT:               DEBORAH POLK  ACCT #:                  [id number]  MRN:                       [id number]  :                       1960  ADMIT DATE:       2025 8:19 AM  DISCH DATE:        2025 1:51 PM  RESPONDING PROVIDER #:        [id number]          PROVIDER RESPONSE TEXT:    Chronic Hypoxemic and Hypercapnic Respiratory Failure    CDI QUERY TEXT:    Clarification        Instruction:    Based on your assessment of the patient and the clinical information, please provide the requested documentation by clicking on the appropriate radio button and enter any additional information if prompted.    Question: Is there a diagnosis indicative of the clinical information    When answering this query, please exercise your independent professional judgment. The fact that a question is being asked, does not imply that any particular answer is desired or expected.    The patient's clinical indicators include:  Clinical Information:  - 65yo female admitted with COPDe. PMH includes sCHF, O2 dependent on 4l, smoking.    Clinical Indicators:  -  NP Matchett: \"Patient with chronic hypoxia dependent on 4 L\"    Treatment: 4l O2 continuous, Solumedrol, Duonebs, Doxycycline.    Risk Factors: COPD, cigarette smoking for 50 years.  Options provided:  -- Chronic Hypoxemic Respiratory Failure  -- Chronic Hypercapnic Respiratory Failure  -- Chronic Hypoxemic and Hypercapnic Respiratory Failure  -- Other - I will add my own diagnosis  -- Refer to Clinical Documentation Reviewer    Query created by: Tiffanie Freed on 2025 11:54 AM      Electronically signed by:  STORMY VAZ MD 2025 2:34 PM          "

## 2025-05-02 NOTE — DISCHARGE SUMMARY
Discharge Diagnosis  COPD exacerbation (Multi)    Issues Requiring Follow-Up  Follow up with pulm team    Discharge Meds     Medication List      START taking these medications     doxycycline 100 mg capsule; Commonly known as: Vibramycin; Take 1   capsule (100 mg) by mouth 2 times a day for 6 doses. Take with at least 8   ounces (large glass) of water, do not lie down for 30 minutes after     CHANGE how you take these medications     hydrOXYzine HCL 25 mg tablet; Commonly known as: Atarax; Take 1 tablet   (25 mg) by mouth every 8 hours if needed for itching.; What changed: how   much to take, when to take this, reasons to take this   * predniSONE 5 mg tablet; Commonly known as: Deltasone; Take 1 tablet (5   mg) by mouth once daily.; What changed: Another medication with the same   name was added. Make sure you understand how and when to take each.   * predniSONE 20 mg tablet; Commonly known as: Deltasone; Take 2 tablets   (40 mg) by mouth once daily for 3 days, THEN 1.5 tablets (30 mg) once   daily for 3 days, THEN 1 tablet (20 mg) once daily for 3 days, THEN 0.5   tablets (10 mg) once daily for 3 days.; Start taking on: May 2, 2025; What   changed: You were already taking a medication with the same name, and this   prescription was added. Make sure you understand how and when to take   each.  * This list has 2 medication(s) that are the same as other medications   prescribed for you. Read the directions carefully, and ask your doctor or   other care provider to review them with you.     CONTINUE taking these medications     albuterol 90 mcg/actuation inhaler; Inhale 2 puffs every 6 hours if   needed for wheezing.   aspirin 81 mg EC tablet   atorvastatin 80 mg tablet; Commonly known as: Lipitor; Take 1 tablet (80   mg) by mouth once daily at bedtime.   budesonide 0.5 mg/2 mL nebulizer solution; Commonly known as: Pulmicort;   Take 2 mL (0.5 mg) by nebulization 2 times a day. Rinse mouth with water   after use to  reduce aftertaste and incidence of candidiasis. Do not   swallow.   buPROPion  mg 12 hr tablet; Commonly known as: Wellbutrin SR; Take   1 tablet (100 mg) by mouth 2 times a day. Do not crush, chew, or split.   carvedilol 6.25 mg tablet; Commonly known as: Coreg; Take 1 tablet (6.25   mg) by mouth 2 times daily (morning and late afternoon).   cyanocobalamin 1,000 mcg tablet; Commonly known as: Vitamin B-12   Fish OiL 1,000 (120-180) mg capsule; Generic drug: omega 3-dha-epa-fish   oil   ipratropium-albuteroL 0.5-2.5 mg/3 mL nebulizer solution; Commonly known   as: Duo-Neb; Take 3 mL by nebulization every 4 hours.   lisinopril 5 mg tablet; Take 1 tablet (5 mg) by mouth once daily.   oxygen gas therapy; Commonly known as: O2; Inhale 1 each every 12 hours.   pantoprazole 40 mg EC tablet; Commonly known as: ProtoNix; Take 1 tablet   (40 mg) by mouth once daily in the morning. Take before meals for 7 days.   Do not crush, chew, or split.     ASK your doctor about these medications     theophylline  mg 24 hr tablet; Commonly known as: Uniphyl; Take 1   tablet (400 mg) by mouth once daily. Do not crush or chew.       Test Results Pending At Discharge  Pending Labs       No current pending labs.            Hospital Course  Mr. Resendez was admitted with severe shortness of breath. He was found to be in COPD exacerbation. He was started on breathing treatment and solu-medrol and Doxycycline.  His symptoms gradually improved and he is more comfortable and able to complete sentence.   He told me that he believe that he can take care of himself at home. He started that when I asked him to stay for another night he didn't argue it yesterday. But now even if I tell him to stay overnight that he is going home. He reported feeling better.  There is significant improvement in his breathing and he can complete full sentence without being short of breath today.     Visit Vitals  /76 (BP Location: Left arm, Patient  Position: Lying)   Pulse 94   Temp 36.4 °C (97.5 °F) (Temporal)   Resp 18       Pertinent Physical Exam At Time of Discharge  Physical Exam  Constitutional:       Appearance: Normal appearance.   HENT:      Head: Normocephalic and atraumatic.      Nose: Nose normal.      Mouth/Throat:      Mouth: Mucous membranes are moist.   Eyes:      Pupils: Pupils are equal, round, and reactive to light.   Cardiovascular:      Rate and Rhythm: Normal rate and regular rhythm.      Pulses: Normal pulses.      Heart sounds: Normal heart sounds.   Pulmonary:      Effort: Pulmonary effort is normal.      Breath sounds: Normal breath sounds.   Abdominal:      General: Bowel sounds are normal.      Palpations: Abdomen is soft.   Musculoskeletal:         General: Normal range of motion.      Cervical back: Neck supple.   Skin:     General: Skin is warm.   Neurological:      General: No focal deficit present.      Mental Status: He is alert and oriented to person, place, and time.   Psychiatric:         Mood and Affect: Mood normal.         Behavior: Behavior normal.         Outpatient Follow-Up  Future Appointments   Date Time Provider Department Center   5/29/2025  3:00 PM OTONIEL Pitt-CNP [id number]   3/19/2026 12:20 PM Corrine Zelaya MD [id number] [facility]     Time spent in discharge of patient is greater than 30 minutes,    Carter Pendleton MD

## 2025-05-02 NOTE — DOCUMENTATION CLARIFICATION NOTE
PATIENT:               DEBORAH POLK  ACCT #:                  [id number]  MRN:                       [id number]  :                       1960  ADMIT DATE:       2025 8:19 AM  DISCH DATE:        2025 1:51 PM  RESPONDING PROVIDER #:        [id number]          PROVIDER RESPONSE TEXT:    Sodium 125 - 135 not requiring treatment or evaluation    CDI QUERY TEXT:    Clarification        Instruction:    Based on your assessment of the patient and the clinical information, please provide the requested documentation by clicking on the appropriate radio button and enter any additional information if prompted.    Question: Is there a diagnosis indicative of the lab values or image study    When answering this query, please exercise your independent professional judgment. The fact that a question is being asked, does not imply that any particular answer is desired or expected.    The patient's clinical indicators include:  Clinical Information:  - 65yo female admitted with COPDe. PMH includes sCHF, O2 dependent on 4l, smoking.    Clinical Indicators:  -  Labs :  GLUCOSE: 150  SODIUM: 135  POTASSIUM: 4.2  CHLORIDE: 102  Creatinine: 1.15      GLUCOSE: 263  SODIUM: 129  POTASSIUM: 4.4  CHLORIDE: 98  Bicarbonate: 27  Creatinine: 1.31      GLUCOSE: 230  SODIUM: 133  POTASSIUM: 4.5  CHLORIDE: 96  Creatinine: 1.17      Treatment: Lab monitoring.    Risk Factors: COPDe.  Options provided:  -- Hyponatremia is clinically significant and required treatment/monitoring  -- Sodium 125 - 135 not requiring treatment or evaluation  -- Other - I will add my own diagnosis  -- Refer to Clinical Documentation Reviewer    Query created by: Tiffanie Freed on 2025 12:06 PM      Electronically signed by:  STORMY VAZ MD 2025 2:34 PM

## 2025-05-02 NOTE — CARE PLAN
The patient's goals for the shift include      The clinical goals for the shift include (P) Pt    Over the shift, the patient did not make progress toward the following goals. Barriers to progression include . Recommendations to address these barriers include .

## 2025-05-07 ENCOUNTER — TELEPHONE (OUTPATIENT)
Dept: PULMONOLOGY | Facility: HOSPITAL | Age: 65
End: 2025-05-07
Payer: MEDICARE

## 2025-05-07 NOTE — TELEPHONE ENCOUNTER
Called and spoke with patient and got him scheduled with Dr. Valdes on 5/16/25 for a post hospital visit. Also asked if patient had gotten a chance to sign up for the [employer] funding, patient told me he was no longer interested in trying the [place] medication because the process was too long to get signed up for the PAP. Instructed him to call us back with any further questions or concerns. Patient was agreeable to treatment plan and acknowledged understanding.     ----- Message from Cedrick Valdes sent at 5/1/2025  8:41 AM EDT -----  Regarding: RE: Prednisone  He was hospitalized yesterday. I can see him for hospital follow up this month. We can check tomorrow or early next week if he gets discharged to help him get in to see me soon.  ----- Message -----  From: Shira Howard MA  Sent: 4/29/2025   4:21 PM EDT  To: Cedrick Valdes MD  Subject: Prednisone                                       Patients wife called in wanting refills on the prednisone and wants 10 mg prednisone instead of the 5 because his breathing is worsening. Patient has yet to sign up for [employer] funding and canceled follow up with you on 4/23. He wants to only come in every other month. Recommended he be evaluated in the ER for any worsening before we were able to get back with them.

## 2025-05-16 ENCOUNTER — APPOINTMENT (OUTPATIENT)
Dept: PULMONOLOGY | Facility: HOSPITAL | Age: 65
End: 2025-05-16
Payer: MEDICARE

## 2025-05-20 ENCOUNTER — PHARMACY VISIT (OUTPATIENT)
Dept: PHARMACY | Facility: CLINIC | Age: 65
End: 2025-05-20
Payer: COMMERCIAL

## 2025-05-20 ENCOUNTER — HOSPITAL ENCOUNTER (EMERGENCY)
Facility: HOSPITAL | Age: 65
Discharge: HOME | End: 2025-05-20
Attending: EMERGENCY MEDICINE
Payer: MEDICARE

## 2025-05-20 ENCOUNTER — APPOINTMENT (OUTPATIENT)
Dept: RADIOLOGY | Facility: HOSPITAL | Age: 65
End: 2025-05-20
Payer: MEDICARE

## 2025-05-20 VITALS
SYSTOLIC BLOOD PRESSURE: 111 MMHG | HEART RATE: 77 BPM | HEIGHT: 67 IN | TEMPERATURE: 97.7 F | BODY MASS INDEX: 37.51 KG/M2 | OXYGEN SATURATION: 97 % | RESPIRATION RATE: 22 BRPM | DIASTOLIC BLOOD PRESSURE: 69 MMHG | WEIGHT: 239 LBS

## 2025-05-20 DIAGNOSIS — J18.9 PNEUMONIA OF BOTH LUNGS DUE TO INFECTIOUS ORGANISM, UNSPECIFIED PART OF LUNG: ICD-10-CM

## 2025-05-20 DIAGNOSIS — J44.1 COPD EXACERBATION (MULTI): Primary | ICD-10-CM

## 2025-05-20 LAB
ANION GAP SERPL CALC-SCNC: 11 MMOL/L (ref 10–20)
BASOPHILS # BLD AUTO: 0.06 X10*3/UL (ref 0–0.1)
BASOPHILS NFR BLD AUTO: 0.6 %
BUN SERPL-MCNC: 10 MG/DL (ref 6–23)
CALCIUM SERPL-MCNC: 9.5 MG/DL (ref 8.6–10.3)
CHLORIDE SERPL-SCNC: 100 MMOL/L (ref 98–107)
CO2 SERPL-SCNC: 28 MMOL/L (ref 21–32)
CREAT SERPL-MCNC: 1.26 MG/DL (ref 0.5–1.3)
EGFRCR SERPLBLD CKD-EPI 2021: 63 ML/MIN/1.73M*2
EOSINOPHIL # BLD AUTO: 0.67 X10*3/UL (ref 0–0.7)
EOSINOPHIL NFR BLD AUTO: 6.3 %
ERYTHROCYTE [DISTWIDTH] IN BLOOD BY AUTOMATED COUNT: 13.1 % (ref 11.5–14.5)
GLUCOSE SERPL-MCNC: 153 MG/DL (ref 74–99)
HCT VFR BLD AUTO: 37.9 % (ref 41–52)
HGB BLD-MCNC: 12.3 G/DL (ref 13.5–17.5)
IMM GRANULOCYTES # BLD AUTO: 0.08 X10*3/UL (ref 0–0.7)
IMM GRANULOCYTES NFR BLD AUTO: 0.8 % (ref 0–0.9)
LYMPHOCYTES # BLD AUTO: 2.78 X10*3/UL (ref 1.2–4.8)
LYMPHOCYTES NFR BLD AUTO: 26.3 %
MCH RBC QN AUTO: 31.7 PG (ref 26–34)
MCHC RBC AUTO-ENTMCNC: 32.5 G/DL (ref 32–36)
MCV RBC AUTO: 98 FL (ref 80–100)
MONOCYTES # BLD AUTO: 0.86 X10*3/UL (ref 0.1–1)
MONOCYTES NFR BLD AUTO: 8.1 %
NEUTROPHILS # BLD AUTO: 6.14 X10*3/UL (ref 1.2–7.7)
NEUTROPHILS NFR BLD AUTO: 57.9 %
NRBC BLD-RTO: 0 /100 WBCS (ref 0–0)
PLATELET # BLD AUTO: 226 X10*3/UL (ref 150–450)
POTASSIUM SERPL-SCNC: 4.4 MMOL/L (ref 3.5–5.3)
RBC # BLD AUTO: 3.88 X10*6/UL (ref 4.5–5.9)
SODIUM SERPL-SCNC: 135 MMOL/L (ref 136–145)
WBC # BLD AUTO: 10.6 X10*3/UL (ref 4.4–11.3)

## 2025-05-20 PROCEDURE — 2500000002 HC RX 250 W HCPCS SELF ADMINISTERED DRUGS (ALT 637 FOR MEDICARE OP, ALT 636 FOR OP/ED): Performed by: EMERGENCY MEDICINE

## 2025-05-20 PROCEDURE — 71046 X-RAY EXAM CHEST 2 VIEWS: CPT

## 2025-05-20 PROCEDURE — 71046 X-RAY EXAM CHEST 2 VIEWS: CPT | Performed by: INTERNAL MEDICINE

## 2025-05-20 PROCEDURE — 80048 BASIC METABOLIC PNL TOTAL CA: CPT | Performed by: EMERGENCY MEDICINE

## 2025-05-20 PROCEDURE — 36415 COLL VENOUS BLD VENIPUNCTURE: CPT | Performed by: EMERGENCY MEDICINE

## 2025-05-20 PROCEDURE — RXMED WILLOW AMBULATORY MEDICATION CHARGE

## 2025-05-20 PROCEDURE — 2500000001 HC RX 250 WO HCPCS SELF ADMINISTERED DRUGS (ALT 637 FOR MEDICARE OP): Performed by: EMERGENCY MEDICINE

## 2025-05-20 PROCEDURE — 94640 AIRWAY INHALATION TREATMENT: CPT

## 2025-05-20 PROCEDURE — 85025 COMPLETE CBC W/AUTO DIFF WBC: CPT | Performed by: EMERGENCY MEDICINE

## 2025-05-20 PROCEDURE — 99284 EMERGENCY DEPT VISIT MOD MDM: CPT | Mod: 25 | Performed by: EMERGENCY MEDICINE

## 2025-05-20 RX ORDER — IPRATROPIUM BROMIDE AND ALBUTEROL SULFATE 2.5; .5 MG/3ML; MG/3ML
3 SOLUTION RESPIRATORY (INHALATION) ONCE
Status: COMPLETED | OUTPATIENT
Start: 2025-05-20 | End: 2025-05-20

## 2025-05-20 RX ORDER — PREDNISONE 10 MG/1
20 TABLET ORAL DAILY
Qty: 10 TABLET | Refills: 0 | Status: SHIPPED | OUTPATIENT
Start: 2025-05-20 | End: 2025-05-21 | Stop reason: WASHOUT

## 2025-05-20 RX ORDER — LEVOFLOXACIN 750 MG/1
750 TABLET, FILM COATED ORAL DAILY
Qty: 4 TABLET | Refills: 0 | Status: SHIPPED | OUTPATIENT
Start: 2025-05-20 | End: 2025-05-29

## 2025-05-20 RX ADMIN — IPRATROPIUM BROMIDE AND ALBUTEROL SULFATE 3 ML: 2.5; .5 SOLUTION RESPIRATORY (INHALATION) at 14:58

## 2025-05-20 RX ADMIN — IPRATROPIUM BROMIDE AND ALBUTEROL SULFATE 3 ML: 2.5; .5 SOLUTION RESPIRATORY (INHALATION) at 13:25

## 2025-05-20 RX ADMIN — LEVOFLOXACIN 750 MG: 500 TABLET, FILM COATED ORAL at 15:38

## 2025-05-20 ASSESSMENT — LIFESTYLE VARIABLES
TOTAL SCORE: 0
EVER FELT BAD OR GUILTY ABOUT YOUR DRINKING: NO
HAVE PEOPLE ANNOYED YOU BY CRITICIZING YOUR DRINKING: NO
HAVE YOU EVER FELT YOU SHOULD CUT DOWN ON YOUR DRINKING: NO
EVER HAD A DRINK FIRST THING IN THE MORNING TO STEADY YOUR NERVES TO GET RID OF A HANGOVER: NO

## 2025-05-20 ASSESSMENT — COLUMBIA-SUICIDE SEVERITY RATING SCALE - C-SSRS
1. IN THE PAST MONTH, HAVE YOU WISHED YOU WERE DEAD OR WISHED YOU COULD GO TO SLEEP AND NOT WAKE UP?: NO
6. HAVE YOU EVER DONE ANYTHING, STARTED TO DO ANYTHING, OR PREPARED TO DO ANYTHING TO END YOUR LIFE?: NO
2. HAVE YOU ACTUALLY HAD ANY THOUGHTS OF KILLING YOURSELF?: NO

## 2025-05-20 ASSESSMENT — PAIN - FUNCTIONAL ASSESSMENT: PAIN_FUNCTIONAL_ASSESSMENT: 0-10

## 2025-05-20 ASSESSMENT — PAIN SCALES - GENERAL: PAINLEVEL_OUTOF10: 0 - NO PAIN

## 2025-05-20 NOTE — ED PROVIDER NOTES
HPI:  64-year-old male presents emergency department via EMS due to COPD exacerbation.  He has a history of COPD wears 4 L of oxygen at baseline at home.  Says he recently completed a course of prednisone and after his steroids were finished she has been feeling short of breath for the last week that is been worsening today just got too bad and he had to call 911.  He had Solu-Medrol and route prior to the ED    Patient denies any fevers no significant cough no other chest discomfort dizziness or exertional symptoms    Limitations to history: None  Independent Historians: [parents, POA, EMS, law enforcement, family]  External Records Reviewed: [HIE, OARRS, outpatient notes, inpatient notes, paper charts]  ------------------------------------------------------------------------------------------------------------------------------------------  ROS: a ten point review of systems was performed and negative except as per HPI.  ------------------------------------------------------------------------------------------------------------------------------------------  PMH / PSH: as per HPI, reviewed in EMR and discussed with the patient []  MEDS:  reviewed in EMR and discussed with the patient []  ALLERGIES: reviewed in EMR[]  SocH:  as per HPI, otherwise reviewed in EMR []  FH:  as per HPI, otherwise reviewed in EMR []  ------------------------------------------------------------------------------------------------------------------------------------------  Physical Exam:  VS: As documented in the triage note and EMR flowsheet from this visit was reviewed  General: Well appearing. No acute distress.   Eyes:  Extraocular movements grossly intact. No scleral icterus.   HEENT: Atraumatic. Normocephalic.    Neck: Supple. No gross masses  CV: RRR, audible S1/S2, 2+ symmetric peripheral pulses  Resp: Patient on his 4 L nasal cannula able to speak to me in full sentences does have biphasic wheeze on exam with diminished air  exchange no respiratory distress.  Non-labored respirations  GI: Soft, non-tender, non-distended, no rebound or gaurding  MSK: Symmetric muscle bulk. No gross step offs or deformities.  Skin: Warm, dry, no obvious rash.  Neuro: Speech fluent. Awake. Alert. Appropriate conversation.  Psych: Appropriate mood and affect for situation  ------------------------------------------------------------------------------------------------------------------------------------------  Hospital Course / Medical Decision Making:  Independent Interpretations:  EKG -   Twelve-lead EKG performed and independently interpreted by me    [Patient is a *** year old *** with a PMHx of chronic medical conditions including *** presenting with a chief complaint of ***]    [Tests/Medications/Escalations of Care considered but not given]    Patient care discussed with: [Admitting team, consultants, social work, THRIVE, MERVIN, radiology]  Social Determinants affecting care: [Problems affording transportation, inability to afford prescriptions, lack of housing, lack of insurance]    Final diagnosis and disposition: []            Lenore Sauceda MD               MD  05/27/25 [id number]

## 2025-05-21 ENCOUNTER — OFFICE VISIT (OUTPATIENT)
Dept: PULMONOLOGY | Facility: HOSPITAL | Age: 65
End: 2025-05-21
Payer: MEDICARE

## 2025-05-21 VITALS
SYSTOLIC BLOOD PRESSURE: 133 MMHG | HEART RATE: 97 BPM | HEIGHT: 67 IN | WEIGHT: 239 LBS | DIASTOLIC BLOOD PRESSURE: 77 MMHG | OXYGEN SATURATION: 93 % | BODY MASS INDEX: 37.51 KG/M2 | TEMPERATURE: 97.6 F | RESPIRATION RATE: 16 BRPM

## 2025-05-21 DIAGNOSIS — J96.11 CHRONIC HYPOXIC RESPIRATORY FAILURE: ICD-10-CM

## 2025-05-21 DIAGNOSIS — J44.9 STAGE 4 VERY SEVERE COPD BY GOLD CLASSIFICATION (MULTI): Primary | ICD-10-CM

## 2025-05-21 DIAGNOSIS — F17.210 CIGARETTE NICOTINE DEPENDENCE WITHOUT COMPLICATION: ICD-10-CM

## 2025-05-21 DIAGNOSIS — G47.10 HYPERSOMNIA WITH SLEEP APNEA: ICD-10-CM

## 2025-05-21 DIAGNOSIS — G47.30 HYPERSOMNIA WITH SLEEP APNEA: ICD-10-CM

## 2025-05-21 PROCEDURE — 3008F BODY MASS INDEX DOCD: CPT | Performed by: INTERNAL MEDICINE

## 2025-05-21 PROCEDURE — 1159F MED LIST DOCD IN RCRD: CPT | Performed by: INTERNAL MEDICINE

## 2025-05-21 PROCEDURE — 3078F DIAST BP <80 MM HG: CPT | Performed by: INTERNAL MEDICINE

## 2025-05-21 PROCEDURE — 1111F DSCHRG MED/CURRENT MED MERGE: CPT | Performed by: INTERNAL MEDICINE

## 2025-05-21 PROCEDURE — 99214 OFFICE O/P EST MOD 30 MIN: CPT | Performed by: INTERNAL MEDICINE

## 2025-05-21 PROCEDURE — 3075F SYST BP GE 130 - 139MM HG: CPT | Performed by: INTERNAL MEDICINE

## 2025-05-21 RX ORDER — PREDNISONE 5 MG/1
5 TABLET ORAL 2 TIMES DAILY
Qty: 60 TABLET | Refills: 4 | Status: SHIPPED | OUTPATIENT
Start: 2025-05-21

## 2025-05-21 RX ORDER — IPRATROPIUM BROMIDE AND ALBUTEROL SULFATE 2.5; .5 MG/3ML; MG/3ML
3 SOLUTION RESPIRATORY (INHALATION) EVERY 4 HOURS
Qty: 450 ML | Refills: 4 | Status: SHIPPED | OUTPATIENT
Start: 2025-05-21

## 2025-05-21 ASSESSMENT — ENCOUNTER SYMPTOMS
WHEEZING: 0
LIGHT-HEADEDNESS: 0
PALPITATIONS: 0
CONSTITUTIONAL NEGATIVE: 1
SHORTNESS OF BREATH: 1
SHORTNESS OF BREATH: 0
CHILLS: 0
FOCAL WEAKNESS: 0
RHINORRHEA: 0
FALLS: 0
DEPRESSION: 0
DECREASED APPETITE: 0
BLURRED VISION: 0
ORTHOPNEA: 0
MEMORY LOSS: 0
SYNCOPE: 0
COUGH: 0
FATIGUE: 0
IRREGULAR HEARTBEAT: 0
GASTROINTESTINAL NEGATIVE: 1
FEVER: 0
UNEXPECTED WEIGHT CHANGE: 0

## 2025-05-21 NOTE — PROGRESS NOTES
Subjective   Patient ID: Darron Resendez is a 64 y.o. male who presents for follow up COPD.     Shortness of Breath  Pertinent negatives include no chest pain, fever, leg swelling, rhinorrhea or wheezing.   : Patient has  (current smoker, ~ pack year history) with a PMHx of ?COPD (no PFT on file), CAD (STEMI) s/p stent, DM, HTN, HLD, HFrEF (EF 35% 6/2020, 65% 7/2020). Here for pulmonary follow-up for COPD. He was hospitalized twice since last office visit in October. He is not taking any medications for COPD due to cost and he states he does not feel like he needs them. He is wearing 4L oxygen prn. He states that he has not smoked in 17 days. He is short of breath on any activity and has a daily productive cough and wheezing. He still does not have prescription insurance and is unsure if he will get any.     He was in the ED on 2/8/25 and given prednisone taper. He states he takes Duoneb 2 to 3 times a day. He again went to ER on 3/5/25 and was given 40 mg daily x 5 days. He states he has started taking Budesonide. His girl friends states that he has gone down from 2 ppd to 1 ppw sometimes only. He has not cut down smoking further since last visit in Feb 2025 and remains on 1 PPW. He reports noticing TODD on mild activity and intermittent daily cough with scant expectoration, intermittent wheezing. He does note fatigue and EDS. He wakes up unrefreshed. His girl friends endorses snoring. He did not complete sleep study. He also did not take Theophylline and does not want to take it. He did not take Bupropion to help with smoking cessation. He has also not established with Pulmonary Rehab.     He is here for follow up today since last visit in March 2025. He was hospitalized for 1 day on March 23, 25 and later April 30 through May 2, 2025. He admits he continued to smoke but states he did not smoke for 3 weeks. He did restarted smoking and is still trying to smoke and has only smoked 8 cigs out of the last pack in  the last week. He was in the ED yesterday again for worsened SOB, cough and wheezing and got levofloxacin and prednisone taper yesterday. He does not have prescription coverage and was advised to take Duonebs and adelina. He had stopped adelina but does not think it caused any side effect.      Review of Systems   Constitutional:  Negative for chills, fatigue, fever and unexpected weight change.   HENT:  Negative for congestion, postnasal drip and rhinorrhea.    Respiratory:  Positive for shortness of breath. Negative for cough (denies hemoptysis.) and wheezing.    Cardiovascular:  Negative for chest pain and leg swelling.   All other systems reviewed and are negative.      Objective   Physical Exam  Vitals reviewed.   Constitutional:       Appearance: Normal appearance.   HENT:      Head: Normocephalic.   Cardiovascular:      Rate and Rhythm: Normal rate and regular rhythm.   Pulmonary:      Effort: Pulmonary effort is normal.      Breath sounds: Decreased breath sounds present.   Skin:     General: Skin is warm and dry.   Neurological:      Mental Status: He is alert.         Assessment/Plan   Assessment and Plan / Recommendations:  I counseled patient for his very severe COPD and ongoing smoking. Discussed prognosis. Educated and stressed importance of adherence to Rx and continuity of care.      1) Very severe COPD (GOLD 4) - PFT 9/3/24 with FEV1 27%; was given Breztri at discharge but he lost it and was very expensive; has not been using any inhalers, he has albuterol but not using; he reports breathing is terrible, has significant TODD with minimal activity, cough and wheezing; faint wheezing and tight on exam  - He is not taking any inhalers or meds for COPD due to not having insurance coverage our pharmacy program could not help. I explained to him at length that if he does not take the medications for his COPD that he will continue to be hospitalized frequently as his COPD is very severe and currently  uncontrolled.   - Advised to take regular Duonebs QID and Budesonide BID, educated on importance of compliance   - would benefit from pulmonary rehab, reordered it and gave him the phone number.  -Added Ohtuvayre for further COPD management but did not take due to expense.    I have ordered Pulmonary Rehab for him. I have advised him to take Duonebs QID and Budesonide BID. The nebs may be processed through his Medicare Part B since he does not have D coverage. Added Theophylline 400 mg daily but states that he got chest pain although his cardiologist reported it was likely non-cardiac but overall does not have prescription coverage. Avoid prednisone if possible long term. He reports he will try to stop smoking and take low dose prednisone for short term. He states Prednisone 5 mg daily helps but thinks he needs higher dose. I will change to Prednisone 5 mg PO BID for short term and wean again on follow up.      2) Chronic hypoxic respiratory failure - 6MW 9/3/24, ambulated 38 m (stopped d/t back pain), 90% at rest on RA, 86% with exertion on RA, needed 3 L NC for pulse ox 89% with exertion  - continue supplemental O2 (RA at rest, 3 L with exertion and as needed)   - patient requesting Inogen concentrator because he feels it is smaller than the one he received from [facility];       3) Nicotine cigarette depedence - current smoker, 1 PPD   - He states that he has not smoked in 17 days.   -advised to start Bupropion 100 mg BID and report any intolerances. He does not want to take it and again will try to stop smoking on his own.    4) Hypersomnia with sleep apnea  -EDS, Fatigue, snoring, upper airway narrowing Mallampati IV, large neck circumference, BMP 32.8. STOP BANG 5. Recommend a Split night study asap.     Overall his COPD remains uncontrolled due to noncompliance with recommended medications and continued smoking. I explained that without taking his medications that he will continue to be hospitalized frequently.  Patient has been hospitalized multiple times for COPD exacerbation since October 2024 and has had ER visits. If patient is readmitted and continues without medications for COPD will need to consider palliative care. I explained to patient.  also instructed him the importance of getting prescription coverage for him. Nebulizer medications are covered by his Part B since he does not have Part D.    Patient has continued to have recurrent exacerbations and gradual decline in his respiratory status. He states he cannot get prescription coverage through Medicare and cannot afford medications. He is taking Duonebs and prednisone 5 mg daily. He understands his prognosis. I recommend palliative care consultation but apparently does not have PCP either. He will continue to take Duonebs and Prednisone changed to 5 mg BID. If continues to have recurrent exacerbations, may need palliative care consultation during hospitalization too.

## 2025-05-21 NOTE — PATIENT INSTRUCTIONS
Please Duonebs (Ipratropium/albuterol) 4 to 5 times per day, everyday.   Please take Budesonide nebulizer twice a day, everyday rinse your mouth out after use.   Continue on 4L O2 with exertion.   Call with any questions or concerns.  PLEASE STOP SMOKING ASAP.   Please attend Pulmonary rehab.   Please complete Sleep study asap.  Take prednisone 5 mg 1 tablet twice daily now.   Follow up with Dr. Valdes in 3-4 months.

## 2025-05-21 NOTE — PROGRESS NOTES
Chief Complaint/Reason for Visit:   Patient is coming in for 3 month cardiovascular follow up.      History Of Present Illness:      Mr. Resendez is coming today as a 3-month cardiovascular follow-up.  We have followed this patient previously for ASHD with PCI to the LAD in 2011, dyslipidemia, and hypertension.  He had a STEMI in June, 2020 after stopping all of his medication.  He required urgent PCI of the LAD.  At that time his ejection fraction was found to be reduced at 35% but has since recovered and at last check was 65%.At the patient's most recent office visit, he was complaining of atypical chest discomfort and was scheduled for a dobutamine stress echo.  It appears this was not completed. In and out of hospital for respiratory issues.      Patient comes in today in a wheelchair wearing home oxygen.  He is accompanied by his significant other.  He has had numerous hospitalizations for respiratory issues but no recent cardiac issues.  Patient has had ongoing chest discomfort with sharp chest pains in the left side of his chest that come in quick bursts and only lasts a few seconds.  This happens numerous times per day.  He also has dyspnea on exertion and rare palpitations.  He was to have had a stress echo but failed to get it scheduled, he is interested in scheduling.       Past Medical History:  He has a past medical history of Chronic systolic (congestive) heart failure (09/22/2020), COPD (chronic obstructive pulmonary disease) (Multi), Myocardial infarction (Multi) (07/01/2024), Old myocardial infarction, Old myocardial infarction (09/22/2020), Old myocardial infarction, Personal history of other diseases of the circulatory system, Personal history of other endocrine, nutritional and metabolic disease, and Presence of stent in anterior descending branch of left coronary artery (07/01/2024).    Past Surgical History:  He has a past surgical history that includes Tonsillectomy (06/01/2016); Vasectomy  (06/01/2016); Mandible surgery (06/01/2016); and Other surgical history (06/29/2020).      Social History:  He reports that he has been smoking cigarettes. He started smoking about 50 years ago. He has a 50.4 pack-year smoking history. He has never used smokeless tobacco. He reports that he does not currently use alcohol. He reports that he does not use drugs.    Family History:  No family history on file.     Allergies:  Penicillin g    Medications:  Current Outpatient Medications   Medication Instructions    albuterol 90 mcg/actuation inhaler 2 puffs, inhalation, Every 6 hours PRN    aspirin 81 mg, Daily    atorvastatin (LIPITOR) 80 mg, oral, Nightly    budesonide (PULMICORT) 0.5 mg, nebulization, 2 times daily RT, Rinse mouth with water after use to reduce aftertaste and incidence of candidiasis. Do not swallow.    buPROPion SR (WELLBUTRIN SR) 100 mg, oral, 2 times daily, Do not crush, chew, or split.    carvedilol (COREG) 6.25 mg, oral, 2 times daily (morning and late afternoon)    cyanocobalamin (VITAMIN B-12) 1,000 mcg, Daily    hydrOXYzine HCL (ATARAX) 25 mg, oral, Every 8 hours PRN    ipratropium-albuteroL (Duo-Neb) 0.5-2.5 mg/3 mL nebulizer solution 3 mL, nebulization, Every 4 hours    levoFLOXacin (LEVAQUIN) 750 mg, oral, Daily    lisinopril 5 mg, oral, Daily    omega 3-dha-epa-fish oil (Fish OiL) 1,000 mg (120 mg-180 mg) capsule 1 capsule, Daily    oxygen (O2) gas therapy 1 each, inhalation, Every 12 hours    pantoprazole (PROTONIX) 40 mg, oral, Daily before breakfast, Do not crush, chew, or split.    predniSONE (DELTASONE) 5 mg, oral, 2 times daily       Review of Systems:  Review of Systems   Constitutional: Negative. Negative for decreased appetite and malaise/fatigue.   HENT: Negative.     Eyes:  Negative for blurred vision and visual disturbance.   Cardiovascular:  Positive for chest pain (sharp fleeting chest pains off and on lasting only seconds, not associated withactivity) and dyspnea on exertion  "(Chronic TODD, on O2). Negative for irregular heartbeat, leg swelling, orthopnea, palpitations and syncope.   Respiratory:  Negative for cough and shortness of breath.         Continues to smokes but has cut back significantly   Wears O2 since last summer   Musculoskeletal:  Positive for arthritis. Negative for falls.   Gastrointestinal: Negative.    Neurological:  Negative for focal weakness and light-headedness.   Psychiatric/Behavioral:  Negative for depression and memory loss.         Vitals  Visit Vitals  /78 (BP Location: Left arm, Patient Position: Sitting, BP Cuff Size: Adult)   Pulse 75   Ht 1.702 m (5' 7\")   Wt 108 kg (239 lb)   BMI 37.43 kg/m²   Smoking Status Every Day   BSA 2.26 m²        Physical Exam:  Physical Exam  Constitutional:       Appearance: Normal appearance.      Comments: In WC today   HENT:      Head: Normocephalic.   Eyes:      Conjunctiva/sclera: Conjunctivae normal.   Cardiovascular:      Rate and Rhythm: Normal rate and regular rhythm.      Pulses: Normal pulses.      Heart sounds: S1 normal and S2 normal. No murmur heard.     No friction rub. No gallop.   Pulmonary:      Effort: Pulmonary effort is normal.      Comments: On O2.  No wheezing or crackles.  Lungs clear with prolonged expiratory phase   Abdominal:      General: Bowel sounds are normal.      Palpations: Abdomen is soft.      Tenderness: There is no abdominal tenderness.   Musculoskeletal:      Cervical back: Neck supple.      Right lower leg: No edema.      Left lower leg: No edema.   Skin:     General: Skin is warm and dry.   Neurological:      General: No focal deficit present.      Mental Status: He is alert and oriented to person, place, and time.   Psychiatric:         Attention and Perception: Attention normal.         Mood and Affect: Mood normal.           Last Labs:  CBC -  Lab Results   Component Value Date    WBC 10.6 05/20/2025    HGB 12.3 (L) 05/20/2025    HCT 37.9 (L) 05/20/2025    MCV 98 05/20/2025    "  05/20/2025     Lab Results   Component Value Date    GLUCOSE 153 (H) 05/20/2025    CALCIUM 9.5 05/20/2025     (L) 05/20/2025    K 4.4 05/20/2025    CO2 28 05/20/2025     05/20/2025    BUN 10 05/20/2025    CREATININE 1.26 05/20/2025      CMP -  Lab Results   Component Value Date    CALCIUM 9.5 05/20/2025    PROT 6.6 03/23/2025    ALBUMIN 3.8 03/23/2025    AST 9 03/23/2025    ALT 10 03/23/2025    ALKPHOS 50 03/23/2025    BILITOT 0.4 03/23/2025       LIPID PANEL -   Lab Results   Component Value Date    CHOL 123 04/27/2023    TRIG 64 04/27/2023    HDL 41.1 04/27/2023    CHHDL 3.0 04/27/2023    LDLF 69 04/27/2023    VLDL 13 04/27/2023       Lab Results   Component Value Date    BNP 12 04/30/2025    HGBA1C 7.1 (H) 11/11/2024       Last Cardiology Tests:    Echo:7-10-20  CONCLUSIONS:   1. The left ventricular systolic function is normal with a 65% estimated ejection fraction.    Stress Test:7-8-20  Summary:   1. Nondiagnostic regadenoson stress test secondary to abnormal resting EKG without CP.   2. Correlate with myocardial perfusion imaging results.   3. Non-diagnostic Stress Test.   4. Nuclear image results are reported separately.  IMPRESSION:  1. Small area of fixed perfusion defect of the inferoapical segment,  consistent with myocardial scar in RCA or LAD territory. No large  area of ischemia was detected..  2. Well-maintained left ventricular function.      Lab review: I have personally reviewed the laboratory result(s)     Assessment/Plan:  ASHD: Patient had a PCI of the LAD in 2011 with a STEMI in 2020 with second PCI of the LAD.  He should continue on his current cardiac regimen which includes aspirin 81 mg daily, atorvastatin 80 mg nightly, carvedilol 6.25 mg twice daily, and lisinopril 5 mg daily.  Patient has been reporting atypical chest pain and was recommended a stress test earlier this year.  He is now ready to get it scheduled as his chest pain has continued.  Patient will be getting  a dobutamine stress echo.    Hypertension: Blood pressure today is well-controlled at 128/78.  Continue on carvedilol 6.25 mg twice daily and lisinopril 5 mg daily.  Metabolic panel from May 20, 2025 shows sodium 135, potassium 4.4, creatinine 1.26.      Tobacco use disorder: Patient was counseled again on the importance of smoking cessation.  He has cut back which is a good step but I recommended total cessation.    Dyslipidemia: Lipid labs from 2023 show an LDL of 69, triglycerides 64.  Patient is overdue for repeat lipid labs, an order was placed.  For now, he will continue on atorvastatin 80 mg nightly as well as fish oil.  Ideally his LDL should be less than 70.  I encouraged him to be on a heart healthy low-sodium diet.    Patient is agreeable to the stress echo, he can be called with the results.  He will get fasting lipid labs this summer.  Patient will follow-up with me in 6 months and will follow-up with Dr. Zelaya in March.  Patient instructed to call with any cardiovascular complaints. All questions were answered.       Dragon dictation was utilized to create this document. Quite often unanticipated grammatical, syntax,  and other interpretive errors are inadvertently transcribed by the computer software.  Please disregard these errors.  Please excuse any errors that have escaped final proofreading.          Renetta Morin, APRN-CNP

## 2025-05-29 ENCOUNTER — OFFICE VISIT (OUTPATIENT)
Dept: CARDIOLOGY | Facility: HOSPITAL | Age: 65
End: 2025-05-29
Payer: MEDICARE

## 2025-05-29 VITALS
HEART RATE: 75 BPM | SYSTOLIC BLOOD PRESSURE: 128 MMHG | WEIGHT: 239 LBS | DIASTOLIC BLOOD PRESSURE: 78 MMHG | HEIGHT: 67 IN | BODY MASS INDEX: 37.51 KG/M2

## 2025-05-29 DIAGNOSIS — I10 PRIMARY HYPERTENSION: ICD-10-CM

## 2025-05-29 DIAGNOSIS — I25.10 ASHD (ARTERIOSCLEROTIC HEART DISEASE): Primary | ICD-10-CM

## 2025-05-29 DIAGNOSIS — R07.89 ATYPICAL CHEST PAIN: ICD-10-CM

## 2025-05-29 DIAGNOSIS — F17.200 TOBACCO USE DISORDER: ICD-10-CM

## 2025-05-29 DIAGNOSIS — E78.5 DYSLIPIDEMIA: ICD-10-CM

## 2025-05-29 PROCEDURE — 99214 OFFICE O/P EST MOD 30 MIN: CPT | Performed by: NURSE PRACTITIONER

## 2025-05-29 PROCEDURE — 4004F PT TOBACCO SCREEN RCVD TLK: CPT | Performed by: NURSE PRACTITIONER

## 2025-05-29 PROCEDURE — 3074F SYST BP LT 130 MM HG: CPT | Performed by: NURSE PRACTITIONER

## 2025-05-29 PROCEDURE — 1111F DSCHRG MED/CURRENT MED MERGE: CPT | Performed by: NURSE PRACTITIONER

## 2025-05-29 PROCEDURE — 3078F DIAST BP <80 MM HG: CPT | Performed by: NURSE PRACTITIONER

## 2025-05-29 PROCEDURE — 1159F MED LIST DOCD IN RCRD: CPT | Performed by: NURSE PRACTITIONER

## 2025-05-29 PROCEDURE — 1160F RVW MEDS BY RX/DR IN RCRD: CPT | Performed by: NURSE PRACTITIONER

## 2025-05-29 PROCEDURE — 3008F BODY MASS INDEX DOCD: CPT | Performed by: NURSE PRACTITIONER

## 2025-05-29 RX ORDER — NITROGLYCERIN 0.4 MG/1
0.4 TABLET SUBLINGUAL EVERY 5 MIN PRN
Qty: 25 TABLET | Refills: 1 | Status: SHIPPED | OUTPATIENT
Start: 2025-05-29

## 2025-05-29 ASSESSMENT — ENCOUNTER SYMPTOMS
DYSPNEA ON EXERTION: 1
COUGH: 0

## 2025-05-29 NOTE — PATIENT INSTRUCTIONS
Continue on current meds  Nitroglycerine sent in for CP (follow directions)   Heart healthy, low sodium diet  Mediterranean diet is recommended  Stress echo is being scheduled.   Work on quitting smoking  Fasting lipid labs this summer  Follow up with me in 6 months   Follow up with Dr Zelaya in March

## 2025-06-10 ENCOUNTER — APPOINTMENT (OUTPATIENT)
Dept: CARDIOLOGY | Facility: HOSPITAL | Age: 65
End: 2025-06-10
Payer: MEDICARE

## 2025-08-15 ENCOUNTER — APPOINTMENT (OUTPATIENT)
Dept: CARDIOLOGY | Facility: HOSPITAL | Age: 65
End: 2025-08-15
Payer: MEDICARE

## 2025-08-15 ENCOUNTER — HOSPITAL ENCOUNTER (EMERGENCY)
Facility: HOSPITAL | Age: 65
Discharge: HOME | End: 2025-08-16
Attending: EMERGENCY MEDICINE
Payer: MEDICARE

## 2025-08-15 ENCOUNTER — APPOINTMENT (OUTPATIENT)
Dept: RADIOLOGY | Facility: HOSPITAL | Age: 65
End: 2025-08-15
Payer: MEDICARE

## 2025-08-15 DIAGNOSIS — R63.0 DECREASED APPETITE: ICD-10-CM

## 2025-08-15 DIAGNOSIS — J44.1 COPD EXACERBATION (MULTI): Primary | ICD-10-CM

## 2025-08-15 DIAGNOSIS — R68.81 EARLY SATIETY: ICD-10-CM

## 2025-08-15 LAB
ANION GAP BLDV CALCULATED.4IONS-SCNC: 6 MMOL/L (ref 10–25)
ANION GAP SERPL CALC-SCNC: 9 MMOL/L (ref 10–20)
BASE EXCESS BLDV CALC-SCNC: 6.2 MMOL/L (ref -2–3)
BASOPHILS # BLD AUTO: 0.08 X10*3/UL (ref 0–0.1)
BASOPHILS NFR BLD AUTO: 0.6 %
BNP SERPL-MCNC: 43 PG/ML (ref 0–99)
BODY TEMPERATURE: 37 DEGREES CELSIUS
BUN SERPL-MCNC: 11 MG/DL (ref 6–23)
CA-I BLDV-SCNC: 1.22 MMOL/L (ref 1.1–1.33)
CALCIUM SERPL-MCNC: 8.9 MG/DL (ref 8.6–10.3)
CARDIAC TROPONIN I PNL SERPL HS: 10 NG/L (ref 0–20)
CARDIAC TROPONIN I PNL SERPL HS: 10 NG/L (ref 0–20)
CHLORIDE BLDV-SCNC: 101 MMOL/L (ref 98–107)
CHLORIDE SERPL-SCNC: 102 MMOL/L (ref 98–107)
CO2 SERPL-SCNC: 31 MMOL/L (ref 21–32)
CREAT SERPL-MCNC: 1.24 MG/DL (ref 0.5–1.3)
EGFRCR SERPLBLD CKD-EPI 2021: 64 ML/MIN/1.73M*2
EOSINOPHIL # BLD AUTO: 0.39 X10*3/UL (ref 0–0.7)
EOSINOPHIL NFR BLD AUTO: 3 %
ERYTHROCYTE [DISTWIDTH] IN BLOOD BY AUTOMATED COUNT: 12.4 % (ref 11.5–14.5)
GLUCOSE BLDV-MCNC: 211 MG/DL (ref 74–99)
GLUCOSE SERPL-MCNC: 211 MG/DL (ref 74–99)
HCO3 BLDV-SCNC: 34 MMOL/L (ref 22–26)
HCT VFR BLD AUTO: 39.6 % (ref 41–52)
HCT VFR BLD EST: 40 % (ref 41–52)
HGB BLD-MCNC: 12.8 G/DL (ref 13.5–17.5)
HGB BLDV-MCNC: 13.3 G/DL (ref 13.5–17.5)
IMM GRANULOCYTES # BLD AUTO: 0.09 X10*3/UL (ref 0–0.7)
IMM GRANULOCYTES NFR BLD AUTO: 0.7 % (ref 0–0.9)
INHALED O2 CONCENTRATION: 44 %
LACTATE BLDV-SCNC: 1.7 MMOL/L (ref 0.4–2)
LACTATE BLDV-SCNC: 2.1 MMOL/L (ref 0.4–2)
LYMPHOCYTES # BLD AUTO: 3.03 X10*3/UL (ref 1.2–4.8)
LYMPHOCYTES NFR BLD AUTO: 23.5 %
MAGNESIUM SERPL-MCNC: 1.88 MG/DL (ref 1.6–2.4)
MCH RBC QN AUTO: 32.3 PG (ref 26–34)
MCHC RBC AUTO-ENTMCNC: 32.3 G/DL (ref 32–36)
MCV RBC AUTO: 100 FL (ref 80–100)
MONOCYTES # BLD AUTO: 0.89 X10*3/UL (ref 0.1–1)
MONOCYTES NFR BLD AUTO: 6.9 %
NEUTROPHILS # BLD AUTO: 8.4 X10*3/UL (ref 1.2–7.7)
NEUTROPHILS NFR BLD AUTO: 65.3 %
NRBC BLD-RTO: 0 /100 WBCS (ref 0–0)
OXYHGB MFR BLDV: 74.9 % (ref 45–75)
PCO2 BLDV: 63 MM HG (ref 41–51)
PH BLDV: 7.34 PH (ref 7.33–7.43)
PLATELET # BLD AUTO: 241 X10*3/UL (ref 150–450)
PO2 BLDV: 45 MM HG (ref 35–45)
POTASSIUM BLDV-SCNC: 4.2 MMOL/L (ref 3.5–5.3)
POTASSIUM SERPL-SCNC: 4.1 MMOL/L (ref 3.5–5.3)
RBC # BLD AUTO: 3.96 X10*6/UL (ref 4.5–5.9)
SAO2 % BLDV: 76 % (ref 45–75)
SODIUM BLDV-SCNC: 137 MMOL/L (ref 136–145)
SODIUM SERPL-SCNC: 138 MMOL/L (ref 136–145)
WBC # BLD AUTO: 12.9 X10*3/UL (ref 4.4–11.3)

## 2025-08-15 PROCEDURE — 83605 ASSAY OF LACTIC ACID: CPT | Performed by: EMERGENCY MEDICINE

## 2025-08-15 PROCEDURE — 2500000002 HC RX 250 W HCPCS SELF ADMINISTERED DRUGS (ALT 637 FOR MEDICARE OP, ALT 636 FOR OP/ED): Performed by: EMERGENCY MEDICINE

## 2025-08-15 PROCEDURE — 74177 CT ABD & PELVIS W/CONTRAST: CPT | Performed by: RADIOLOGY

## 2025-08-15 PROCEDURE — 71045 X-RAY EXAM CHEST 1 VIEW: CPT

## 2025-08-15 PROCEDURE — 74177 CT ABD & PELVIS W/CONTRAST: CPT

## 2025-08-15 PROCEDURE — 2550000001 HC RX 255 CONTRASTS: Performed by: EMERGENCY MEDICINE

## 2025-08-15 PROCEDURE — 83735 ASSAY OF MAGNESIUM: CPT | Performed by: EMERGENCY MEDICINE

## 2025-08-15 PROCEDURE — 84132 ASSAY OF SERUM POTASSIUM: CPT | Performed by: EMERGENCY MEDICINE

## 2025-08-15 PROCEDURE — 83880 ASSAY OF NATRIURETIC PEPTIDE: CPT | Performed by: EMERGENCY MEDICINE

## 2025-08-15 PROCEDURE — 93005 ELECTROCARDIOGRAM TRACING: CPT

## 2025-08-15 PROCEDURE — 85025 COMPLETE CBC W/AUTO DIFF WBC: CPT | Performed by: EMERGENCY MEDICINE

## 2025-08-15 PROCEDURE — 36415 COLL VENOUS BLD VENIPUNCTURE: CPT | Performed by: EMERGENCY MEDICINE

## 2025-08-15 PROCEDURE — 84484 ASSAY OF TROPONIN QUANT: CPT | Performed by: EMERGENCY MEDICINE

## 2025-08-15 PROCEDURE — 94640 AIRWAY INHALATION TREATMENT: CPT

## 2025-08-15 PROCEDURE — 84132 ASSAY OF SERUM POTASSIUM: CPT | Mod: 59 | Performed by: EMERGENCY MEDICINE

## 2025-08-15 PROCEDURE — 99285 EMERGENCY DEPT VISIT HI MDM: CPT | Mod: 25 | Performed by: EMERGENCY MEDICINE

## 2025-08-15 PROCEDURE — 51798 US URINE CAPACITY MEASURE: CPT

## 2025-08-15 PROCEDURE — 71045 X-RAY EXAM CHEST 1 VIEW: CPT | Performed by: STUDENT IN AN ORGANIZED HEALTH CARE EDUCATION/TRAINING PROGRAM

## 2025-08-15 RX ORDER — IPRATROPIUM BROMIDE AND ALBUTEROL SULFATE 2.5; .5 MG/3ML; MG/3ML
3 SOLUTION RESPIRATORY (INHALATION) ONCE
Status: COMPLETED | OUTPATIENT
Start: 2025-08-15 | End: 2025-08-15

## 2025-08-15 RX ADMIN — IOHEXOL 75 ML: 350 INJECTION, SOLUTION INTRAVENOUS at 22:06

## 2025-08-15 RX ADMIN — IPRATROPIUM BROMIDE AND ALBUTEROL SULFATE 3 ML: 2.5; .5 SOLUTION RESPIRATORY (INHALATION) at 20:35

## 2025-08-15 ASSESSMENT — PAIN DESCRIPTION - PAIN TYPE: TYPE: ACUTE PAIN

## 2025-08-15 ASSESSMENT — LIFESTYLE VARIABLES
HAVE PEOPLE ANNOYED YOU BY CRITICIZING YOUR DRINKING: NO
EVER HAD A DRINK FIRST THING IN THE MORNING TO STEADY YOUR NERVES TO GET RID OF A HANGOVER: NO
HAVE YOU EVER FELT YOU SHOULD CUT DOWN ON YOUR DRINKING: NO
EVER FELT BAD OR GUILTY ABOUT YOUR DRINKING: NO
TOTAL SCORE: 0

## 2025-08-15 ASSESSMENT — PAIN DESCRIPTION - LOCATION: LOCATION: ABDOMEN

## 2025-08-15 ASSESSMENT — PAIN - FUNCTIONAL ASSESSMENT: PAIN_FUNCTIONAL_ASSESSMENT: 0-10

## 2025-08-15 ASSESSMENT — PAIN DESCRIPTION - FREQUENCY: FREQUENCY: CONSTANT/CONTINUOUS

## 2025-08-15 ASSESSMENT — PAIN SCALES - GENERAL: PAINLEVEL_OUTOF10: 3

## 2025-08-16 VITALS
HEART RATE: 80 BPM | BODY MASS INDEX: 36.1 KG/M2 | DIASTOLIC BLOOD PRESSURE: 82 MMHG | WEIGHT: 230 LBS | RESPIRATION RATE: 18 BRPM | OXYGEN SATURATION: 100 % | TEMPERATURE: 97.6 F | HEIGHT: 67 IN | SYSTOLIC BLOOD PRESSURE: 129 MMHG

## 2025-08-16 RX ORDER — PREDNISONE 20 MG/1
40 TABLET ORAL DAILY
Qty: 10 TABLET | Refills: 0 | Status: SHIPPED | OUTPATIENT
Start: 2025-08-16 | End: 2025-08-21

## 2025-08-18 LAB
ATRIAL RATE: 82 BPM
P AXIS: 67 DEGREES
PR INTERVAL: 153 MS
Q ONSET: 253 MS
QRS COUNT: 13 BEATS
QRS DURATION: 86 MS
QT INTERVAL: 310 MS
QTC CALCULATION(BAZETT): 362 MS
QTC FREDERICIA: 344 MS
R AXIS: 71 DEGREES
T AXIS: 222 DEGREES
T OFFSET: 408 MS
VENTRICULAR RATE: 82 BPM

## 2025-08-19 LAB
ATRIAL RATE: 74 BPM
P AXIS: 61 DEGREES
PR INTERVAL: 159 MS
Q ONSET: 253 MS
QRS COUNT: 12 BEATS
QRS DURATION: 88 MS
QT INTERVAL: 335 MS
QTC CALCULATION(BAZETT): 372 MS
QTC FREDERICIA: 359 MS
R AXIS: 67 DEGREES
T AXIS: 114 DEGREES
T OFFSET: 420 MS
VENTRICULAR RATE: 74 BPM

## 2025-08-23 ENCOUNTER — APPOINTMENT (OUTPATIENT)
Dept: RADIOLOGY | Facility: HOSPITAL | Age: 65
End: 2025-08-23
Payer: MEDICARE

## 2025-08-23 ENCOUNTER — HOSPITAL ENCOUNTER (EMERGENCY)
Facility: HOSPITAL | Age: 65
Discharge: HOME | End: 2025-08-23
Attending: STUDENT IN AN ORGANIZED HEALTH CARE EDUCATION/TRAINING PROGRAM
Payer: MEDICARE

## 2025-08-23 ENCOUNTER — APPOINTMENT (OUTPATIENT)
Dept: CARDIOLOGY | Facility: HOSPITAL | Age: 65
End: 2025-08-23
Payer: MEDICARE

## 2025-08-23 VITALS
RESPIRATION RATE: 18 BRPM | TEMPERATURE: 97.3 F | DIASTOLIC BLOOD PRESSURE: 77 MMHG | HEART RATE: 77 BPM | SYSTOLIC BLOOD PRESSURE: 115 MMHG | BODY MASS INDEX: 36.1 KG/M2 | WEIGHT: 230 LBS | OXYGEN SATURATION: 98 % | HEIGHT: 67 IN

## 2025-08-23 DIAGNOSIS — K55.20 ARTERIOVENOUS MALFORMATION OF DIGESTIVE SYSTEM VESSEL: Primary | ICD-10-CM

## 2025-08-23 DIAGNOSIS — K11.8 PAROTID MASS: ICD-10-CM

## 2025-08-23 LAB
ALBUMIN SERPL BCP-MCNC: 3.5 G/DL (ref 3.4–5)
ALP SERPL-CCNC: 50 U/L (ref 33–136)
ALT SERPL W P-5'-P-CCNC: 25 U/L (ref 10–52)
ANION GAP SERPL CALC-SCNC: 9 MMOL/L (ref 10–20)
AST SERPL W P-5'-P-CCNC: 14 U/L (ref 9–39)
BASOPHILS # BLD AUTO: 0.1 X10*3/UL (ref 0–0.1)
BASOPHILS NFR BLD AUTO: 0.5 %
BILIRUB SERPL-MCNC: 0.4 MG/DL (ref 0–1.2)
BUN SERPL-MCNC: 17 MG/DL (ref 6–23)
CALCIUM SERPL-MCNC: 10 MG/DL (ref 8.6–10.3)
CARDIAC TROPONIN I PNL SERPL HS: 18 NG/L (ref 0–20)
CARDIAC TROPONIN I PNL SERPL HS: 19 NG/L (ref 0–20)
CHLORIDE SERPL-SCNC: 100 MMOL/L (ref 98–107)
CO2 SERPL-SCNC: 31 MMOL/L (ref 21–32)
CREAT SERPL-MCNC: 1.21 MG/DL (ref 0.5–1.3)
EGFRCR SERPLBLD CKD-EPI 2021: 66 ML/MIN/1.73M*2
EOSINOPHIL # BLD AUTO: 0.45 X10*3/UL (ref 0–0.7)
EOSINOPHIL NFR BLD AUTO: 2.4 %
ERYTHROCYTE [DISTWIDTH] IN BLOOD BY AUTOMATED COUNT: 12.8 % (ref 11.5–14.5)
GLUCOSE SERPL-MCNC: 162 MG/DL (ref 74–99)
HCT VFR BLD AUTO: 42.7 % (ref 41–52)
HGB BLD-MCNC: 13.8 G/DL (ref 13.5–17.5)
IMM GRANULOCYTES # BLD AUTO: 0.56 X10*3/UL (ref 0–0.7)
IMM GRANULOCYTES NFR BLD AUTO: 2.9 % (ref 0–0.9)
LIPASE SERPL-CCNC: 19 U/L (ref 9–82)
LYMPHOCYTES # BLD AUTO: 2.56 X10*3/UL (ref 1.2–4.8)
LYMPHOCYTES NFR BLD AUTO: 13.4 %
MCH RBC QN AUTO: 32 PG (ref 26–34)
MCHC RBC AUTO-ENTMCNC: 32.3 G/DL (ref 32–36)
MCV RBC AUTO: 99 FL (ref 80–100)
MONOCYTES # BLD AUTO: 1.22 X10*3/UL (ref 0.1–1)
MONOCYTES NFR BLD AUTO: 6.4 %
NEUTROPHILS # BLD AUTO: 14.19 X10*3/UL (ref 1.2–7.7)
NEUTROPHILS NFR BLD AUTO: 74.4 %
NRBC BLD-RTO: 0 /100 WBCS (ref 0–0)
PLATELET # BLD AUTO: 249 X10*3/UL (ref 150–450)
POTASSIUM SERPL-SCNC: 4.4 MMOL/L (ref 3.5–5.3)
PROT SERPL-MCNC: 6.4 G/DL (ref 6.4–8.2)
RBC # BLD AUTO: 4.31 X10*6/UL (ref 4.5–5.9)
SODIUM SERPL-SCNC: 136 MMOL/L (ref 136–145)
WBC # BLD AUTO: 19.1 X10*3/UL (ref 4.4–11.3)

## 2025-08-23 PROCEDURE — 94640 AIRWAY INHALATION TREATMENT: CPT

## 2025-08-23 PROCEDURE — 71045 X-RAY EXAM CHEST 1 VIEW: CPT

## 2025-08-23 PROCEDURE — 2550000001 HC RX 255 CONTRASTS: Performed by: NURSE PRACTITIONER

## 2025-08-23 PROCEDURE — 36415 COLL VENOUS BLD VENIPUNCTURE: CPT | Performed by: NURSE PRACTITIONER

## 2025-08-23 PROCEDURE — 2500000004 HC RX 250 GENERAL PHARMACY W/ HCPCS (ALT 636 FOR OP/ED): Performed by: STUDENT IN AN ORGANIZED HEALTH CARE EDUCATION/TRAINING PROGRAM

## 2025-08-23 PROCEDURE — 2500000002 HC RX 250 W HCPCS SELF ADMINISTERED DRUGS (ALT 637 FOR MEDICARE OP, ALT 636 FOR OP/ED): Performed by: NURSE PRACTITIONER

## 2025-08-23 PROCEDURE — 71045 X-RAY EXAM CHEST 1 VIEW: CPT | Performed by: RADIOLOGY

## 2025-08-23 PROCEDURE — 70491 CT SOFT TISSUE NECK W/DYE: CPT | Performed by: STUDENT IN AN ORGANIZED HEALTH CARE EDUCATION/TRAINING PROGRAM

## 2025-08-23 PROCEDURE — 84484 ASSAY OF TROPONIN QUANT: CPT | Performed by: NURSE PRACTITIONER

## 2025-08-23 PROCEDURE — 99285 EMERGENCY DEPT VISIT HI MDM: CPT | Mod: 25 | Performed by: STUDENT IN AN ORGANIZED HEALTH CARE EDUCATION/TRAINING PROGRAM

## 2025-08-23 PROCEDURE — 93005 ELECTROCARDIOGRAM TRACING: CPT

## 2025-08-23 PROCEDURE — 2500000001 HC RX 250 WO HCPCS SELF ADMINISTERED DRUGS (ALT 637 FOR MEDICARE OP): Performed by: NURSE PRACTITIONER

## 2025-08-23 PROCEDURE — 71275 CT ANGIOGRAPHY CHEST: CPT

## 2025-08-23 PROCEDURE — 85025 COMPLETE CBC W/AUTO DIFF WBC: CPT | Performed by: NURSE PRACTITIONER

## 2025-08-23 PROCEDURE — 84075 ASSAY ALKALINE PHOSPHATASE: CPT | Performed by: NURSE PRACTITIONER

## 2025-08-23 PROCEDURE — 70491 CT SOFT TISSUE NECK W/DYE: CPT

## 2025-08-23 PROCEDURE — 83690 ASSAY OF LIPASE: CPT | Performed by: NURSE PRACTITIONER

## 2025-08-23 PROCEDURE — 71275 CT ANGIOGRAPHY CHEST: CPT | Performed by: STUDENT IN AN ORGANIZED HEALTH CARE EDUCATION/TRAINING PROGRAM

## 2025-08-23 RX ORDER — PREDNISONE 20 MG/1
20 TABLET ORAL ONCE
Status: COMPLETED | OUTPATIENT
Start: 2025-08-23 | End: 2025-08-23

## 2025-08-23 RX ORDER — PREDNISONE 10 MG/1
20 TABLET ORAL DAILY
Qty: 10 TABLET | Refills: 0 | Status: SHIPPED | OUTPATIENT
Start: 2025-08-23 | End: 2025-08-28

## 2025-08-23 RX ORDER — OXYCODONE AND ACETAMINOPHEN 5; 325 MG/1; MG/1
1 TABLET ORAL ONCE
Refills: 0 | Status: COMPLETED | OUTPATIENT
Start: 2025-08-23 | End: 2025-08-23

## 2025-08-23 RX ORDER — IPRATROPIUM BROMIDE AND ALBUTEROL SULFATE 2.5; .5 MG/3ML; MG/3ML
3 SOLUTION RESPIRATORY (INHALATION) ONCE
Status: COMPLETED | OUTPATIENT
Start: 2025-08-23 | End: 2025-08-23

## 2025-08-23 RX ADMIN — IPRATROPIUM BROMIDE AND ALBUTEROL SULFATE 3 ML: 2.5; .5 SOLUTION RESPIRATORY (INHALATION) at 19:07

## 2025-08-23 RX ADMIN — PREDNISONE 20 MG: 20 TABLET ORAL at 22:46

## 2025-08-23 RX ADMIN — IOHEXOL 75 ML: 350 INJECTION, SOLUTION INTRAVENOUS at 19:54

## 2025-08-23 RX ADMIN — OXYCODONE HYDROCHLORIDE AND ACETAMINOPHEN 1 TABLET: 5; 325 TABLET ORAL at 19:07

## 2025-08-23 ASSESSMENT — PAIN - FUNCTIONAL ASSESSMENT
PAIN_FUNCTIONAL_ASSESSMENT: 0-10
PAIN_FUNCTIONAL_ASSESSMENT: 0-10

## 2025-08-23 ASSESSMENT — PAIN SCALES - GENERAL
PAINLEVEL_OUTOF10: 7
PAINLEVEL_OUTOF10: 5 - MODERATE PAIN

## 2025-08-25 ENCOUNTER — PATIENT OUTREACH (OUTPATIENT)
Dept: CARE COORDINATION | Facility: CLINIC | Age: 65
End: 2025-08-25

## 2025-08-25 ENCOUNTER — APPOINTMENT (OUTPATIENT)
Dept: OTOLARYNGOLOGY | Facility: CLINIC | Age: 65
End: 2025-08-25
Payer: MEDICARE

## 2025-08-25 LAB
ATRIAL RATE: 74 BPM
ATRIAL RATE: 82 BPM
P AXIS: 61 DEGREES
P AXIS: 67 DEGREES
PR INTERVAL: 153 MS
PR INTERVAL: 159 MS
Q ONSET: 253 MS
Q ONSET: 253 MS
QRS COUNT: 12 BEATS
QRS COUNT: 13 BEATS
QRS DURATION: 86 MS
QRS DURATION: 88 MS
QT INTERVAL: 310 MS
QT INTERVAL: 335 MS
QTC CALCULATION(BAZETT): 362 MS
QTC CALCULATION(BAZETT): 372 MS
QTC FREDERICIA: 344 MS
QTC FREDERICIA: 359 MS
R AXIS: 67 DEGREES
R AXIS: 71 DEGREES
T AXIS: 114 DEGREES
T AXIS: 222 DEGREES
T OFFSET: 408 MS
T OFFSET: 420 MS
VENTRICULAR RATE: 74 BPM
VENTRICULAR RATE: 82 BPM

## 2025-08-25 SDOH — ECONOMIC STABILITY: GENERAL: WOULD YOU LIKE HELP WITH ANY OF THE FOLLOWING NEEDS?: I DONT NEED HELP WITH ANY OF THESE

## 2025-08-25 SDOH — ECONOMIC STABILITY: FOOD INSECURITY
ARE ANY OF YOUR NEEDS URGENT? FOR EXAMPLE, UNCERTAINTY OF WHERE YOU WILL GET YOUR NEXT MEAL OR NOT HAVING THE MEDICATIONS YOU NEED TO TAKE TOMORROW.: NO

## 2025-08-28 ENCOUNTER — HOSPITAL ENCOUNTER (EMERGENCY)
Facility: HOSPITAL | Age: 65
Discharge: HOME | End: 2025-08-28
Attending: STUDENT IN AN ORGANIZED HEALTH CARE EDUCATION/TRAINING PROGRAM
Payer: MEDICARE

## 2025-08-28 ENCOUNTER — APPOINTMENT (OUTPATIENT)
Dept: RADIOLOGY | Facility: HOSPITAL | Age: 65
End: 2025-08-28
Payer: MEDICARE

## 2025-08-28 ENCOUNTER — APPOINTMENT (OUTPATIENT)
Dept: OTOLARYNGOLOGY | Facility: CLINIC | Age: 65
End: 2025-08-28
Payer: MEDICARE

## 2025-08-28 LAB
ATRIAL RATE: 79 BPM
P AXIS: 68 DEGREES
PR INTERVAL: 156 MS
Q ONSET: 251 MS
QRS COUNT: 13 BEATS
QRS DURATION: 85 MS
QT INTERVAL: 347 MS
QTC CALCULATION(BAZETT): 398 MS
QTC FREDERICIA: 380 MS
R AXIS: 72 DEGREES
T AXIS: 95 DEGREES
T OFFSET: 425 MS
VENTRICULAR RATE: 79 BPM

## 2025-08-28 ASSESSMENT — HEART SCORE
HISTORY: SLIGHTLY SUSPICIOUS
ECG: NORMAL
HEART SCORE: 3
RISK FACTORS: 1-2 RISK FACTORS
AGE: 65+
TROPONIN: LESS THAN OR EQUAL TO NORMAL LIMIT

## 2025-08-28 ASSESSMENT — PAIN DESCRIPTION - PAIN TYPE
TYPE: ACUTE PAIN
TYPE: ACUTE PAIN

## 2025-08-28 ASSESSMENT — LIFESTYLE VARIABLES
HAVE PEOPLE ANNOYED YOU BY CRITICIZING YOUR DRINKING: NO
EVER FELT BAD OR GUILTY ABOUT YOUR DRINKING: NO
EVER HAD A DRINK FIRST THING IN THE MORNING TO STEADY YOUR NERVES TO GET RID OF A HANGOVER: NO
HAVE YOU EVER FELT YOU SHOULD CUT DOWN ON YOUR DRINKING: NO
TOTAL SCORE: 0

## 2025-08-28 ASSESSMENT — PAIN DESCRIPTION - LOCATION
LOCATION: NECK
LOCATION: JAW

## 2025-08-28 ASSESSMENT — PAIN - FUNCTIONAL ASSESSMENT
PAIN_FUNCTIONAL_ASSESSMENT: 0-10

## 2025-08-28 ASSESSMENT — PAIN SCALES - GENERAL
PAINLEVEL_OUTOF10: 5 - MODERATE PAIN
PAINLEVEL_OUTOF10: 3
PAINLEVEL_OUTOF10: 7

## 2025-08-29 ENCOUNTER — HOSPITAL ENCOUNTER (EMERGENCY)
Facility: HOSPITAL | Age: 65
Discharge: HOME | End: 2025-08-29
Attending: EMERGENCY MEDICINE
Payer: MEDICARE

## 2025-08-29 ASSESSMENT — PAIN SCALES - GENERAL: PAINLEVEL_OUTOF10: 7

## 2025-08-29 ASSESSMENT — LIFESTYLE VARIABLES
EVER HAD A DRINK FIRST THING IN THE MORNING TO STEADY YOUR NERVES TO GET RID OF A HANGOVER: NO
EVER FELT BAD OR GUILTY ABOUT YOUR DRINKING: NO
HAVE PEOPLE ANNOYED YOU BY CRITICIZING YOUR DRINKING: NO
TOTAL SCORE: 0
HAVE YOU EVER FELT YOU SHOULD CUT DOWN ON YOUR DRINKING: NO

## 2025-08-29 ASSESSMENT — PAIN DESCRIPTION - LOCATION: LOCATION: NECK

## 2025-08-29 ASSESSMENT — PAIN - FUNCTIONAL ASSESSMENT: PAIN_FUNCTIONAL_ASSESSMENT: 0-10

## 2025-08-29 ASSESSMENT — PAIN DESCRIPTION - PAIN TYPE: TYPE: ACUTE PAIN

## 2025-09-01 ENCOUNTER — HOSPITAL ENCOUNTER (EMERGENCY)
Facility: HOSPITAL | Age: 65
Discharge: HOME | End: 2025-09-01
Attending: STUDENT IN AN ORGANIZED HEALTH CARE EDUCATION/TRAINING PROGRAM
Payer: MEDICARE

## 2025-09-01 VITALS
HEART RATE: 85 BPM | WEIGHT: 250 LBS | OXYGEN SATURATION: 96 % | RESPIRATION RATE: 20 BRPM | BODY MASS INDEX: 39.24 KG/M2 | TEMPERATURE: 98.2 F | SYSTOLIC BLOOD PRESSURE: 127 MMHG | HEIGHT: 67 IN | DIASTOLIC BLOOD PRESSURE: 96 MMHG

## 2025-09-01 DIAGNOSIS — K11.8 PAROTID MASS: Primary | ICD-10-CM

## 2025-09-01 DIAGNOSIS — R11.0 NAUSEA: ICD-10-CM

## 2025-09-01 PROCEDURE — 99284 EMERGENCY DEPT VISIT MOD MDM: CPT | Mod: 25 | Performed by: STUDENT IN AN ORGANIZED HEALTH CARE EDUCATION/TRAINING PROGRAM

## 2025-09-01 PROCEDURE — 96375 TX/PRO/DX INJ NEW DRUG ADDON: CPT

## 2025-09-01 PROCEDURE — 2500000004 HC RX 250 GENERAL PHARMACY W/ HCPCS (ALT 636 FOR OP/ED): Performed by: STUDENT IN AN ORGANIZED HEALTH CARE EDUCATION/TRAINING PROGRAM

## 2025-09-01 PROCEDURE — 96374 THER/PROPH/DIAG INJ IV PUSH: CPT

## 2025-09-01 RX ORDER — HYDROMORPHONE HYDROCHLORIDE 1 MG/ML
1 INJECTION, SOLUTION INTRAMUSCULAR; INTRAVENOUS; SUBCUTANEOUS ONCE
Status: DISCONTINUED | OUTPATIENT
Start: 2025-09-01 | End: 2025-09-01

## 2025-09-01 RX ORDER — HYDROMORPHONE HYDROCHLORIDE 1 MG/ML
1 INJECTION, SOLUTION INTRAMUSCULAR; INTRAVENOUS; SUBCUTANEOUS ONCE
Status: COMPLETED | OUTPATIENT
Start: 2025-09-01 | End: 2025-09-01

## 2025-09-01 RX ORDER — ONDANSETRON HYDROCHLORIDE 2 MG/ML
4 INJECTION, SOLUTION INTRAVENOUS ONCE
Status: COMPLETED | OUTPATIENT
Start: 2025-09-01 | End: 2025-09-01

## 2025-09-01 RX ORDER — ONDANSETRON 4 MG/1
4 TABLET, FILM COATED ORAL EVERY 8 HOURS PRN
Qty: 9 TABLET | Refills: 0 | Status: SHIPPED | OUTPATIENT
Start: 2025-09-01 | End: 2025-09-04

## 2025-09-01 RX ORDER — ONDANSETRON 4 MG/1
4 TABLET, ORALLY DISINTEGRATING ORAL ONCE
Status: DISCONTINUED | OUTPATIENT
Start: 2025-09-01 | End: 2025-09-01

## 2025-09-01 RX ADMIN — ONDANSETRON 4 MG: 2 INJECTION INTRAMUSCULAR; INTRAVENOUS at 15:45

## 2025-09-01 RX ADMIN — HYDROMORPHONE HYDROCHLORIDE 1 MG: 1 INJECTION, SOLUTION INTRAMUSCULAR; INTRAVENOUS; SUBCUTANEOUS at 15:45

## 2025-09-01 ASSESSMENT — PAIN DESCRIPTION - ORIENTATION: ORIENTATION: LEFT

## 2025-09-01 ASSESSMENT — ENCOUNTER SYMPTOMS
PALPITATIONS: 0
IRREGULAR HEARTBEAT: 0
BLURRED VISION: 0
LIGHT-HEADEDNESS: 0
SYNCOPE: 0
DECREASED APPETITE: 0
SHORTNESS OF BREATH: 0
COUGH: 0
ORTHOPNEA: 0
DYSPNEA ON EXERTION: 1
CONSTITUTIONAL NEGATIVE: 1
FOCAL WEAKNESS: 0
GASTROINTESTINAL NEGATIVE: 1
DEPRESSION: 0
MEMORY LOSS: 0
FALLS: 0

## 2025-09-01 ASSESSMENT — PAIN SCALES - GENERAL: PAINLEVEL_OUTOF10: 7

## 2025-09-01 ASSESSMENT — LIFESTYLE VARIABLES
EVER HAD A DRINK FIRST THING IN THE MORNING TO STEADY YOUR NERVES TO GET RID OF A HANGOVER: NO
HAVE PEOPLE ANNOYED YOU BY CRITICIZING YOUR DRINKING: NO
EVER FELT BAD OR GUILTY ABOUT YOUR DRINKING: NO
TOTAL SCORE: 0
HAVE YOU EVER FELT YOU SHOULD CUT DOWN ON YOUR DRINKING: NO

## 2025-09-01 ASSESSMENT — PAIN DESCRIPTION - PAIN TYPE: TYPE: ACUTE PAIN

## 2025-09-01 ASSESSMENT — PAIN - FUNCTIONAL ASSESSMENT: PAIN_FUNCTIONAL_ASSESSMENT: 0-10

## 2025-09-01 ASSESSMENT — PAIN DESCRIPTION - LOCATION: LOCATION: NECK

## 2025-09-04 ENCOUNTER — APPOINTMENT (OUTPATIENT)
Dept: CARDIOLOGY | Facility: HOSPITAL | Age: 65
End: 2025-09-04
Payer: MEDICARE

## 2025-09-05 ENCOUNTER — PATIENT OUTREACH (OUTPATIENT)
Dept: CARE COORDINATION | Facility: CLINIC | Age: 65
End: 2025-09-05
Payer: MEDICARE

## 2025-09-05 ENCOUNTER — APPOINTMENT (OUTPATIENT)
Dept: OTOLARYNGOLOGY | Facility: HOSPITAL | Age: 65
End: 2025-09-05
Payer: MEDICARE

## 2025-09-08 ENCOUNTER — APPOINTMENT (OUTPATIENT)
Dept: PRIMARY CARE | Facility: CLINIC | Age: 65
End: 2025-09-08
Payer: MEDICARE

## 2025-12-09 ENCOUNTER — APPOINTMENT (OUTPATIENT)
Dept: CARDIOLOGY | Facility: HOSPITAL | Age: 65
End: 2025-12-09
Payer: MEDICARE